# Patient Record
Sex: MALE | Race: WHITE | Employment: FULL TIME | URBAN - NONMETROPOLITAN AREA
[De-identification: names, ages, dates, MRNs, and addresses within clinical notes are randomized per-mention and may not be internally consistent; named-entity substitution may affect disease eponyms.]

---

## 2017-01-26 ENCOUNTER — OFFICE VISIT (OUTPATIENT)
Dept: FAMILY MEDICINE CLINIC | Age: 62
End: 2017-01-26

## 2017-01-26 VITALS
HEIGHT: 72 IN | HEART RATE: 99 BPM | DIASTOLIC BLOOD PRESSURE: 82 MMHG | SYSTOLIC BLOOD PRESSURE: 130 MMHG | BODY MASS INDEX: 38.03 KG/M2 | OXYGEN SATURATION: 96 % | WEIGHT: 280.8 LBS

## 2017-01-26 DIAGNOSIS — N52.9 ERECTILE DYSFUNCTION, UNSPECIFIED ERECTILE DYSFUNCTION TYPE: ICD-10-CM

## 2017-01-26 DIAGNOSIS — Z87.898 HISTORY OF PREDIABETES: ICD-10-CM

## 2017-01-26 DIAGNOSIS — K44.9 GASTROESOPHAGEAL REFLUX DISEASE WITH HIATAL HERNIA: Primary | ICD-10-CM

## 2017-01-26 DIAGNOSIS — R73.9 HYPERGLYCEMIA: ICD-10-CM

## 2017-01-26 DIAGNOSIS — E66.9 NON MORBID OBESITY, UNSPECIFIED OBESITY TYPE: ICD-10-CM

## 2017-01-26 DIAGNOSIS — E29.1 HYPOGONADISM IN MALE: ICD-10-CM

## 2017-01-26 DIAGNOSIS — R35.0 URINARY FREQUENCY: ICD-10-CM

## 2017-01-26 DIAGNOSIS — L30.1 DYSHIDROTIC ECZEMA: ICD-10-CM

## 2017-01-26 DIAGNOSIS — Z11.59 NEED FOR HEPATITIS C SCREENING TEST: ICD-10-CM

## 2017-01-26 DIAGNOSIS — K21.9 GASTROESOPHAGEAL REFLUX DISEASE WITH HIATAL HERNIA: Primary | ICD-10-CM

## 2017-01-26 LAB
BILIRUBIN URINE: NEGATIVE
BLOOD, URINE: NEGATIVE
CLARITY: CLEAR
COLOR: YELLOW
GLUCOSE URINE: NEGATIVE MG/DL
HBA1C MFR BLD: 5.5 % (ref 4.8–5.9)
HEPATITIS C ANTIBODY INTERPRETATION: NORMAL
KETONES, URINE: NEGATIVE MG/DL
LEUKOCYTE ESTERASE, URINE: NEGATIVE
NITRITE, URINE: NEGATIVE
PH UA: 7.5 (ref 5–9)
PROTEIN UA: NEGATIVE MG/DL
SPECIFIC GRAVITY UA: 1.01 (ref 1–1.03)
UROBILINOGEN, URINE: 0.2 E.U./DL

## 2017-01-26 PROCEDURE — 99214 OFFICE O/P EST MOD 30 MIN: CPT | Performed by: FAMILY MEDICINE

## 2017-01-26 ASSESSMENT — ENCOUNTER SYMPTOMS
SORE THROAT: 0
NAUSEA: 0
HEARTBURN: 0
COUGH: 0
ABDOMINAL PAIN: 0
SHORTNESS OF BREATH: 0

## 2017-01-28 LAB — TESTOSTERONE TOTAL-MALE: 325 NG/DL (ref 300–720)

## 2017-01-31 ENCOUNTER — OFFICE VISIT (OUTPATIENT)
Dept: FAMILY MEDICINE CLINIC | Age: 62
End: 2017-01-31

## 2017-01-31 VITALS
HEIGHT: 72 IN | SYSTOLIC BLOOD PRESSURE: 142 MMHG | HEART RATE: 99 BPM | WEIGHT: 284 LBS | OXYGEN SATURATION: 96 % | BODY MASS INDEX: 38.47 KG/M2 | DIASTOLIC BLOOD PRESSURE: 88 MMHG

## 2017-01-31 DIAGNOSIS — B99.9 LOCALIZED INFECTION: Primary | ICD-10-CM

## 2017-01-31 DIAGNOSIS — L30.1 DYSHIDROTIC ECZEMA: ICD-10-CM

## 2017-01-31 PROCEDURE — 99213 OFFICE O/P EST LOW 20 MIN: CPT | Performed by: FAMILY MEDICINE

## 2017-01-31 RX ORDER — UREA 40 %
CREAM (GRAM) TOPICAL
Qty: 198.4 G | Refills: 5 | Status: SHIPPED | OUTPATIENT
Start: 2017-01-31 | End: 2017-09-07

## 2017-01-31 ASSESSMENT — ENCOUNTER SYMPTOMS
SHORTNESS OF BREATH: 0
ABDOMINAL PAIN: 0

## 2017-02-09 ENCOUNTER — OFFICE VISIT (OUTPATIENT)
Dept: FAMILY MEDICINE CLINIC | Age: 62
End: 2017-02-09

## 2017-02-09 VITALS
HEIGHT: 72 IN | OXYGEN SATURATION: 96 % | WEIGHT: 280 LBS | SYSTOLIC BLOOD PRESSURE: 104 MMHG | BODY MASS INDEX: 37.93 KG/M2 | HEART RATE: 96 BPM | DIASTOLIC BLOOD PRESSURE: 80 MMHG

## 2017-02-09 DIAGNOSIS — L30.1 DYSHIDROTIC ECZEMA: Primary | ICD-10-CM

## 2017-02-09 DIAGNOSIS — N52.9 ERECTILE DYSFUNCTION, UNSPECIFIED ERECTILE DYSFUNCTION TYPE: ICD-10-CM

## 2017-02-09 PROCEDURE — 99213 OFFICE O/P EST LOW 20 MIN: CPT | Performed by: FAMILY MEDICINE

## 2017-02-09 RX ORDER — PREDNISONE 10 MG/1
TABLET ORAL
Qty: 40 TABLET | Refills: 0 | Status: SHIPPED | OUTPATIENT
Start: 2017-02-09 | End: 2017-03-02

## 2017-02-09 RX ORDER — MUPIROCIN CALCIUM 20 MG/G
CREAM TOPICAL
Qty: 30 G | Refills: 1 | Status: SHIPPED | OUTPATIENT
Start: 2017-02-09 | End: 2017-05-04 | Stop reason: SDUPTHER

## 2017-02-09 ASSESSMENT — ENCOUNTER SYMPTOMS
VOMITING: 0
SHORTNESS OF BREATH: 0
SORE THROAT: 0
COUGH: 0
RHINORRHEA: 0
DIARRHEA: 0

## 2017-02-14 ENCOUNTER — TELEPHONE (OUTPATIENT)
Dept: FAMILY MEDICINE CLINIC | Age: 62
End: 2017-02-14

## 2017-03-02 ENCOUNTER — OFFICE VISIT (OUTPATIENT)
Dept: FAMILY MEDICINE CLINIC | Age: 62
End: 2017-03-02

## 2017-03-02 VITALS
WEIGHT: 273.4 LBS | BODY MASS INDEX: 37.03 KG/M2 | DIASTOLIC BLOOD PRESSURE: 78 MMHG | OXYGEN SATURATION: 95 % | SYSTOLIC BLOOD PRESSURE: 118 MMHG | HEIGHT: 72 IN | HEART RATE: 52 BPM

## 2017-03-02 DIAGNOSIS — E66.9 NON MORBID OBESITY, UNSPECIFIED OBESITY TYPE: ICD-10-CM

## 2017-03-02 DIAGNOSIS — E55.9 VITAMIN D DEFICIENCY: ICD-10-CM

## 2017-03-02 DIAGNOSIS — E29.1 HYPOGONADISM IN MALE: ICD-10-CM

## 2017-03-02 DIAGNOSIS — N52.9 ERECTILE DYSFUNCTION, UNSPECIFIED ERECTILE DYSFUNCTION TYPE: ICD-10-CM

## 2017-03-02 DIAGNOSIS — L30.1 DYSHIDROTIC ECZEMA: Primary | ICD-10-CM

## 2017-03-02 LAB — VITAMIN D 25-HYDROXY: 29.6 NG/ML (ref 30–100)

## 2017-03-02 PROCEDURE — 99214 OFFICE O/P EST MOD 30 MIN: CPT | Performed by: FAMILY MEDICINE

## 2017-03-02 RX ORDER — MULTIVITAMIN WITH IRON
250 TABLET ORAL DAILY
COMMUNITY

## 2017-03-02 RX ORDER — SILDENAFIL 100 MG/1
100 TABLET, FILM COATED ORAL PRN
Qty: 30 TABLET | Refills: 3 | Status: SHIPPED | OUTPATIENT
Start: 2017-03-02 | End: 2017-09-07 | Stop reason: DRUGHIGH

## 2017-03-02 RX ORDER — MULTIVIT-MIN/FOLIC/VIT K/LYCOP 400-300MCG
TABLET ORAL
COMMUNITY

## 2017-03-02 ASSESSMENT — ENCOUNTER SYMPTOMS
RHINORRHEA: 0
SHORTNESS OF BREATH: 0
COUGH: 0
VOMITING: 0
DIARRHEA: 0
SORE THROAT: 0

## 2017-05-04 ENCOUNTER — OFFICE VISIT (OUTPATIENT)
Dept: FAMILY MEDICINE CLINIC | Age: 62
End: 2017-05-04

## 2017-05-04 VITALS
BODY MASS INDEX: 37.25 KG/M2 | HEART RATE: 74 BPM | SYSTOLIC BLOOD PRESSURE: 144 MMHG | WEIGHT: 275 LBS | HEIGHT: 72 IN | OXYGEN SATURATION: 97 % | DIASTOLIC BLOOD PRESSURE: 92 MMHG

## 2017-05-04 DIAGNOSIS — R73.9 HYPERGLYCEMIA: ICD-10-CM

## 2017-05-04 DIAGNOSIS — N52.9 ERECTILE DYSFUNCTION, UNSPECIFIED ERECTILE DYSFUNCTION TYPE: ICD-10-CM

## 2017-05-04 DIAGNOSIS — K44.9 GASTROESOPHAGEAL REFLUX DISEASE WITH HIATAL HERNIA: ICD-10-CM

## 2017-05-04 DIAGNOSIS — Z12.5 PROSTATE CANCER SCREENING: ICD-10-CM

## 2017-05-04 DIAGNOSIS — Z00.00 WELLNESS EXAMINATION: ICD-10-CM

## 2017-05-04 DIAGNOSIS — E66.9 NON MORBID OBESITY, UNSPECIFIED OBESITY TYPE: ICD-10-CM

## 2017-05-04 DIAGNOSIS — L30.1 DYSHIDROTIC ECZEMA: Primary | ICD-10-CM

## 2017-05-04 DIAGNOSIS — Z79.899 HIGH RISK MEDICATION USE: ICD-10-CM

## 2017-05-04 DIAGNOSIS — E55.9 VITAMIN D DEFICIENCY: ICD-10-CM

## 2017-05-04 DIAGNOSIS — K21.9 GASTROESOPHAGEAL REFLUX DISEASE WITH HIATAL HERNIA: ICD-10-CM

## 2017-05-04 DIAGNOSIS — R03.0 ELEVATED BLOOD PRESSURE READING WITHOUT DIAGNOSIS OF HYPERTENSION: ICD-10-CM

## 2017-05-04 PROCEDURE — 99214 OFFICE O/P EST MOD 30 MIN: CPT | Performed by: FAMILY MEDICINE

## 2017-05-04 PROCEDURE — 96372 THER/PROPH/DIAG INJ SC/IM: CPT | Performed by: FAMILY MEDICINE

## 2017-05-04 RX ORDER — PANTOPRAZOLE SODIUM 40 MG/1
40 TABLET, DELAYED RELEASE ORAL DAILY
Qty: 90 TABLET | Refills: 3 | Status: SHIPPED | OUTPATIENT
Start: 2017-05-04 | End: 2018-09-18 | Stop reason: ALTCHOICE

## 2017-05-04 RX ORDER — PREDNISONE 10 MG/1
TABLET ORAL
Qty: 40 TABLET | Refills: 0 | Status: SHIPPED | OUTPATIENT
Start: 2017-05-04 | End: 2017-07-14 | Stop reason: ALTCHOICE

## 2017-05-04 ASSESSMENT — ENCOUNTER SYMPTOMS
RHINORRHEA: 0
SORE THROAT: 0
VOMITING: 0
DIARRHEA: 0
SHORTNESS OF BREATH: 0
COUGH: 0

## 2017-05-05 RX ORDER — MUPIROCIN CALCIUM 20 MG/G
CREAM TOPICAL
Qty: 30 G | Refills: 1 | Status: SHIPPED | OUTPATIENT
Start: 2017-05-05 | End: 2017-06-03

## 2017-05-05 RX ORDER — TRIAMCINOLONE ACETONIDE 1 MG/G
CREAM TOPICAL
Qty: 454 G | Refills: 1 | Status: SHIPPED | OUTPATIENT
Start: 2017-05-05 | End: 2017-09-07 | Stop reason: SDUPTHER

## 2017-05-05 RX ORDER — TRIAMCINOLONE ACETONIDE 40 MG/ML
80 INJECTION, SUSPENSION INTRA-ARTICULAR; INTRAMUSCULAR ONCE
Status: COMPLETED | OUTPATIENT
Start: 2017-05-05 | End: 2017-05-05

## 2017-05-05 RX ADMIN — TRIAMCINOLONE ACETONIDE 80 MG: 40 INJECTION, SUSPENSION INTRA-ARTICULAR; INTRAMUSCULAR at 08:06

## 2017-06-02 DIAGNOSIS — E55.9 VITAMIN D DEFICIENCY: ICD-10-CM

## 2017-06-02 DIAGNOSIS — E29.1 HYPOGONADISM IN MALE: ICD-10-CM

## 2017-06-02 DIAGNOSIS — N52.9 ERECTILE DYSFUNCTION, UNSPECIFIED ERECTILE DYSFUNCTION TYPE: ICD-10-CM

## 2017-06-02 LAB — VITAMIN D 25-HYDROXY: 32.5 NG/ML (ref 30–100)

## 2017-06-05 LAB — TESTOSTERONE TOTAL-MALE: 297 NG/DL (ref 300–720)

## 2017-07-06 DIAGNOSIS — E55.9 VITAMIN D DEFICIENCY: Primary | ICD-10-CM

## 2017-07-06 DIAGNOSIS — N52.9 ERECTILE DYSFUNCTION, UNSPECIFIED ERECTILE DYSFUNCTION TYPE: ICD-10-CM

## 2017-07-14 ENCOUNTER — OFFICE VISIT (OUTPATIENT)
Dept: FAMILY MEDICINE CLINIC | Age: 62
End: 2017-07-14

## 2017-07-14 VITALS
OXYGEN SATURATION: 97 % | TEMPERATURE: 97.1 F | BODY MASS INDEX: 36.38 KG/M2 | HEIGHT: 72 IN | WEIGHT: 268.6 LBS | DIASTOLIC BLOOD PRESSURE: 90 MMHG | HEART RATE: 89 BPM | SYSTOLIC BLOOD PRESSURE: 140 MMHG

## 2017-07-14 DIAGNOSIS — M54.42 ACUTE LEFT-SIDED LOW BACK PAIN WITH LEFT-SIDED SCIATICA: Primary | ICD-10-CM

## 2017-07-14 PROCEDURE — 99213 OFFICE O/P EST LOW 20 MIN: CPT | Performed by: NURSE PRACTITIONER

## 2017-07-14 RX ORDER — CYCLOBENZAPRINE HCL 10 MG
10 TABLET ORAL 3 TIMES DAILY PRN
Qty: 15 TABLET | Refills: 0 | Status: SHIPPED | OUTPATIENT
Start: 2017-07-14 | End: 2017-07-24

## 2017-07-14 RX ORDER — METHYLPREDNISOLONE 4 MG/1
TABLET ORAL
Qty: 21 TABLET | Refills: 0 | Status: SHIPPED | OUTPATIENT
Start: 2017-07-14 | End: 2017-07-20

## 2017-07-14 ASSESSMENT — ENCOUNTER SYMPTOMS
SHORTNESS OF BREATH: 0
BACK PAIN: 1
COUGH: 0

## 2017-07-14 ASSESSMENT — PATIENT HEALTH QUESTIONNAIRE - PHQ9
SUM OF ALL RESPONSES TO PHQ QUESTIONS 1-9: 0
1. LITTLE INTEREST OR PLEASURE IN DOING THINGS: 0
2. FEELING DOWN, DEPRESSED OR HOPELESS: 0
SUM OF ALL RESPONSES TO PHQ9 QUESTIONS 1 & 2: 0

## 2017-08-31 DIAGNOSIS — Z12.5 PROSTATE CANCER SCREENING: ICD-10-CM

## 2017-08-31 DIAGNOSIS — Z00.00 WELLNESS EXAMINATION: ICD-10-CM

## 2017-08-31 DIAGNOSIS — Z79.899 HIGH RISK MEDICATION USE: ICD-10-CM

## 2017-08-31 DIAGNOSIS — R03.0 ELEVATED BLOOD PRESSURE READING WITHOUT DIAGNOSIS OF HYPERTENSION: ICD-10-CM

## 2017-08-31 DIAGNOSIS — E55.9 VITAMIN D DEFICIENCY: ICD-10-CM

## 2017-08-31 DIAGNOSIS — N52.9 ERECTILE DYSFUNCTION, UNSPECIFIED ERECTILE DYSFUNCTION TYPE: ICD-10-CM

## 2017-08-31 DIAGNOSIS — E66.9 NON MORBID OBESITY, UNSPECIFIED OBESITY TYPE: ICD-10-CM

## 2017-08-31 LAB
ALT SERPL-CCNC: 23 U/L (ref 0–41)
ANION GAP SERPL CALCULATED.3IONS-SCNC: 15 MEQ/L (ref 7–13)
AST SERPL-CCNC: 16 U/L (ref 0–40)
BASOPHILS ABSOLUTE: 0.1 K/UL (ref 0–0.2)
BASOPHILS RELATIVE PERCENT: 1.2 %
BUN BLDV-MCNC: 17 MG/DL (ref 8–23)
CALCIUM SERPL-MCNC: 9.3 MG/DL (ref 8.6–10.2)
CHLORIDE BLD-SCNC: 101 MEQ/L (ref 98–107)
CHOLESTEROL, TOTAL: 181 MG/DL (ref 0–199)
CO2: 25 MEQ/L (ref 22–29)
CREAT SERPL-MCNC: 0.67 MG/DL (ref 0.7–1.2)
EOSINOPHILS ABSOLUTE: 0.2 K/UL (ref 0–0.7)
EOSINOPHILS RELATIVE PERCENT: 3.2 %
GFR AFRICAN AMERICAN: >60
GFR NON-AFRICAN AMERICAN: >60
GLUCOSE BLD-MCNC: 99 MG/DL (ref 74–109)
HBA1C MFR BLD: 5.2 % (ref 4.8–5.9)
HCT VFR BLD CALC: 46.1 % (ref 42–52)
HDLC SERPL-MCNC: 39 MG/DL (ref 40–59)
HEMOGLOBIN: 15.5 G/DL (ref 14–18)
LDL CHOLESTEROL CALCULATED: 119 MG/DL (ref 0–129)
LYMPHOCYTES ABSOLUTE: 1.2 K/UL (ref 1–4.8)
LYMPHOCYTES RELATIVE PERCENT: 18.1 %
MAGNESIUM: 2.2 MG/DL (ref 1.7–2.3)
MCH RBC QN AUTO: 29.6 PG (ref 27–31.3)
MCHC RBC AUTO-ENTMCNC: 33.6 % (ref 33–37)
MCV RBC AUTO: 88.1 FL (ref 80–100)
MONOCYTES ABSOLUTE: 0.6 K/UL (ref 0.2–0.8)
MONOCYTES RELATIVE PERCENT: 8.7 %
NEUTROPHILS ABSOLUTE: 4.4 K/UL (ref 1.4–6.5)
NEUTROPHILS RELATIVE PERCENT: 68.8 %
PDW BLD-RTO: 13.5 % (ref 11.5–14.5)
PLATELET # BLD: 194 K/UL (ref 130–400)
POTASSIUM SERPL-SCNC: 4.4 MEQ/L (ref 3.5–5.1)
PROSTATE SPECIFIC ANTIGEN: 0.73 NG/ML (ref 0–5.4)
RBC # BLD: 5.23 M/UL (ref 4.7–6.1)
SODIUM BLD-SCNC: 141 MEQ/L (ref 132–144)
TRIGL SERPL-MCNC: 117 MG/DL (ref 0–200)
TSH SERPL DL<=0.05 MIU/L-ACNC: 2.26 UIU/ML (ref 0.27–4.2)
VITAMIN D 25-HYDROXY: 31.8 NG/ML (ref 30–100)
WBC # BLD: 6.4 K/UL (ref 4.8–10.8)

## 2017-09-02 LAB — TESTOSTERONE TOTAL-MALE: 318 NG/DL (ref 300–720)

## 2017-09-07 ENCOUNTER — OFFICE VISIT (OUTPATIENT)
Dept: FAMILY MEDICINE CLINIC | Age: 62
End: 2017-09-07

## 2017-09-07 VITALS
HEIGHT: 72 IN | HEART RATE: 81 BPM | WEIGHT: 277 LBS | OXYGEN SATURATION: 97 % | DIASTOLIC BLOOD PRESSURE: 90 MMHG | BODY MASS INDEX: 37.52 KG/M2 | SYSTOLIC BLOOD PRESSURE: 142 MMHG

## 2017-09-07 DIAGNOSIS — Z12.11 COLON CANCER SCREENING: ICD-10-CM

## 2017-09-07 DIAGNOSIS — L30.1 DYSHIDROTIC ECZEMA: Primary | ICD-10-CM

## 2017-09-07 DIAGNOSIS — N52.9 ERECTILE DYSFUNCTION, UNSPECIFIED ERECTILE DYSFUNCTION TYPE: ICD-10-CM

## 2017-09-07 DIAGNOSIS — E29.1 HYPOGONADISM IN MALE: ICD-10-CM

## 2017-09-07 DIAGNOSIS — E66.9 OBESITY (BMI 30-39.9): ICD-10-CM

## 2017-09-07 PROCEDURE — 99214 OFFICE O/P EST MOD 30 MIN: CPT | Performed by: FAMILY MEDICINE

## 2017-09-07 RX ORDER — TRIAMCINOLONE ACETONIDE 1 MG/G
CREAM TOPICAL
Qty: 454 G | Refills: 1 | Status: SHIPPED | OUTPATIENT
Start: 2017-09-07 | End: 2018-09-18

## 2017-09-07 RX ORDER — SILDENAFIL 100 MG/1
100 TABLET, FILM COATED ORAL PRN
Qty: 30 TABLET | Refills: 3 | Status: SHIPPED | OUTPATIENT
Start: 2017-09-07 | End: 2018-05-01

## 2017-09-07 ASSESSMENT — ENCOUNTER SYMPTOMS
SORE THROAT: 0
COUGH: 0
RHINORRHEA: 0
VOMITING: 0
SHORTNESS OF BREATH: 0
DIARRHEA: 0

## 2017-09-15 ENCOUNTER — HOSPITAL ENCOUNTER (OUTPATIENT)
Age: 62
Setting detail: OUTPATIENT SURGERY
Discharge: HOME | End: 2017-09-15
Attending: INTERNAL MEDICINE | Admitting: INTERNAL MEDICINE
Payer: COMMERCIAL

## 2017-09-15 ENCOUNTER — OFFICE VISIT (OUTPATIENT)
Dept: GASTROENTEROLOGY | Age: 62
End: 2017-09-15

## 2017-09-15 ENCOUNTER — ANESTHESIA EVENT (OUTPATIENT)
Dept: ENDOSCOPY | Age: 62
End: 2017-09-15
Payer: COMMERCIAL

## 2017-09-15 ENCOUNTER — ANESTHESIA (OUTPATIENT)
Dept: ENDOSCOPY | Age: 62
End: 2017-09-15
Payer: COMMERCIAL

## 2017-09-15 VITALS
SYSTOLIC BLOOD PRESSURE: 144 MMHG | BODY MASS INDEX: 35.89 KG/M2 | HEART RATE: 86 BPM | HEIGHT: 72 IN | RESPIRATION RATE: 16 BRPM | DIASTOLIC BLOOD PRESSURE: 69 MMHG | OXYGEN SATURATION: 97 % | WEIGHT: 265 LBS | TEMPERATURE: 97.5 F

## 2017-09-15 VITALS
DIASTOLIC BLOOD PRESSURE: 98 MMHG | SYSTOLIC BLOOD PRESSURE: 154 MMHG | WEIGHT: 265 LBS | BODY MASS INDEX: 35.94 KG/M2 | HEART RATE: 90 BPM

## 2017-09-15 VITALS
RESPIRATION RATE: 13 BRPM | SYSTOLIC BLOOD PRESSURE: 125 MMHG | OXYGEN SATURATION: 94 % | DIASTOLIC BLOOD PRESSURE: 72 MMHG

## 2017-09-15 DIAGNOSIS — Z79.899 ENCOUNTER FOR LONG-TERM (CURRENT) USE OF MEDICATIONS: ICD-10-CM

## 2017-09-15 DIAGNOSIS — R13.19 OTHER DYSPHAGIA: ICD-10-CM

## 2017-09-15 DIAGNOSIS — R12 HEARTBURN: Primary | ICD-10-CM

## 2017-09-15 DIAGNOSIS — Z12.11 SPECIAL SCREENING FOR MALIGNANT NEOPLASMS, COLON: ICD-10-CM

## 2017-09-15 PROCEDURE — 3700000001 HC ADD 15 MINUTES (ANESTHESIA): Performed by: INTERNAL MEDICINE

## 2017-09-15 PROCEDURE — 3609027000 HC COLONOSCOPY: Performed by: INTERNAL MEDICINE

## 2017-09-15 PROCEDURE — 2500000003 HC RX 250 WO HCPCS: Performed by: NURSE ANESTHETIST, CERTIFIED REGISTERED

## 2017-09-15 PROCEDURE — 7100000010 HC PHASE II RECOVERY - FIRST 15 MIN: Performed by: INTERNAL MEDICINE

## 2017-09-15 PROCEDURE — 6360000002 HC RX W HCPCS: Performed by: NURSE ANESTHETIST, CERTIFIED REGISTERED

## 2017-09-15 PROCEDURE — 2580000003 HC RX 258: Performed by: INTERNAL MEDICINE

## 2017-09-15 PROCEDURE — 88305 TISSUE EXAM BY PATHOLOGIST: CPT

## 2017-09-15 PROCEDURE — 88313 SPECIAL STAINS GROUP 2: CPT

## 2017-09-15 PROCEDURE — 3700000000 HC ANESTHESIA ATTENDED CARE: Performed by: INTERNAL MEDICINE

## 2017-09-15 PROCEDURE — 3609012700 HC EGD DILATION SAVORY: Performed by: INTERNAL MEDICINE

## 2017-09-15 RX ORDER — LIDOCAINE HYDROCHLORIDE 10 MG/ML
1 INJECTION, SOLUTION EPIDURAL; INFILTRATION; INTRACAUDAL; PERINEURAL
Status: DISCONTINUED | OUTPATIENT
Start: 2017-09-15 | End: 2017-09-15 | Stop reason: HOSPADM

## 2017-09-15 RX ORDER — SODIUM CHLORIDE 0.9 % (FLUSH) 0.9 %
10 SYRINGE (ML) INJECTION PRN
Status: DISCONTINUED | OUTPATIENT
Start: 2017-09-15 | End: 2017-09-15 | Stop reason: HOSPADM

## 2017-09-15 RX ORDER — PROPOFOL 10 MG/ML
INJECTION, EMULSION INTRAVENOUS PRN
Status: DISCONTINUED | OUTPATIENT
Start: 2017-09-15 | End: 2017-09-15 | Stop reason: SDUPTHER

## 2017-09-15 RX ORDER — SODIUM CHLORIDE 0.9 % (FLUSH) 0.9 %
10 SYRINGE (ML) INJECTION EVERY 12 HOURS SCHEDULED
Status: DISCONTINUED | OUTPATIENT
Start: 2017-09-15 | End: 2017-09-15 | Stop reason: HOSPADM

## 2017-09-15 RX ORDER — ONDANSETRON 2 MG/ML
4 INJECTION INTRAMUSCULAR; INTRAVENOUS
Status: DISCONTINUED | OUTPATIENT
Start: 2017-09-15 | End: 2017-09-15 | Stop reason: HOSPADM

## 2017-09-15 RX ORDER — SODIUM CHLORIDE 9 MG/ML
INJECTION, SOLUTION INTRAVENOUS CONTINUOUS
Status: DISCONTINUED | OUTPATIENT
Start: 2017-09-15 | End: 2017-09-15 | Stop reason: SDUPTHER

## 2017-09-15 RX ORDER — SODIUM CHLORIDE 9 MG/ML
INJECTION, SOLUTION INTRAVENOUS CONTINUOUS
Status: DISCONTINUED | OUTPATIENT
Start: 2017-09-15 | End: 2017-09-15 | Stop reason: HOSPADM

## 2017-09-15 RX ORDER — GLYCOPYRROLATE 0.2 MG/ML
INJECTION INTRAMUSCULAR; INTRAVENOUS PRN
Status: DISCONTINUED | OUTPATIENT
Start: 2017-09-15 | End: 2017-09-15 | Stop reason: SDUPTHER

## 2017-09-15 RX ORDER — LIDOCAINE HYDROCHLORIDE 20 MG/ML
INJECTION, SOLUTION INFILTRATION; PERINEURAL PRN
Status: DISCONTINUED | OUTPATIENT
Start: 2017-09-15 | End: 2017-09-15 | Stop reason: SDUPTHER

## 2017-09-15 RX ADMIN — SODIUM CHLORIDE: 900 INJECTION, SOLUTION INTRAVENOUS at 12:11

## 2017-09-15 RX ADMIN — PROPOFOL 40 MG: 10 INJECTION, EMULSION INTRAVENOUS at 11:53

## 2017-09-15 RX ADMIN — PROPOFOL 10 MG: 10 INJECTION, EMULSION INTRAVENOUS at 11:54

## 2017-09-15 RX ADMIN — PROPOFOL 20 MG: 10 INJECTION, EMULSION INTRAVENOUS at 12:04

## 2017-09-15 RX ADMIN — PROPOFOL 20 MG: 10 INJECTION, EMULSION INTRAVENOUS at 12:07

## 2017-09-15 RX ADMIN — GLYCOPYRROLATE 0.2 MG: 0.2 INJECTION INTRAMUSCULAR; INTRAVENOUS at 12:11

## 2017-09-15 RX ADMIN — PROPOFOL 20 MG: 10 INJECTION, EMULSION INTRAVENOUS at 12:08

## 2017-09-15 RX ADMIN — PROPOFOL 20 MG: 10 INJECTION, EMULSION INTRAVENOUS at 12:09

## 2017-09-15 RX ADMIN — PROPOFOL 20 MG: 10 INJECTION, EMULSION INTRAVENOUS at 11:55

## 2017-09-15 RX ADMIN — PROPOFOL 20 MG: 10 INJECTION, EMULSION INTRAVENOUS at 12:11

## 2017-09-15 RX ADMIN — PROPOFOL 20 MG: 10 INJECTION, EMULSION INTRAVENOUS at 11:59

## 2017-09-15 RX ADMIN — PROPOFOL 20 MG: 10 INJECTION, EMULSION INTRAVENOUS at 11:56

## 2017-09-15 RX ADMIN — LIDOCAINE HYDROCHLORIDE 40 MG: 20 INJECTION, SOLUTION INFILTRATION; PERINEURAL at 11:53

## 2017-09-15 RX ADMIN — PROPOFOL 20 MG: 10 INJECTION, EMULSION INTRAVENOUS at 12:06

## 2017-09-15 RX ADMIN — PROPOFOL 10 MG: 10 INJECTION, EMULSION INTRAVENOUS at 12:01

## 2017-09-15 RX ADMIN — PROPOFOL 20 MG: 10 INJECTION, EMULSION INTRAVENOUS at 12:00

## 2017-09-15 RX ADMIN — PROPOFOL 20 MG: 10 INJECTION, EMULSION INTRAVENOUS at 11:57

## 2017-09-15 RX ADMIN — PROPOFOL 10 MG: 10 INJECTION, EMULSION INTRAVENOUS at 12:02

## 2017-09-15 RX ADMIN — PROPOFOL 20 MG: 10 INJECTION, EMULSION INTRAVENOUS at 12:05

## 2017-09-15 RX ADMIN — PROPOFOL 20 MG: 10 INJECTION, EMULSION INTRAVENOUS at 12:15

## 2017-09-15 RX ADMIN — PROPOFOL 20 MG: 10 INJECTION, EMULSION INTRAVENOUS at 12:03

## 2017-09-15 RX ADMIN — PROPOFOL 20 MG: 10 INJECTION, EMULSION INTRAVENOUS at 12:13

## 2017-09-15 RX ADMIN — SODIUM CHLORIDE: 900 INJECTION, SOLUTION INTRAVENOUS at 10:52

## 2017-09-15 RX ADMIN — PROPOFOL 20 MG: 10 INJECTION, EMULSION INTRAVENOUS at 11:58

## 2017-09-15 ASSESSMENT — PAIN - FUNCTIONAL ASSESSMENT: PAIN_FUNCTIONAL_ASSESSMENT: 0-10

## 2017-09-28 ENCOUNTER — OFFICE VISIT (OUTPATIENT)
Dept: GASTROENTEROLOGY | Age: 62
End: 2017-09-28

## 2017-09-28 VITALS
BODY MASS INDEX: 37.57 KG/M2 | SYSTOLIC BLOOD PRESSURE: 164 MMHG | DIASTOLIC BLOOD PRESSURE: 91 MMHG | WEIGHT: 277 LBS | HEART RATE: 95 BPM

## 2017-09-28 DIAGNOSIS — R12 HEARTBURN: Primary | ICD-10-CM

## 2017-09-28 DIAGNOSIS — K25.3 ACUTE GASTRIC ULCER WITHOUT HEMORRHAGE OR PERFORATION: ICD-10-CM

## 2017-09-28 DIAGNOSIS — R13.19 OTHER DYSPHAGIA: ICD-10-CM

## 2017-09-28 PROCEDURE — 99212 OFFICE O/P EST SF 10 MIN: CPT | Performed by: INTERNAL MEDICINE

## 2018-03-09 ENCOUNTER — OFFICE VISIT (OUTPATIENT)
Dept: FAMILY MEDICINE CLINIC | Age: 63
End: 2018-03-09
Payer: COMMERCIAL

## 2018-03-09 VITALS
HEART RATE: 79 BPM | BODY MASS INDEX: 37.57 KG/M2 | HEIGHT: 72 IN | DIASTOLIC BLOOD PRESSURE: 90 MMHG | OXYGEN SATURATION: 97 % | WEIGHT: 277.4 LBS | SYSTOLIC BLOOD PRESSURE: 150 MMHG

## 2018-03-09 DIAGNOSIS — N52.9 ERECTILE DYSFUNCTION, UNSPECIFIED ERECTILE DYSFUNCTION TYPE: ICD-10-CM

## 2018-03-09 DIAGNOSIS — E55.9 VITAMIN D DEFICIENCY: ICD-10-CM

## 2018-03-09 DIAGNOSIS — K44.9 GASTROESOPHAGEAL REFLUX DISEASE WITH HIATAL HERNIA: Primary | ICD-10-CM

## 2018-03-09 DIAGNOSIS — E66.9 OBESITY (BMI 30-39.9): ICD-10-CM

## 2018-03-09 DIAGNOSIS — K21.9 GASTROESOPHAGEAL REFLUX DISEASE WITH HIATAL HERNIA: Primary | ICD-10-CM

## 2018-03-09 DIAGNOSIS — L30.1 DYSHIDROTIC ECZEMA: ICD-10-CM

## 2018-03-09 PROCEDURE — 99213 OFFICE O/P EST LOW 20 MIN: CPT | Performed by: FAMILY MEDICINE

## 2018-03-09 RX ORDER — SILDENAFIL CITRATE 20 MG/1
20 TABLET ORAL DAILY
Qty: 30 TABLET | Refills: 0 | Status: SHIPPED | OUTPATIENT
Start: 2018-03-09 | End: 2018-05-01

## 2018-03-09 ASSESSMENT — ENCOUNTER SYMPTOMS
SHORTNESS OF BREATH: 0
ABDOMINAL PAIN: 0

## 2018-03-09 NOTE — PROGRESS NOTES
facility-administered medications for this visit. Objective    Vitals:    03/09/18 1121 03/09/18 1125   BP: (!) 142/90 (!) 150/90   Pulse: 79    SpO2: 97%    Weight: 277 lb 6.4 oz (125.8 kg)    Height: 6' (1.829 m)      Physical Exam   Constitutional: He appears well-developed and well-nourished. HENT:   Head: Normocephalic and atraumatic. Right Ear: Tympanic membrane, external ear and ear canal normal.   Left Ear: Tympanic membrane, external ear and ear canal normal.   Nose: Nose normal.   Mouth/Throat: Uvula is midline, oropharynx is clear and moist and mucous membranes are normal.   Neck: Normal range of motion. Neck supple. Cardiovascular: Normal rate, regular rhythm and normal heart sounds. No murmur heard. Pulmonary/Chest: Effort normal and breath sounds normal. He has no wheezes. Lymphadenopathy:     He has no cervical adenopathy. Skin: Skin is warm and dry. No rash noted. Erythematous scaly patches on hands and feet. Assessment & Plan   1. Gastroesophageal reflux disease with hiatal hernia     2. Dyshidrotic eczema  Crisaborole (EUCRISA) 2 % OINT   3. Vitamin D deficiency     4. Obesity (BMI 30-39.9)     5. Erectile dysfunction, unspecified erectile dysfunction type  sildenafil (REVATIO) 20 MG tablet     Will have him try Eucrisa/samples given and rx sent. He wants to focus on diet, exercise and weight reduction to bring down his BP without medication. He will stop in for a BP and weight check in one month. He declines medication at this time. No orders of the defined types were placed in this encounter. Orders Placed This Encounter   Medications    Crisaborole (EUCRISA) 2 % OINT     Sig: Apply bid to affected areas including hands and feet     Dispense:  60 g     Refill:  3     Dx eczematous dermatitis including hands and feet failing on topical and injection steriods.     sildenafil (REVATIO) 20 MG tablet     Sig: Take 1 tablet by mouth daily     Dispense:  30 tablet

## 2018-04-06 ENCOUNTER — NURSE ONLY (OUTPATIENT)
Dept: FAMILY MEDICINE CLINIC | Age: 63
End: 2018-04-06

## 2018-04-06 ENCOUNTER — TELEPHONE (OUTPATIENT)
Dept: FAMILY MEDICINE CLINIC | Age: 63
End: 2018-04-06

## 2018-04-06 VITALS — DIASTOLIC BLOOD PRESSURE: 72 MMHG | WEIGHT: 268 LBS | BODY MASS INDEX: 36.35 KG/M2 | SYSTOLIC BLOOD PRESSURE: 124 MMHG

## 2018-04-06 VITALS — SYSTOLIC BLOOD PRESSURE: 124 MMHG | DIASTOLIC BLOOD PRESSURE: 72 MMHG | WEIGHT: 268 LBS | BODY MASS INDEX: 36.35 KG/M2

## 2018-05-01 ENCOUNTER — OFFICE VISIT (OUTPATIENT)
Dept: FAMILY MEDICINE CLINIC | Age: 63
End: 2018-05-01
Payer: COMMERCIAL

## 2018-05-01 VITALS
WEIGHT: 266 LBS | HEART RATE: 95 BPM | BODY MASS INDEX: 36.03 KG/M2 | TEMPERATURE: 98.2 F | OXYGEN SATURATION: 97 % | SYSTOLIC BLOOD PRESSURE: 136 MMHG | DIASTOLIC BLOOD PRESSURE: 72 MMHG | HEIGHT: 72 IN

## 2018-05-01 DIAGNOSIS — L30.9 ECZEMA, UNSPECIFIED TYPE: ICD-10-CM

## 2018-05-01 DIAGNOSIS — L03.116 CELLULITIS OF LEFT LOWER EXTREMITY: Primary | ICD-10-CM

## 2018-05-01 PROCEDURE — 99213 OFFICE O/P EST LOW 20 MIN: CPT | Performed by: NURSE PRACTITIONER

## 2018-05-01 RX ORDER — SULFAMETHOXAZOLE AND TRIMETHOPRIM 800; 160 MG/1; MG/1
1 TABLET ORAL 2 TIMES DAILY
Qty: 20 TABLET | Refills: 0 | Status: SHIPPED | OUTPATIENT
Start: 2018-05-01 | End: 2018-05-11

## 2018-05-01 ASSESSMENT — ENCOUNTER SYMPTOMS
SHORTNESS OF BREATH: 0
COUGH: 0
RHINORRHEA: 0
COLOR CHANGE: 1
SORE THROAT: 0

## 2018-09-18 ENCOUNTER — OFFICE VISIT (OUTPATIENT)
Dept: FAMILY MEDICINE CLINIC | Age: 63
End: 2018-09-18
Payer: COMMERCIAL

## 2018-09-18 VITALS
BODY MASS INDEX: 33.72 KG/M2 | HEART RATE: 86 BPM | SYSTOLIC BLOOD PRESSURE: 134 MMHG | WEIGHT: 249 LBS | OXYGEN SATURATION: 98 % | HEIGHT: 72 IN | DIASTOLIC BLOOD PRESSURE: 82 MMHG

## 2018-09-18 DIAGNOSIS — L30.9 ECZEMA, UNSPECIFIED TYPE: Primary | ICD-10-CM

## 2018-09-18 DIAGNOSIS — Z79.899 HIGH RISK MEDICATION USE: ICD-10-CM

## 2018-09-18 DIAGNOSIS — Z12.5 PROSTATE CANCER SCREENING: ICD-10-CM

## 2018-09-18 DIAGNOSIS — Z00.00 WELLNESS EXAMINATION: ICD-10-CM

## 2018-09-18 DIAGNOSIS — Z23 NEED FOR SHINGLES VACCINE: ICD-10-CM

## 2018-09-18 PROCEDURE — 99214 OFFICE O/P EST MOD 30 MIN: CPT | Performed by: FAMILY MEDICINE

## 2018-09-18 RX ORDER — TRIAMCINOLONE ACETONIDE 1 MG/G
CREAM TOPICAL
Qty: 454 G | Refills: 0 | Status: SHIPPED | OUTPATIENT
Start: 2018-09-18 | End: 2019-01-10 | Stop reason: SDUPTHER

## 2018-09-18 ASSESSMENT — PATIENT HEALTH QUESTIONNAIRE - PHQ9
SUM OF ALL RESPONSES TO PHQ QUESTIONS 1-9: 0
SUM OF ALL RESPONSES TO PHQ QUESTIONS 1-9: 0
SUM OF ALL RESPONSES TO PHQ9 QUESTIONS 1 & 2: 0
2. FEELING DOWN, DEPRESSED OR HOPELESS: 0
1. LITTLE INTEREST OR PLEASURE IN DOING THINGS: 0

## 2018-09-18 ASSESSMENT — ENCOUNTER SYMPTOMS
COUGH: 0
SHORTNESS OF BREATH: 0
ABDOMINAL PAIN: 0

## 2018-09-19 DIAGNOSIS — Z00.00 WELLNESS EXAMINATION: ICD-10-CM

## 2018-09-19 DIAGNOSIS — Z12.5 PROSTATE CANCER SCREENING: ICD-10-CM

## 2018-09-19 DIAGNOSIS — Z79.899 HIGH RISK MEDICATION USE: ICD-10-CM

## 2018-09-19 LAB
ALT SERPL-CCNC: 22 U/L (ref 0–41)
ANION GAP SERPL CALCULATED.3IONS-SCNC: 14 MEQ/L (ref 7–13)
AST SERPL-CCNC: 14 U/L (ref 0–40)
BASOPHILS ABSOLUTE: 0.1 K/UL (ref 0–0.2)
BASOPHILS RELATIVE PERCENT: 2.1 %
BUN BLDV-MCNC: 19 MG/DL (ref 8–23)
CALCIUM SERPL-MCNC: 9.5 MG/DL (ref 8.6–10.2)
CHLORIDE BLD-SCNC: 100 MEQ/L (ref 98–107)
CHOLESTEROL, TOTAL: 162 MG/DL (ref 0–199)
CO2: 26 MEQ/L (ref 22–29)
CREAT SERPL-MCNC: 0.74 MG/DL (ref 0.7–1.2)
EOSINOPHILS ABSOLUTE: 0.4 K/UL (ref 0–0.7)
EOSINOPHILS RELATIVE PERCENT: 7.9 %
GFR AFRICAN AMERICAN: >60
GFR NON-AFRICAN AMERICAN: >60
GLUCOSE BLD-MCNC: 71 MG/DL (ref 74–109)
HCT VFR BLD CALC: 45.2 % (ref 42–52)
HDLC SERPL-MCNC: 38 MG/DL (ref 40–59)
HEMOGLOBIN: 15.3 G/DL (ref 14–18)
LDL CHOLESTEROL CALCULATED: 111 MG/DL (ref 0–129)
LYMPHOCYTES ABSOLUTE: 1.1 K/UL (ref 1–4.8)
LYMPHOCYTES RELATIVE PERCENT: 18.7 %
MCH RBC QN AUTO: 29.2 PG (ref 27–31.3)
MCHC RBC AUTO-ENTMCNC: 33.8 % (ref 33–37)
MCV RBC AUTO: 86.6 FL (ref 80–100)
MONOCYTES ABSOLUTE: 0.5 K/UL (ref 0.2–0.8)
MONOCYTES RELATIVE PERCENT: 8.4 %
NEUTROPHILS ABSOLUTE: 3.6 K/UL (ref 1.4–6.5)
NEUTROPHILS RELATIVE PERCENT: 62.9 %
PDW BLD-RTO: 13.9 % (ref 11.5–14.5)
PLATELET # BLD: 201 K/UL (ref 130–400)
POTASSIUM SERPL-SCNC: 4.4 MEQ/L (ref 3.5–5.1)
PROSTATE SPECIFIC ANTIGEN: 0.77 NG/ML (ref 0–5.4)
RBC # BLD: 5.22 M/UL (ref 4.7–6.1)
SODIUM BLD-SCNC: 140 MEQ/L (ref 132–144)
TRIGL SERPL-MCNC: 64 MG/DL (ref 0–200)
WBC # BLD: 5.7 K/UL (ref 4.8–10.8)

## 2019-01-11 RX ORDER — TRIAMCINOLONE ACETONIDE 1 MG/G
CREAM TOPICAL
Qty: 454 G | Refills: 0 | Status: SHIPPED | OUTPATIENT
Start: 2019-01-11 | End: 2020-10-09 | Stop reason: SDUPTHER

## 2019-03-19 ENCOUNTER — OFFICE VISIT (OUTPATIENT)
Dept: FAMILY MEDICINE CLINIC | Age: 64
End: 2019-03-19
Payer: MEDICARE

## 2019-03-19 VITALS
DIASTOLIC BLOOD PRESSURE: 74 MMHG | HEIGHT: 71 IN | TEMPERATURE: 96.5 F | SYSTOLIC BLOOD PRESSURE: 122 MMHG | BODY MASS INDEX: 35.42 KG/M2 | OXYGEN SATURATION: 98 % | WEIGHT: 253 LBS | HEART RATE: 72 BPM

## 2019-03-19 DIAGNOSIS — E55.9 VITAMIN D DEFICIENCY: Primary | ICD-10-CM

## 2019-03-19 DIAGNOSIS — R79.89 LOW TESTOSTERONE: ICD-10-CM

## 2019-03-19 DIAGNOSIS — Z11.4 SCREENING FOR HIV WITHOUT PRESENCE OF RISK FACTORS: ICD-10-CM

## 2019-03-19 DIAGNOSIS — D12.3 ADENOMATOUS POLYP OF TRANSVERSE COLON: ICD-10-CM

## 2019-03-19 DIAGNOSIS — Z13.6 SCREENING FOR AAA (ABDOMINAL AORTIC ANEURYSM): ICD-10-CM

## 2019-03-19 PROCEDURE — 99214 OFFICE O/P EST MOD 30 MIN: CPT | Performed by: FAMILY MEDICINE

## 2019-03-19 RX ORDER — MULTIVIT WITH MINERALS/LUTEIN
1000 TABLET ORAL DAILY
COMMUNITY

## 2019-03-19 ASSESSMENT — ENCOUNTER SYMPTOMS
EYE DISCHARGE: 0
COLOR CHANGE: 0
CONSTIPATION: 0
TROUBLE SWALLOWING: 0
DIARRHEA: 0
VOICE CHANGE: 0
ABDOMINAL DISTENTION: 0
ABDOMINAL PAIN: 0
SHORTNESS OF BREATH: 0
COUGH: 0
NAUSEA: 0

## 2019-03-19 ASSESSMENT — PATIENT HEALTH QUESTIONNAIRE - PHQ9
SUM OF ALL RESPONSES TO PHQ QUESTIONS 1-9: 0
1. LITTLE INTEREST OR PLEASURE IN DOING THINGS: 0
SUM OF ALL RESPONSES TO PHQ9 QUESTIONS 1 & 2: 0
2. FEELING DOWN, DEPRESSED OR HOPELESS: 0
SUM OF ALL RESPONSES TO PHQ QUESTIONS 1-9: 0

## 2019-03-22 DIAGNOSIS — E55.9 VITAMIN D DEFICIENCY: ICD-10-CM

## 2019-03-22 DIAGNOSIS — Z11.4 SCREENING FOR HIV WITHOUT PRESENCE OF RISK FACTORS: ICD-10-CM

## 2019-03-22 DIAGNOSIS — R79.89 LOW TESTOSTERONE: ICD-10-CM

## 2019-03-22 LAB — VITAMIN D 25-HYDROXY: 33.9 NG/ML (ref 30–100)

## 2019-03-25 LAB
SEX HORMONE BINDING GLOBULIN: 30 NMOL/L (ref 11–80)
TESTOSTERONE FREE PERCENT: 1.9 % (ref 1.6–2.9)
TESTOSTERONE FREE, CALC: 55 PG/ML (ref 47–244)
TESTOSTERONE TOTAL-MALE: 292 NG/DL (ref 300–720)

## 2019-03-26 LAB — HIV 1,2 COMBO ANTIGEN/ANTIBODY: NEGATIVE

## 2019-03-29 ENCOUNTER — HOSPITAL ENCOUNTER (OUTPATIENT)
Dept: ULTRASOUND IMAGING | Age: 64
Discharge: HOME | End: 2019-03-31
Payer: MEDICARE

## 2019-03-29 DIAGNOSIS — Z13.6 SCREENING FOR AAA (ABDOMINAL AORTIC ANEURYSM): ICD-10-CM

## 2019-03-29 PROCEDURE — 76706 US ABDL AORTA SCREEN AAA: CPT

## 2019-09-13 ENCOUNTER — TELEPHONE (OUTPATIENT)
Dept: FAMILY MEDICINE CLINIC | Age: 64
End: 2019-09-13

## 2019-09-20 ENCOUNTER — OFFICE VISIT (OUTPATIENT)
Dept: FAMILY MEDICINE CLINIC | Age: 64
End: 2019-09-20
Payer: MEDICARE

## 2019-09-20 VITALS
TEMPERATURE: 97 F | HEIGHT: 71 IN | WEIGHT: 245 LBS | HEART RATE: 78 BPM | DIASTOLIC BLOOD PRESSURE: 82 MMHG | SYSTOLIC BLOOD PRESSURE: 138 MMHG | BODY MASS INDEX: 34.3 KG/M2 | OXYGEN SATURATION: 98 %

## 2019-09-20 DIAGNOSIS — Z12.5 SCREENING FOR MALIGNANT NEOPLASM OF PROSTATE: ICD-10-CM

## 2019-09-20 DIAGNOSIS — E55.9 VITAMIN D DEFICIENCY: ICD-10-CM

## 2019-09-20 DIAGNOSIS — Z00.00 MEDICARE ANNUAL WELLNESS VISIT, SUBSEQUENT: Primary | ICD-10-CM

## 2019-09-20 DIAGNOSIS — L30.9 DERMATITIS: ICD-10-CM

## 2019-09-20 LAB
PROSTATE SPECIFIC ANTIGEN: 0.76 NG/ML (ref 0–5.4)
VITAMIN D 25-HYDROXY: 42.3 NG/ML (ref 30–100)

## 2019-09-20 PROCEDURE — G0402 INITIAL PREVENTIVE EXAM: HCPCS | Performed by: FAMILY MEDICINE

## 2019-09-20 PROCEDURE — 99213 OFFICE O/P EST LOW 20 MIN: CPT | Performed by: FAMILY MEDICINE

## 2019-09-20 ASSESSMENT — ENCOUNTER SYMPTOMS
VOICE CHANGE: 0
COUGH: 0
NAUSEA: 0
TROUBLE SWALLOWING: 0
EYE DISCHARGE: 0
ABDOMINAL PAIN: 0
COLOR CHANGE: 0
CONSTIPATION: 0
SHORTNESS OF BREATH: 0
DIARRHEA: 0
ABDOMINAL DISTENTION: 0

## 2019-09-20 ASSESSMENT — PATIENT HEALTH QUESTIONNAIRE - PHQ9
SUM OF ALL RESPONSES TO PHQ QUESTIONS 1-9: 0
SUM OF ALL RESPONSES TO PHQ QUESTIONS 1-9: 0

## 2019-09-20 ASSESSMENT — LIFESTYLE VARIABLES: HOW OFTEN DO YOU HAVE A DRINK CONTAINING ALCOHOL: 0

## 2019-09-20 NOTE — PROGRESS NOTES
Medicare Annual Wellness Visit  Name: Lucrecia Jim Date: 2019   MRN: [id number] Sex: Male   Age: 70 y.o. Ethnicity: Non-/Non    : 1955 Race: Abby Garcia is here for Medicare AWV    Screenings for behavioral, psychosocial and functional/safety risks, and cognitive dysfunction are all negative except as indicated below. These results, as well as other patient data from the [address] form, are documented in Flowsheets linked to this Encounter. No Known Allergies    Prior to Visit Medications    Medication Sig Taking? Authorizing Provider   vitamin E 1000 units capsule Take 1,000 Units by mouth daily Yes Historical Provider, MD   vitamin D (CHOLECALCIFEROL) 1000 UNIT TABS tablet Take 6,000 Units by mouth daily Yes Historical Provider, MD   triamcinolone (KENALOG) 0.1 % cream Apply topically qam daily Monday through Friday only. Take weekend off. Do not use on face, groin, armpits, breasts tissue. Yes Chelle Woodson MD   Multiple Vitamins-Minerals (ONE-A-DAY MENS HEALTH FORMULA) TABS Take by mouth Yes Historical Provider, MD   magnesium (MAGNESIUM-OXIDE) 250 MG TABS tablet Take 250 mg by mouth daily Take 3 pills daily Yes Historical Provider, MD   calcium carbonate (OYSTER SHELL CALCIUM 500 MG) 1250 MG tablet Take 1 tablet by mouth daily Yes Historical Provider, MD   Glucosamine-Chondroitin 5023-0261 MG/30ML LIQD Take by mouth Take 3 pills daily Yes Historical Provider, MD   Omega-3 Fatty Acids (FISH OIL) 1200 MG CAPS Take  by mouth daily.    Yes Historical Provider, MD       Past Medical History:   Diagnosis Date    BPH (benign prostatic hyperplasia) 2/3/2012    no treatment at this time    Chronic back pain     Colon polyp 2017    Dr. Leandra Tee; f/u 5 years    Eczematous dermatitis     hands, feet and legs    ED (erectile dysfunction) 2/3/2012    Erectile dysfunction     Family history of early CAD 2/3/2012    Gastroesophageal reflux

## 2020-10-09 ENCOUNTER — OFFICE VISIT (OUTPATIENT)
Dept: FAMILY MEDICINE CLINIC | Age: 65
End: 2020-10-09
Payer: MEDICARE

## 2020-10-09 VITALS
WEIGHT: 266.6 LBS | HEIGHT: 71 IN | BODY MASS INDEX: 37.32 KG/M2 | HEART RATE: 87 BPM | DIASTOLIC BLOOD PRESSURE: 80 MMHG | SYSTOLIC BLOOD PRESSURE: 136 MMHG | OXYGEN SATURATION: 98 % | TEMPERATURE: 98 F

## 2020-10-09 PROCEDURE — G0008 ADMIN INFLUENZA VIRUS VAC: HCPCS | Performed by: FAMILY MEDICINE

## 2020-10-09 PROCEDURE — G0009 ADMIN PNEUMOCOCCAL VACCINE: HCPCS | Performed by: FAMILY MEDICINE

## 2020-10-09 PROCEDURE — G0438 PPPS, INITIAL VISIT: HCPCS | Performed by: FAMILY MEDICINE

## 2020-10-09 PROCEDURE — 90732 PPSV23 VACC 2 YRS+ SUBQ/IM: CPT | Performed by: FAMILY MEDICINE

## 2020-10-09 PROCEDURE — 90694 VACC AIIV4 NO PRSRV 0.5ML IM: CPT | Performed by: FAMILY MEDICINE

## 2020-10-09 PROCEDURE — 99213 OFFICE O/P EST LOW 20 MIN: CPT | Performed by: FAMILY MEDICINE

## 2020-10-09 RX ORDER — LANOLIN ALCOHOL/MO/W.PET/CERES
3 CREAM (GRAM) TOPICAL DAILY
Refills: 0 | COMMUNITY
Start: 2020-10-09 | End: 2020-10-12

## 2020-10-09 RX ORDER — TRIAMCINOLONE ACETONIDE 1 MG/G
CREAM TOPICAL
Qty: 454 G | Refills: 0 | Status: SHIPPED | OUTPATIENT
Start: 2020-10-09 | End: 2021-07-14 | Stop reason: SDUPTHER

## 2020-10-09 SDOH — ECONOMIC STABILITY: FOOD INSECURITY: WITHIN THE PAST 12 MONTHS, YOU WORRIED THAT YOUR FOOD WOULD RUN OUT BEFORE YOU GOT MONEY TO BUY MORE.: NEVER TRUE

## 2020-10-09 SDOH — ECONOMIC STABILITY: TRANSPORTATION INSECURITY
IN THE PAST 12 MONTHS, HAS LACK OF TRANSPORTATION KEPT YOU FROM MEETINGS, WORK, OR FROM GETTING THINGS NEEDED FOR DAILY LIVING?: NO

## 2020-10-09 SDOH — ECONOMIC STABILITY: INCOME INSECURITY: HOW HARD IS IT FOR YOU TO PAY FOR THE VERY BASICS LIKE FOOD, HOUSING, MEDICAL CARE, AND HEATING?: NOT HARD AT ALL

## 2020-10-09 SDOH — ECONOMIC STABILITY: TRANSPORTATION INSECURITY
IN THE PAST 12 MONTHS, HAS THE LACK OF TRANSPORTATION KEPT YOU FROM MEDICAL APPOINTMENTS OR FROM GETTING MEDICATIONS?: NO

## 2020-10-09 SDOH — ECONOMIC STABILITY: FOOD INSECURITY: WITHIN THE PAST 12 MONTHS, THE FOOD YOU BOUGHT JUST DIDN'T LAST AND YOU DIDN'T HAVE MONEY TO GET MORE.: NEVER TRUE

## 2020-10-09 ASSESSMENT — ENCOUNTER SYMPTOMS
ABDOMINAL PAIN: 0
COUGH: 0
ABDOMINAL DISTENTION: 0
CONSTIPATION: 0
EYE DISCHARGE: 0
VOICE CHANGE: 0
DIARRHEA: 0
SHORTNESS OF BREATH: 0
TROUBLE SWALLOWING: 0
NAUSEA: 0
COLOR CHANGE: 0

## 2020-10-09 ASSESSMENT — LIFESTYLE VARIABLES: HOW OFTEN DO YOU HAVE A DRINK CONTAINING ALCOHOL: 0

## 2020-10-09 ASSESSMENT — PATIENT HEALTH QUESTIONNAIRE - PHQ9
2. FEELING DOWN, DEPRESSED OR HOPELESS: 0
SUM OF ALL RESPONSES TO PHQ QUESTIONS 1-9: 0
SUM OF ALL RESPONSES TO PHQ QUESTIONS 1-9: 0
SUM OF ALL RESPONSES TO PHQ9 QUESTIONS 1 & 2: 0
1. LITTLE INTEREST OR PLEASURE IN DOING THINGS: 0

## 2020-10-09 NOTE — PATIENT INSTRUCTIONS
Personalized Preventive Plan for Lorraine Tirado - 10/9/2020  Medicare offers a range of preventive health benefits. Some of the tests and screenings are paid in full while other may be subject to a deductible, co-insurance, and/or copay. Some of these benefits include a comprehensive review of your medical history including lifestyle, illnesses that may run in your family, and various assessments and screenings as appropriate. After reviewing your medical record and screening and assessments performed today your provider may have ordered immunizations, labs, imaging, and/or referrals for you. A list of these orders (if applicable) as well as your Preventive Care list are included within your After Visit Summary for your review. Other Preventive Recommendations:    · A preventive eye exam performed by an eye specialist is recommended every 1-2 years to screen for glaucoma; cataracts, macular degeneration, and other eye disorders. · A preventive dental visit is recommended every 6 months. · Try to get at least 150 minutes of exercise per week or 10,000 steps per day on a pedometer . · Order or download the FREE \"Exercise & Physical Activity: Your Everyday Guide\" from The [employer]. Call 7-602.385.2049 or search [employer] online. · You need 2789-1178 mg of calcium and 9698-1640 IU of vitamin D per day. It is possible to meet your calcium requirement with diet alone, but a vitamin D supplement is usually necessary to meet this goal.  · When exposed to the sun, use a sunscreen that protects against both UVA and UVB radiation with an SPF of 30 or greater. Reapply every 2 to 3 hours or after sweating, drying off with a towel, or swimming. · Always wear a seat belt when traveling in a car. Always wear a helmet when riding a bicycle or motorcycle.

## 2020-10-09 NOTE — PROGRESS NOTES
Diagnosis Orders   1. Medicare annual wellness visit, subsequent     2. Eczema, unspecified type  triamcinolone (KENALOG) 0.1 % cream   3. Obesity (BMI 30-39.9)     4. Prostate cancer screening  Psa screening   5. Insomnia, unspecified type  melatonin 3 MG TABS tablet   6. Need for pneumococcal vaccine  PNEUMOVAX 23 subcutaneous/IM (Pneumococcal polysaccharide vaccine 23-valent >= 3yo)   7. Flu vaccine need  INFLUENZA, QUADV, ADJUVANTED, 65 YRS =, IM, PF, PREFILL SYR, 0.5ML (FLUAD)     Return in 6 months (on 4/9/2021) for Medicare Annual Wellness Visit in 1 year, 6 month eczema and weight recheck. Subjective:      Patient ID: Danette Cisneros is a 77 y.o. male who presents for:  Chief Complaint   Patient presents with    Medicare [address] Maintenance     Pt declines flu & Pneu        Patient has not done routine labs. Review of prior labs reveals at least prostate needs to be repeated. Using triamcinolone as needed for rash. He also uses some over-the-counter antifungal cream he cannot find the name of at times as well. Keeps his rash is controlled. No further symptoms at this time. Patient had been having some insomnia more recently. His wife has broken her neck and is an extended care facility while she is rehabilitating. This is changed some things for him at home so he has been taking child dosing melatonin and that has been helping with his insomnia. Patient is aware he is overweight. He has been working on working out.   He does weights and has an elliptical that he is doing small amounts of exercise on at this time and working on increasing      Current Outpatient Medications on File Prior to Visit   Medication Sig Dispense Refill    vitamin E 1000 units capsule Take 1,000 Units by mouth daily      vitamin D (CHOLECALCIFEROL) 1000 UNIT TABS tablet Take 6,000 Units by mouth daily      Multiple Vitamins-Minerals (ONE-A-DAY MENS HEALTH FORMULA) TABS Take by mouth      magnesium (MAGNESIUM-OXIDE) 250 MG TABS tablet Take 250 mg by mouth daily Take 3 pills daily      calcium carbonate (OYSTER SHELL CALCIUM 500 MG) 1250 MG tablet Take 1 tablet by mouth daily      Glucosamine-Chondroitin 2970-7740 MG/30ML LIQD Take by mouth Take 3 pills daily      Omega-3 Fatty Acids (FISH OIL) 1200 MG CAPS Take  by mouth daily. No current facility-administered medications on file prior to visit. Past Medical History:   Diagnosis Date    BPH (benign prostatic hyperplasia) 2/3/2012    no treatment at this time    Chronic back pain     Colon polyp 09/2017    Dr. Delon Reed; f/u 5 years    Eczematous dermatitis     hands, feet and legs    ED (erectile dysfunction) 2/3/2012    Erectile dysfunction     Family history of early CAD 2/3/2012    Gastroesophageal reflux disease with hiatal hernia     History of hypertension     History of prediabetes     History of renal insufficiency syndrome     Obesity (BMI 30-39. 9)     Osteoarthritis     Vitamin D deficiency     Weight gain      Past Surgical History:   Procedure Laterality Date    COLONOSCOPY  9/25/2006    [place] COLON [place] SCRN NOT  W [address] [place] [address] IND N/A 9/15/2017    COLONOSCOPY performed by Dony Kirkpatrick MD at [address] [place] [address]  5/9/14    bx, dilation marjorie    UPPER GASTROINTESTINAL ENDOSCOPY N/A 9/15/2017    EGD DILATION [facility] and biopsy performed by Dony Kirkpatrick MD at [address] [place] [address] Marital status:      Spouse name: Not on file    Number of children: 1    Years of education: Not on file    Highest education level: Not on file   Occupational History    Not on file   Social Needs    Financial resource strain: Not hard at all   Fernando-Timo insecurity     Worry: Never true     Inability: Never true   [employer] needs     Medical: No     Non-medical: No   Tobacco Use    Smoking status: Former Smoker     Packs/day: 2.00 Years: 5.00     Pack years: 10.00     Last attempt to quit: 1990     Years since quittin.7    Smokeless tobacco: Never Used   Substance and Sexual Activity    Alcohol use: No    Drug use: No    Sexual activity: Yes     Partners: Female   Lifestyle    Physical activity     Days per week: Not on file     Minutes per session: Not on file    Stress: Not on file   Relationships    Social connections     Talks on phone: Not on file     Gets together: Not on file     Attends Rastafari service: Not on file     Active member of club or organization: Not on file     Attends meetings of clubs or organizations: Not on file     Relationship status: Not on file    Intimate partner violence     Fear of current or ex partner: Not on file     Emotionally abused: Not on file     Physically abused: Not on file     Forced sexual activity: Not on file   Other Topics Concern    Not on file   Social History Narrative    Not on file     Family History   Problem Relation Age of Onset    Depression Mother     Heart Disease Paternal Grandmother     High Blood Pressure Paternal Grandmother     High Cholesterol Paternal Grandmother     Anemia Paternal Grandmother         B 12 deficiency    Heart Disease Brother     Heart Surgery Brother     Anemia Brother         B 12 deficiency     Allergies:  Patient has no known allergies. Review of Systems   Constitutional: Negative for activity change, appetite change, fatigue and unexpected weight change. HENT: Negative for congestion, dental problem, trouble swallowing and voice change. Eyes: Negative for discharge and visual disturbance. Respiratory: Negative for cough and shortness of breath. Cardiovascular: Negative for chest pain. Gastrointestinal: Negative for abdominal distention, abdominal pain, constipation, diarrhea and nausea. Endocrine: Negative for polydipsia and polyuria. Genitourinary: Negative for dysuria and urgency.    Musculoskeletal: Negative for gait problem and joint swelling. Skin: Negative for color change and rash. Allergic/Immunologic: Negative for environmental allergies and food allergies. Neurological: Negative for speech difficulty and weakness. Hematological: Does not bruise/bleed easily. Psychiatric/Behavioral: Positive for sleep disturbance. Negative for agitation and behavioral problems. Objective:   /80   Pulse 87   Temp 98 °F (36.7 °C)   Ht 5' 10.75\" (1.797 m)   Wt 266 lb 9.6 oz (120.9 kg)   SpO2 98%   BMI 37.45 kg/m²     Physical Exam  Vitals signs reviewed. Constitutional:       General: He is not in acute distress. Appearance: He is well-developed. HENT:      Head: Normocephalic and atraumatic. Right Ear: External ear normal.      Left Ear: External ear normal.      Nose: Nose normal.   Eyes:      General:         Right eye: No discharge. Left eye: No discharge. Conjunctiva/sclera: Conjunctivae normal.      Pupils: Pupils are equal, round, and reactive to light. Neck:      Musculoskeletal: Neck supple. Thyroid: No thyromegaly. Cardiovascular:      Rate and Rhythm: Normal rate and regular rhythm. Pulmonary:      Effort: Pulmonary effort is normal. No respiratory distress. Abdominal:      General: There is no distension. Skin:     General: Skin is warm and dry. Neurological:      Mental Status: He is alert and oriented to person, place, and time. Coordination: Coordination normal.   Psychiatric:         Thought Content: Thought content normal.         Judgment: Judgment normal.         No results found for this visit on 10/09/20. No results found for this or any previous visit (from the past 2016 hour(s)). Assessment:       Diagnosis Orders   1. Medicare annual wellness visit, subsequent     2. Eczema, unspecified type  triamcinolone (KENALOG) 0.1 % cream   3. Obesity (BMI 30-39.9)     4. Prostate cancer screening  Psa screening   5.  Insomnia, unspecified type  melatonin 3 MG TABS tablet   6. Need for pneumococcal vaccine  PNEUMOVAX 23 subcutaneous/IM (Pneumococcal polysaccharide vaccine 23-valent >= 1yo)   7. Flu vaccine need  INFLUENZA, QUADV, ADJUVANTED, 65 YRS =, IM, PF, PREFILL SYR, 0.5ML (FLUAD)         Orders Placed This Encounter   Procedures    INFLUENZA, QUADV, ADJUVANTED, 65 YRS =, IM, PF, PREFILL SYR, 0.5ML (FLUAD)    PNEUMOVAX 23 subcutaneous/IM (Pneumococcal polysaccharide vaccine 23-valent >= 1yo)    Psa screening     Standing Status:   Future     Standing Expiration Date:   10/9/2021         Plan:   Return in 6 months (on 4/9/2021) for Medicare Annual Wellness Visit in 1 year, 6 month eczema and weight recheck. Patient Instructions     Personalized Preventive Plan for Aldona Drivers - 10/9/2020  Medicare offers a range of preventive health benefits. Some of the tests and screenings are paid in full while other may be subject to a deductible, co-insurance, and/or copay. Some of these benefits include a comprehensive review of your medical history including lifestyle, illnesses that may run in your family, and various assessments and screenings as appropriate. After reviewing your medical record and screening and assessments performed today your provider may have ordered immunizations, labs, imaging, and/or referrals for you. A list of these orders (if applicable) as well as your Preventive Care list are included within your After Visit Summary for your review. Other Preventive Recommendations:    · A preventive eye exam performed by an eye specialist is recommended every 1-2 years to screen for glaucoma; cataracts, macular degeneration, and other eye disorders. · A preventive dental visit is recommended every 6 months. · Try to get at least 150 minutes of exercise per week or 10,000 steps per day on a pedometer .   · Order or download the FREE \"Exercise & Physical Activity: Your Everyday Guide\" from The [employer] mouth daily Take 3 pills daily Yes Historical Provider, MD   calcium carbonate (OYSTER SHELL CALCIUM 500 MG) 1250 MG tablet Take 1 tablet by mouth daily Yes Historical Provider, MD   Glucosamine-Chondroitin 9417-0405 MG/30ML LIQD Take by mouth Take 3 pills daily Yes Historical Provider, MD   Omega-3 Fatty Acids (FISH OIL) 1200 MG CAPS Take  by mouth daily. Yes Historical Provider, MD         Past Medical History:   Diagnosis Date    BPH (benign prostatic hyperplasia) 2/3/2012    no treatment at this time    Chronic back pain     Colon polyp 09/2017    Dr. Irl Gitelman; f/u 5 years    Eczematous dermatitis     hands, feet and legs    ED (erectile dysfunction) 2/3/2012    Erectile dysfunction     Family history of early CAD 2/3/2012    Gastroesophageal reflux disease with hiatal hernia     History of hypertension     History of prediabetes     History of renal insufficiency syndrome     Obesity (BMI 30-39. 9)     Osteoarthritis     Vitamin D deficiency     Weight gain        Past Surgical History:   Procedure Laterality Date    COLONOSCOPY  9/25/2006    [place] COLON [place] SCRN NOT  W [address] [place] [address] IND N/A 9/15/2017    COLONOSCOPY performed by Sebastien Morel MD at [address] [place] [address]  5/9/14    bx, dilation jarmoszuk    UPPER GASTROINTESTINAL ENDOSCOPY N/A 9/15/2017    [facility]GD [facility] and [facility]         Family History   Problem Relation Age of Onset    Depression Mother     Heart Disease Paternal Grandmother     High Blood Pressure Paternal Grandmother     High Cholesterol Paternal Grandmother     Anemia Paternal Grandmother         B 12 deficiency    Heart Disease Brother     Heart Surgery Brother     Anemia Brother         B 12 deficiency       CareTeam (Including outside providers/suppliers regularly involved in providing care):   Patient Care Team:  Sean Pack MD as PCP - General (Family Medicine)  Дмитрий Rosado MD as PCP - [facility] Provider    Wt Readings from Last 3 Encounters:   10/09/20 266 lb 9.6 oz (120.9 kg)   09/20/19 245 lb (111.1 kg)   03/19/19 253 lb (114.8 kg)     Vitals:    10/09/20 1059   BP: 136/80   Pulse: 87   Temp: 98 °F (36.7 °C)   SpO2: 98%   Weight: 266 lb 9.6 oz (120.9 kg)   Height: 5' 10.75\" (1.797 m)     Body mass index is 37.45 kg/m². Based upon direct observation of the patient, evaluation of cognition reveals recent and remote memory intact. Patient's complete Health Risk Assessment and screening values have been reviewed and are found in Flowsheets. The following problems were reviewed today and where indicated follow up appointments were made and/or referrals ordered. Positive Risk Factor Screenings with Interventions:       General Health and ACP:  General  In general, how would you say your health is?: Excellent  In the past 7 days, have you experienced any of the following?  New or Increased Pain, New or Increased Fatigue, Loneliness, Social Isolation, Stress or Anger?: (!) Stress  Do you get the social and emotional support that you need?: Yes  Do you have a Living Will?: Yes  Advance Directives     Power of  Living Will ACP-Advance Directive ACP-Power of     Not on File [place] on 09/15/17 Filed [address] [place] Risk Interventions:  · stress related to wife's injury and healthcare for her    Health Habits/Nutrition:  Health Habits/Nutrition  Do you exercise for at least 20 minutes 2-3 times per week?: Yes  Have you lost any weight without trying in the past 3 months?: No  Do you eat fewer than 2 meals per day?: No  Have you seen a dentist within the past year?: Yes  Body mass index: (!) 37.44  Health Habits/Nutrition Interventions:  · pt is now doing elliptical and weights and hopes to have improvement    Hearing/Vision:  No exam data present  Hearing/Vision  Do you or your family notice any trouble with your hearing?: (!) Yes(family has notices he turns T/V up loud)  Do you have difficulty driving, watching TV, or doing any of your daily activities because of your eyesight?: No  Have you had an eye exam within the past year?: Yes  Hearing/Vision Interventions:  · no problems with conversation through mask. situational issue    Personalized Preventive Plan   Current Health Maintenance Status  Immunization History   Administered Date(s) Administered    Influenza Virus Vaccine 10/08/2015    Pneumococcal Polysaccharide ([id number]) 02/03/2012        Health Maintenance   Topic Date Due    Pneumococcal 65+ years Vaccine (1 of 1 - PPSV23) 10/08/2018    Annual Wellness Visit (AWV)  05/29/2019    Flu vaccine (1) 09/01/2020    DTaP/Tdap/Td vaccine (1 - Tdap) 10/09/2021 (Originally 10/8/1974)    Shingles Vaccine (1 of 2) 10/09/2021 (Originally 10/8/2003)    Lipid screen  09/19/2023    Colon cancer screen colonoscopy  09/15/2027    AAA screen  Completed    Hepatitis C screen  Completed    Hepatitis A vaccine  Aged Out    Hepatitis B vaccine  Aged Out    Hib vaccine  Aged Out    Meningococcal (ACWY) vaccine  Aged Out     Recommendations for [employer] Due: see orders and patient instructions/AVS.  . Recommended screening schedule for the next 5-10 years is provided to the patient in written form: see Patient Instructions/AVS.    Vesta Mann was seen today for medicare awv and health maintenance. Diagnoses and all orders for this visit:    Medicare annual wellness visit, subsequent    Eczema, unspecified type  -     triamcinolone (KENALOG) 0.1 % cream; Apply topically qam daily Monday through Friday only. Take weekend off. Do not use on face, groin, armpits, breasts tissue. Obesity (BMI 30-39. 9)    Prostate cancer screening  -     Psa screening; Future    Insomnia, unspecified type  -     melatonin 3 MG TABS tablet;  Take 1 tablet by mouth daily    Need for pneumococcal vaccine  -     PNEUMOVAX 23 subcutaneous/IM (Pneumococcal polysaccharide vaccine 23-valent >= 1yo)    Flu vaccine need  -     INFLUENZA, QUADV, ADJUVANTED, 65 YRS =, IM, PF, PREFILL SYR, 0.5ML (FLUAD)

## 2020-10-09 NOTE — PROGRESS NOTES
After obtaining consent, and per orders of Dr. Vignesh Perera, injection of multiple vaccines  given in both by Pinky Pimentel. Patient instructed to remain in clinic for 20 minutes afterwards, and to report any adverse reaction to me immediately. Vaccine Information Sheet, \"Influenza - Inactivated\"  given to Jerod Parisi, or parent/legal guardian of  Jerod Parisi and verbalized understanding. Patient responses:    Have you ever had a reaction to a flu vaccine? No  Are you able to eat eggs without adverse effects? Yes  Do you have any current illness? No  Have you ever had Guillian [place] Syndrome? No    Flu vaccine given per order. Please see immunization tab.

## 2020-10-12 DIAGNOSIS — Z12.5 PROSTATE CANCER SCREENING: ICD-10-CM

## 2020-10-12 LAB — PROSTATE SPECIFIC ANTIGEN: 0.81 NG/ML (ref 0–5.4)

## 2020-10-12 RX ORDER — UREA 10 %
1 LOTION (ML) TOPICAL NIGHTLY PRN
Qty: 30 TABLET | COMMUNITY
Start: 2020-10-12 | End: 2022-03-27

## 2020-10-12 RX ORDER — LANOLIN ALCOHOL/MO/W.PET/CERES
1 CREAM (GRAM) TOPICAL DAILY
Refills: 0 | COMMUNITY
Start: 2020-10-12 | End: 2020-10-12 | Stop reason: DRUGHIGH

## 2021-04-12 ENCOUNTER — OFFICE VISIT (OUTPATIENT)
Dept: FAMILY MEDICINE CLINIC | Age: 66
End: 2021-04-12
Payer: MEDICARE

## 2021-04-12 VITALS
SYSTOLIC BLOOD PRESSURE: 128 MMHG | HEIGHT: 71 IN | WEIGHT: 273.4 LBS | DIASTOLIC BLOOD PRESSURE: 76 MMHG | BODY MASS INDEX: 38.27 KG/M2 | OXYGEN SATURATION: 98 % | TEMPERATURE: 97.8 F | HEART RATE: 90 BPM

## 2021-04-12 DIAGNOSIS — R19.5 LOOSE STOOLS: Primary | ICD-10-CM

## 2021-04-12 DIAGNOSIS — L57.0 ACTINIC KERATOSES: ICD-10-CM

## 2021-04-12 DIAGNOSIS — L82.1 SEBORRHEIC KERATOSES: ICD-10-CM

## 2021-04-12 DIAGNOSIS — E66.9 OBESITY (BMI 30-39.9): ICD-10-CM

## 2021-04-12 DIAGNOSIS — L30.1 DYSHIDROTIC ECZEMA: ICD-10-CM

## 2021-04-12 PROCEDURE — 17110 DESTRUCTION B9 LES UP TO 14: CPT | Performed by: FAMILY MEDICINE

## 2021-04-12 PROCEDURE — 3288F FALL RISK ASSESSMENT DOCD: CPT | Performed by: FAMILY MEDICINE

## 2021-04-12 PROCEDURE — 17000 DESTRUCT PREMALG LESION: CPT | Performed by: FAMILY MEDICINE

## 2021-04-12 PROCEDURE — 99214 OFFICE O/P EST MOD 30 MIN: CPT | Performed by: FAMILY MEDICINE

## 2021-04-12 ASSESSMENT — PATIENT HEALTH QUESTIONNAIRE - PHQ9
2. FEELING DOWN, DEPRESSED OR HOPELESS: 0
SUM OF ALL RESPONSES TO PHQ QUESTIONS 1-9: 0
SUM OF ALL RESPONSES TO PHQ9 QUESTIONS 1 & 2: 0
SUM OF ALL RESPONSES TO PHQ QUESTIONS 1-9: 0
SUM OF ALL RESPONSES TO PHQ QUESTIONS 1-9: 0
1. LITTLE INTEREST OR PLEASURE IN DOING THINGS: 0

## 2021-04-12 ASSESSMENT — ENCOUNTER SYMPTOMS
PHOTOPHOBIA: 0
CHEST TIGHTNESS: 0
COLOR CHANGE: 1
SHORTNESS OF BREATH: 0
CONSTIPATION: 0
ABDOMINAL PAIN: 0
NAUSEA: 0
VOMITING: 0
DIARRHEA: 1
ABDOMINAL DISTENTION: 0

## 2021-04-12 NOTE — PATIENT INSTRUCTIONS
Pt will stop magnesium for 3-4 days and monitor stool response. Discuss restart of magnesium after that, may consider labs    Continue current meds for eczema     Increase walking now weather is better for weight loss. Cryotherapy instructions    Post op instructions given. A printed copy provided. It is best to leave blisters alone if they form for the first 1-3 days to allow the desired damaged tissue (precancer lesion, wart, or whatever lesion is being removed) to separate from healthy tissue. They should be covered with a bandage to prevent blister breakage and dirt exposure. The wounds should remain dry while there is a blister, therefore if this is a sweaty location like the foot you may need to change socks multiple times per day. When the blister(s) pop or patient removes the top as instructed between day 3-5, apply antibiotic (NOT triple antibiotic, one brand is Neosporin) ointment and a bandage to affected area(s). The ointment should be applied to the open area as long as it is not covered with skin. Exposed tissue is meant to be moist.  Once a scab is formed the patient may stop applying ointment. The scab may appear yellow while moist, don't confuse this with infection. If the wound is infection pus will drain from the site. If this treatment was for a large wart you may note that a plug of skin may fall out of the area that was treated. That is the center of the wart and it is appropriate for it to come out. If exposed skin remains, treat that area as you would a ruptured blister as mentioned above.     Bacitracin sample supplied

## 2021-04-12 NOTE — PROGRESS NOTES
supplied              Subjective:      Patient ID: Antwan Mayo is a 77 y.o. male who presents for:  Chief Complaint   Patient presents with    Skin Exam     x 6 months  Eczema     Weight Management     recheck weight        Patient has been noticing over the last couple weeks that he had very watery stool that sometimes gets pasty but has not gotten back to normal consistency. They have decreased in frequency from the original problem from 3 or 4 times a day down to once or twice a day now but still not normal consistency. No abnormal color. No blood. No obvious trigger. No nausea. No vomiting. No fever. No preceding illness. Patient uses triamcinolone cream twice a day on his feet for about a week or so when he gets a flare and it resolves quickly. He notes his hand symptoms to resolve even more quickly. Patient noted a new lesion on the back of his left hand and a growing brown lesion on his right side. Both of them get caught on things bleed and are irritated. Patient has been doing some weightlifting but no cardio. Now that the weather is better he plans to get out walking more and hopes to lose some weight. Current Outpatient Medications on File Prior to Visit   Medication Sig Dispense Refill    melatonin 1 MG tablet Take 1 tablet by mouth nightly as needed for Sleep 30 tablet     triamcinolone (KENALOG) 0.1 % cream Apply topically qam daily Monday through Friday only. Take weekend off. Do not use on face, groin, armpits, breasts tissue.  454 g 0    vitamin E 1000 units capsule Take 1,000 Units by mouth daily      vitamin D (CHOLECALCIFEROL) 1000 UNIT TABS tablet Take 6,000 Units by mouth daily      Multiple Vitamins-Minerals (ONE-A-DAY MENS HEALTH FORMULA) TABS Take by mouth      magnesium (MAGNESIUM-OXIDE) 250 MG TABS tablet Take 250 mg by mouth daily Take 3 pills daily      calcium carbonate (OYSTER SHELL CALCIUM 500 MG) 1250 MG tablet Take 1 tablet by mouth daily  Glucosamine-Chondroitin 5577-2769 MG/30ML LIQD Take by mouth Take 3 pills daily      Omega-3 Fatty Acids (FISH OIL) 1200 MG CAPS Take  by mouth daily. No current facility-administered medications on file prior to visit. Past Medical History:   Diagnosis Date    BPH (benign prostatic hyperplasia) 2/3/2012    no treatment at this time    Chronic back pain     Colon polyp 2017    Dr. Wendi Cain; f/u 5 years    Eczematous dermatitis     hands, feet and legs    ED (erectile dysfunction) 2/3/2012    Erectile dysfunction     Family history of early CAD 2/3/2012    Gastroesophageal reflux disease with hiatal hernia     History of hypertension     History of prediabetes     History of renal insufficiency syndrome     Obesity (BMI 30-39. 9)     Osteoarthritis     Vitamin D deficiency     Weight gain      Past Surgical History:   Procedure Laterality Date    COLONOSCOPY  2006    IA COLON CA SCRN NOT  [address] IND N/A 9/15/2017    COLONOSCOPY performed by Juan Young MD at [address] [place] [address]  14    bx, dilation jarmoszuk    UPPER GASTROINTESTINAL ENDOSCOPY N/A 9/15/2017    EGD DILATION [facility] and biopsy performed by Juan Young MD at [address] [place] [address] Marital status:      Spouse name: Not on file    Number of children: 1    Years of education: Not on file    Highest education level: Not on file   Occupational History    Not on file   Social Needs    Financial resource strain: Not hard at all   [employer] system insecurity     Worry: Never true     Inability: Never true   [employer] needs     Medical: No     Non-medical: No   Tobacco Use    Smoking status: Former Smoker     Packs/day: 2.00     Years: 5.00     Pack years: 10.00     Quit date: 1990     Years since quittin.2    Smokeless tobacco: Never Used   Substance and Sexual Activity    Alcohol use: No    Drug Objective:   /76 (Cuff Size: Large Adult)   Pulse 90   Temp 97.8 °F (36.6 °C)   Ht 5' 10.75\" (1.797 m)   Wt 273 lb 6.4 oz (124 kg)   SpO2 98%   BMI 38.40 kg/m²     Physical Exam  Constitutional:       General: He is not in acute distress. Appearance: Normal appearance. He is well-developed. He is not toxic-appearing. HENT:      Head: Normocephalic and atraumatic. Right Ear: Hearing and tympanic membrane normal.      Left Ear: Hearing and tympanic membrane normal.      Nose: Nose normal. No nasal deformity. Eyes:      General: Lids are normal.         Right eye: No discharge. Left eye: No discharge. Conjunctiva/sclera: Conjunctivae normal.      Pupils: Pupils are equal, round, and reactive to light. Neck:      Musculoskeletal: Full passive range of motion without pain. Thyroid: No thyroid mass or thyromegaly. Vascular: No JVD. Trachea: Trachea and phonation normal.   Cardiovascular:      Rate and Rhythm: Normal rate and regular rhythm. Pulmonary:      Effort: No accessory muscle usage or respiratory distress. Musculoskeletal:      Comments: FROM all large muscle groups and joints as witnessed when walking to exam table, getting on, and getting off the exam table. Skin:     General: Skin is warm and dry. Findings: No rash. Comments: Left hand dorsal aspect 3 mm erythematous base raised rough white flake    . oval stuck on  Appearing brown lesion 4 x 8 mm   Neurological:      Mental Status: He is alert. Motor: No tremor or atrophy. Gait: Gait normal.   Psychiatric:         Speech: Speech normal.         Behavior: Behavior normal.         Thought Content: Thought content normal.         PROCEDURE:  The procedure was discussed with the patient. All questions were answered and alternative options discussed. The patient is aware of the risks of bleeding, infection, unsatisfactory scar result.   Informed consent paperwork was signed by the patient. Liquid nitrogen was applied to the affected areas until freezing was noted then a thaw was allowed between dosing for a total of 2 applications. Applied to 2 lesion(s). The patient tolerated the procedure well. Post op instructions given. A printed copy provided. No results found for this visit on 04/12/21. No results found for this or any previous visit (from the past 2016 hour(s)). Assessment:       Diagnosis Orders   1. Loose stools     2. Actinic keratoses  IN [place] PREMALIGNANT, FIRST LESION   3. Dyshidrotic eczema     4. Obesity (BMI 30-39.9)     5. Seborrheic keratoses  IN DESTRUCTION BENIGN LESIONS UP TO 14         Orders Placed This Encounter   Procedures    IN DESTRUCTION BENIGN LESIONS UP TO 14    IN [place] PREMALIGNANT, FIRST LESION         Plan:   Return in about 3 months (around 7/12/2021) for for review of outcome of today's recommendation. Patient Instructions   Pt will stop magnesium for 3-4 days and monitor stool response. Discuss restart of magnesium after that, may consider labs    Continue current meds for eczema     Increase walking now weather is better for weight loss. Cryotherapy instructions    Post op instructions given. A printed copy provided. It is best to leave blisters alone if they form for the first 1-3 days to allow the desired damaged tissue (precancer lesion, wart, or whatever lesion is being removed) to separate from healthy tissue. They should be covered with a bandage to prevent blister breakage and dirt exposure. The wounds should remain dry while there is a blister, therefore if this is a sweaty location like the foot you may need to change socks multiple times per day. When the blister(s) pop or patient removes the top as instructed between day 3-5, apply antibiotic (NOT triple antibiotic, one brand is Neosporin) ointment and a bandage to affected area(s).   The ointment should be applied to the open area as long as it is not covered with skin. Exposed tissue is meant to be moist.  Once a scab is formed the patient may stop applying ointment. The scab may appear yellow while moist, don't confuse this with infection. If the wound is infection pus will drain from the site. If this treatment was for a large wart you may note that a plug of skin may fall out of the area that was treated. That is the center of the wart and it is appropriate for it to come out. If exposed skin remains, treat that area as you would a ruptured blister as mentioned above. Bacitracin sample supplied              This note was partially created with the assistance of dictation. This may lead to grammatical or spelling errors. Thang Bronson M.D.

## 2021-07-14 ENCOUNTER — OFFICE VISIT (OUTPATIENT)
Dept: FAMILY MEDICINE CLINIC | Age: 66
End: 2021-07-14
Payer: MEDICARE

## 2021-07-14 VITALS
TEMPERATURE: 97.7 F | SYSTOLIC BLOOD PRESSURE: 132 MMHG | HEART RATE: 86 BPM | OXYGEN SATURATION: 98 % | HEIGHT: 71 IN | BODY MASS INDEX: 37.63 KG/M2 | DIASTOLIC BLOOD PRESSURE: 82 MMHG | WEIGHT: 268.8 LBS

## 2021-07-14 DIAGNOSIS — Z13.1 SCREENING FOR DIABETES MELLITUS: ICD-10-CM

## 2021-07-14 DIAGNOSIS — R79.89 LOW TESTOSTERONE: ICD-10-CM

## 2021-07-14 DIAGNOSIS — Z13.0 SCREENING FOR DEFICIENCY ANEMIA: ICD-10-CM

## 2021-07-14 DIAGNOSIS — L30.9 ECZEMA, UNSPECIFIED TYPE: ICD-10-CM

## 2021-07-14 DIAGNOSIS — L30.1 DYSHIDROTIC ECZEMA: Primary | ICD-10-CM

## 2021-07-14 DIAGNOSIS — Z13.29 SCREENING FOR THYROID DISORDER: ICD-10-CM

## 2021-07-14 DIAGNOSIS — D12.3 ADENOMATOUS POLYP OF TRANSVERSE COLON: ICD-10-CM

## 2021-07-14 DIAGNOSIS — E66.9 OBESITY (BMI 30-39.9): ICD-10-CM

## 2021-07-14 DIAGNOSIS — R19.5 LOOSE STOOLS: ICD-10-CM

## 2021-07-14 DIAGNOSIS — Z12.5 PROSTATE CANCER SCREENING: ICD-10-CM

## 2021-07-14 PROCEDURE — 99214 OFFICE O/P EST MOD 30 MIN: CPT | Performed by: FAMILY MEDICINE

## 2021-07-14 RX ORDER — TRIAMCINOLONE ACETONIDE 1 MG/G
CREAM TOPICAL
Qty: 454 G | Refills: 0 | Status: SHIPPED | OUTPATIENT
Start: 2021-07-14 | End: 2021-10-20 | Stop reason: SDUPTHER

## 2021-07-14 ASSESSMENT — ENCOUNTER SYMPTOMS
PHOTOPHOBIA: 0
ABDOMINAL DISTENTION: 0
DIARRHEA: 0
CHEST TIGHTNESS: 0
SHORTNESS OF BREATH: 0
COLOR CHANGE: 0
ABDOMINAL PAIN: 0

## 2021-07-14 NOTE — PATIENT INSTRUCTIONS
Follow-up in October after labs to review and then to discuss any change in mind about testosterone therapy. Monitor weight as well. Colonoscopy is due in 2022. Patient states he thinks he can wait on his esophageal issue until that scope is due. Patient Education        Counting Carbohydrates for Diabetes: Care Instructions  Your Care Instructions     You don't have to eat special foods when you have diabetes. You just have to be careful to eat healthy foods. Carbohydrates (carbs) raise blood sugar higher and quicker than any other nutrient. Carbs are found in desserts, breads and cereals, and fruit. They're also in starchy vegetables. These include potatoes, corn, and grains such as rice and pasta. Carbs are also in milk and yogurt. The more carbs you eat at one time, the higher your blood sugar will rise. Spreading carbs all through the day helps keep your blood sugar levels within your target range. Counting carbs is one of the best ways to keep your blood sugar under control. If you use insulin, counting carbs helps you match the right amount of insulin to the number of grams of carbs in a meal. Then you can change your diet and insulin dose as needed. Testing your blood sugar several times a day can help you learn how carbs affect your blood sugar. A registered dietitian or certified diabetes educator can help you plan meals and snacks. Follow-up care is a key part of your treatment and safety. Be sure to make and go to all appointments, and call your doctor if you are having problems. It's also a good idea to know your test results and keep a list of the medicines you take. How can you care for yourself at home? Know your daily amount of carbohydrates  Your daily amount depends on several things, such as your weight, how active you are, which diabetes medicines you take, and what your goals are for your blood sugar levels.  A registered dietitian or certified diabetes educator can help you carbohydrate (carbs) you eat is an important part of healthy meals when you have diabetes. Carbohydrate is found in many foods. · Learn which foods have carbs. And learn the amounts of carbs in different foods. ? Bread, cereal, pasta, and rice have about 15 grams of carbs in a serving. A serving is 1 slice of bread (1 ounce), ½ cup of cooked cereal, or 1/3 cup of cooked pasta or rice. ? Fruits have 15 grams of carbs in a serving. A serving is 1 small fresh fruit, such as an apple or orange; ½ of a banana; ½ cup of cooked or canned fruit; ½ cup of fruit juice; 1 cup of melon or raspberries; or 2 tablespoons of dried fruit. ? Milk and no-sugar-added yogurt have 15 grams of carbs in a serving. A serving is 1 cup of milk or 2/3 cup of no-sugar-added yogurt. ? Starchy vegetables have 15 grams of carbs in a serving. A serving is ½ cup of mashed potatoes or sweet potato; 1 cup winter squash; ½ of a small baked potato; ½ cup of cooked beans; or ½ cup cooked corn or green peas. · Learn how much carbs to eat each day and at each meal. A dietitian or CDE can teach you how to keep track of the amount of carbs you eat. This is called carbohydrate counting. · If you are not sure how to count carbohydrate grams, use the Plate Method to plan meals. It is a good, quick way to make sure that you have a balanced meal. It also helps you spread carbs throughout the day. ? Divide your plate by types of foods. Put non-starchy vegetables on half the plate, meat or other protein food on one-quarter of the plate, and a grain or starchy vegetable in the final quarter of the plate. To this you can add a small piece of fruit and 1 cup of milk or yogurt, depending on how many carbs you are supposed to eat at a meal.  · Try to eat about the same amount of carbs at each meal. Do not \"save up\" your daily allowance of carbs to eat at one meal.  · Proteins have very little or no carbs per serving.  Examples of proteins are beef, chicken, turkey, fish, eggs, tofu, cheese, cottage cheese, and peanut butter. A serving size of meat is 3 ounces, which is about the size of a deck of cards. Examples of meat substitute serving sizes (equal to 1 ounce of meat) are 1/4 cup of cottage cheese, 1 egg, 1 tablespoon of peanut butter, and ½ cup of tofu. How can you eat out and still eat healthy? · Learn to estimate the serving sizes of foods that have carbohydrate. If you measure food at home, it will be easier to estimate the amount in a serving of restaurant food. · If the meal you order has too much carbohydrate (such as potatoes, corn, or baked beans), ask to have a low-carbohydrate food instead. Ask for a salad or green vegetables. · If you use insulin, check your blood sugar before and after eating out to help you plan how much to eat in the future. · If you eat more carbohydrate at a meal than you had planned, take a walk or do other exercise. This will help lower your blood sugar. What are some tips for eating healthy? · Limit saturated fat, such as the fat from meat and dairy products. This is a healthy choice because people who have diabetes are at higher risk of heart disease. So choose lean cuts of meat and nonfat or low-fat dairy products. Use olive or canola oil instead of butter or shortening when cooking. · Don't skip meals. Your blood sugar may drop too low if you skip meals and take insulin or certain medicines for diabetes. · Check with your doctor before you drink alcohol. Alcohol can cause your blood sugar to drop too low. Alcohol can also cause a bad reaction if you take certain diabetes medicines. Follow-up care is a key part of your treatment and safety. Be sure to make and go to all appointments, and call your doctor if you are having problems. It's also a good idea to know your test results and keep a list of the medicines you take. Where can you learn more? Go to https://franky[facility]. org and sign in to your [employer] account. Enter U872 in the [facility] box to learn more about \"Learning About Carbohydrate (Carb) Counting and Eating Out When You Have Diabetes. \"     If you do not have an account, please click on the \"Sign Up Now\" link. Current as of: August 31, 2020               Content Version: 12.9  © 2006-2021 Healthwise, [employer]. Care instructions adapted under license by [facility] ([facility]). If you have questions about a medical condition or this instruction, always ask your healthcare professional. [address] any warranty or liability for your use of this information.

## 2021-07-14 NOTE — PROGRESS NOTES
Return in about 3 months (around 7/12/2021) for for review of outcome of today's recommendation. Patient Instructions   Pt will stop magnesium for 3-4 days and monitor stool response. Discuss restart of magnesium after that, may consider labs     Continue current meds for eczema      Increase walking now weather is better for weight loss.

## 2021-07-14 NOTE — PROGRESS NOTES
Diagnosis Orders   1. Dyshidrotic eczema  triamcinolone (KENALOG) 0.1 % cream   2. Eczema, unspecified type  triamcinolone (KENALOG) 0.1 % cream   3. Low testosterone  Testosterone Free and Total Male   4. Obesity (BMI 30-39.9)     5. Adenomatous polyp of transverse colon- f/u due 9/2022     6. Prostate cancer screening  PSA screening   7. Screening for thyroid disorder  TSH with Reflex   8. Screening for diabetes mellitus  Basic Metabolic Panel   9. Screening for deficiency anemia  CBC Auto Differential   10. Loose stools       Return in about 3 months (around 10/12/2021) for for review of outcome of today's recommendation. Patient Instructions   Follow-up in October after labs to review and then to discuss any change in mind about testosterone therapy. Monitor weight as well. Colonoscopy is due in 2022. Patient states he thinks he can wait on his esophageal issue until that scope is due. Patient Education        Counting Carbohydrates for Diabetes: Care Instructions  Your Care Instructions     You don't have to eat special foods when you have diabetes. You just have to be careful to eat healthy foods. Carbohydrates (carbs) raise blood sugar higher and quicker than any other nutrient. Carbs are found in desserts, breads and cereals, and fruit. They're also in starchy vegetables. These include potatoes, corn, and grains such as rice and pasta. Carbs are also in milk and yogurt. The more carbs you eat at one time, the higher your blood sugar will rise. Spreading carbs all through the day helps keep your blood sugar levels within your target range. Counting carbs is one of the best ways to keep your blood sugar under control. If you use insulin, counting carbs helps you match the right amount of insulin to the number of grams of carbs in a meal. Then you can change your diet and insulin dose as needed. Testing your blood sugar several times a day can help you learn how carbs affect your blood sugar.   BEV registered dietitian or certified diabetes educator can help you plan meals and snacks. Follow-up care is a key part of your treatment and safety. Be sure to make and go to all appointments, and call your doctor if you are having problems. It's also a good idea to know your test results and keep a list of the medicines you take. How can you care for yourself at home? Know your daily amount of carbohydrates  Your daily amount depends on several things, such as your weight, how active you are, which diabetes medicines you take, and what your goals are for your blood sugar levels. A registered dietitian or certified diabetes educator can help you plan how many carbs to include in each meal and snack. For most adults, a guideline for the daily amount of carbs is:  · 45 to 60 grams at each meal. That's about the same as 3 to 4 carbohydrate servings. · 15 to 20 grams at each snack. That's about the same as 1 carbohydrate serving. Count carbs  Counting carbs lets you know how much rapid-acting insulin to take before you eat. If you use an insulin pump, you get a constant rate of insulin during the day. So the pump must be programmed at meals. This gives you extra insulin to cover the rise in blood sugar after meals. If you take insulin:  · Learn your own insulin-to-carb ratio. You and your diabetes health professional will figure out the ratio. You can do this by testing your blood sugar after meals. For example, you may need a certain amount of insulin for every 15 grams of carbs. · Add up the carb grams in a meal. Then you can figure out how many units of insulin to take based on your insulin-to-carb ratio. · Exercise lowers blood sugar. You can use less insulin than you would if you were not doing exercise. Keep in mind that timing matters.  If you exercise within 1 hour after a meal, your body may need less insulin for that meal than it would if you exercised 3 hours after the meal. Test your blood sugar to find out how exercise affects your need for insulin. If you do or don't take insulin:  · Look at labels on packaged foods. This can tell you how many carbs are in a serving. You can also use guides from the American Diabetes Association. · Be aware of portions, or serving sizes. If a package has two servings and you eat the whole package, you need to double the number of grams of carbohydrate listed for one serving. · Protein, fat, and fiber do not raise blood sugar as much as carbs do. If you eat a lot of these nutrients in a meal, your blood sugar will rise more slowly than it would otherwise. Eat from all food groups  · Eat at least three meals a day. · Plan meals to include food from all the food groups. The food groups include grains, fruits, dairy, proteins, and vegetables. · Talk to your dietitian or diabetes educator about ways to add limited amounts of sweets into your meal plan. · If you drink alcohol, talk to your doctor. It may not be recommended when you are taking certain diabetes medicines. Where can you learn more? Go to https://[employer].Rentobo. org and sign in to your [employer] account. Enter N451 in the [facility] box to learn more about \"Counting Carbohydrates for Diabetes: Care Instructions. \"     If you do not have an account, please click on the \"Sign Up Now\" link. Current as of: August 31, 2020               Content Version: 12.9  © 2006-2021 [employer]. Care instructions adapted under license by [facility] ([facility]). If you have questions about a medical condition or this instruction, always ask your healthcare professional. [place] [address] any warranty or liability for your use of this information. Patient Education        Learning About Carbohydrate (Carb) Counting and Eating Out When You Have Diabetes  Why plan your meals? Meal planning can be a key part of managing diabetes.  Planning meals and snacks with the right balance of carbohydrate, protein, and fat can help you keep your blood sugar at the target level you set with your doctor. You don't have to eat special foods. You can eat what your family eats, including sweets once in a while. But you do have to pay attention to how often you eat and how much you eat of certain foods. You may want to work with a dietitian or a certified diabetes educator. He or she can give you tips and meal ideas and can answer your questions about meal planning. This health professional can also help you reach a healthy weight if that is one of your goals. What should you know about eating carbs? Managing the amount of carbohydrate (carbs) you eat is an important part of healthy meals when you have diabetes. Carbohydrate is found in many foods. · Learn which foods have carbs. And learn the amounts of carbs in different foods. ? Bread, cereal, pasta, and rice have about 15 grams of carbs in a serving. A serving is 1 slice of bread (1 ounce), ½ cup of cooked cereal, or 1/3 cup of cooked pasta or rice. ? Fruits have 15 grams of carbs in a serving. A serving is 1 small fresh fruit, such as an apple or orange; ½ of a banana; ½ cup of cooked or canned fruit; ½ cup of fruit juice; 1 cup of melon or raspberries; or 2 tablespoons of dried fruit. ? Milk and no-sugar-added yogurt have 15 grams of carbs in a serving. A serving is 1 cup of milk or 2/3 cup of no-sugar-added yogurt. ? Starchy vegetables have 15 grams of carbs in a serving. A serving is ½ cup of mashed potatoes or sweet potato; 1 cup winter squash; ½ of a small baked potato; ½ cup of cooked beans; or ½ cup cooked corn or green peas. · Learn how much carbs to eat each day and at each meal. A dietitian or CDE can teach you how to keep track of the amount of carbs you eat. This is called carbohydrate counting. · If you are not sure how to count carbohydrate grams, use the Plate Method to plan meals.  It is a good, quick way to make sure that you have a balanced meal. It also helps you spread carbs throughout the day. ? Divide your plate by types of foods. Put non-starchy vegetables on half the plate, meat or other protein food on one-quarter of the plate, and a grain or starchy vegetable in the final quarter of the plate. To this you can add a small piece of fruit and 1 cup of milk or yogurt, depending on how many carbs you are supposed to eat at a meal.  · Try to eat about the same amount of carbs at each meal. Do not \"save up\" your daily allowance of carbs to eat at one meal.  · Proteins have very little or no carbs per serving. Examples of proteins are beef, chicken, turkey, fish, eggs, tofu, cheese, cottage cheese, and peanut butter. A serving size of meat is 3 ounces, which is about the size of a deck of cards. Examples of meat substitute serving sizes (equal to 1 ounce of meat) are 1/4 cup of cottage cheese, 1 egg, 1 tablespoon of peanut butter, and ½ cup of tofu. How can you eat out and still eat healthy? · Learn to estimate the serving sizes of foods that have carbohydrate. If you measure food at home, it will be easier to estimate the amount in a serving of restaurant food. · If the meal you order has too much carbohydrate (such as potatoes, corn, or baked beans), ask to have a low-carbohydrate food instead. Ask for a salad or green vegetables. · If you use insulin, check your blood sugar before and after eating out to help you plan how much to eat in the future. · If you eat more carbohydrate at a meal than you had planned, take a walk or do other exercise. This will help lower your blood sugar. What are some tips for eating healthy? · Limit saturated fat, such as the fat from meat and dairy products. This is a healthy choice because people who have diabetes are at higher risk of heart disease. So choose lean cuts of meat and nonfat or low-fat dairy products.  Use olive or canola oil instead of butter or shortening when cooking. · Don't skip meals. Your blood sugar may drop too low if you skip meals and take insulin or certain medicines for diabetes. · Check with your doctor before you drink alcohol. Alcohol can cause your blood sugar to drop too low. Alcohol can also cause a bad reaction if you take certain diabetes medicines. Follow-up care is a key part of your treatment and safety. Be sure to make and go to all appointments, and call your doctor if you are having problems. It's also a good idea to know your test results and keep a list of the medicines you take. Where can you learn more? Go to https://chpepiceweb.Shopow. org and sign in to your Plair account. Enter S965 in the [employer] box to learn more about \"Learning About Carbohydrate (Carb) Counting and Eating Out When You Have Diabetes. \"     If you do not have an account, please click on the \"Sign Up Now\" link. Current as of: August 31, 2020               Content Version: 12.9  © 2006-2021 Healthwise, [employer]. Care instructions adapted under license by [facility] ([facility]). If you have questions about a medical condition or this instruction, always ask your healthcare professional. [place] [address] any warranty or liability for your use of this information. Subjective:      Patient ID: Zaheer West is a 77 y.o. male who presents for:  Chief Complaint   Patient presents with    Discuss Medications     x 3 month follow up     Eczema    Weight Management     has been on & off with his walking        Diarrhea improved with decreased Magnesium. Normal bm now    Triamcinolone cream works well for eczema over 2-3 days. Can go a month or so without     Weight is down 5 # and he is walking routinely. monitoring on watch. 10, 000 per day achieved    Needing more water to help pass food down. Will not move if no water.   Has had esophageal dilation in the past      Patient is aware of prior history of low testosterone at this point looking to confirm continued low level and then to supplement with activity and supplements      Current Outpatient Medications on File Prior to Visit   Medication Sig Dispense Refill    melatonin 1 MG tablet Take 1 tablet by mouth nightly as needed for Sleep 30 tablet     vitamin E 1000 units capsule Take 1,000 Units by mouth daily      vitamin D (CHOLECALCIFEROL) 1000 UNIT TABS tablet Take 6,000 Units by mouth daily      Multiple Vitamins-Minerals (ONE-A-DAY MENS HEALTH FORMULA) TABS Take by mouth      magnesium (MAGNESIUM-OXIDE) 250 MG TABS tablet Take 250 mg by mouth daily Take 3 pills daily      calcium carbonate (OYSTER SHELL CALCIUM 500 MG) 1250 MG tablet Take 1 tablet by mouth daily      Glucosamine-Chondroitin 5323-2696 MG/30ML LIQD Take by mouth Take 3 pills daily      Omega-3 Fatty Acids (FISH OIL) 1200 MG CAPS Take  by mouth daily. No current facility-administered medications on file prior to visit. Past Medical History:   Diagnosis Date    BPH (benign prostatic hyperplasia) 2/3/2012    no treatment at this time    Chronic back pain     Colon polyp 09/2017    Dr. India Gardner; f/u 5 years    Eczematous dermatitis     hands, feet and legs    ED (erectile dysfunction) 2/3/2012    Erectile dysfunction     Family history of early CAD 2/3/2012    Gastroesophageal reflux disease with hiatal hernia     History of hypertension     History of prediabetes     History of renal insufficiency syndrome     Obesity (BMI 30-39. 9)     Osteoarthritis     Vitamin D deficiency     Weight gain      Past Surgical History:   Procedure Laterality Date    COLONOSCOPY  9/25/2006    [place] COLON CA SCRN NOT  [address] IND N/A 9/15/2017    COLONOSCOPY performed by Lorna Nicolas MD at [place] [address]  5/9/14    bx, dilation marjorie    UPPER GASTROINTESTINAL ENDOSCOPY N/A 9/15/2017    EGD DILATION DARIUS and biopsy performed by Annalise Acharya MD at [facility] [address] Marital status:      Spouse name: Not on file    Number of children: 1    Years of education: Not on file    Highest education level: Not on file   Occupational History    Not on file   Tobacco Use    Smoking status: Former Smoker     Packs/day: 2.00     Years: 5.00     Pack years: 10.00     Quit date: 1990     Years since quittin.5    Smokeless tobacco: Never Used   Substance and Sexual Activity    Alcohol use: No    Drug use: No    Sexual activity: Yes     Partners: Female   Other Topics Concern    Not on file   Social History Narrative    Not on file     Social Determinants of Health     Financial Resource Strain: Low Risk     Difficulty of Paying Living Expenses: Not hard at all   Food Insecurity: No Food Insecurity    Worried About [address] in the Last Year: Never true    [address] in the Last Year: Never true   Transportation Needs: No Transportation Needs    Lack of Transportation (Medical): No    Lack of Transportation (Non-Medical):  No   Physical Activity:     Days of Exercise per Week:     Minutes of Exercise per Session:    Stress:     Feeling of Stress :    Social Connections:     Frequency of Communication with Friends and Family:     Frequency of Social Gatherings with Friends and Family:     Attends [employer] Services:     Active Member of Clubs or Organizations:     Attends Club or Organization Meetings:     Marital Status:    Intimate Partner Violence:     Fear of Current or Ex-Partner:     Emotionally Abused:     Physically Abused:     Sexually Abused:      Family History   Problem Relation Age of Onset    Depression Mother     Heart Disease Paternal Grandmother     High Blood Pressure Paternal Grandmother     High Cholesterol Paternal Grandmother     Anemia Paternal Grandmother         B 12 deficiency    Heart Disease Brother     Heart Surgery Brother     Anemia Brother         B 12 deficiency     Allergies:  Patient has no known allergies. Review of Systems   Constitutional: Negative for activity change, appetite change, diaphoresis and unexpected weight change. Eyes: Negative for photophobia and visual disturbance. Respiratory: Negative for chest tightness and shortness of breath. No orthopnea   Cardiovascular: Negative for chest pain, palpitations and leg swelling. Gastrointestinal: Negative for abdominal distention, abdominal pain and diarrhea. Genitourinary: Negative for flank pain and frequency. Musculoskeletal: Negative for gait problem and joint swelling. Skin: Positive for rash. Negative for color change and wound. Neurological: Negative for dizziness, weakness, light-headedness and headaches. Psychiatric/Behavioral: Negative for confusion. Objective:   /82   Pulse 86   Temp 97.7 °F (36.5 °C)   Ht 5' 10.75\" (1.797 m)   Wt 268 lb 12.8 oz (121.9 kg)   SpO2 98%   BMI 37.76 kg/m²     Physical Exam  Constitutional:       Appearance: Normal appearance. He is well-developed. He is not ill-appearing or diaphoretic. Comments: Vitals signs reviewed   HENT:      Head: Normocephalic and atraumatic. Right Ear: Hearing and external ear normal.      Left Ear: Hearing and external ear normal.      Nose: No nasal deformity or rhinorrhea. Eyes:      General: Lids are normal.         Right eye: No discharge. Left eye: No discharge. Extraocular Movements:      Right eye: No nystagmus. Left eye: No nystagmus. Conjunctiva/sclera: Conjunctivae normal.      Right eye: No hemorrhage. Left eye: No hemorrhage. Pupils: Pupils are equal, round, and reactive to light. Neck:      Thyroid: No thyroid mass or thyromegaly. Vascular: Normal carotid pulses. No carotid bruit or JVD. Trachea: No tracheal tenderness or tracheal deviation.    Cardiovascular:      Rate and Rhythm: Normal rate and regular rhythm. Chest Wall: PMI displaced: normal location PMI. Pulses:           Carotid pulses are 2+ on the right side and 2+ on the left side. Radial pulses are 2+ on the right side and 2+ on the left side. Heart sounds: S1 normal and S2 normal. No murmur heard. No friction rub. No gallop. No S3 sounds. Pulmonary:      Effort: Pulmonary effort is normal. No accessory muscle usage or respiratory distress. Breath sounds: Normal breath sounds. Chest:      Chest wall: No deformity. Abdominal:      General: There is no distension. Musculoskeletal:         General: No deformity. Cervical back: Full passive range of motion without pain and neck supple. No deformity or tenderness. Normal range of motion. Lymphadenopathy:      Cervical:      Right cervical: No superficial cervical adenopathy. Left cervical: No superficial cervical adenopathy. Upper Body:      Right upper body: No supraclavicular adenopathy. Left upper body: No supraclavicular adenopathy. Skin:     General: Skin is warm and dry. Findings: No bruising or rash. Nails: There is no clubbing. Neurological:      Mental Status: He is alert. Cranial Nerves: Cranial nerve deficit: CN II-XII GI without obvious deficit. Sensory: Sensory deficit: grossly intact for hands. Motor: No tremor. Atrophy: B UE and LE without atrophy. Abnormal muscle tone: B UE and LE have normal tone. Coordination: Coordination normal.      Gait: Gait normal.   Psychiatric:         Attention and Perception: He is attentive. Mood and Affect: Mood is not anxious. Affect is not inappropriate. Speech: Speech normal.         Behavior: Behavior is not agitated. Behavior is cooperative. Judgment: Judgment normal.         No results found for this visit on 07/14/21. No results found for this or any previous visit (from the past 2016 hour(s)).         Assessment: Diagnosis Orders   1. Dyshidrotic eczema  triamcinolone (KENALOG) 0.1 % cream   2. Eczema, unspecified type  triamcinolone (KENALOG) 0.1 % cream   3. Low testosterone  Testosterone Free and Total Male   4. Obesity (BMI 30-39.9)     5. Adenomatous polyp of transverse colon- f/u due 9/2022     6. Prostate cancer screening  PSA screening   7. Screening for thyroid disorder  TSH with Reflex   8. Screening for diabetes mellitus  Basic Metabolic Panel   9. Screening for deficiency anemia  CBC Auto Differential   10. Loose stools           Orders Placed This Encounter   Procedures    Testosterone Free and Total Male     Standing Status:   Future     Standing Expiration Date:   7/14/2022    PSA screening     Standing Status:   Future     Standing Expiration Date:   7/14/2022    TSH with Reflex     Standing Status:   Future     Standing Expiration Date:   7/14/2022    Basic Metabolic Panel     Standing Status:   Future     Standing Expiration Date:   7/14/2022    CBC Auto Differential     Standing Status:   Future     Standing Expiration Date:   7/14/2022         Plan:   Return in about 3 months (around 10/12/2021) for for review of outcome of today's recommendation. Patient Instructions   Follow-up in October after labs to review and then to discuss any change in mind about testosterone therapy. Monitor weight as well. Colonoscopy is due in 2022. Patient states he thinks he can wait on his esophageal issue until that scope is due. Patient Education        Counting Carbohydrates for Diabetes: Care Instructions  Your Care Instructions     You don't have to eat special foods when you have diabetes. You just have to be careful to eat healthy foods. Carbohydrates (carbs) raise blood sugar higher and quicker than any other nutrient. Carbs are found in desserts, breads and cereals, and fruit. They're also in starchy vegetables. These include potatoes, corn, and grains such as rice and pasta.  Carbs are also in milk and yogurt. The more carbs you eat at one time, the higher your blood sugar will rise. Spreading carbs all through the day helps keep your blood sugar levels within your target range. Counting carbs is one of the best ways to keep your blood sugar under control. If you use insulin, counting carbs helps you match the right amount of insulin to the number of grams of carbs in a meal. Then you can change your diet and insulin dose as needed. Testing your blood sugar several times a day can help you learn how carbs affect your blood sugar. A registered dietitian or certified diabetes educator can help you plan meals and snacks. Follow-up care is a key part of your treatment and safety. Be sure to make and go to all appointments, and call your doctor if you are having problems. It's also a good idea to know your test results and keep a list of the medicines you take. How can you care for yourself at home? Know your daily amount of carbohydrates  Your daily amount depends on several things, such as your weight, how active you are, which diabetes medicines you take, and what your goals are for your blood sugar levels. A registered dietitian or certified diabetes educator can help you plan how many carbs to include in each meal and snack. For most adults, a guideline for the daily amount of carbs is:  · 45 to 60 grams at each meal. That's about the same as 3 to 4 carbohydrate servings. · 15 to 20 grams at each snack. That's about the same as 1 carbohydrate serving. Count carbs  Counting carbs lets you know how much rapid-acting insulin to take before you eat. If you use an insulin pump, you get a constant rate of insulin during the day. So the pump must be programmed at meals. This gives you extra insulin to cover the rise in blood sugar after meals. If you take insulin:  · Learn your own insulin-to-carb ratio. You and your diabetes health professional will figure out the ratio.  You can do this by testing your blood sugar after meals. For example, you may need a certain amount of insulin for every 15 grams of carbs. · Add up the carb grams in a meal. Then you can figure out how many units of insulin to take based on your insulin-to-carb ratio. · Exercise lowers blood sugar. You can use less insulin than you would if you were not doing exercise. Keep in mind that timing matters. If you exercise within 1 hour after a meal, your body may need less insulin for that meal than it would if you exercised 3 hours after the meal. Test your blood sugar to find out how exercise affects your need for insulin. If you do or don't take insulin:  · Look at labels on packaged foods. This can tell you how many carbs are in a serving. You can also use guides from the American Diabetes Association. · Be aware of portions, or serving sizes. If a package has two servings and you eat the whole package, you need to double the number of grams of carbohydrate listed for one serving. · Protein, fat, and fiber do not raise blood sugar as much as carbs do. If you eat a lot of these nutrients in a meal, your blood sugar will rise more slowly than it would otherwise. Eat from all food groups  · Eat at least three meals a day. · Plan meals to include food from all the food groups. The food groups include grains, fruits, dairy, proteins, and vegetables. · Talk to your dietitian or diabetes educator about ways to add limited amounts of sweets into your meal plan. · If you drink alcohol, talk to your doctor. It may not be recommended when you are taking certain diabetes medicines. Where can you learn more? Go to https://Systems Integrationjosefa.Joyhound. org and sign in to your [employer] account. Enter X257 in the [facility] box to learn more about \"Counting Carbohydrates for Diabetes: Care Instructions. \"     If you do not have an account, please click on the \"Sign Up Now\" link.   Current as of: August 31, 2020               Content Version: 12.9  © 9429-8030 Healthwise, [employer]. Care instructions adapted under license by [facility] ([facility]). If you have questions about a medical condition or this instruction, always ask your healthcare professional. [address] any warranty or liability for your use of this information. Patient Education        Learning About Carbohydrate (Carb) Counting and Eating Out When You Have Diabetes  Why plan your meals? Meal planning can be a key part of managing diabetes. Planning meals and snacks with the right balance of carbohydrate, protein, and fat can help you keep your blood sugar at the target level you set with your doctor. You don't have to eat special foods. You can eat what your family eats, including sweets once in a while. But you do have to pay attention to how often you eat and how much you eat of certain foods. You may want to work with a dietitian or a certified diabetes educator. He or she can give you tips and meal ideas and can answer your questions about meal planning. This health professional can also help you reach a healthy weight if that is one of your goals. What should you know about eating carbs? Managing the amount of carbohydrate (carbs) you eat is an important part of healthy meals when you have diabetes. Carbohydrate is found in many foods. · Learn which foods have carbs. And learn the amounts of carbs in different foods. ? Bread, cereal, pasta, and rice have about 15 grams of carbs in a serving. A serving is 1 slice of bread (1 ounce), ½ cup of cooked cereal, or 1/3 cup of cooked pasta or rice. ? Fruits have 15 grams of carbs in a serving. A serving is 1 small fresh fruit, such as an apple or orange; ½ of a banana; ½ cup of cooked or canned fruit; ½ cup of fruit juice; 1 cup of melon or raspberries; or 2 tablespoons of dried fruit. ? Milk and no-sugar-added yogurt have 15 grams of carbs in a serving.  A serving is 1 cup of milk or 2/3 cup of no-sugar-added yogurt. ? Starchy vegetables have 15 grams of carbs in a serving. A serving is ½ cup of mashed potatoes or sweet potato; 1 cup winter squash; ½ of a small baked potato; ½ cup of cooked beans; or ½ cup cooked corn or green peas. · Learn how much carbs to eat each day and at each meal. A dietitian or CDE can teach you how to keep track of the amount of carbs you eat. This is called carbohydrate counting. · If you are not sure how to count carbohydrate grams, use the Plate Method to plan meals. It is a good, quick way to make sure that you have a balanced meal. It also helps you spread carbs throughout the day. ? Divide your plate by types of foods. Put non-starchy vegetables on half the plate, meat or other protein food on one-quarter of the plate, and a grain or starchy vegetable in the final quarter of the plate. To this you can add a small piece of fruit and 1 cup of milk or yogurt, depending on how many carbs you are supposed to eat at a meal.  · Try to eat about the same amount of carbs at each meal. Do not \"save up\" your daily allowance of carbs to eat at one meal.  · Proteins have very little or no carbs per serving. Examples of proteins are beef, chicken, turkey, fish, eggs, tofu, cheese, cottage cheese, and peanut butter. A serving size of meat is 3 ounces, which is about the size of a deck of cards. Examples of meat substitute serving sizes (equal to 1 ounce of meat) are 1/4 cup of cottage cheese, 1 egg, 1 tablespoon of peanut butter, and ½ cup of tofu. How can you eat out and still eat healthy? · Learn to estimate the serving sizes of foods that have carbohydrate. If you measure food at home, it will be easier to estimate the amount in a serving of restaurant food. · If the meal you order has too much carbohydrate (such as potatoes, corn, or baked beans), ask to have a low-carbohydrate food instead. Ask for a salad or green vegetables.   · If you use insulin, check your blood sugar before and after eating out to help you plan how much to eat in the future. · If you eat more carbohydrate at a meal than you had planned, take a walk or do other exercise. This will help lower your blood sugar. What are some tips for eating healthy? · Limit saturated fat, such as the fat from meat and dairy products. This is a healthy choice because people who have diabetes are at higher risk of heart disease. So choose lean cuts of meat and nonfat or low-fat dairy products. Use olive or canola oil instead of butter or shortening when cooking. · Don't skip meals. Your blood sugar may drop too low if you skip meals and take insulin or certain medicines for diabetes. · Check with your doctor before you drink alcohol. Alcohol can cause your blood sugar to drop too low. Alcohol can also cause a bad reaction if you take certain diabetes medicines. Follow-up care is a key part of your treatment and safety. Be sure to make and go to all appointments, and call your doctor if you are having problems. It's also a good idea to know your test results and keep a list of the medicines you take. Where can you learn more? Go to https://Avaamopepiceweb.Camera Service & Integration. org and sign in to your [employer] account. Enter M928 in the [employer] box to learn more about \"Learning About Carbohydrate (Carb) Counting and Eating Out When You Have Diabetes. \"     If you do not have an account, please click on the \"Sign Up Now\" link. Current as of: August 31, 2020               Content Version: 12.9  © 2006-2021 Healthwise, Incorporated. Care instructions adapted under license by [facility] [address] If you have questions about a medical condition or this instruction, always ask your healthcare professional. [place] [address] any warranty or liability for your use of this information. This note was partially created with the assistance of dictation.   This may lead to grammatical or spelling errors. Thang Sutton M.D.

## 2021-10-12 DIAGNOSIS — R79.89 LOW TESTOSTERONE: Primary | ICD-10-CM

## 2021-10-19 DIAGNOSIS — R79.89 LOW TESTOSTERONE: ICD-10-CM

## 2021-10-20 ENCOUNTER — OFFICE VISIT (OUTPATIENT)
Dept: FAMILY MEDICINE CLINIC | Age: 66
End: 2021-10-20
Payer: MEDICARE

## 2021-10-20 VITALS
DIASTOLIC BLOOD PRESSURE: 78 MMHG | SYSTOLIC BLOOD PRESSURE: 124 MMHG | HEART RATE: 96 BPM | BODY MASS INDEX: 37.1 KG/M2 | WEIGHT: 265 LBS | OXYGEN SATURATION: 98 % | HEIGHT: 71 IN | TEMPERATURE: 98.2 F

## 2021-10-20 DIAGNOSIS — R79.89 LOW TESTOSTERONE: ICD-10-CM

## 2021-10-20 DIAGNOSIS — E66.9 OBESITY (BMI 30-39.9): ICD-10-CM

## 2021-10-20 DIAGNOSIS — L30.9 ECZEMA, UNSPECIFIED TYPE: Primary | ICD-10-CM

## 2021-10-20 DIAGNOSIS — L30.1 DYSHIDROTIC ECZEMA: ICD-10-CM

## 2021-10-20 DIAGNOSIS — R79.89 ABNORMAL TSH: ICD-10-CM

## 2021-10-20 PROCEDURE — 3288F FALL RISK ASSESSMENT DOCD: CPT | Performed by: FAMILY MEDICINE

## 2021-10-20 PROCEDURE — 99214 OFFICE O/P EST MOD 30 MIN: CPT | Performed by: FAMILY MEDICINE

## 2021-10-20 RX ORDER — TRIAMCINOLONE ACETONIDE 1 MG/G
CREAM TOPICAL
Qty: 454 G | Refills: 0 | Status: SHIPPED | OUTPATIENT
Start: 2021-10-20 | End: 2022-01-31 | Stop reason: SDUPTHER

## 2021-10-20 SDOH — ECONOMIC STABILITY: FOOD INSECURITY: WITHIN THE PAST 12 MONTHS, YOU WORRIED THAT YOUR FOOD WOULD RUN OUT BEFORE YOU GOT MONEY TO BUY MORE.: NEVER TRUE

## 2021-10-20 SDOH — ECONOMIC STABILITY: FOOD INSECURITY: WITHIN THE PAST 12 MONTHS, THE FOOD YOU BOUGHT JUST DIDN'T LAST AND YOU DIDN'T HAVE MONEY TO GET MORE.: NEVER TRUE

## 2021-10-20 ASSESSMENT — PATIENT HEALTH QUESTIONNAIRE - PHQ9
SUM OF ALL RESPONSES TO PHQ QUESTIONS 1-9: 0
2. FEELING DOWN, DEPRESSED OR HOPELESS: 0
SUM OF ALL RESPONSES TO PHQ9 QUESTIONS 1 & 2: 0
SUM OF ALL RESPONSES TO PHQ QUESTIONS 1-9: 0
1. LITTLE INTEREST OR PLEASURE IN DOING THINGS: 0
SUM OF ALL RESPONSES TO PHQ QUESTIONS 1-9: 0

## 2021-10-20 ASSESSMENT — ENCOUNTER SYMPTOMS
ABDOMINAL DISTENTION: 0
SHORTNESS OF BREATH: 0
CHEST TIGHTNESS: 0
ABDOMINAL PAIN: 0
PHOTOPHOBIA: 0

## 2021-10-20 ASSESSMENT — SOCIAL DETERMINANTS OF HEALTH (SDOH): HOW HARD IS IT FOR YOU TO PAY FOR THE VERY BASICS LIKE FOOD, HOUSING, MEDICAL CARE, AND HEATING?: NOT HARD AT ALL

## 2021-10-20 NOTE — PATIENT INSTRUCTIONS
Pt is interested in testosterone injections and would like to see if the automatic injector is available    Sinew triamcinolone episodically. May need to use twice a day for 2 weeks at a time but then needs to take break for 2 weeks or so at a time. Continue with exercise and weight loss. Continue current diet regimen. Goal of 5 pounds per amount of weight loss is appropriate. Slightly abnormal TSH. We will recheck that in the future when we recheck testosterone level as we expect to do with likely initiation of testosterone therapy.

## 2021-10-20 NOTE — PROGRESS NOTES
Diagnosis Orders   1. Eczema, unspecified type  triamcinolone (KENALOG) 0.1 % cream   2. Dyshidrotic eczema  triamcinolone (KENALOG) 0.1 % cream   3. Low testosterone     4. Abnormal TSH     5. Obesity (BMI 30-39. 9)       Return in about 3 months (around 1/20/2022) for for review of outcome of today's recommendation. Patient Instructions   Pt is interested in testosterone injections and would like to see if the automatic injector is available    Sinew triamcinolone episodically. May need to use twice a day for 2 weeks at a time but then needs to take break for 2 weeks or so at a time. Continue with exercise and weight loss. Continue current diet regimen. Goal of 5 pounds per amount of weight loss is appropriate. Slightly abnormal TSH. We will recheck that in the future when we recheck testosterone level as we expect to do with likely initiation of testosterone therapy. Subjective:      Patient ID: Glady Scheuermann is a 74 y.o. male who presents for:  Chief Complaint   Patient presents with    Discuss Labs     x 3 month check up address [facility]'s        Pt has been working on losing weight, he is at 17# lost.  Using nutrasystem. Increasing protein intake. Did repeat testosterone lab repeat today    Some exercise at home in the form of weights and averaging 11,000 steps per day at work. Rash well controlled with occassional flares, controlled with triamcinolone easily.        Current Outpatient Medications on File Prior to Visit   Medication Sig Dispense Refill    melatonin 1 MG tablet Take 1 tablet by mouth nightly as needed for Sleep 30 tablet     vitamin E 1000 units capsule Take 1,000 Units by mouth daily      vitamin D (CHOLECALCIFEROL) 1000 UNIT TABS tablet Take 6,000 Units by mouth daily      Multiple Vitamins-Minerals (ONE-A-DAY MENS HEALTH FORMULA) TABS Take by mouth      magnesium (MAGNESIUM-OXIDE) 250 MG TABS tablet Take 250 mg by mouth daily Take 3 pills daily      calcium carbonate (OYSTER SHELL CALCIUM 500 MG) 1250 MG tablet Take 1 tablet by mouth daily      Glucosamine-Chondroitin 7261-6830 MG/30ML LIQD Take by mouth Take 3 pills daily      Omega-3 Fatty Acids (FISH OIL) 1200 MG CAPS Take  by mouth daily. No current facility-administered medications on file prior to visit. Past Medical History:   Diagnosis Date    BPH (benign prostatic hyperplasia) 2/3/2012    no treatment at this time    Chronic back pain     Colon polyp 2017    Dr. Hui Trevizo; f/u 5 years    Eczematous dermatitis     hands, feet and legs    ED (erectile dysfunction) 2/3/2012    Erectile dysfunction     Family history of early CAD 2/3/2012    Gastroesophageal reflux disease with hiatal hernia     History of hypertension     History of prediabetes     History of renal insufficiency syndrome     Obesity (BMI 30-39. 9)     Osteoarthritis     Vitamin D deficiency     Weight gain      Past Surgical History:   Procedure Laterality Date    COLONOSCOPY  2006    IA COLON CA SCRN NOT  W [address] [place] [address] IND N/A 9/15/2017    COLONOSCOPY performed by Jacobo Interiano MD at [address] [place] [address]  14    bx, dilation jarmoszuk    UPPER GASTROINTESTINAL ENDOSCOPY N/A 9/15/2017    EGD DILATION [facility] and biopsy performed by Jacobo Interiano MD at [address] [place] [address] Marital status:      Spouse name: Not on file    Number of children: 1    Years of education: Not on file    Highest education level: Not on file   Occupational History    Not on file   Tobacco Use    Smoking status: Former Smoker     Packs/day: 2.00     Years: 5.00     Pack years: 10.00     Quit date: 1990     Years since quittin.8    Smokeless tobacco: Never Used   Substance and Sexual Activity    Alcohol use: No    Drug use: No    Sexual activity: Yes     Partners: Female   Other Topics Concern    Not on file   Social History Narrative    Not on file     Social Determinants of Health     Financial Resource Strain: Low Risk     Difficulty of Paying Living Expenses: Not hard at all   Food Insecurity: No Food Insecurity    Worried About Running Out of Food in the Last Year: Never true    [address] in the Last Year: Never true   Transportation Needs:     Lack of Transportation (Medical):  Lack of Transportation (Non-Medical):    Physical Activity:     Days of Exercise per Week:     Minutes of Exercise per Session:    Stress:     Feeling of Stress :    Social Connections:     Frequency of Communication with Friends and Family:     Frequency of Social Gatherings with Friends and Family:     Attends [employer] Services:     Active Member of Clubs or Organizations:     Attends Club or Organization Meetings:     Marital Status:    Intimate Partner Violence:     Fear of Current or Ex-Partner:     Emotionally Abused:     Physically Abused:     Sexually Abused:      Family History   Problem Relation Age of Onset    Depression Mother     Heart Disease Paternal Grandmother     High Blood Pressure Paternal Grandmother     High Cholesterol Paternal Grandmother     Anemia Paternal Grandmother         B 12 deficiency    Heart Disease Brother     Heart Surgery Brother     Anemia Brother         B 12 deficiency     Allergies:  Patient has no known allergies. Review of Systems   Constitutional: Positive for fatigue. Negative for activity change, appetite change, diaphoresis and unexpected weight change. Eyes: Negative for photophobia and visual disturbance. Respiratory: Negative for chest tightness and shortness of breath. No orthopnea   Cardiovascular: Negative for chest pain, palpitations and leg swelling. Gastrointestinal: Negative for abdominal distention and abdominal pain. Genitourinary: Negative for flank pain and frequency. Musculoskeletal: Positive for myalgias.  Negative for gait problem and joint swelling. Skin: Positive for rash. Neurological: Negative for dizziness, weakness, light-headedness and headaches. Psychiatric/Behavioral: Positive for sleep disturbance. Negative for confusion. Objective:   /78   Pulse 96   Temp 98.2 °F (36.8 °C)   Ht 5' 10.75\" (1.797 m)   Wt 265 lb (120.2 kg)   SpO2 98%   BMI 37.22 kg/m²     Physical Exam  Vitals reviewed. Constitutional:       General: He is not in acute distress. Appearance: He is well-developed. HENT:      Head: Normocephalic and atraumatic. Right Ear: External ear normal.      Left Ear: External ear normal.      Nose: Nose normal.   Eyes:      General:         Right eye: No discharge. Left eye: No discharge. Conjunctiva/sclera: Conjunctivae normal.      Pupils: Pupils are equal, round, and reactive to light. Neck:      Thyroid: No thyromegaly. Cardiovascular:      Rate and Rhythm: Normal rate and regular rhythm. Pulmonary:      Effort: Pulmonary effort is normal. No respiratory distress. Abdominal:      General: There is no distension. Musculoskeletal:      Cervical back: Neck supple. Skin:     General: Skin is warm and dry. Neurological:      Mental Status: He is alert and oriented to person, place, and time. Coordination: Coordination normal.   Psychiatric:         Thought Content: Thought content normal.         Judgment: Judgment normal.         No results found for this visit on 10/20/21.     Recent Results (from the past 2016 hour(s))   CBC Auto Differential    Collection Time: 10/08/21  8:44 AM   Result Value Ref Range    WBC 5.6 4.8 - 10.8 K/uL    RBC 5.36 4.70 - 6.10 M/uL    Hemoglobin 15.5 14.0 - 18.0 g/dL    Hematocrit 46.0 42.0 - 52.0 %    MCV 85.8 80.0 - 100.0 fL    MCH 29.0 27.0 - 31.3 pg    MCHC 33.8 33.0 - 37.0 %    RDW 14.1 11.5 - 14.5 %    Platelets 609 898 - 587 K/uL    Neutrophils % 69.0 %    Lymphocytes % 17.2 %    Monocytes % 8.1 %    Eosinophils % 3.6 % Basophils % 2.1 %    Neutrophils Absolute 3.9 1.4 - 6.5 K/uL    Lymphocytes Absolute 1.0 1.0 - 4.8 K/uL    Monocytes Absolute 0.5 0.2 - 0.8 K/uL    Eosinophils Absolute 0.2 0.0 - 0.7 K/uL    Basophils Absolute 0.1 0.0 - 0.2 K/uL   Basic Metabolic Panel    Collection Time: 10/08/21  8:44 AM   Result Value Ref Range    Sodium 141 135 - 144 mEq/L    Potassium 4.6 3.4 - 4.9 mEq/L    Chloride 105 95 - 107 mEq/L    CO2 25 20 - 31 mEq/L    Anion Gap 11 9 - 15 mEq/L    Glucose 109 (H) 70 - 99 mg/dL    BUN 16 8 - 23 mg/dL    CREATININE 0.73 0.70 - 1.20 mg/dL    GFR Non-African American >60.0 >60    GFR  >60.0 >60    Calcium 9.1 8.5 - 9.9 mg/dL   TSH with Reflex    Collection Time: 10/08/21  8:44 AM   Result Value Ref Range    TSH 3.930 (H) 0.440 - 3.860 uIU/mL   PSA screening    Collection Time: 10/08/21  8:44 AM   Result Value Ref Range    PSA 0.89 0.00 - 4.00 ng/mL   Testosterone Free and Total Male    Collection Time: 10/08/21  8:44 AM   Result Value Ref Range    Testosterone Free, Calc 59 47 - 244 pg/mL    Testosterone % Free 2.0 1.6 - 2.9 %    Total Testosterone 292 (L) 300 - 720 ng/dL    Sex Hormone Binding 26 11 - 80 nmol/L       [] Pt was seen by provider for      Minutes  Counseling and coordination of care was done for all assessment diagnosis listed for today with patient and any family/friend present. More than 50% of this visit was spent coordinating cuurent care, obtaining information for prior records, and counseling for current plan of action. Assessment:       Diagnosis Orders   1. Eczema, unspecified type  triamcinolone (KENALOG) 0.1 % cream   2. Dyshidrotic eczema  triamcinolone (KENALOG) 0.1 % cream   3. Low testosterone     4. Abnormal TSH     5. Obesity (BMI 30-39. 9)           No orders of the defined types were placed in this encounter.       Orders Placed This Encounter   Medications    triamcinolone (KENALOG) 0.1 % cream     Sig: Apply topically qam daily Monday through Friday only. Take weekend off. Do not use on face, groin, armpits, breasts tissue. Dispense:  454 g     Refill:  0          Medication List          Accurate as of October 20, 2021  6:44 PM. If you have any questions, ask your nurse or doctor. CONTINUE taking these medications    calcium carbonate 1250 (500 Ca) MG tablet  Commonly known as: OYSTER SHELL CALCIUM 500 mg     Fish Oil 1200 MG Caps     Glucosamine-Chondroitin 1119-2232 MG/30ML Liqd     magnesium 250 MG Tabs tablet  Commonly known as: MAGNESIUM-OXIDE     melatonin 1 MG tablet  Take 1 tablet by mouth nightly as needed for Sleep     One-A-Day Mens Health Formula Tabs     triamcinolone 0.1 % cream  Commonly known as: KENALOG  Apply topically qam daily Monday through Friday only. Take weekend off. Do not use on face, groin, armpits, breasts tissue. vitamin D 1000 UNIT Tabs tablet  Commonly known as: CHOLECALCIFEROL     vitamin E 1000 units capsule           Where to Get Your Medications      These medications were sent to [facility], [id number] [place] [address] [address] [place] [address]  [address] [place] [address]    Phone: [phone]   · triamcinolone 0.1 % cream           Plan:   Return in about 3 months (around 1/20/2022) for for review of outcome of today's recommendation. Patient Instructions   Pt is interested in testosterone injections and would like to see if the automatic injector is available    Sinew triamcinolone episodically. May need to use twice a day for 2 weeks at a time but then needs to take break for 2 weeks or so at a time. Continue with exercise and weight loss. Continue current diet regimen. Goal of 5 pounds per amount of weight loss is appropriate. Slightly abnormal TSH. We will recheck that in the future when we recheck testosterone level as we expect to do with likely initiation of testosterone therapy.       This note was partially created with the assistance of dictation. This may lead to grammatical or spelling errors. Thang Watson M.D.

## 2021-10-20 NOTE — PROGRESS NOTES
Return in about 3 months (around 10/12/2021) for for review of outcome of today's recommendation. Patient Instructions   Follow-up in October after labs to review and then to discuss any change in mind about testosterone therapy. Monitor weight as well. Colonoscopy is due in 2022. Patient states he thinks he can wait on his esophageal issue until that scope is due.         Patient Education

## 2021-10-22 LAB
SEX HORMONE BINDING GLOBULIN: 36 NMOL/L (ref 11–80)
TESTOSTERONE FREE PERCENT: 1.7 % (ref 1.6–2.9)
TESTOSTERONE FREE, CALC: 55 PG/ML (ref 47–244)
TESTOSTERONE TOTAL-MALE: 320 NG/DL (ref 300–720)

## 2021-10-28 ENCOUNTER — TELEPHONE (OUTPATIENT)
Dept: FAMILY MEDICINE CLINIC | Age: 66
End: 2021-10-28

## 2021-10-28 DIAGNOSIS — E34.9 TESTOSTERONE DEFICIENCY: Primary | ICD-10-CM

## 2021-10-28 RX ORDER — SYRINGE W-NEEDLE,DISPOSAB,3 ML 25GX5/8"
1 SYRINGE, EMPTY DISPOSABLE MISCELLANEOUS
Qty: 2 EACH | Refills: 5 | Status: SHIPPED | OUTPATIENT
Start: 2021-10-28

## 2021-10-28 RX ORDER — TESTOSTERONE ENANTHATE 200 MG/ML
1 VIAL (ML) INTRAMUSCULAR
Qty: 6 ML | Refills: 0 | Status: SHIPPED | OUTPATIENT
Start: 2021-10-28 | End: 2021-11-22

## 2021-10-28 NOTE — TELEPHONE ENCOUNTER
Pt is calling to let you know that he does want the Testerone shot. Does pt need a prescription for it?      Pt # [phone]

## 2021-10-29 NOTE — TELEPHONE ENCOUNTER
Pt aware of Dr Krystian Espinal message. Rosie Solano that he wants to start it after his 11/17 visit.

## 2021-11-17 ENCOUNTER — OFFICE VISIT (OUTPATIENT)
Dept: FAMILY MEDICINE CLINIC | Age: 66
End: 2021-11-17
Payer: MEDICARE

## 2021-11-17 VITALS
HEART RATE: 68 BPM | TEMPERATURE: 98.2 F | OXYGEN SATURATION: 98 % | HEIGHT: 71 IN | BODY MASS INDEX: 36.37 KG/M2 | DIASTOLIC BLOOD PRESSURE: 80 MMHG | SYSTOLIC BLOOD PRESSURE: 142 MMHG | WEIGHT: 259.8 LBS

## 2021-11-17 DIAGNOSIS — Z00.00 MEDICARE ANNUAL WELLNESS VISIT, SUBSEQUENT: Primary | ICD-10-CM

## 2021-11-17 DIAGNOSIS — Z23 FLU VACCINE NEED: ICD-10-CM

## 2021-11-17 PROCEDURE — G0008 ADMIN INFLUENZA VIRUS VAC: HCPCS | Performed by: FAMILY MEDICINE

## 2021-11-17 PROCEDURE — G0439 PPPS, SUBSEQ VISIT: HCPCS | Performed by: FAMILY MEDICINE

## 2021-11-17 PROCEDURE — 90694 VACC AIIV4 NO PRSRV 0.5ML IM: CPT | Performed by: FAMILY MEDICINE

## 2021-11-17 PROCEDURE — 99497 ADVNCD CARE PLAN 30 MIN: CPT | Performed by: FAMILY MEDICINE

## 2021-11-17 ASSESSMENT — PATIENT HEALTH QUESTIONNAIRE - PHQ9
SUM OF ALL RESPONSES TO PHQ QUESTIONS 1-9: 0
SUM OF ALL RESPONSES TO PHQ QUESTIONS 1-9: 0
1. LITTLE INTEREST OR PLEASURE IN DOING THINGS: 0
SUM OF ALL RESPONSES TO PHQ QUESTIONS 1-9: 0
SUM OF ALL RESPONSES TO PHQ9 QUESTIONS 1 & 2: 0
2. FEELING DOWN, DEPRESSED OR HOPELESS: 0

## 2021-11-17 ASSESSMENT — LIFESTYLE VARIABLES: HOW OFTEN DO YOU HAVE A DRINK CONTAINING ALCOHOL: 0

## 2021-11-17 NOTE — PROGRESS NOTES
Medicare Annual Wellness Visit  Name: Preeti Hayes Date: 2021   MRN: [id number] Sex: Male   Age: 74 y.o. Ethnicity: Non- / Non    : 1955 Race: White (non-)      Wilson Damon is here for Medicare AWV and Advance Care Planning    Screenings for behavioral, psychosocial and functional/safety risks, and cognitive dysfunction are all negative except as indicated below. These results, as well as other patient data from the [address] form, are documented in Flowsheets linked to this Encounter. No Known Allergies      Prior to Visit Medications    Medication Sig Taking? Authorizing Provider   Syringe/Needle, Disp, (SYRINGE 3CC/50NT5-9/2\") 22G X 1-1/2\" 3 ML MISC 1 actuation by Does not apply route every 14 days Yes Keyla Mederos MD   Testosterone Enanthate 200 MG/ML SOLN Inject 1 mL as directed every 14 days Yes Keyla Mederos MD   triamcinolone (KENALOG) 0.1 % cream Apply topically qam daily Monday through Friday only. Take weekend off. Do not use on face, groin, armpits, breasts tissue. Yes Keyla Mederos MD   melatonin 1 MG tablet Take 1 tablet by mouth nightly as needed for Sleep Yes Keyla Mederos MD   vitamin E 1000 units capsule Take 1,000 Units by mouth daily Yes Historical Provider, MD   vitamin D (CHOLECALCIFEROL) 1000 UNIT TABS tablet Take 6,000 Units by mouth daily Yes Historical Provider, MD   Multiple Vitamins-Minerals (ONE-A-DAY MENS HEALTH FORMULA) TABS Take by mouth Yes Historical Provider, MD   magnesium (MAGNESIUM-OXIDE) 250 MG TABS tablet Take 250 mg by mouth daily Take 3 pills daily Yes Historical Provider, MD   calcium carbonate (OYSTER SHELL CALCIUM 500 MG) 1250 MG tablet Take 1 tablet by mouth daily Yes Historical Provider, MD   Glucosamine-Chondroitin 8219-3451 MG/30ML LIQD Take by mouth Take 3 pills daily Yes Historical Provider, MD   Omega-3 Fatty Acids (FISH OIL) 1200 MG CAPS Take  by mouth daily.    Yes Historical MD Mark Anthony         Past Medical History:   Diagnosis Date    BPH (benign prostatic hyperplasia) 2/3/2012    no treatment at this time    Chronic back pain     Colon polyp 09/2017    Dr. Chente Avila; f/u 5 years    Eczematous dermatitis     hands, feet and legs    ED (erectile dysfunction) 2/3/2012    Erectile dysfunction     Family history of early CAD 2/3/2012    Gastroesophageal reflux disease with hiatal hernia     History of hypertension     History of prediabetes     History of renal insufficiency syndrome     Obesity (BMI 30-39. 9)     Osteoarthritis     Vitamin D deficiency     Weight gain        Past Surgical History:   Procedure Laterality Date    COLONOSCOPY  9/25/2006    [place] COLON CA SCRN NOT HI [facility] IND N/A 9/15/2017    COLONOSCOPY performed by Na Silva MD at [address] [place] [address]  5/9/14    bx, dilation jarmoszuk    UPPER GASTROINTESTINAL ENDOSCOPY N/A 9/15/2017    EGD DILATION [facility] and biopsy performed by [facility] at [facility]         Family History   Problem Relation Age of Onset    Depression Mother     Heart Disease Paternal Grandmother     High Blood Pressure Paternal Grandmother     High Cholesterol Paternal Grandmother     Anemia Paternal Grandmother         B 12 deficiency    Heart Disease Brother     Heart Surgery Brother     Anemia Brother         B 12 deficiency       CareTeam (Including outside providers/suppliers regularly involved in providing care):   Patient Care Team:  Abilio Martinez MD as PCP - General (Family Medicine)  Abilio Martinez MD as PCP - [facility] Empaneled Provider    Wt Readings from Last 3 Encounters:   11/17/21 259 lb 12.8 oz (117.8 kg)   10/20/21 265 lb (120.2 kg)   07/14/21 268 lb 12.8 oz (121.9 kg)     Vitals:    11/17/21 1654 11/17/21 1704   BP: (!) 144/82 (!) 142/80   Pulse: 68    Temp: 98.2 °F (36.8 °C)    SpO2: 98%    Weight: 259 lb 12.8 oz (117.8 kg)    Height: 5' 10.5\" (1.791 m)      Body mass index is 36.75 kg/m². Based upon direct observation of the patient, evaluation of cognition reveals recent and remote memory intact. Patient's complete Health Risk Assessment and screening values have been reviewed and are found in Flowsheets. The following problems were reviewed today and where indicated follow up appointments were made and/or referrals ordered. Positive Risk Factor Screenings with Interventions:           Health Habits/Nutrition:  Health Habits/Nutrition  Do you exercise for at least 20 minutes 2-3 times per week?: Yes  Have you lost any weight without trying in the past 3 months?: No  Do you eat only one meal per day?: No  Have you seen the dentist within the past year?: (!) No  Body mass index: (!) 36.75  Health Habits/Nutrition Interventions:  · will make appt     Advance Care Planning     Advance Care Planning (ACP) Physician/NP/PA Conversation    Date of Conversation: 11/17/2021  Conducted with: Patient with Decision Making Capacity    Healthcare Decision Maker:    Primary Decision Maker: Piyush Fam - Child - [phone]  Click here to complete [address] including selection of the Healthcare Decision Maker Relationship (ie \"Primary\"). Today we documented Decision Maker(s) consistent with Legal Next of Kin hierarchy. Care Preferences:    Hospitalization: \"If your health worsens and it becomes clear that your chance of recovery is unlikely, what would be your preference regarding hospitalization? \"  The patient would prefer hospitalization. Ventilation: \"If you were unable to breath on your own and your chance of recovery was unlikely, what would be your preference about the use of a ventilator (breathing machine) if it was available to you? \"  The patient would desire the use of a ventilator. Resuscitation:   \"In the event your heart stopped as a result of an underlying serious health condition, would you want attempts made to restart your heart, or would you prefer a natural death? \"  Yes, attempt to resuscitate. benefit/burden of treatment options    Conversation Outcomes / Follow-Up Plan:  ACP incomplete - refer to ACP Clinical Specialist  Reviewed DNR/DNI and patient elects Full Code (Attempt Resuscitation)    Length of Voluntary ACP Conversation in minutes:  12 minutes    Lyubov Pleitez MD                      Personalized Preventive Plan   Current Health Maintenance Status  Immunization History   Administered Date(s) Administered    Influenza Virus Vaccine 10/08/2015    Influenza, Quadv, adjuvanted, 65 yrs +, IM, PF (Fluad) 10/09/2020, 11/17/2021    Pneumococcal Polysaccharide ([id number]) 02/03/2012, 10/09/2020        Health Maintenance   Topic Date Due    COVID-19 Vaccine (1) Never done    Shingles Vaccine (1 of 2) Never done    DTaP/Tdap/Td vaccine (2 - Tdap) 06/14/2009    Diabetes screen  08/31/2020    Annual Wellness Visit (AWV)  10/10/2021    Lipid screen  09/19/2023    Colon cancer screen colonoscopy  09/15/2027    Flu vaccine  Completed    Pneumococcal 65+ years Vaccine  Completed    AAA screen  Completed    Hepatitis C screen  Completed    Hepatitis A vaccine  Aged Out    Hepatitis B vaccine  Aged Out    Hib vaccine  Aged Out    Meningococcal (ACWY) vaccine  Aged Out     Recommendations for cookdinner Due: see orders and patient instructions/AVS.  . Recommended screening schedule for the next 5-10 years is provided to the patient in written form: see Patient Instructions/AVS.    Kamar Deion was seen today for medicare awv and advance care planning.     Diagnoses and all orders for this visit:    Medicare annual wellness visit, subsequent    Flu vaccine need  -     INFLUENZA, QUADV, ADJUVANTED, 65 YRS =, IM, PF, PREFILL SYR, 0.5ML (FLUAD)

## 2021-11-17 NOTE — PROGRESS NOTES
After obtaining consent, and per orders of Dr. Talha Reed, injection of high dose flu given in Left deltoid by Agusto rKuger MA. Patient instructed to remain in clinic for 20 minutes afterwards, and to report any adverse reaction to me immediately. Vaccine Information Sheet, \"Influenza - Inactivated\"  given to Diane Martinez, or parent/legal guardian of  Diane Martinez and verbalized understanding. Patient responses:    Have you ever had a reaction to a flu vaccine? No  Are you able to eat eggs without adverse effects? Yes  Do you have any current illness? No  Have you ever had [place] Syndrome? No    Flu vaccine given per order. Please see immunization tab.

## 2021-11-19 ENCOUNTER — TELEPHONE (OUTPATIENT)
Dept: FAMILY MEDICINE CLINIC | Age: 66
End: 2021-11-19

## 2021-11-19 NOTE — TELEPHONE ENCOUNTER
Pt ph. [phone]    Patient states he was told by the pharmacy that the ins company needs a PA on the testosterone script. Please complete.

## 2021-11-22 ENCOUNTER — NURSE ONLY (OUTPATIENT)
Dept: FAMILY MEDICINE CLINIC | Age: 66
End: 2021-11-22
Payer: MEDICARE

## 2021-11-22 DIAGNOSIS — R79.89 LOW TESTOSTERONE: Primary | ICD-10-CM

## 2021-11-22 PROCEDURE — 96372 THER/PROPH/DIAG INJ SC/IM: CPT | Performed by: FAMILY MEDICINE

## 2021-11-22 RX ORDER — TESTOSTERONE CYPIONATE 200 MG/ML
200 INJECTION INTRAMUSCULAR ONCE
Status: COMPLETED | OUTPATIENT
Start: 2021-11-22 | End: 2021-11-22

## 2021-11-22 RX ORDER — TESTOSTERONE CYPIONATE 200 MG/ML
200 INJECTION INTRAMUSCULAR
Qty: 8 ML | Refills: 0 | Status: SHIPPED | OUTPATIENT
Start: 2021-11-22 | End: 2025-04-22

## 2021-11-22 RX ADMIN — TESTOSTERONE CYPIONATE 200 MG: 200 INJECTION INTRAMUSCULAR at 17:12

## 2021-11-22 NOTE — TELEPHONE ENCOUNTER
Please see med ordered ? Is this suppose to be the testosterone cypionate or is this correct? If not correct can it be resent to the pharmacy.

## 2021-11-22 NOTE — PROGRESS NOTES
Patient given Testosterone 200mg IM right gluteal..  Will return in 2 weeks for next injection    Patient tolerated injection well.     Administrations This Visit     testosterone cypionate (DEPOTESTOTERONE CYPIONATE) injection 200 mg     Admin Date  11/22/2021 Action  Given Dose  200 mg Route  IntraMUSCular Administered By  Maria Dolores Hebert LPN

## 2021-12-06 ENCOUNTER — NURSE ONLY (OUTPATIENT)
Dept: FAMILY MEDICINE CLINIC | Age: 66
End: 2021-12-06
Payer: MEDICARE

## 2021-12-06 DIAGNOSIS — R79.89 LOW TESTOSTERONE: Primary | ICD-10-CM

## 2021-12-06 PROCEDURE — 96372 THER/PROPH/DIAG INJ SC/IM: CPT | Performed by: FAMILY MEDICINE

## 2021-12-06 RX ORDER — TESTOSTERONE CYPIONATE 200 MG/ML
200 INJECTION INTRAMUSCULAR ONCE
Status: COMPLETED | OUTPATIENT
Start: 2021-12-06 | End: 2021-12-06

## 2021-12-06 RX ADMIN — TESTOSTERONE CYPIONATE 200 MG: 200 INJECTION INTRAMUSCULAR at 17:00

## 2021-12-06 NOTE — PROGRESS NOTES
Patient given Testosterone 200mg IM left gluteal..  Will return in 2 weeks for next injection    Patient tolerated injection well.     Administrations This Visit     testosterone cypionate (DEPOTESTOTERONE CYPIONATE) injection 200 mg     Admin Date  12/06/2021 Action  Given Dose  200 mg Route  IntraMUSCular Administered By  Darren Plata LPN

## 2021-12-20 ENCOUNTER — NURSE ONLY (OUTPATIENT)
Dept: FAMILY MEDICINE CLINIC | Age: 66
End: 2021-12-20
Payer: MEDICARE

## 2021-12-20 DIAGNOSIS — R79.89 LOW TESTOSTERONE: Primary | ICD-10-CM

## 2021-12-20 PROCEDURE — 96372 THER/PROPH/DIAG INJ SC/IM: CPT | Performed by: FAMILY MEDICINE

## 2021-12-20 RX ORDER — TESTOSTERONE CYPIONATE 200 MG/ML
200 INJECTION INTRAMUSCULAR ONCE
Status: COMPLETED | OUTPATIENT
Start: 2021-12-20 | End: 2021-12-20

## 2021-12-20 RX ADMIN — TESTOSTERONE CYPIONATE 200 MG: 200 INJECTION INTRAMUSCULAR at 16:51

## 2021-12-20 NOTE — PROGRESS NOTES
Patient given Testosterone 200mg IM right gluteal..  Will return in 2 weeks for next injection    Patient tolerated injection well.     Administrations This Visit     testosterone cypionate (DEPOTESTOTERONE CYPIONATE) injection 200 mg     Admin Date  12/20/2021 Action  Given Dose  200 mg Route  IntraMUSCular Administered By  Pearl Bledsoe LPN

## 2022-01-03 ENCOUNTER — NURSE ONLY (OUTPATIENT)
Dept: FAMILY MEDICINE CLINIC | Age: 67
End: 2022-01-03
Payer: MEDICARE

## 2022-01-03 DIAGNOSIS — R79.89 LOW TESTOSTERONE: Primary | ICD-10-CM

## 2022-01-03 PROCEDURE — 96372 THER/PROPH/DIAG INJ SC/IM: CPT | Performed by: FAMILY MEDICINE

## 2022-01-03 RX ORDER — TESTOSTERONE CYPIONATE 200 MG/ML
200 INJECTION INTRAMUSCULAR ONCE
Status: COMPLETED | OUTPATIENT
Start: 2022-01-03 | End: 2022-01-03

## 2022-01-03 RX ADMIN — TESTOSTERONE CYPIONATE 200 MG: 200 INJECTION INTRAMUSCULAR at 16:50

## 2022-01-03 NOTE — PROGRESS NOTES
Patient given Testosterone 200mg IM left gluteal..  Will return in 2 weeks for next injection    Patient tolerated injection well.     Administrations This Visit     testosterone cypionate (DEPOTESTOTERONE CYPIONATE) injection 200 mg     Admin Date  01/03/2022 Action  Given Dose  200 mg Route  IntraMUSCular Administered By  Manuel Cerna LPN

## 2022-01-05 ENCOUNTER — OFFICE VISIT (OUTPATIENT)
Dept: FAMILY MEDICINE CLINIC | Age: 67
End: 2022-01-05
Payer: MEDICARE

## 2022-01-05 VITALS
BODY MASS INDEX: 37.1 KG/M2 | SYSTOLIC BLOOD PRESSURE: 136 MMHG | HEIGHT: 71 IN | TEMPERATURE: 97.3 F | DIASTOLIC BLOOD PRESSURE: 82 MMHG | OXYGEN SATURATION: 97 % | WEIGHT: 265 LBS | HEART RATE: 94 BPM

## 2022-01-05 DIAGNOSIS — L03.311 CELLULITIS OF ABDOMINAL WALL: Primary | ICD-10-CM

## 2022-01-05 DIAGNOSIS — D49.2 ABNORMAL SKIN GROWTH: ICD-10-CM

## 2022-01-05 PROCEDURE — 11104 PUNCH BX SKIN SINGLE LESION: CPT | Performed by: FAMILY MEDICINE

## 2022-01-05 PROCEDURE — 99214 OFFICE O/P EST MOD 30 MIN: CPT | Performed by: FAMILY MEDICINE

## 2022-01-05 RX ORDER — CEPHALEXIN 500 MG/1
500 CAPSULE ORAL 3 TIMES DAILY
Qty: 21 CAPSULE | Refills: 0 | Status: SHIPPED | OUTPATIENT
Start: 2022-01-05 | End: 2022-01-12

## 2022-01-05 ASSESSMENT — ENCOUNTER SYMPTOMS: COLOR CHANGE: 1

## 2022-01-05 NOTE — PROGRESS NOTES
Diagnosis Orders   1. Cellulitis of abdominal wall  cephALEXin (KEFLEX) 500 MG capsule   2. Abnormal skin growth  [place] PUNCH BIOPSY SKIN SINGLE LESION    Specimen to Pathology Outpatient     Return for Based on test results. Patient Instructions   Patient will initiate antibiotics to help heal the cellulitis of the prior folliculitis the rest the way. Biopsy of left outer leg is being done and follow-up will be based on test results    Incision/Laceration repair    -Clean surgical area with antibacterial soap and water once daily. --You may be instructed to soak the wound with Hydrogen Peroxide to loosen scabbing around sutures, this is not to be done more often that every 3 days, should be for 30 seconds-1 min and then rinsed off with water.     -Keep surgical site moist with vaseline or antibiotic ointment (single, not tripleantibiotic or Neosporin) and apply a fresh bandage daily until a solid scab forms or if the wound is at risk for trauma or dirt.     -Follow up immediately if any growing redness (minimal redness or pale pink is normal along wound edges) surrounds the surgical site or if dripping drainage occurs at surgical site. Once a solid scab forms no more bandage needed. A wet scab can look yellow. This is not infection, just moisture.  -Once the lesion is healed be sure to apply sunscreen to the area to prevent burn of the newer and more delicate skin during the first 6 months of healing.    -If the scar begins to be raised you may massage the area firmly twice a day to help break down scar tissue and help the area become a flat scar. There is some evidence that Mederma applied to a scar daily for the first few months can help shrink and fade it more quickly then without intervention.            Subjective:      Patient ID: Kezia Álvarez is a 74 y.o. male who presents for:  Chief Complaint   Patient presents with    Skin Exam    Skin Problem     stomach , left upper thigh        Patient came in because he had a lesion on his abdomen that that got very large and swollen and red. He read on the Internet how to address this and he soaked it with warm compresses and it popped but it remains red and he has a scab and that he feels fullness he is not sure it is resolved already. In addition he has 2 spots on his left upper leg it is been there for over a year and slowly growing. They itch at times it will seem to bleed. No known history of skin cancer. Current Outpatient Medications on File Prior to Visit   Medication Sig Dispense Refill    testosterone cypionate (DEPOTESTOTERONE CYPIONATE) 200 MG/ML injection Inject 1 mL into the muscle every 14 days for 90 doses. 8 mL 0    Syringe/Needle, Disp, (SYRINGE 3CC/71MO6-9/2\") 22G X 1-1/2\" 3 ML MISC 1 actuation by Does not apply route every 14 days 2 each 5    triamcinolone (KENALOG) 0.1 % cream Apply topically qam daily Monday through Friday only. Take weekend off. Do not use on face, groin, armpits, breasts tissue. 454 g 0    melatonin 1 MG tablet Take 1 tablet by mouth nightly as needed for Sleep 30 tablet     vitamin E 1000 units capsule Take 1,000 Units by mouth daily      vitamin D (CHOLECALCIFEROL) 1000 UNIT TABS tablet Take 6,000 Units by mouth daily      Multiple Vitamins-Minerals (ONE-A-DAY MENS HEALTH FORMULA) TABS Take by mouth      magnesium (MAGNESIUM-OXIDE) 250 MG TABS tablet Take 250 mg by mouth daily Take 3 pills daily      calcium carbonate (OYSTER SHELL CALCIUM 500 MG) 1250 MG tablet Take 1 tablet by mouth daily      Glucosamine-Chondroitin 8381-0499 MG/30ML LIQD Take by mouth Take 3 pills daily      Omega-3 Fatty Acids (FISH OIL) 1200 MG CAPS Take  by mouth daily. No current facility-administered medications on file prior to visit.      Past Medical History:   Diagnosis Date    BPH (benign prostatic hyperplasia) 2/3/2012    no treatment at this time    Chronic back pain     Colon polyp 09/2017    Dr. Vesta Back; f/u 5 years    Eczematous dermatitis     hands, feet and legs    ED (erectile dysfunction) 2/3/2012    Erectile dysfunction     Family history of early CAD 2/3/2012    Gastroesophageal reflux disease with hiatal hernia     History of hypertension     History of prediabetes     History of renal insufficiency syndrome     Obesity (BMI 30-39. 9)     Osteoarthritis     Vitamin D deficiency     Weight gain      Past Surgical History:   Procedure Laterality Date    COLONOSCOPY  2006    [place] COLON CA SCRN NOT  [address] IND N/A 9/15/2017    COLONOSCOPY performed by Johnnie Hassan MD at [address] [place] [address]  14    bx, dilation jarmoszuk    UPPER GASTROINTESTINAL ENDOSCOPY N/A 9/15/2017    EGD DILATION [facility] and biopsy performed by Johnnie Hassan MD at [address] [place] [address] Marital status:      Spouse name: Not on file    Number of children: 1    Years of education: Not on file    Highest education level: Not on file   Occupational History    Not on file   Tobacco Use    Smoking status: Former Smoker     Packs/day: 2.00     Years: 5.00     Pack years: 10.00     Quit date: 1990     Years since quittin.0    Smokeless tobacco: Never Used   Substance and Sexual Activity    Alcohol use: No    Drug use: No    Sexual activity: Yes     Partners: Female   Other Topics Concern    Not on file   Social History Narrative    Not on file     Social Determinants of Health     Financial Resource Strain: Low Risk     Difficulty of Paying Living Expenses: Not hard at all   Food Insecurity: No Food Insecurity    Worried About [facility] in the Last Year: Never true    [facility] in the Last Year: Never true   Transportation Needs:     Lack of Transportation (Medical): Not on file    Lack of Transportation (Non-Medical):  Not on file   Physical Activity:     Days of Exercise per Week: Not on file   [employer] of Exercise per Session: Not on file   Stress:     Feeling of Stress : Not on file   Social Connections:     Frequency of Communication with Friends and Family: Not on file    Frequency of Social Gatherings with Friends and Family: Not on file    Attends [employer] Services: Not on file    Active Member of [address] [place] [address] [employer] or Organizations: Not on file    Attends Club or Organization Meetings: Not on file    Marital Status: Not on file   Intimate Partner Violence:     Fear of Current or Ex-Partner: Not on file    Emotionally Abused: Not on file    Physically Abused: Not on file    Sexually Abused: Not on file   Housing Stability:     Unable to Pay for Housing in the Last Year: Not on file    Number of [place] in the Last Year: Not on file    Unstable Housing in the Last Year: Not on file     Family History   Problem Relation Age of Onset    Depression Mother     Heart Disease Paternal Grandmother     High Blood Pressure Paternal Grandmother     High Cholesterol Paternal Grandmother     Anemia Paternal Grandmother         B 12 deficiency    Heart Disease Brother     Heart Surgery Brother     Anemia Brother         B 12 deficiency     Allergies:  Patient has no known allergies. Review of Systems   Constitutional: Negative for chills and fever. Skin: Positive for color change and wound. Allergic/Immunologic: Negative for environmental allergies, food allergies and immunocompromised state. Hematological: Negative for adenopathy. Does not bruise/bleed easily. Psychiatric/Behavioral: Negative for behavioral problems and sleep disturbance. Objective:   /82   Pulse 94   Temp 97.3 °F (36.3 °C)   Ht 5' 10.5\" (1.791 m)   Wt 265 lb (120.2 kg)   SpO2 97%   BMI 37.49 kg/m²     Physical Exam  Constitutional:       General: He is not in acute distress. Appearance: Normal appearance. He is well-developed. He is not toxic-appearing.    HENT:      Head: Normocephalic and atraumatic. Right Ear: Hearing and tympanic membrane normal.      Left Ear: Hearing and tympanic membrane normal.      Nose: Nose normal. No nasal deformity. Eyes:      General: Lids are normal.         Right eye: No discharge. Left eye: No discharge. Conjunctiva/sclera: Conjunctivae normal.      Pupils: Pupils are equal, round, and reactive to light. Neck:      Thyroid: No thyroid mass or thyromegaly. Vascular: No JVD. Trachea: Trachea and phonation normal.   Cardiovascular:      Rate and Rhythm: Normal rate and regular rhythm. Pulmonary:      Effort: No accessory muscle usage or respiratory distress. Musculoskeletal:      Cervical back: Full passive range of motion without pain. Comments: FROM all large muscle groups and joints as witnessed when walking to exam table, getting on, and getting off the exam table. Skin:     General: Skin is warm and dry. Findings: No rash. Comments: Left abdominal wound indurated minimally erythematous no fluctuance eschar present. Left thigh 2 lesions similar character flaking irregular erythematous base   Neurological:      Mental Status: He is alert. Motor: No tremor or atrophy. Gait: Gait normal.   Psychiatric:         Speech: Speech normal.         Behavior: Behavior normal.         Thought Content: Thought content normal.         No results found for this visit on 01/05/22. PROCEDURE: Left thigh biopsy  Biopsy done using 0.3 cc of 1% lidocaine with epinephrine for anesthesia. A 3 mm punch biopsy was used to obtain the biopsy. Drysol was used to control blood loss at the base of site. EBL < 1cc. [] Pt was seen by provider for      Minutes  Counseling and coordination of care was done for all assessment diagnosis listed for today with patient and any family/friend present.    More than 50% of this visit was spent coordinating cuurent care, obtaining information for prior records, and counseling for current plan of action. Assessment:       Diagnosis Orders   1. Cellulitis of abdominal wall  cephALEXin (KEFLEX) 500 MG capsule   2. Abnormal skin growth  [place] PUNCH BIOPSY SKIN SINGLE LESION    Specimen to Pathology Outpatient         Orders Placed This Encounter   Procedures    Specimen to Pathology Outpatient     L leg dermatitis x 1 year     Standing Status:   Future     Number of Occurrences:   1     Standing Expiration Date:   1/5/2023     Order Specific Question:   PREVIOUS BIOPSY     Answer:   No     Order Specific Question:   PREOP DIAGNOSIS     Answer:   dermatitis     Order Specific Question:   FROZEN SECTION - NO OR YES/SPECIMEN     Answer:   No    Surgical Pathology    [place] PUNCH BIOPSY SKIN SINGLE LESION     l LEG       Orders Placed This Encounter   Medications    cephALEXin (KEFLEX) 500 MG capsule     Sig: Take 1 capsule by mouth 3 times daily for 7 days     Dispense:  21 capsule     Refill:  0          Medication List          Accurate as of January 5, 2022  4:59 PM. If you have any questions, ask your nurse or doctor. START taking these medications    cephALEXin 500 MG capsule  Commonly known as: KEFLEX  Take 1 capsule by mouth 3 times daily for 7 days  Started by: Carrie Engle MD        CONTINUE taking these medications    calcium carbonate 1250 (500 Ca) MG tablet  Commonly known as: OYSTER SHELL CALCIUM 500 mg     Fish Oil 1200 MG Caps     Glucosamine-Chondroitin 2929-5166 MG/30ML Liqd     magnesium 250 MG Tabs tablet  Commonly known as: MAGNESIUM-OXIDE     melatonin 1 MG tablet  Take 1 tablet by mouth nightly as needed for Sleep     One-A-Day Mens Health Formula Tabs     SYRINGE 3CC/40GI1-5/2\" 22G X 1-1/2\" 3 ML Misc  1 actuation by Does not apply route every 14 days     testosterone cypionate 200 MG/ML injection  Commonly known as: DEPOTESTOTERONE CYPIONATE  Inject 1 mL into the muscle every 14 days for 90 doses.      triamcinolone 0.1 % cream  Commonly known as: KENALOG  Apply topically qam daily Monday through Friday only. Take weekend off. Do not use on face, groin, armpits, breasts tissue. vitamin D 1000 UNIT Tabs tablet  Commonly known as: CHOLECALCIFEROL     vitamin E 1000 units capsule           Where to Get Your Medications      These medications were sent to [facility], [id number] [place] [address] [address]  [address] [place] [address]    Phone: [phone]   · cephALEXin 500 MG capsule           Plan:   Return for Based on test results. Patient Instructions   Patient will initiate antibiotics to help heal the cellulitis of the prior folliculitis the rest the way. Biopsy of left outer leg is being done and follow-up will be based on test results    Incision/Laceration repair    -Clean surgical area with antibacterial soap and water once daily. --You may be instructed to soak the wound with Hydrogen Peroxide to loosen scabbing around sutures, this is not to be done more often that every 3 days, should be for 30 seconds-1 min and then rinsed off with water.     -Keep surgical site moist with vaseline or antibiotic ointment (single, not tripleantibiotic or Neosporin) and apply a fresh bandage daily until a solid scab forms or if the wound is at risk for trauma or dirt.     -Follow up immediately if any growing redness (minimal redness or pale pink is normal along wound edges) surrounds the surgical site or if dripping drainage occurs at surgical site. Once a solid scab forms no more bandage needed. A wet scab can look yellow. This is not infection, just moisture.  -Once the lesion is healed be sure to apply sunscreen to the area to prevent burn of the newer and more delicate skin during the first 6 months of healing.    -If the scar begins to be raised you may massage the area firmly twice a day to help break down scar tissue and help the area become a flat scar.  There is some evidence that Mederma applied to a scar daily for the first few months can help shrink and fade it more quickly then without intervention. This note was partially created with the assistance of dictation. This may lead to grammatical or spelling errors. Thang Kendrick M.D.

## 2022-01-05 NOTE — PATIENT INSTRUCTIONS
Patient will initiate antibiotics to help heal the cellulitis of the prior folliculitis the rest the way. Biopsy of left outer leg is being done and follow-up will be based on test results    Incision/Laceration repair    -Clean surgical area with antibacterial soap and water once daily. --You may be instructed to soak the wound with Hydrogen Peroxide to loosen scabbing around sutures, this is not to be done more often that every 3 days, should be for 30 seconds-1 min and then rinsed off with water.     -Keep surgical site moist with vaseline or antibiotic ointment (single, not tripleantibiotic or Neosporin) and apply a fresh bandage daily until a solid scab forms or if the wound is at risk for trauma or dirt.     -Follow up immediately if any growing redness (minimal redness or pale pink is normal along wound edges) surrounds the surgical site or if dripping drainage occurs at surgical site. Once a solid scab forms no more bandage needed. A wet scab can look yellow. This is not infection, just moisture.  -Once the lesion is healed be sure to apply sunscreen to the area to prevent burn of the newer and more delicate skin during the first 6 months of healing.    -If the scar begins to be raised you may massage the area firmly twice a day to help break down scar tissue and help the area become a flat scar. There is some evidence that Mederma applied to a scar daily for the first few months can help shrink and fade it more quickly then without intervention.

## 2022-01-19 ENCOUNTER — NURSE ONLY (OUTPATIENT)
Dept: FAMILY MEDICINE CLINIC | Age: 67
End: 2022-01-19
Payer: MEDICARE

## 2022-01-19 DIAGNOSIS — R79.89 LOW TESTOSTERONE: Primary | ICD-10-CM

## 2022-01-19 PROCEDURE — 96372 THER/PROPH/DIAG INJ SC/IM: CPT | Performed by: FAMILY MEDICINE

## 2022-01-19 RX ORDER — TESTOSTERONE CYPIONATE 200 MG/ML
200 INJECTION INTRAMUSCULAR ONCE
Status: COMPLETED | OUTPATIENT
Start: 2022-01-19 | End: 2022-01-19

## 2022-01-19 RX ADMIN — TESTOSTERONE CYPIONATE 200 MG: 200 INJECTION INTRAMUSCULAR at 10:42

## 2022-01-19 NOTE — PROGRESS NOTES
Patient given Testosterone 200mg IM right gluteal..  Will return in 2 weeks for next injection    Patient tolerated injection well.     Administrations This Visit     testosterone cypionate (DEPOTESTOTERONE CYPIONATE) injection 200 mg     Admin Date  01/19/2022 Action  Given Dose  200 mg Route  IntraMUSCular Administered By  Vinicius Martinez LPN

## 2022-01-31 ENCOUNTER — NURSE ONLY (OUTPATIENT)
Dept: FAMILY MEDICINE CLINIC | Age: 67
End: 2022-01-31
Payer: MEDICARE

## 2022-01-31 ENCOUNTER — OFFICE VISIT (OUTPATIENT)
Dept: FAMILY MEDICINE CLINIC | Age: 67
End: 2022-01-31
Payer: MEDICARE

## 2022-01-31 VITALS
BODY MASS INDEX: 36.16 KG/M2 | HEIGHT: 72 IN | OXYGEN SATURATION: 98 % | WEIGHT: 267 LBS | TEMPERATURE: 98.4 F | HEART RATE: 87 BPM

## 2022-01-31 DIAGNOSIS — L30.9 ECZEMA, UNSPECIFIED TYPE: ICD-10-CM

## 2022-01-31 DIAGNOSIS — E34.9 TESTOSTERONE DEFICIENCY: Primary | ICD-10-CM

## 2022-01-31 DIAGNOSIS — R79.89 ABNORMAL THYROID BLOOD TEST: Primary | ICD-10-CM

## 2022-01-31 DIAGNOSIS — L30.1 DYSHIDROTIC ECZEMA: ICD-10-CM

## 2022-01-31 PROCEDURE — 99214 OFFICE O/P EST MOD 30 MIN: CPT | Performed by: FAMILY MEDICINE

## 2022-01-31 PROCEDURE — 96372 THER/PROPH/DIAG INJ SC/IM: CPT | Performed by: FAMILY MEDICINE

## 2022-01-31 RX ORDER — TESTOSTERONE CYPIONATE 200 MG/ML
200 INJECTION INTRAMUSCULAR ONCE
Status: COMPLETED | OUTPATIENT
Start: 2022-01-31 | End: 2022-01-31

## 2022-01-31 RX ORDER — TRIAMCINOLONE ACETONIDE 1 MG/G
CREAM TOPICAL
Qty: 454 G | Refills: 0 | Status: SHIPPED | OUTPATIENT
Start: 2022-01-31 | End: 2022-05-12 | Stop reason: SDUPTHER

## 2022-01-31 RX ADMIN — TESTOSTERONE CYPIONATE 200 MG: 200 INJECTION INTRAMUSCULAR at 16:46

## 2022-01-31 NOTE — PATIENT INSTRUCTIONS
Patient is due for thyroid and testosterone labs. Orders have been created. Continue triamcinolone as needed. Be sure to change socks frequently and consider using a boot dryer so that shoes do not remain moist triggering dyshidrotic eczema.     Eliminate triggers for eczema on the leg

## 2022-01-31 NOTE — PROGRESS NOTES
Patient given Testosterone 200mg IM left gluteal..  Will return in 2 weeks for next injection    Patient tolerated injection well.     Administrations This Visit     testosterone cypionate (DEPOTESTOTERONE CYPIONATE) injection 200 mg     Admin Date  01/31/2022 Action  Given Dose  200 mg Route  IntraMUSCular Administered By  Nupur Rodas LPN

## 2022-01-31 NOTE — PROGRESS NOTES
Diagnosis Orders   1. Abnormal thyroid blood test  TSH with Reflex   2. Eczema, unspecified type  triamcinolone (KENALOG) 0.1 % cream   3. Dyshidrotic eczema  triamcinolone (KENALOG) 0.1 % cream     Return in about 1 year (around 1/31/2023) for 15 min skin check. Patient Instructions   Patient is due for thyroid and testosterone labs. Orders have been created. Continue triamcinolone as needed. Be sure to change socks frequently and consider using a boot dryer so that shoes do not remain moist triggering dyshidrotic eczema. Eliminate triggers for eczema on the leg      Subjective:      Patient ID: Prudencio Claude is a 74 y.o. male who presents for:  Chief Complaint   Patient presents with    Lesion(s)     states that he is here to discuss tx options for lesion on his LT upper leg       Patient states his dyshidrotic eczema on his feet is responding well. He forgot that he should be changing socks frequently he thinks that will help even more. Here to review the results of his biopsy. He states he did finally realize a potential trigger though for the area that was irritated and that is that he puts his iPhone in that pocket frequently. He is started moving location and noted that the area is healing up better. Also reviewed patient's prior labs and current lab needs. Current Outpatient Medications on File Prior to Visit   Medication Sig Dispense Refill    testosterone cypionate (DEPOTESTOTERONE CYPIONATE) 200 MG/ML injection Inject 1 mL into the muscle every 14 days for 90 doses.  8 mL 0    melatonin 1 MG tablet Take 1 tablet by mouth nightly as needed for Sleep 30 tablet     vitamin E 1000 units capsule Take 1,000 Units by mouth daily      vitamin D (CHOLECALCIFEROL) 1000 UNIT TABS tablet Take 6,000 Units by mouth daily      Multiple Vitamins-Minerals (ONE-A-DAY MENS HEALTH FORMULA) TABS Take by mouth      magnesium (MAGNESIUM-OXIDE) 250 MG TABS tablet Take 250 mg by mouth daily Take 3 pills daily      calcium carbonate (OYSTER SHELL CALCIUM 500 MG) 1250 MG tablet Take 1 tablet by mouth daily      Glucosamine-Chondroitin 5931-7323 MG/30ML LIQD Take by mouth Take 3 pills daily      Omega-3 Fatty Acids (FISH OIL) 1200 MG CAPS Take  by mouth daily.  Syringe/Needle, Disp, (SYRINGE 3CC/93SP7-4/2\") 22G X 1-2\" 3 ML MISC 1 actuation by Does not apply route every 14 days (Patient not taking: Reported on 2022) 2 each 5     No current facility-administered medications on file prior to visit. Past Medical History:   Diagnosis Date    BPH (benign prostatic hyperplasia) 2/3/2012    no treatment at this time    Chronic back pain     Colon polyp 2017    Dr. Marcel Serna; f/u 5 years    Eczematous dermatitis     hands, feet and legs    ED (erectile dysfunction) 2/3/2012    Erectile dysfunction     Family history of early CAD 2/3/2012    Gastroesophageal reflux disease with hiatal hernia     History of hypertension     History of prediabetes     History of renal insufficiency syndrome     Obesity (BMI 30-39. 9)     Osteoarthritis     Vitamin D deficiency     Weight gain      Past Surgical History:   Procedure Laterality Date    COLONOSCOPY  2006    IA COLON CA SCRN NOT  [address] IND N/A 9/15/2017    COLONOSCOPY performed by Sam Hector MD at [place] [address]  14    bx, dilation jarmoscarol    UPPER GASTROINTESTINAL ENDOSCOPY N/A 9/15/2017    EGD DILATION [facility] and biopsy performed by Sam Hector MD at [address] [place] [address] Marital status:      Spouse name: Not on file    Number of children: 1    Years of education: Not on file    Highest education level: Not on file   Occupational History    Not on file   Tobacco Use    Smoking status: Former Smoker     Packs/day: 2.00     Years: 5.00     Pack years: 10.00     Quit date: 1990     Years since quittin.1  Smokeless tobacco: Never Used   Substance and Sexual Activity    Alcohol use: No    Drug use: No    Sexual activity: Yes     Partners: Female   Other Topics Concern    Not on file   Social History Narrative    Not on file     Social Determinants of Health     Financial Resource Strain: Low Risk     Difficulty of Paying Living Expenses: Not hard at all   Food Insecurity: No Food Insecurity    Worried About Running Out of Food in the Last Year: Never true    [address] in the Last Year: Never true   Transportation Needs:     Lack of Transportation (Medical): Not on file    Lack of Transportation (Non-Medical): Not on file   Physical Activity:     Days of Exercise per Week: Not on file    Minutes of Exercise per Session: Not on file   Stress:     Feeling of Stress : Not on file   Social Connections:     Frequency of Communication with Friends and Family: Not on file    Frequency of Social Gatherings with Friends and Family: Not on file    Attends Yazidi Services: Not on file    Active Member of [address] [place] [address] or Organizations: Not on file    Attends Club or Organization Meetings: Not on file    Marital Status: Not on file   Intimate Partner Violence:     Fear of Current or Ex-Partner: Not on file    Emotionally Abused: Not on file    Physically Abused: Not on file    Sexually Abused: Not on file   Housing Stability:     Unable to Pay for Housing in the Last Year: Not on file    Number of [place] in the Last Year: Not on file    Unstable Housing in the Last Year: Not on file     Family History   Problem Relation Age of Onset    Depression Mother     Heart Disease Paternal Grandmother     High Blood Pressure Paternal Grandmother     High Cholesterol Paternal Grandmother     Anemia Paternal Grandmother         B 12 deficiency    Heart Disease Brother     Heart Surgery Brother     Anemia Brother         B 12 deficiency     Allergies:  Patient has no known allergies.     Review of Systems   Constitutional: Negative for chills and fever. Skin: Positive for rash. Allergic/Immunologic: Negative for environmental allergies, food allergies and immunocompromised state. Hematological: Negative for adenopathy. Does not bruise/bleed easily. Psychiatric/Behavioral: Negative for behavioral problems and sleep disturbance. Objective:   Pulse 87   Temp 98.4 °F (36.9 °C) (Infrared)   Ht 6' (1.829 m)   Wt 267 lb (121.1 kg)   SpO2 98%   BMI 36.21 kg/m²     Physical Exam  Constitutional:       General: He is not in acute distress. Appearance: Normal appearance. He is well-developed. He is not toxic-appearing. HENT:      Head: Normocephalic and atraumatic. Right Ear: Hearing and tympanic membrane normal.      Left Ear: Hearing and tympanic membrane normal.      Nose: Nose normal. No nasal deformity. Eyes:      General: Lids are normal.         Right eye: No discharge. Left eye: No discharge. Conjunctiva/sclera: Conjunctivae normal.      Pupils: Pupils are equal, round, and reactive to light. Neck:      Thyroid: No thyroid mass or thyromegaly. Vascular: No JVD. Trachea: Trachea and phonation normal.   Cardiovascular:      Rate and Rhythm: Normal rate and regular rhythm. Pulmonary:      Effort: No accessory muscle usage or respiratory distress. Musculoskeletal:      Cervical back: Full passive range of motion without pain. Comments: FROM all large muscle groups and joints as witnessed when walking to exam table, getting on, and getting off the exam table. Skin:     General: Skin is warm and dry. Findings: No rash. Comments: Left lateral thigh minimal discoloration noted without flaking or excoriation. There is some central granulation tissue noted at the biopsy site   Neurological:      Mental Status: He is alert. Motor: No tremor or atrophy.       Gait: Gait normal.   Psychiatric:         Speech: Speech normal.         Behavior: Behavior normal.         Thought Content: Thought content normal.         No results found for this visit on 01/31/22. No results found for this or any previous visit (from the past 2016 hour(s)). [] Pt was seen by provider for      Minutes  Counseling and coordination of care was done for all assessment diagnosis listed for today with patient and any family/friend present. More than 50% of this visit was spent coordinating cuurent care, obtaining information for prior records, and counseling for current plan of action. Assessment:       Diagnosis Orders   1. Abnormal thyroid blood test  TSH with Reflex   2. Eczema, unspecified type  triamcinolone (KENALOG) 0.1 % cream   3. Dyshidrotic eczema  triamcinolone (KENALOG) 0.1 % cream         Orders Placed This Encounter   Procedures    TSH with Reflex     Standing Status:   Future     Standing Expiration Date:   1/31/2023       Orders Placed This Encounter   Medications    triamcinolone (KENALOG) 0.1 % cream     Sig: Apply topically qam daily Monday through Friday only. Take weekend off. Do not use on face, groin, armpits, breasts tissue. Dispense:  454 g     Refill:  0          Medication List          Accurate as of January 31, 2022  5:51 PM. If you have any questions, ask your nurse or doctor. CONTINUE taking these medications    calcium carbonate 1250 (500 Ca) MG tablet  Commonly known as: OYSTER SHELL CALCIUM 500 mg     Fish Oil 1200 MG Caps     Glucosamine-Chondroitin 0519-3562 MG/30ML Liqd     magnesium 250 MG Tabs tablet  Commonly known as: MAGNESIUM-OXIDE     melatonin 1 MG tablet  Take 1 tablet by mouth nightly as needed for Sleep     One-A-Day Mens Health Formula Tabs     SYRINGE 3CC/04OQ3-6/2\" 22G X 1-1/2\" 3 ML Misc  1 actuation by Does not apply route every 14 days     testosterone cypionate 200 MG/ML injection  Commonly known as: DEPOTESTOTERONE CYPIONATE  Inject 1 mL into the muscle every 14 days for 90 doses. triamcinolone 0.1 % cream  Commonly known as: KENALOG  Apply topically qam daily Monday through Friday only. Take weekend off. Do not use on face, groin, armpits, breasts tissue. vitamin D 1000 UNIT Tabs tablet  Commonly known as: CHOLECALCIFEROL     vitamin E 1000 units capsule           Where to Get Your Medications      These medications were sent to [facility], [id number] [place] [address] [facility]  [address], [place] [address]    Phone: [phone]   · triamcinolone 0.1 % cream           Plan:   Return in about 1 year (around 1/31/2023) for 15 min skin check. Patient Instructions   Patient is due for thyroid and testosterone labs. Orders have been created. Continue triamcinolone as needed. Be sure to change socks frequently and consider using a boot dryer so that shoes do not remain moist triggering dyshidrotic eczema. Eliminate triggers for eczema on the leg      This note was partially created with the assistance of dictation. This may lead to grammatical or spelling errors. Thang Michaud M.D.

## 2022-02-02 DIAGNOSIS — E34.9 TESTOSTERONE DEFICIENCY: ICD-10-CM

## 2022-02-02 DIAGNOSIS — R79.89 ABNORMAL THYROID BLOOD TEST: ICD-10-CM

## 2022-02-02 LAB — TSH REFLEX: 3.16 UIU/ML (ref 0.44–3.86)

## 2022-02-04 LAB
SEX HORMONE BINDING GLOBULIN: 30 NMOL/L (ref 11–80)
TESTOSTERONE FREE PERCENT: 2.1 % (ref 1.6–2.9)
TESTOSTERONE FREE, CALC: 162 PG/ML (ref 47–244)
TESTOSTERONE TOTAL-MALE: 764 NG/DL (ref 300–720)

## 2022-02-14 ENCOUNTER — NURSE ONLY (OUTPATIENT)
Dept: FAMILY MEDICINE CLINIC | Age: 67
End: 2022-02-14
Payer: MEDICARE

## 2022-02-14 ENCOUNTER — HOSPITAL ENCOUNTER (OUTPATIENT)
Dept: ULTRASOUND IMAGING | Age: 67
Discharge: HOME | End: 2022-02-16
Payer: MEDICARE

## 2022-02-14 ENCOUNTER — TELEPHONE (OUTPATIENT)
Dept: FAMILY MEDICINE CLINIC | Age: 67
End: 2022-02-14

## 2022-02-14 ENCOUNTER — OFFICE VISIT (OUTPATIENT)
Dept: FAMILY MEDICINE CLINIC | Age: 67
End: 2022-02-14
Payer: MEDICARE

## 2022-02-14 VITALS
HEIGHT: 71 IN | OXYGEN SATURATION: 98 % | TEMPERATURE: 98.1 F | WEIGHT: 268 LBS | DIASTOLIC BLOOD PRESSURE: 80 MMHG | HEART RATE: 102 BPM | BODY MASS INDEX: 37.52 KG/M2 | SYSTOLIC BLOOD PRESSURE: 154 MMHG

## 2022-02-14 DIAGNOSIS — R22.41 LOCALIZED SWELLING OF RIGHT LOWER EXTREMITY: ICD-10-CM

## 2022-02-14 DIAGNOSIS — I10 UNCONTROLLED HYPERTENSION: ICD-10-CM

## 2022-02-14 DIAGNOSIS — M79.89 SWOLLEN LEG: ICD-10-CM

## 2022-02-14 DIAGNOSIS — M79.671 RIGHT FOOT PAIN: ICD-10-CM

## 2022-02-14 DIAGNOSIS — R22.41 LOCALIZED SWELLING OF RIGHT FOOT: ICD-10-CM

## 2022-02-14 DIAGNOSIS — Z13.1 DIABETES MELLITUS SCREENING: ICD-10-CM

## 2022-02-14 DIAGNOSIS — M79.89 SWOLLEN LEG: Primary | ICD-10-CM

## 2022-02-14 DIAGNOSIS — E34.9 TESTOSTERONE DEFICIENCY: Primary | ICD-10-CM

## 2022-02-14 DIAGNOSIS — R73.9 HYPERGLYCEMIA: ICD-10-CM

## 2022-02-14 LAB — HBA1C MFR BLD: 5.2 %

## 2022-02-14 PROCEDURE — 83036 HEMOGLOBIN GLYCOSYLATED A1C: CPT | Performed by: FAMILY MEDICINE

## 2022-02-14 PROCEDURE — 96372 THER/PROPH/DIAG INJ SC/IM: CPT | Performed by: FAMILY MEDICINE

## 2022-02-14 PROCEDURE — 93926 LOWER EXTREMITY STUDY: CPT

## 2022-02-14 PROCEDURE — 93971 EXTREMITY STUDY: CPT

## 2022-02-14 PROCEDURE — 99215 OFFICE O/P EST HI 40 MIN: CPT | Performed by: FAMILY MEDICINE

## 2022-02-14 RX ORDER — TESTOSTERONE CYPIONATE 200 MG/ML
200 INJECTION INTRAMUSCULAR ONCE
Status: COMPLETED | OUTPATIENT
Start: 2022-02-14 | End: 2022-02-14

## 2022-02-14 RX ADMIN — TESTOSTERONE CYPIONATE 200 MG: 200 INJECTION INTRAMUSCULAR at 16:04

## 2022-02-14 ASSESSMENT — PATIENT HEALTH QUESTIONNAIRE - PHQ9
2. FEELING DOWN, DEPRESSED OR HOPELESS: 0
SUM OF ALL RESPONSES TO PHQ QUESTIONS 1-9: 0
SUM OF ALL RESPONSES TO PHQ QUESTIONS 1-9: 0
SUM OF ALL RESPONSES TO PHQ9 QUESTIONS 1 & 2: 0
SUM OF ALL RESPONSES TO PHQ QUESTIONS 1-9: 0
SUM OF ALL RESPONSES TO PHQ QUESTIONS 1-9: 0
1. LITTLE INTEREST OR PLEASURE IN DOING THINGS: 0

## 2022-02-14 ASSESSMENT — ENCOUNTER SYMPTOMS
ABDOMINAL PAIN: 0
ABDOMINAL DISTENTION: 0
PHOTOPHOBIA: 0
CHEST TIGHTNESS: 0
SHORTNESS OF BREATH: 0
COLOR CHANGE: 1

## 2022-02-14 NOTE — PROGRESS NOTES
Diagnosis Orders   1. Swollen leg  US DUP LOWER EXTREMITY LEFT ARTERIES    US DUP LOWER EXTREMITY LEFT CONCEPCION   2. Right foot pain  US DUP LOWER EXTREMITY LEFT ARTERIES    US DUP LOWER EXTREMITY LEFT CONCEPCION   3. Diabetes mellitus screening     4. Hyperglycemia  POCT glycosylated hemoglobin (Hb A1C)   5. Uncontrolled hypertension       Return for Based on test results. Patient Instructions   Vascular studies to be done stat. Suspect some vascular involvement in this change in his foot. Hemoglobin A1c is 5.2. Insignificant hyperglycemia    We will address blood pressure when through this acute issue. The acute issue may even be contributing      Subjective:      Patient ID: Yoly Banda is a 74 y.o. male who presents for:  Chief Complaint   Patient presents with    Foot Swelling     right sided - acute visit - wore a to tight shoe for too long thinks he bruised his foot        Patient states he noted pain in his foot about a week ago. Took his shoe off and noted a bit of a red spot. Thought it was friction from his shoe. It continued to progress. The foot and ankle have increased in swelling. He tried heat and that made it worse. He tried cold and it seems to soothe things a little bit but it does not get rid of the problem. Denies open sores. Denies deep pain in calf or leg. Does get some pain across the top of the right foot at times. Denies black changes in the skin. Denies burning sensation. Current Outpatient Medications on File Prior to Visit   Medication Sig Dispense Refill    triamcinolone (KENALOG) 0.1 % cream Apply topically qam daily Monday through Friday only. Take weekend off. Do not use on face, groin, armpits, breasts tissue. 454 g 0    testosterone cypionate (DEPOTESTOTERONE CYPIONATE) 200 MG/ML injection Inject 1 mL into the muscle every 14 days for 90 doses.  8 mL 0    Syringe/Needle, Disp, (SYRINGE 3CC/78OO5-9/2\") 22G X 1-1/2\" 3 ML MISC 1 actuation by Does not apply route every 14 days 2 each 5    melatonin 1 MG tablet Take 1 tablet by mouth nightly as needed for Sleep 30 tablet     vitamin E 1000 units capsule Take 1,000 Units by mouth daily      vitamin D (CHOLECALCIFEROL) 1000 UNIT TABS tablet Take 6,000 Units by mouth daily      Multiple Vitamins-Minerals (ONE-A-DAY MENS HEALTH FORMULA) TABS Take by mouth      magnesium (MAGNESIUM-OXIDE) 250 MG TABS tablet Take 250 mg by mouth daily Take 3 pills daily      calcium carbonate (OYSTER SHELL CALCIUM 500 MG) 1250 MG tablet Take 1 tablet by mouth daily      Glucosamine-Chondroitin 4786-7625 MG/30ML LIQD Take by mouth Take 3 pills daily      Omega-3 Fatty Acids (FISH OIL) 1200 MG CAPS Take  by mouth daily. No current facility-administered medications on file prior to visit. Past Medical History:   Diagnosis Date    BPH (benign prostatic hyperplasia) 2/3/2012    no treatment at this time    Chronic back pain     Colon polyp 09/2017    Dr. Lora Mays; f/u 5 years    Eczematous dermatitis     hands, feet and legs    ED (erectile dysfunction) 2/3/2012    Erectile dysfunction     Family history of early CAD 2/3/2012    Gastroesophageal reflux disease with hiatal hernia     History of hypertension     History of prediabetes     History of renal insufficiency syndrome     Obesity (BMI 30-39. 9)     Osteoarthritis     Vitamin D deficiency     Weight gain      Past Surgical History:   Procedure Laterality Date    COLONOSCOPY  9/25/2006    LA COLON CA SCRN NOT  W [address] [place] [address] N/A 9/15/2017    COLONOSCOPY performed by Missy Ramos MD at [place] [address]  5/9/14    bx, dilation jarmoscarol    UPPER GASTROINTESTINAL ENDOSCOPY N/A 9/15/2017    EGD DILATION [facility] and biopsy performed by Missy Ramos MD at [address] [place] [address] Marital status:      Spouse name: Not on file    Number of children: 1    Years of education: Not on file    Highest education level: Not on file   Occupational History    Not on file   Tobacco Use    Smoking status: Former Smoker     Packs/day: 2.00     Years: 5.00     Pack years: 10.00     Quit date: 1990     Years since quittin.1    Smokeless tobacco: Never Used   Substance and Sexual Activity    Alcohol use: No    Drug use: No    Sexual activity: Yes     Partners: Female   Other Topics Concern    Not on file   Social History Narrative    Not on file     Social Determinants of Health     Financial Resource Strain: Low Risk     Difficulty of Paying Living Expenses: Not hard at all   Food Insecurity: No Food Insecurity    Worried About [employer] [employer] in the Last Year: Never true    [address] in the Last Year: Never true   Transportation Needs:     Lack of Transportation (Medical): Not on file    Lack of Transportation (Non-Medical):  Not on file   Physical Activity:     Days of Exercise per Week: Not on file    Minutes of Exercise per Session: Not on file   Stress:     Feeling of Stress : Not on file   Social Connections:     Frequency of Communication with Friends and Family: Not on file    Frequency of Social Gatherings with Friends and Family: Not on file    Attends Jew Services: Not on file    Active Member of [address] [place] [address] [employer] or Organizations: Not on file    Attends Club or Organization Meetings: Not on file    Marital Status: Not on file   Intimate Partner Violence:     Fear of Current or Ex-Partner: Not on file    Emotionally Abused: Not on file    Physically Abused: Not on file    Sexually Abused: Not on file   Housing Stability:     Unable to Pay for Housing in the Last Year: Not on file    Number of [place] in the Last Year: Not on file    Unstable Housing in the Last Year: Not on file     Family History   Problem Relation Age of Onset    Depression Mother     Heart Disease Paternal Grandmother     High Blood Pressure Paternal Grandmother     High Cholesterol Paternal Grandmother     Anemia Paternal Grandmother         B 12 deficiency    Heart Disease Brother     Heart Surgery Brother     Anemia Brother         B 12 deficiency     Allergies:  Patient has no known allergies. Review of Systems   Constitutional: Negative for activity change, appetite change, diaphoresis and unexpected weight change. Eyes: Negative for photophobia and visual disturbance. Respiratory: Negative for chest tightness and shortness of breath. No orthopnea   Cardiovascular: Positive for leg swelling. Negative for chest pain and palpitations. Gastrointestinal: Negative for abdominal distention and abdominal pain. Genitourinary: Negative for flank pain and frequency. Musculoskeletal: Negative for gait problem and joint swelling. Skin: Positive for color change. Neurological: Negative for dizziness, weakness, light-headedness and headaches. Psychiatric/Behavioral: Negative for confusion. Objective:   BP (!) 154/80   Pulse 102   Temp 98.1 °F (36.7 °C)   Ht 5' 10.5\" (1.791 m)   Wt 268 lb (121.6 kg)   SpO2 98%   BMI 37.91 kg/m²     Physical Exam  Vitals reviewed. Constitutional:       General: He is not in acute distress. Appearance: He is well-developed. HENT:      Head: Normocephalic and atraumatic. Right Ear: External ear normal.      Left Ear: External ear normal.      Nose: Nose normal.   Eyes:      General:         Right eye: No discharge. Left eye: No discharge. Conjunctiva/sclera: Conjunctivae normal.      Pupils: Pupils are equal, round, and reactive to light. Neck:      Thyroid: No thyromegaly. Cardiovascular:      Rate and Rhythm: Normal rate and regular rhythm. Comments: Bilateral lower extremities are warm to touch capillary refill less than 3 seconds but no palpable pulse in the right foot  Pulmonary:      Effort: Pulmonary effort is normal. No respiratory distress.    Abdominal: General: There is no distension. Musculoskeletal:         General: Swelling and tenderness present. Cervical back: Neck supple. Right lower leg: Edema present. Comments: Sausage toes erythematous continued through the foot including the swelling to the ankle approximately at the ankle it turns to edema without erythema   Skin:     General: Skin is warm and dry. Neurological:      Mental Status: He is alert and oriented to person, place, and time. Coordination: Coordination normal.   Psychiatric:         Thought Content: Thought content normal.         Judgment: Judgment normal.         Results for POC orders placed in visit on 02/14/22   POCT glycosylated hemoglobin (Hb A1C)   Result Value Ref Range    Hemoglobin A1C 5.2 %       Recent Results (from the past 2016 hour(s))   TSH with Reflex    Collection Time: 02/02/22  9:54 AM   Result Value Ref Range    TSH 3.160 0.440 - 3.860 uIU/mL   Testosterone Free and Total Male    Collection Time: 02/02/22  9:54 AM   Result Value Ref Range    Testosterone Free, Calc 162 47 - 244 pg/mL    Testosterone % Free 2.1 1.6 - 2.9 %    Total Testosterone 764 (H) 300 - 720 ng/dL    Sex Hormone Binding 30 11 - 80 nmol/L   POCT glycosylated hemoglobin (Hb A1C)    Collection Time: 02/14/22  3:43 PM   Result Value Ref Range    Hemoglobin A1C 5.2 %       [] Pt was seen by provider for      Minutes  Counseling and coordination of care was done for all assessment diagnosis listed for today with patient and any family/friend present. More than 50% of this visit was spent coordinating cuurent care, obtaining information for prior records, and counseling for current plan of action. Educated patient on the rationale for ordering tests data and the fact that this could be an emergency. Talked about emergency reasons to go to the hospital         Assessment:       Diagnosis Orders   1.  Swollen leg  US DUP LOWER EXTREMITY LEFT ARTERIES    US DUP LOWER EXTREMITY LEFT CONCEPCION   2. Right foot pain  US DUP LOWER EXTREMITY LEFT ARTERIES    US DUP LOWER EXTREMITY LEFT CONCEPCION   3. Diabetes mellitus screening     4. Hyperglycemia  POCT glycosylated hemoglobin (Hb A1C)   5. Uncontrolled hypertension           Orders Placed This Encounter   Procedures    US DUP LOWER EXTREMITY LEFT ARTERIES     Standing Status:   Future     Standing Expiration Date:   2/14/2023    US DUP LOWER EXTREMITY LEFT CONCEPCION     Standing Status:   Future     Standing Expiration Date:   2/14/2023    POCT glycosylated hemoglobin (Hb A1C)       No orders of the defined types were placed in this encounter. Medication List          Accurate as of February 14, 2022  3:57 PM. If you have any questions, ask your nurse or doctor. CONTINUE taking these medications    calcium carbonate 1250 (500 Ca) MG tablet  Commonly known as: OYSTER SHELL CALCIUM 500 mg     Fish Oil 1200 MG Caps     Glucosamine-Chondroitin 1749-3471 MG/30ML Liqd     magnesium 250 MG Tabs tablet  Commonly known as: MAGNESIUM-OXIDE     melatonin 1 MG tablet  Take 1 tablet by mouth nightly as needed for Sleep     One-A-Day Mens Health Formula Tabs     SYRINGE 3CC/00PE4-5/2\" 22G X 1-1/2\" 3 ML Misc  1 actuation by Does not apply route every 14 days     testosterone cypionate 200 MG/ML injection  Commonly known as: DEPOTESTOTERONE CYPIONATE  Inject 1 mL into the muscle every 14 days for 90 doses. triamcinolone 0.1 % cream  Commonly known as: KENALOG  Apply topically qam daily Monday through Friday only. Take weekend off. Do not use on face, groin, armpits, breasts tissue. vitamin D 1000 UNIT Tabs tablet  Commonly known as: CHOLECALCIFEROL     vitamin E 1000 units capsule              Plan:   Return for Based on test results. Patient Instructions   Vascular studies to be done stat. Suspect some vascular involvement in this change in his foot. Hemoglobin A1c is 5.2.   Insignificant hyperglycemia    We will address blood pressure when through this acute issue. The acute issue may even be contributing      This note was partially created with the assistance of dictation. This may lead to grammatical or spelling errors. Thang Vu M.D.

## 2022-02-14 NOTE — PATIENT INSTRUCTIONS
Vascular studies to be done stat. Suspect some vascular involvement in this change in his foot. Hemoglobin A1c is 5.2. Insignificant hyperglycemia    We will address blood pressure when through this acute issue.   The acute issue may even be contributing

## 2022-02-14 NOTE — TELEPHONE ENCOUNTER
Scheduling states the dx foot pain needs to be changed, and the left needs to be changed to the right. Pt is on his way to [place] to have the test ran.

## 2022-02-14 NOTE — PROGRESS NOTES
Patient given Testosterone 200mg IM right gluteal..  Will return in 2 weeks for next injection    Patient tolerated injection well.     Administrations This Visit     testosterone cypionate (DEPOTESTOTERONE CYPIONATE) injection 200 mg     Admin Date  02/14/2022 Action  Given Dose  200 mg Route  [facility] Administered By  Charlesetta Baumgarten, LPN

## 2022-02-16 ENCOUNTER — OFFICE VISIT (OUTPATIENT)
Dept: FAMILY MEDICINE CLINIC | Age: 67
End: 2022-02-16
Payer: MEDICARE

## 2022-02-16 VITALS — BODY MASS INDEX: 37.52 KG/M2 | WEIGHT: 268 LBS | TEMPERATURE: 98.6 F | HEIGHT: 71 IN

## 2022-02-16 DIAGNOSIS — E66.01 SEVERE OBESITY (BMI 35.0-39.9) WITH COMORBIDITY (HCC): ICD-10-CM

## 2022-02-16 DIAGNOSIS — I70.209 ATHEROSCLEROSIS OF ARTERY OF LOWER EXTREMITY (HCC): Primary | ICD-10-CM

## 2022-02-16 DIAGNOSIS — R22.41 LOCALIZED SWELLING OF RIGHT FOOT: ICD-10-CM

## 2022-02-16 PROCEDURE — 99214 OFFICE O/P EST MOD 30 MIN: CPT | Performed by: FAMILY MEDICINE

## 2022-02-16 RX ORDER — PREDNISONE 10 MG/1
TABLET ORAL
Qty: 20 TABLET | Refills: 0 | Status: ON HOLD | OUTPATIENT
Start: 2022-02-16 | End: 2022-02-28 | Stop reason: HOSPADM

## 2022-02-16 RX ORDER — ASPIRIN 325 MG
325 TABLET, DELAYED RELEASE (ENTERIC COATED) ORAL DAILY
Qty: 1 TABLET | Refills: 0 | Status: SHIPPED | OUTPATIENT
Start: 2022-02-16 | End: 2023-02-16

## 2022-02-16 ASSESSMENT — ENCOUNTER SYMPTOMS
SHORTNESS OF BREATH: 0
PHOTOPHOBIA: 0
ABDOMINAL DISTENTION: 0
ABDOMINAL PAIN: 0
CHEST TIGHTNESS: 0

## 2022-02-16 NOTE — PROGRESS NOTES
Diagnosis Orders   1. Atherosclerosis of artery of lower extremity (HCC)  aspirin 325 MG EC tablet   2. Localized swelling of right foot  predniSONE (DELTASONE) 10 MG tablet   3. Severe obesity (BMI 35.0-39. 9) with comorbidity ([address]       Return for for routine major medical condition management. Patient Instructions   Patient will take prednisone taper for foot swelling. If repeat episode occurs we will order uric acid, lipid, and x-ray. Due to atherosclerosis noted on arterial testing at 30% in the right lower leg patient will start daily 325 mg aspirin for 1 week and then convert to baby aspirin 81 mg daily thereafter for peripheral vascular disease noted on arterial testing. Subjective:      Patient ID: Gracie Preciado is a 74 y.o. male who presents for:  Chief Complaint   Patient presents with    Results       Patient states right foot remains red. Skin is intact. Definitely warm. Some swelling into the ankle now. Reviewed ultrasound and arterial testing results. No history of gout. No family history of gout. No history of injury. Patient is active at work with walking otherwise not doing committed exercise      Current Outpatient Medications on File Prior to Visit   Medication Sig Dispense Refill    triamcinolone (KENALOG) 0.1 % cream Apply topically qam daily Monday through Friday only. Take weekend off. Do not use on face, groin, armpits, breasts tissue. 454 g 0    testosterone cypionate (DEPOTESTOTERONE CYPIONATE) 200 MG/ML injection Inject 1 mL into the muscle every 14 days for 90 doses.  8 mL 0    Syringe/Needle, Disp, (SYRINGE 3CC/62LN1-1/2\") 22G X 1-1/2\" 3 ML MISC 1 actuation by Does not apply route every 14 days 2 each 5    melatonin 1 MG tablet Take 1 tablet by mouth nightly as needed for Sleep 30 tablet     vitamin E 1000 units capsule Take 1,000 Units by mouth daily      vitamin D (CHOLECALCIFEROL) 1000 UNIT TABS tablet Take 6,000 Units by mouth daily      Multiple Vitamins-Minerals (ONE-A-DAY MENS HEALTH FORMULA) TABS Take by mouth      magnesium (MAGNESIUM-OXIDE) 250 MG TABS tablet Take 250 mg by mouth daily Take 3 pills daily      calcium carbonate (OYSTER SHELL CALCIUM 500 MG) 1250 MG tablet Take 1 tablet by mouth daily      Glucosamine-Chondroitin 6845-5824 MG/30ML LIQD Take by mouth Take 3 pills daily      Omega-3 Fatty Acids (FISH OIL) 1200 MG CAPS Take  by mouth daily. No current facility-administered medications on file prior to visit. Past Medical History:   Diagnosis Date    BPH (benign prostatic hyperplasia) 2/3/2012    no treatment at this time    Chronic back pain     Colon polyp 2017    Dr. Geovanna Bean; f/u 5 years    Eczematous dermatitis     hands, feet and legs    ED (erectile dysfunction) 2/3/2012    Erectile dysfunction     Family history of early CAD 2/3/2012    Gastroesophageal reflux disease with hiatal hernia     History of hypertension     History of prediabetes     History of renal insufficiency syndrome     Obesity (BMI 30-39. 9)     Osteoarthritis     Vitamin D deficiency     Weight gain      Past Surgical History:   Procedure Laterality Date    COLONOSCOPY  2006    CA COLON CA SCRN NOT  W [address] [place] [address] IND N/A 9/15/2017    COLONOSCOPY performed by Justin Rivera MD at [address] [place] [address]  14    bx, dilation jarmoszuk    UPPER GASTROINTESTINAL ENDOSCOPY N/A 9/15/2017    EGD DILATION [facility] and biopsy performed by Justin Rivera MD at [address] [place] [address] Marital status:      Spouse name: Not on file    Number of children: 1    Years of education: Not on file    Highest education level: Not on file   Occupational History    Not on file   Tobacco Use    Smoking status: Former Smoker     Packs/day: 2.00     Years: 5.00     Pack years: 10.00     Quit date: 1990     Years since quittin.1    Smokeless tobacco: Never Used   Substance and Sexual Activity    Alcohol use: No    Drug use: No    Sexual activity: Yes     Partners: Female   Other Topics Concern    Not on file   Social History Narrative    Not on file     Social Determinants of Health     Financial Resource Strain: Low Risk     Difficulty of Paying Living Expenses: Not hard at all   Food Insecurity: No Food Insecurity    Worried About Running Out of Food in the Last Year: Never true    [address] in the Last Year: Never true   Transportation Needs:     Lack of Transportation (Medical): Not on file    Lack of Transportation (Non-Medical): Not on file   Physical Activity:     Days of Exercise per Week: Not on file    Minutes of Exercise per Session: Not on file   Stress:     Feeling of Stress : Not on file   Social Connections:     Frequency of Communication with Friends and Family: Not on file    Frequency of Social Gatherings with Friends and Family: Not on file    Attends [employer] Services: Not on file    Active Member of [address] [place] [address] or Organizations: Not on file    Attends Club or Organization Meetings: Not on file    Marital Status: Not on file   Intimate Partner Violence:     Fear of Current or Ex-Partner: Not on file    Emotionally Abused: Not on file    Physically Abused: Not on file    Sexually Abused: Not on file   Housing Stability:     Unable to Pay for Housing in the Last Year: Not on file    Number of [place] in the Last Year: Not on file    Unstable Housing in the Last Year: Not on file     Family History   Problem Relation Age of Onset    Depression Mother     Heart Disease Paternal Grandmother     High Blood Pressure Paternal Grandmother     High Cholesterol Paternal Grandmother     Anemia Paternal Grandmother         B 12 deficiency    Heart Disease Brother     Heart Surgery Brother     Anemia Brother         B 12 deficiency     Allergies:  Patient has no known allergies.     Review of Systems   Constitutional: Negative for activity change, appetite change, diaphoresis and unexpected weight change. Eyes: Negative for photophobia and visual disturbance. Respiratory: Negative for chest tightness and shortness of breath. No orthopnea   Cardiovascular: Positive for leg swelling. Negative for chest pain and palpitations. Gastrointestinal: Negative for abdominal distention and abdominal pain. Genitourinary: Negative for flank pain and frequency. Musculoskeletal: Negative for gait problem and joint swelling. Neurological: Negative for dizziness, weakness, light-headedness and headaches. Psychiatric/Behavioral: Negative for confusion. Objective:   Temp 98.6 °F (37 °C)   Ht 5' 10.5\" (1.791 m)   Wt 268 lb (121.6 kg)   BMI 37.91 kg/m²     Physical Exam  Vitals reviewed. Constitutional:       General: He is not in acute distress. Appearance: He is well-developed. HENT:      Head: Normocephalic and atraumatic. Right Ear: External ear normal.      Left Ear: External ear normal.      Nose: Nose normal.   Eyes:      General:         Right eye: No discharge. Left eye: No discharge. Conjunctiva/sclera: Conjunctivae normal.      Pupils: Pupils are equal, round, and reactive to light. Neck:      Thyroid: No thyromegaly. Cardiovascular:      Rate and Rhythm: Normal rate and regular rhythm. Pulmonary:      Effort: Pulmonary effort is normal. No respiratory distress. Abdominal:      General: There is no distension. Musculoskeletal:         General: Swelling and tenderness present. Cervical back: Neck supple. Comments: Erythema of the right foot on the dorsal aspect with swelling into the toes   Skin:     General: Skin is warm and dry. Neurological:      Mental Status: He is alert and oriented to person, place, and time. Coordination: Coordination normal.   Psychiatric:         Thought Content:  Thought content normal.         Judgment: Judgment normal.         No results found for this visit on 02/16/22. Recent Results (from the past 2016 hour(s))   TSH with Reflex    Collection Time: 02/02/22  9:54 AM   Result Value Ref Range    TSH 3.160 0.440 - 3.860 uIU/mL   Testosterone Free and Total Male    Collection Time: 02/02/22  9:54 AM   Result Value Ref Range    Testosterone Free, Calc 162 47 - 244 pg/mL    Testosterone % Free 2.1 1.6 - 2.9 %    Total Testosterone 764 (H) 300 - 720 ng/dL    Sex Hormone Binding 30 11 - 80 nmol/L   POCT glycosylated hemoglobin (Hb A1C)    Collection Time: 02/14/22  3:43 PM   Result Value Ref Range    Hemoglobin A1C 5.2 %       [] Pt was seen by provider for      Minutes  Counseling and coordination of care was done for all assessment diagnosis listed for today with patient and any family/friend present. More than 50% of this visit was spent coordinating cuurent care, obtaining information for prior records, and counseling for current plan of action. Assessment:       Diagnosis Orders   1. Atherosclerosis of artery of lower extremity (HCC)  aspirin 325 MG EC tablet   2. Localized swelling of right foot  predniSONE (DELTASONE) 10 MG tablet   3. Severe obesity (BMI 35.0-39. 9) with comorbidity ([facility] [address]           No orders of the defined types were placed in this encounter. Orders Placed This Encounter   Medications    predniSONE (DELTASONE) 10 MG tablet     Sig: Take 3 tabs for 3 days, then 2 tabs for 3 days, then 1 tab for 3 days, then 1/2 tab for 3 days, then stop. Dispense:  20 tablet     Refill:  0    aspirin 325 MG EC tablet     Sig: Take 1 tablet by mouth daily     Dispense:  1 tablet     Refill:  0          Medication List          Accurate as of February 16, 2022  6:04 PM. If you have any questions, ask your nurse or doctor.             START taking these medications    aspirin 325 MG EC tablet  Take 1 tablet by mouth daily  Started by: Kurt Lan MD     predniSONE 10 MG tablet  Commonly known as: Rajan Public  Take 3 tabs for 3 days, then 2 tabs for 3 days, then 1 tab for 3 days, then 1/2 tab for 3 days, then stop. Started by: Sabiha Sosa MD        CONTINUE taking these medications    calcium carbonate 1250 (500 Ca) MG tablet  Commonly known as: OYSTER SHELL CALCIUM 500 mg     Fish Oil 1200 MG Caps     Glucosamine-Chondroitin 1200-9518 MG/30ML Liqd     magnesium 250 MG Tabs tablet  Commonly known as: MAGNESIUM-OXIDE     melatonin 1 MG tablet  Take 1 tablet by mouth nightly as needed for Sleep     One-A-Day Mens Health Formula Tabs     SYRINGE 3CC/83XB5-8/2\" 22G X 1-1/2\" 3 ML Misc  1 actuation by Does not apply route every 14 days     testosterone cypionate 200 MG/ML injection  Commonly known as: DEPOTESTOTERONE CYPIONATE  Inject 1 mL into the muscle every 14 days for 90 doses. triamcinolone 0.1 % cream  Commonly known as: KENALOG  Apply topically qam daily Monday through Friday only. Take weekend off. Do not use on face, groin, armpits, breasts tissue. vitamin D 1000 UNIT Tabs tablet  Commonly known as: CHOLECALCIFEROL     vitamin E 1000 units capsule           Where to Get Your Medications      These medications were sent to [address] [place] [address] [address] [place] [id number]    Phone: [phone]   · predniSONE 10 MG tablet     You can get these medications from any pharmacy    Bring a paper prescription for each of these medications  · aspirin 325 MG EC tablet           Plan:   Return for for routine major medical condition management. Patient Instructions   Patient will take prednisone taper for foot swelling. If repeat episode occurs we will order uric acid, lipid, and x-ray.     Due to atherosclerosis noted on arterial testing at 30% in the right lower leg patient will start daily 325 mg aspirin for 1 week and then convert to baby aspirin 81 mg daily thereafter for peripheral vascular disease noted on arterial testing. This note was partially created with the assistance of dictation. This may lead to grammatical or spelling errors. Thang Smith M.D.

## 2022-02-16 NOTE — PATIENT INSTRUCTIONS
Patient will take prednisone taper for foot swelling. If repeat episode occurs we will order uric acid, lipid, and x-ray. Due to atherosclerosis noted on arterial testing at 30% in the right lower leg patient will start daily 325 mg aspirin for 1 week and then convert to baby aspirin 81 mg daily thereafter for peripheral vascular disease noted on arterial testing.

## 2022-02-23 ENCOUNTER — HOSPITAL ENCOUNTER (EMERGENCY)
Age: 67
Discharge: HOME | DRG: 854 | End: 2022-02-23
Payer: MEDICARE

## 2022-02-23 ENCOUNTER — APPOINTMENT (OUTPATIENT)
Dept: GENERAL RADIOLOGY | Age: 67
DRG: 854 | End: 2022-02-23
Payer: MEDICARE

## 2022-02-23 VITALS
WEIGHT: 260 LBS | BODY MASS INDEX: 35.21 KG/M2 | TEMPERATURE: 99.1 F | OXYGEN SATURATION: 97 % | DIASTOLIC BLOOD PRESSURE: 87 MMHG | HEIGHT: 72 IN | HEART RATE: 90 BPM | SYSTOLIC BLOOD PRESSURE: 140 MMHG | RESPIRATION RATE: 15 BRPM

## 2022-02-23 DIAGNOSIS — L03.115 CELLULITIS OF RIGHT FOOT: Primary | ICD-10-CM

## 2022-02-23 LAB
ALBUMIN SERPL-MCNC: 3.9 G/DL (ref 3.5–4.6)
ALP BLD-CCNC: 110 U/L (ref 35–104)
ALT SERPL-CCNC: 19 U/L (ref 0–41)
ANION GAP SERPL CALCULATED.3IONS-SCNC: 11 MEQ/L (ref 9–15)
AST SERPL-CCNC: 11 U/L (ref 0–40)
BASOPHILS ABSOLUTE: 0.1 K/UL (ref 0–0.2)
BASOPHILS RELATIVE PERCENT: 0.7 %
BILIRUB SERPL-MCNC: 0.5 MG/DL (ref 0.2–0.7)
BUN BLDV-MCNC: 12 MG/DL (ref 8–23)
C-REACTIVE PROTEIN: 29.1 MG/L (ref 0–5)
CALCIUM SERPL-MCNC: 8.7 MG/DL (ref 8.5–9.9)
CHLORIDE BLD-SCNC: 99 MEQ/L (ref 95–107)
CO2: 24 MEQ/L (ref 20–31)
CREAT SERPL-MCNC: 0.73 MG/DL (ref 0.7–1.2)
EOSINOPHILS ABSOLUTE: 0.1 K/UL (ref 0–0.7)
EOSINOPHILS RELATIVE PERCENT: 0.9 %
GFR AFRICAN AMERICAN: >60
GFR NON-AFRICAN AMERICAN: >60
GLOBULIN: 3.1 G/DL (ref 2.3–3.5)
GLUCOSE BLD-MCNC: 109 MG/DL (ref 70–99)
HCT VFR BLD CALC: 47.8 % (ref 42–52)
HEMOGLOBIN: 15.8 G/DL (ref 14–18)
LYMPHOCYTES ABSOLUTE: 1 K/UL (ref 1–4.8)
LYMPHOCYTES RELATIVE PERCENT: 6.7 %
MCH RBC QN AUTO: 28.1 PG (ref 27–31.3)
MCHC RBC AUTO-ENTMCNC: 33 % (ref 33–37)
MCV RBC AUTO: 85.3 FL (ref 80–100)
MONOCYTES ABSOLUTE: 1 K/UL (ref 0.2–0.8)
MONOCYTES RELATIVE PERCENT: 7.1 %
NEUTROPHILS ABSOLUTE: 12.3 K/UL (ref 1.4–6.5)
NEUTROPHILS RELATIVE PERCENT: 84.6 %
PDW BLD-RTO: 13.9 % (ref 11.5–14.5)
PLATELET # BLD: 262 K/UL (ref 130–400)
POTASSIUM SERPL-SCNC: 4.2 MEQ/L (ref 3.4–4.9)
RBC # BLD: 5.6 M/UL (ref 4.7–6.1)
SEDIMENTATION RATE, ERYTHROCYTE: 8 MM (ref 0–20)
SODIUM BLD-SCNC: 134 MEQ/L (ref 135–144)
TOTAL PROTEIN: 7 G/DL (ref 6.3–8)
WBC # BLD: 14.6 K/UL (ref 4.8–10.8)

## 2022-02-23 PROCEDURE — 73630 X-RAY EXAM OF FOOT: CPT

## 2022-02-23 PROCEDURE — 80053 COMPREHEN METABOLIC PANEL: CPT

## 2022-02-23 PROCEDURE — 2500000003 HC RX 250 WO HCPCS: Performed by: PHYSICIAN ASSISTANT

## 2022-02-23 PROCEDURE — 96375 TX/PRO/DX INJ NEW DRUG ADDON: CPT

## 2022-02-23 PROCEDURE — 96365 THER/PROPH/DIAG IV INF INIT: CPT

## 2022-02-23 PROCEDURE — 84550 ASSAY OF BLOOD/URIC ACID: CPT

## 2022-02-23 PROCEDURE — 85652 RBC SED RATE AUTOMATED: CPT

## 2022-02-23 PROCEDURE — 6360000002 HC RX W HCPCS: Performed by: PHYSICIAN ASSISTANT

## 2022-02-23 PROCEDURE — 85025 COMPLETE CBC W/AUTO DIFF WBC: CPT

## 2022-02-23 PROCEDURE — 99284 EMERGENCY DEPT VISIT MOD MDM: CPT

## 2022-02-23 PROCEDURE — 86140 C-REACTIVE PROTEIN: CPT

## 2022-02-23 PROCEDURE — 87040 BLOOD CULTURE FOR BACTERIA: CPT

## 2022-02-23 PROCEDURE — 36415 COLL VENOUS BLD VENIPUNCTURE: CPT

## 2022-02-23 RX ORDER — CLINDAMYCIN HYDROCHLORIDE 300 MG/1
300 CAPSULE ORAL 4 TIMES DAILY
Qty: 40 CAPSULE | Refills: 0 | Status: ON HOLD | OUTPATIENT
Start: 2022-02-23 | End: 2022-02-28 | Stop reason: HOSPADM

## 2022-02-23 RX ORDER — HYDROCODONE BITARTRATE AND ACETAMINOPHEN 5; 325 MG/1; MG/1
1 TABLET ORAL EVERY 6 HOURS PRN
Qty: 12 TABLET | Refills: 0 | Status: ON HOLD | OUTPATIENT
Start: 2022-02-23 | End: 2022-02-28 | Stop reason: HOSPADM

## 2022-02-23 RX ORDER — KETOROLAC TROMETHAMINE 15 MG/ML
15 INJECTION, SOLUTION INTRAMUSCULAR; INTRAVENOUS ONCE
Status: COMPLETED | OUTPATIENT
Start: 2022-02-23 | End: 2022-02-23

## 2022-02-23 RX ORDER — CLINDAMYCIN PHOSPHATE 600 MG/50ML
600 INJECTION INTRAVENOUS ONCE
Status: COMPLETED | OUTPATIENT
Start: 2022-02-23 | End: 2022-02-23

## 2022-02-23 RX ADMIN — CLINDAMYCIN PHOSPHATE 600 MG: 600 INJECTION, SOLUTION INTRAVENOUS at 09:40

## 2022-02-23 RX ADMIN — KETOROLAC TROMETHAMINE 15 MG: 15 INJECTION, SOLUTION INTRAMUSCULAR; INTRAVENOUS at 09:05

## 2022-02-23 ASSESSMENT — PAIN DESCRIPTION - DESCRIPTORS: DESCRIPTORS: OTHER (COMMENT)

## 2022-02-23 ASSESSMENT — PAIN DESCRIPTION - PAIN TYPE
TYPE: ACUTE PAIN
TYPE: ACUTE PAIN

## 2022-02-23 ASSESSMENT — PAIN SCALES - GENERAL
PAINLEVEL_OUTOF10: 0
PAINLEVEL_OUTOF10: 2
PAINLEVEL_OUTOF10: 4

## 2022-02-23 ASSESSMENT — PAIN DESCRIPTION - ORIENTATION: ORIENTATION: RIGHT

## 2022-02-23 ASSESSMENT — PAIN DESCRIPTION - LOCATION: LOCATION: FOOT

## 2022-02-23 NOTE — ED TRIAGE NOTES
Pt to ED; reports he had \"a tight shoe 2 weeks ago that caused swelling in his right foot\". Pt states he saw his pcp and was given prednisone but has not started yet. Pt reports he noticed redness and swelling in his foot and reports \"a black spot on his foot as well. \" Pt not diabetic, not taking any medication at home.

## 2022-02-23 NOTE — Clinical Note
Royston Kehr was seen and treated in our emergency department on 2/23/2022. He may return to work on 02/28/2022. If you have any questions or concerns, please don't hesitate to call.       Perez Lee PA-C

## 2022-02-23 NOTE — ED PROVIDER NOTES
[address] [facility] ED  eMERGENCY dEPARTMENT eNCOUnter      Pt Name: Linda Lou  MRN: [id number]  [place] 1955  Date of evaluation: 2/23/2022  Provider: Justina Singh PA-C    CHIEF COMPLAINT     No chief complaint on file. HISTORY OF PRESENT ILLNESS   (Location/Symptom, Timing/Onset,Context/Setting, Quality, Duration, Modifying Factors, Severity)  Note limiting factors. Linda Lou is a 74 y.o. male who presents to the emergency department with complaint of right foot swelling, and pain, which she states been ongoing for approximately 2 weeks. Patient states approximately 2 weeks ago he had purchased a new pair shoes, he states they were tight, causing irritation to his foot, he states increasing swelling, he was seen by his regular family provider I had an ultrasound completed at that time which did not show any blood clots, but concerns for atherosclerosis, he was placed on aspirin, given a prescription for prednisone. He has not fill the prescription for prednisone to this point. He states the foot is progressively more swollen and red, and now notices a dark spot over the dorsal surface of the right foot in the area of the third metatarsal.  No drainage or discharge noted, no lacerations or bleeding. Past medical history significant for osteoarthritis, chronic back pain, gastric reflux, coronary artery disease, hypertension prediabetes    HPI    NursingNotes were reviewed. REVIEW OF SYSTEMS    (2-9 systems for level 4, 10 or more for level 5)     Review of Systems   Constitutional: Negative for activity change and appetite change. HENT: Negative for congestion, ear discharge, ear pain, nosebleeds, rhinorrhea and sore throat. Eyes: Negative for discharge. Respiratory: Negative for shortness of breath. Cardiovascular: Negative for chest pain, palpitations and leg swelling. Gastrointestinal: Negative for abdominal distention, abdominal pain and constipation. Genitourinary: Negative for difficulty urinating and dysuria. Musculoskeletal: Negative for arthralgias. Skin: Negative for color change. Entire right foot is erythemic and mildly edematous 1+ edema. Neurological: Negative for dizziness, syncope, numbness and headaches. Psychiatric/Behavioral: Negative for agitation and confusion. Except as noted above the remainder of the review of systems was reviewed and negative. PAST MEDICAL HISTORY     Past Medical History:   Diagnosis Date    BPH (benign prostatic hyperplasia) 2/3/2012    no treatment at this time    Chronic back pain     Colon polyp 09/2017    Dr. Farhana Vasquez; f/u 5 years    Eczematous dermatitis     hands, feet and legs    ED (erectile dysfunction) 2/3/2012    Erectile dysfunction     Family history of early CAD 2/3/2012    Gastroesophageal reflux disease with hiatal hernia     History of hypertension     History of prediabetes     History of renal insufficiency syndrome     Obesity (BMI 30-39. 9)     Osteoarthritis     Vitamin D deficiency     Weight gain          SURGICALHISTORY       Past Surgical History:   Procedure Laterality Date    COLONOSCOPY  9/25/2006    [facility]    COLONOSCOPY performed by Xavier Morgan MD at [address] [place] [address]  5/9/14    bx, dilation jarmoszuk    UPPER GASTROINTESTINAL ENDOSCOPY N/A 9/15/2017    EGD DILATION [facility] and biopsy performed by Xavier Morgan MD at [address] [place] [address]       Discharge Medication List as of 2/23/2022 10:01 AM      CONTINUE these medications which have NOT CHANGED    Details   predniSONE (DELTASONE) 10 MG tablet Take 3 tabs for 3 days, then 2 tabs for 3 days, then 1 tab for 3 days, then 1/2 tab for 3 days, then stop., Disp-20 tablet, R-0Normal      aspirin 325 MG EC tablet Take 1 tablet by mouth daily, Disp-1 tablet, R-0Normal      triamcinolone (KENALOG) 0.1 % cream Apply topically qam daily Monday through Friday only. Take weekend off. Do not use on face, groin, armpits, breasts tissue., Disp-454 g, R-0, Normal      testosterone cypionate (DEPOTESTOTERONE CYPIONATE) 200 MG/ML injection Inject 1 mL into the muscle every 14 days for 90 doses. , Disp-8 mL, R-0Normal      Syringe/Needle, Disp, (SYRINGE 3CC/46WH4-3/2\") 22G X 1-1/2\" 3 ML MISC EVERY 14 DAYS Starting Thu 10/28/2021, Disp-2 each, R-5, Normal      melatonin 1 MG tablet Take 1 tablet by mouth nightly as needed for Sleep, Disp-30 tabletOTC      vitamin E 1000 units capsule Take 1,000 Units by mouth dailyHistorical Med      vitamin D (CHOLECALCIFEROL) 1000 UNIT TABS tablet Take 6,000 Units by mouth dailyHistorical Med      Multiple Vitamins-Minerals (ONE-A-DAY MENS HEALTH FORMULA) TABS Take by mouthHistorical Med      magnesium (MAGNESIUM-OXIDE) 250 MG TABS tablet Take 250 mg by mouth daily Take 3 pills dailyHistorical Med      calcium carbonate (OYSTER SHELL CALCIUM 500 MG) 1250 MG tablet Take 1 tablet by mouth daily      Glucosamine-Chondroitin 4618-5775 MG/30ML LIQD Take by mouth Take 3 pills dailyHistorical Med      Omega-3 Fatty Acids (FISH OIL) 1200 MG CAPS Take  by mouth daily. ALLERGIES     Patient has no known allergies.     FAMILY HISTORY       Family History   Problem Relation Age of Onset    Depression Mother     Heart Disease Paternal Grandmother     High Blood Pressure Paternal Grandmother     High Cholesterol Paternal Grandmother     Anemia Paternal Grandmother         B 12 deficiency    Heart Disease Brother     Heart Surgery Brother     Anemia Brother         B 12 deficiency          SOCIAL HISTORY       Social History     Socioeconomic History    Marital status:      Spouse name: None    Number of children: 1    Years of education: None    Highest education level: None   Occupational History    None   Tobacco Use    Smoking status: Former Smoker Packs/day: 2.00     Years: 5.00     Pack years: 10.00     Quit date: 1990     Years since quittin.1    Smokeless tobacco: Never Used   Vaping Use    Vaping Use: Never used   Substance and Sexual Activity    Alcohol use: No    Drug use: No    Sexual activity: Yes     Partners: Female   Other Topics Concern    None   Social History Narrative    None     Social Determinants of Health     Financial Resource Strain: Low Risk     Difficulty of Paying Living Expenses: Not hard at all   Food Insecurity: No Food Insecurity    Worried About Running Out of Food in the Last Year: Never true    Ricardo of Food in the Last Year: Never true   Transportation Needs:     Lack of Transportation (Medical): Not on file    Lack of Transportation (Non-Medical):  Not on file   Physical Activity:     Days of Exercise per Week: Not on file    Minutes of Exercise per Session: Not on file   Stress:     Feeling of Stress : Not on file   Social Connections:     Frequency of Communication with Friends and Family: Not on file    Frequency of Social Gatherings with Friends and Family: Not on file    Attends Jew Services: Not on file    Active Member of [address] [place] [address] [employer] or Organizations: Not on file    Attends Club or Organization Meetings: Not on file    Marital Status: Not on file   Intimate Partner Violence:     Fear of Current or Ex-Partner: Not on file    Emotionally Abused: Not on file    Physically Abused: Not on file    Sexually Abused: Not on file   Housing Stability:     Unable to Pay for Housing in the Last Year: Not on file    Number of [place] in the Last Year: Not on file    Unstable Housing in the Last Year: Not on file       SCREENINGS    Dago Coma Scale  Eye Opening: Spontaneous  Best Verbal Response: Oriented  Best Motor Response: Obeys commands  Dago Coma Scale Score: 15 @FLOW([id number])@      PHYSICAL EXAM    (up to 7 for level 4, 8 or more for level 5)     ED Triage Vitals [22 0842] BP Temp Temp Source Pulse Resp SpO2 Height Weight   (!) 147/99 99.1 °F (37.3 °C) Oral 97 18 97 % 6' (1.829 m) 260 lb (117.9 kg)       Physical Exam  Vitals and nursing note reviewed. Constitutional:       General: He is not in acute distress. Appearance: He is well-developed. He is not ill-appearing, toxic-appearing or diaphoretic. HENT:      Head: Normocephalic. Nose: No congestion. Mouth/Throat:      Mouth: Mucous membranes are moist.      Pharynx: No oropharyngeal exudate or posterior oropharyngeal erythema. Eyes:      Extraocular Movements: Extraocular movements intact. Conjunctiva/sclera: Conjunctivae normal.      Pupils: Pupils are equal, round, and reactive to light. Neck:      Vascular: No JVD. Trachea: No tracheal deviation. Cardiovascular:      Rate and Rhythm: Normal rate. Pulses: Normal pulses. Heart sounds: Normal heart sounds. No murmur heard. No friction rub. No gallop. Pulmonary:      Effort: Pulmonary effort is normal. No tachypnea, accessory muscle usage, respiratory distress or retractions. Breath sounds: No stridor. No wheezing, rhonchi or rales. Chest:      Chest wall: No tenderness. Abdominal:      General: Abdomen is flat. Bowel sounds are normal. There is no distension or abdominal bruit. Palpations: There is no shifting dullness, fluid wave, hepatomegaly, splenomegaly, mass or pulsatile mass. Tenderness: There is no abdominal tenderness. There is no right CVA tenderness, left CVA tenderness, guarding or rebound. Negative signs include Umana's sign, Rovsing's sign and McBurney's sign. Musculoskeletal:         General: No deformity. Cervical back: Normal range of motion and neck supple. No rigidity. Feet:    Skin:     General: Skin is warm and dry. Capillary Refill: Capillary refill takes less than 2 seconds. Coloration: Skin is not jaundiced. Findings: Rash present.       Comments: Right foot over entire area is erythema, 1+ edema is noted. There is no open wounds, no drainage or discharge, skin is erythemic, but blanches on touch. Positive dorsalis and posterior tibialis pulses. Good sensation. Moves foot ankle and toes well without increased difficulty or pain. Neurological:      General: No focal deficit present. Mental Status: He is alert and oriented to person, place, and time. Mental status is at baseline. Cranial Nerves: No cranial nerve deficit. Sensory: No sensory deficit. Motor: No weakness.       Coordination: Coordination normal.   Psychiatric:         Mood and Affect: Mood normal.         DIAGNOSTIC RESULTS     EKG: All EKG's are interpreted by the Emergency Department Physician who either signs or Co-signsthis chart in the absence of a cardiologist.        RADIOLOGY:   [place] such as CT, Ultrasound and MRI are read by the radiologist. Plain radiographic images are visualized and preliminarily interpreted by the emergency physician with the below findings:        Interpretation per the Radiologist below, if available at the time ofthis note:    XR FOOT RIGHT (MIN 3 VIEWS)   Final Result   NO ACUTE FRACTURES            ED BEDSIDE ULTRASOUND:   Performed by ED Physician - none    LABS:  Labs Reviewed   COMPREHENSIVE METABOLIC PANEL - Abnormal; Notable for the following components:       Result Value    Sodium 134 (*)     Glucose 109 (*)     Alkaline Phosphatase 110 (*)     All other components within normal limits   CBC WITH AUTO DIFFERENTIAL - Abnormal; Notable for the following components:    WBC 14.6 (*)     Neutrophils Absolute 12.3 (*)     Monocytes Absolute 1.0 (*)     All other components within normal limits   C-REACTIVE PROTEIN - Abnormal; Notable for the following components:    CRP 29.1 (*)     All other components within normal limits   CULTURE, BLOOD 1    Narrative:     ORDER#: [id number]                          ORDERED BY: Reynaldo Álvarez TERRIE  SOURCE: Blood                              COLLECTED:  02/23/22 09:17  [facility] [place].:                      RECEIVED :  02/23/22 09:17   CULTURE, BLOOD 2    Narrative:     ORDER#: [id number]                          ORDERED BY: Valentine Balbuena  SOURCE: Blood                              COLLECTED:  02/23/22 09:16  [facility] [place].:                      RECEIVED :  02/23/22 09:16   SEDIMENTATION RATE   URIC ACID       All other labs were within normal range or not returned as of this dictation. EMERGENCY DEPARTMENT COURSE and DIFFERENTIAL DIAGNOSIS/MDM:   Vitals:    Vitals:    02/23/22 0842 02/23/22 0847 02/23/22 0940 02/23/22 1003   BP: (!) 147/99  (!) 151/88 (!) 140/87   Pulse: 97 97 88 90   Resp: 18   15   Temp: 99.1 °F (37.3 °C)      TempSrc: Oral      SpO2: 97%  95% 97%   Weight: 260 lb (117.9 kg)      Height: 6' (1.829 m)             MDM  Number of Diagnoses or Management Options  Cellulitis of right foot  Diagnosis management comments: Patient presents to ED with complaint of redness, swelling, to right foot which she states been approximately present for 2 weeks. He states this initially started by wearing tight shoes, he was seen by primary doctor, he was given a prescription for prednisone, which he states he did not fill up to this point. He is prediabetic, his blood glucose today is 109 mg/dL. The foot is red, swollen, shows most small area of discoloration in the midfoot. An x-ray of the foot shows no signs for osteomyelitis, but some midfoot soft tissue swelling. Findings are most consistent with that of cellulitis. He was given a dose of clindamycin while in the ED, and sent home with clindamycin, and Norco for pain control. He was given a referral to his regular family provider, as well as Dr. Karthik Diaz of the wound care center.   He was advised if he has any worsening or changes symptoms, he should return to the emergency department for reevaluation, and potential

## 2022-02-24 ENCOUNTER — HOSPITAL ENCOUNTER (INPATIENT)
Age: 67
LOS: 5 days | Discharge: HOME HEALTH | DRG: 854 | End: 2022-03-01
Attending: INTERNAL MEDICINE | Admitting: INTERNAL MEDICINE
Payer: MEDICARE

## 2022-02-24 ENCOUNTER — HOSPITAL ENCOUNTER (OUTPATIENT)
Dept: WOUND CARE | Age: 67
Discharge: HOME | DRG: 854 | End: 2022-02-24
Payer: MEDICARE

## 2022-02-24 ENCOUNTER — APPOINTMENT (OUTPATIENT)
Dept: MRI IMAGING | Age: 67
DRG: 854 | End: 2022-02-24
Payer: MEDICARE

## 2022-02-24 VITALS
DIASTOLIC BLOOD PRESSURE: 71 MMHG | TEMPERATURE: 97.8 F | SYSTOLIC BLOOD PRESSURE: 148 MMHG | RESPIRATION RATE: 16 BRPM | HEART RATE: 107 BPM

## 2022-02-24 DIAGNOSIS — L03.115 CELLULITIS OF FOOT, RIGHT: Chronic | ICD-10-CM

## 2022-02-24 DIAGNOSIS — L03.115 CELLULITIS OF RIGHT FOOT: Primary | ICD-10-CM

## 2022-02-24 DIAGNOSIS — L02.611 ABSCESS OF FOOT INCLUDING TOES, RIGHT: Chronic | ICD-10-CM

## 2022-02-24 DIAGNOSIS — M79.89 SWOLLEN LEG: Primary | ICD-10-CM

## 2022-02-24 LAB
ALBUMIN SERPL-MCNC: 4.2 G/DL (ref 3.5–4.6)
ALP BLD-CCNC: 101 U/L (ref 35–104)
ALT SERPL-CCNC: 18 U/L (ref 0–41)
ANION GAP SERPL CALCULATED.3IONS-SCNC: 8 MEQ/L (ref 9–15)
AST SERPL-CCNC: 10 U/L (ref 0–40)
BASOPHILS ABSOLUTE: 0.1 K/UL (ref 0–0.2)
BASOPHILS RELATIVE PERCENT: 0.6 %
BILIRUB SERPL-MCNC: 0.5 MG/DL (ref 0.2–0.7)
BUN BLDV-MCNC: 16 MG/DL (ref 8–23)
C-REACTIVE PROTEIN: 66.4 MG/L (ref 0–5)
CALCIUM SERPL-MCNC: 9 MG/DL (ref 8.5–9.9)
CHLORIDE BLD-SCNC: 95 MEQ/L (ref 95–107)
CO2: 30 MEQ/L (ref 20–31)
CREAT SERPL-MCNC: 0.85 MG/DL (ref 0.7–1.2)
EOSINOPHILS ABSOLUTE: 0.1 K/UL (ref 0–0.7)
EOSINOPHILS RELATIVE PERCENT: 0.8 %
GFR AFRICAN AMERICAN: >60
GFR NON-AFRICAN AMERICAN: >60
GLOBULIN: 3 G/DL (ref 2.3–3.5)
GLUCOSE BLD-MCNC: 113 MG/DL (ref 70–99)
HBA1C MFR BLD: 5.7 % (ref 4.8–5.9)
HCT VFR BLD CALC: 47.9 % (ref 42–52)
HEMOGLOBIN: 15.7 G/DL (ref 14–18)
LACTIC ACID: 1 MMOL/L (ref 0.5–2.2)
LYMPHOCYTES ABSOLUTE: 0.9 K/UL (ref 1–4.8)
LYMPHOCYTES RELATIVE PERCENT: 6.1 %
MCH RBC QN AUTO: 27.9 PG (ref 27–31.3)
MCHC RBC AUTO-ENTMCNC: 32.7 % (ref 33–37)
MCV RBC AUTO: 85.4 FL (ref 80–100)
MONOCYTES ABSOLUTE: 1.1 K/UL (ref 0.2–0.8)
MONOCYTES RELATIVE PERCENT: 7.8 %
NEUTROPHILS ABSOLUTE: 12.4 K/UL (ref 1.4–6.5)
NEUTROPHILS RELATIVE PERCENT: 84.7 %
PDW BLD-RTO: 13.9 % (ref 11.5–14.5)
PLATELET # BLD: 268 K/UL (ref 130–400)
POTASSIUM SERPL-SCNC: 4.3 MEQ/L (ref 3.4–4.9)
RBC # BLD: 5.62 M/UL (ref 4.7–6.1)
SEDIMENTATION RATE, ERYTHROCYTE: 14 MM (ref 0–20)
SODIUM BLD-SCNC: 133 MEQ/L (ref 135–144)
TOTAL PROTEIN: 7.2 G/DL (ref 6.3–8)
URIC ACID, SERUM: 5.8 MG/DL (ref 3.4–7)
WBC # BLD: 14.7 K/UL (ref 4.8–10.8)

## 2022-02-24 PROCEDURE — 96367 TX/PROPH/DG ADDL SEQ IV INF: CPT

## 2022-02-24 PROCEDURE — 85025 COMPLETE CBC W/AUTO DIFF WBC: CPT

## 2022-02-24 PROCEDURE — 96365 THER/PROPH/DIAG IV INF INIT: CPT

## 2022-02-24 PROCEDURE — 1210000000 HC MED SURG R&B

## 2022-02-24 PROCEDURE — 6360000002 HC RX W HCPCS: Performed by: NURSE PRACTITIONER

## 2022-02-24 PROCEDURE — 6360000002 HC RX W HCPCS: Performed by: PHYSICIAN ASSISTANT

## 2022-02-24 PROCEDURE — 86140 C-REACTIVE PROTEIN: CPT

## 2022-02-24 PROCEDURE — 2580000003 HC RX 258: Performed by: PHYSICIAN ASSISTANT

## 2022-02-24 PROCEDURE — 86900 BLOOD TYPING SEROLOGIC ABO: CPT

## 2022-02-24 PROCEDURE — 6360000004 HC RX CONTRAST MEDICATION: Performed by: RADIOLOGY

## 2022-02-24 PROCEDURE — A9579 GAD-BASE MR CONTRAST NOS,1ML: HCPCS | Performed by: RADIOLOGY

## 2022-02-24 PROCEDURE — 99285 EMERGENCY DEPT VISIT HI MDM: CPT

## 2022-02-24 PROCEDURE — 73720 MRI LWR EXTREMITY W/O&W/DYE: CPT

## 2022-02-24 PROCEDURE — 6370000000 HC RX 637 (ALT 250 FOR IP): Performed by: NURSE PRACTITIONER

## 2022-02-24 PROCEDURE — 87040 BLOOD CULTURE FOR BACTERIA: CPT

## 2022-02-24 PROCEDURE — 85652 RBC SED RATE AUTOMATED: CPT

## 2022-02-24 PROCEDURE — 86901 BLOOD TYPING SEROLOGIC RH(D): CPT

## 2022-02-24 PROCEDURE — 86850 RBC ANTIBODY SCREEN: CPT

## 2022-02-24 PROCEDURE — 6370000000 HC RX 637 (ALT 250 FOR IP): Performed by: STUDENT IN AN ORGANIZED HEALTH CARE EDUCATION/TRAINING PROGRAM

## 2022-02-24 PROCEDURE — 2580000003 HC RX 258: Performed by: NURSE PRACTITIONER

## 2022-02-24 PROCEDURE — 99212 OFFICE O/P EST SF 10 MIN: CPT

## 2022-02-24 PROCEDURE — 36415 COLL VENOUS BLD VENIPUNCTURE: CPT

## 2022-02-24 PROCEDURE — 83605 ASSAY OF LACTIC ACID: CPT

## 2022-02-24 PROCEDURE — 83036 HEMOGLOBIN GLYCOSYLATED A1C: CPT

## 2022-02-24 PROCEDURE — 80053 COMPREHEN METABOLIC PANEL: CPT

## 2022-02-24 RX ORDER — HYDROCODONE BITARTRATE AND ACETAMINOPHEN 5; 325 MG/1; MG/1
2 TABLET ORAL EVERY 6 HOURS PRN
Status: DISCONTINUED | OUTPATIENT
Start: 2022-02-24 | End: 2022-03-01 | Stop reason: HOSPADM

## 2022-02-24 RX ORDER — NICOTINE POLACRILEX 4 MG
15 LOZENGE BUCCAL PRN
Status: DISCONTINUED | OUTPATIENT
Start: 2022-02-24 | End: 2022-02-24

## 2022-02-24 RX ORDER — DEXTROSE MONOHYDRATE 25 G/50ML
12.5 INJECTION, SOLUTION INTRAVENOUS PRN
Status: DISCONTINUED | OUTPATIENT
Start: 2022-02-24 | End: 2022-02-24

## 2022-02-24 RX ORDER — ACETAMINOPHEN 650 MG/1
650 SUPPOSITORY RECTAL EVERY 6 HOURS PRN
Status: DISCONTINUED | OUTPATIENT
Start: 2022-02-24 | End: 2022-03-01 | Stop reason: HOSPADM

## 2022-02-24 RX ORDER — DEXTROSE MONOHYDRATE 50 MG/ML
100 INJECTION, SOLUTION INTRAVENOUS PRN
Status: DISCONTINUED | OUTPATIENT
Start: 2022-02-24 | End: 2022-02-24

## 2022-02-24 RX ORDER — ONDANSETRON 2 MG/ML
4 INJECTION INTRAMUSCULAR; INTRAVENOUS EVERY 6 HOURS PRN
Status: DISCONTINUED | OUTPATIENT
Start: 2022-02-24 | End: 2022-02-25

## 2022-02-24 RX ORDER — SODIUM CHLORIDE 0.9 % (FLUSH) 0.9 %
5-40 SYRINGE (ML) INJECTION EVERY 12 HOURS SCHEDULED
Status: DISCONTINUED | OUTPATIENT
Start: 2022-02-24 | End: 2022-02-27 | Stop reason: SDUPTHER

## 2022-02-24 RX ORDER — SODIUM CHLORIDE 9 MG/ML
INJECTION, SOLUTION INTRAVENOUS CONTINUOUS
Status: DISCONTINUED | OUTPATIENT
Start: 2022-02-24 | End: 2022-02-25

## 2022-02-24 RX ORDER — ACETAMINOPHEN 325 MG/1
650 TABLET ORAL EVERY 6 HOURS PRN
Status: DISCONTINUED | OUTPATIENT
Start: 2022-02-24 | End: 2022-03-01 | Stop reason: HOSPADM

## 2022-02-24 RX ORDER — ONDANSETRON 4 MG/1
4 TABLET, ORALLY DISINTEGRATING ORAL EVERY 8 HOURS PRN
Status: DISCONTINUED | OUTPATIENT
Start: 2022-02-24 | End: 2022-02-25

## 2022-02-24 RX ORDER — SODIUM CHLORIDE 9 MG/ML
25 INJECTION, SOLUTION INTRAVENOUS PRN
Status: DISCONTINUED | OUTPATIENT
Start: 2022-02-24 | End: 2022-02-27 | Stop reason: SDUPTHER

## 2022-02-24 RX ORDER — ACETAMINOPHEN 325 MG/1
650 TABLET ORAL ONCE
Status: COMPLETED | OUTPATIENT
Start: 2022-02-24 | End: 2022-02-24

## 2022-02-24 RX ORDER — POLYETHYLENE GLYCOL 3350 17 G/17G
17 POWDER, FOR SOLUTION ORAL DAILY PRN
Status: DISCONTINUED | OUTPATIENT
Start: 2022-02-24 | End: 2022-03-01 | Stop reason: HOSPADM

## 2022-02-24 RX ORDER — SODIUM CHLORIDE 0.9 % (FLUSH) 0.9 %
5-40 SYRINGE (ML) INJECTION PRN
Status: DISCONTINUED | OUTPATIENT
Start: 2022-02-24 | End: 2022-02-27 | Stop reason: SDUPTHER

## 2022-02-24 RX ADMIN — ACETAMINOPHEN 650 MG: 325 TABLET ORAL at 18:32

## 2022-02-24 RX ADMIN — VANCOMYCIN HYDROCHLORIDE 1500 MG: 5 INJECTION, POWDER, LYOPHILIZED, FOR SOLUTION INTRAVENOUS at 18:25

## 2022-02-24 RX ADMIN — SODIUM CHLORIDE: 9 INJECTION, SOLUTION INTRAVENOUS at 17:42

## 2022-02-24 RX ADMIN — SODIUM CHLORIDE, PRESERVATIVE FREE 10 ML: 5 INJECTION INTRAVENOUS at 21:12

## 2022-02-24 RX ADMIN — ENOXAPARIN SODIUM 40 MG: 100 INJECTION SUBCUTANEOUS at 21:12

## 2022-02-24 RX ADMIN — PIPERACILLIN AND TAZOBACTAM 3375 MG: 3; .375 INJECTION, POWDER, LYOPHILIZED, FOR SOLUTION INTRAVENOUS at 17:41

## 2022-02-24 RX ADMIN — ASPIRIN 325 MG: 325 TABLET, COATED ORAL at 21:11

## 2022-02-24 RX ADMIN — GADOTERIDOL 20 ML: 279.3 INJECTION, SOLUTION INTRAVENOUS at 19:57

## 2022-02-24 ASSESSMENT — ENCOUNTER SYMPTOMS
EYE DISCHARGE: 0
ABDOMINAL PAIN: 0
RESPIRATORY NEGATIVE: 1
EYES NEGATIVE: 1
COLOR CHANGE: 1
SORE THROAT: 0
GASTROINTESTINAL NEGATIVE: 1
CONSTIPATION: 0
SHORTNESS OF BREATH: 0
ABDOMINAL DISTENTION: 0
RHINORRHEA: 0
COLOR CHANGE: 0

## 2022-02-24 ASSESSMENT — PAIN DESCRIPTION - PAIN TYPE
TYPE: ACUTE PAIN
TYPE: ACUTE PAIN

## 2022-02-24 ASSESSMENT — PAIN - FUNCTIONAL ASSESSMENT: PAIN_FUNCTIONAL_ASSESSMENT: 0-10

## 2022-02-24 ASSESSMENT — PAIN DESCRIPTION - ORIENTATION
ORIENTATION: RIGHT
ORIENTATION: RIGHT

## 2022-02-24 ASSESSMENT — PAIN DESCRIPTION - LOCATION
LOCATION: FOOT
LOCATION: FOOT

## 2022-02-24 ASSESSMENT — PAIN DESCRIPTION - FREQUENCY
FREQUENCY: INTERMITTENT
FREQUENCY: CONTINUOUS

## 2022-02-24 ASSESSMENT — PAIN DESCRIPTION - DESCRIPTORS
DESCRIPTORS: SHARP
DESCRIPTORS: SHOOTING

## 2022-02-24 ASSESSMENT — PAIN SCALES - GENERAL
PAINLEVEL_OUTOF10: 4
PAINLEVEL_OUTOF10: 2
PAINLEVEL_OUTOF10: 0

## 2022-02-24 ASSESSMENT — PAIN DESCRIPTION - ONSET: ONSET: ON-GOING

## 2022-02-24 NOTE — ED NOTES
Bed: 04  Expected date:   Expected time:   Means of arrival:   Comments:     April 322 Naeem Hamilton RN  02/24/22 5085

## 2022-02-24 NOTE — PROGRESS NOTES
[facility]   Progress Note and Procedure Note      [address] RECORD NUMBER:  [id number]  AGE: 74 y.o. GENDER: male  : 1955  EPISODE DATE:  2022    Subjective:     Chief Complaint   Patient presents with    Wound Check     right foot         HISTORY of PRESENT ILLNESS HPI     Jennifer Johnson is a 74 y.o. male who presents today for wound/ulcer evaluation. History of Wound Context: Jennifer Johnson is a 74 y.o. male who presents to the wound center with complaint of right foot swelling, and pain, which she states been ongoing for approximately 2 weeks. Patient states approximately 2 weeks ago he had purchased a new pair shoes, he states they were tight, causing irritation to his foot, he states increasing swelling, he was seen by his regular family provider I had an ultrasound completed at that time which did not show any blood clots, but concerns for atherosclerosis, he was placed on aspirin, given a prescription for prednisone. He has not fill the prescription for prednisone to this point. He states the foot is progressively more swollen and red, and now notices a dark spot over the dorsal surface of the right foot in the area of the third metatarsal.  No drainage or discharge noted, no lacerations or bleeding.   Past medical history significant for osteoarthritis, chronic back pain, gastric reflux, coronary artery disease, hypertension prediabetes    Wound/Ulcer Pain Timing/Severity: severe  Quality of pain: sharp  Severity:  5 / 10   Modifying Factors: Pain worsens with walking  Associated Signs/Symptoms: edema and pain    Ulcer Identification:  Ulcer Type: undetermined  Contributing Factors: edema, shear force, obesity, arterial insufficiency and cellulitis    Wound: N/A        PAST MEDICAL HISTORY        Diagnosis Date    BPH (benign prostatic hyperplasia) 2/3/2012    no treatment at this time    Chronic back pain     Colon polyp 2017    Dr. Gilmer Salgado; f/u 5 years  Eczematous dermatitis     hands, feet and legs    ED (erectile dysfunction) 2/3/2012    Erectile dysfunction     Family history of early CAD 2/3/2012    Gastroesophageal reflux disease with hiatal hernia     History of hypertension     History of prediabetes     History of renal insufficiency syndrome     Obesity (BMI 30-39. 9)     Osteoarthritis     Vitamin D deficiency     Weight gain        PAST SURGICAL HISTORY    Past Surgical History:   Procedure Laterality Date    COLONOSCOPY  2006    [place] COLON CA SCRN NOT  [address] IND N/A 9/15/2017    COLONOSCOPY performed by Nolvia Vera MD at [address] [place] [address]  14    bx, dilation jarmoszuk    UPPER GASTROINTESTINAL ENDOSCOPY N/A 9/15/2017    [facility]GD [facility] and biopsy [facility] by [facility] at [facility]    Family History   Problem Relation Age of Onset    Depression Mother     Heart Disease Paternal Grandmother     High Blood Pressure Paternal Grandmother     High Cholesterol Paternal Grandmother     Anemia Paternal Grandmother         B 12 deficiency    Heart Disease Brother     Heart Surgery Brother     Anemia Brother         B 12 deficiency       SOCIAL HISTORY    Social History     Tobacco Use    Smoking status: Former Smoker     Packs/day: 2.00     Years: 5.00     Pack years: 10.00     Quit date: 1990     Years since quittin.1    Smokeless tobacco: Never Used   Vaping Use    Vaping Use: Never used   Substance Use Topics    Alcohol use: No    Drug use: No       ALLERGIES    No Known Allergies    MEDICATIONS    Current Outpatient Medications on File Prior to Encounter   Medication Sig Dispense Refill    HYDROcodone-acetaminophen (NORCO) 5-325 MG per tablet Take 1 tablet by mouth every 6 hours as needed for Pain for up to 3 days. Intended supply: 3 days.  Take lowest dose possible to manage pain 12 tablet 0    clindamycin (CLEOCIN) 300 MG capsule Take 1 capsule by mouth 4 times daily for 10 days 40 capsule 0    aspirin 325 MG EC tablet Take 1 tablet by mouth daily 1 tablet 0    triamcinolone (KENALOG) 0.1 % cream Apply topically qam daily Monday through Friday only. Take weekend off. Do not use on face, groin, armpits, breasts tissue. 454 g 0    testosterone cypionate (DEPOTESTOTERONE CYPIONATE) 200 MG/ML injection Inject 1 mL into the muscle every 14 days for 90 doses. 8 mL 0    melatonin 1 MG tablet Take 1 tablet by mouth nightly as needed for Sleep 30 tablet     vitamin E 1000 units capsule Take 1,000 Units by mouth daily      vitamin D (CHOLECALCIFEROL) 1000 UNIT TABS tablet Take 6,000 Units by mouth daily      Multiple Vitamins-Minerals (ONE-A-DAY MENS HEALTH FORMULA) TABS Take by mouth      calcium carbonate (OYSTER SHELL CALCIUM 500 MG) 1250 MG tablet Take 1 tablet by mouth daily      Glucosamine-Chondroitin 2490-7886 MG/30ML LIQD Take by mouth Take 3 pills daily      Omega-3 Fatty Acids (FISH OIL) 1200 MG CAPS Take  by mouth daily.  predniSONE (DELTASONE) 10 MG tablet Take 3 tabs for 3 days, then 2 tabs for 3 days, then 1 tab for 3 days, then 1/2 tab for 3 days, then stop. 20 tablet 0    Syringe/Needle, Disp, (SYRINGE 3CC/93PS1-7/2\") 22G X 1-1/2\" 3 ML MISC 1 actuation by Does not apply route every 14 days 2 each 5    magnesium (MAGNESIUM-OXIDE) 250 MG TABS tablet Take 250 mg by mouth daily Take 3 pills daily       No current facility-administered medications on file prior to encounter. REVIEW OF SYSTEMS    Pertinent items are noted in HPI. Objective:      BP (!) 148/71   Pulse 107   Temp 97.8 °F (36.6 °C) (Temporal)   Resp 16     Wt Readings from Last 3 Encounters:   02/23/22 260 lb (117.9 kg)   02/16/22 268 lb (121.6 kg)   02/14/22 268 lb (121.6 kg)       PHYSICAL EXAM    Constitutional:   Well nourished and well developed. Appears neat and clean.   Patient is alert, oriented x3, and in no apparent distress. Respiratory:  Respiratory effort is easy and symmetric bilaterally. Rate is normal at rest and on room air. Vascular:  Pedal Pulses is palpable and audible signal noted with doppler. Capillary refill is <5 sec to digits bilateral.  Extremities negative for pitting edema. Neurological:  Gross and Light touch intact. Protective sensation intact    Dermatological:  Wound description noted in wound assessment. No open wound. Darkened eschar is noted over dorsal foot with significant edema, erythema, pain  and warmth noted. Mild fluctuance noted to dorsal foot. X rays reviewed. No fracture noted. Possible 30% stenosis of RIght posterior tibial artery. Psychiatric:  Judgement and insight intact. Short and long term memory intact. No evidence of depression, anxiety, or agitation. Patient is calm, cooperative, and communicative. Appropriate interactions and affect. Assessment:      Active Hospital Problems    Diagnosis Date Noted    Abscess of foot including toes, right [L02.611] 02/24/2022    Cellulitis of foot, right [L03.115] 02/24/2022        Procedure Note  Indications:  Based on my examination of this patient's wound(s)/ulcer(s) today, debridement is not required to promote healing and evaluate the wound base.     Wound 02/24/22 Foot Right;Dorsal #1 (Active)   Wound Image   02/24/22 1558   Wound Cleansed Cleansed with saline 02/24/22 1558   Wound Length (cm) 2.4 cm 02/24/22 1558   Wound Width (cm) 0.5 cm 02/24/22 1558   Wound Depth (cm) 0 cm 02/24/22 1558   Wound Surface Area (cm^2) 1.2 cm^2 02/24/22 1558   Wound Volume (cm^3) 0 cm^3 02/24/22 1558   Wound Assessment Dry 02/24/22 1558   Drainage Amount None 02/24/22 1558   Diann-wound Assessment Blanchable erythema 02/24/22 1558   Margins Attached edges 02/24/22 1558   Number of days: 0                  Plan:   Patient examined  I explained the situation after reviewing his previous visits and labs from PCP and ER  I am highly suspicious for a subcutaneous abscess. With a 14.9 Wbc I feel that he should be admitted for IV abx and stat MRI with possible I &D of right foot. Dr. Jacky Mosley has OR time and will add on if MRI comes back positive. Spoke to Kristin DAWSON in ER for the admission and Stat MRI. Patient understands the seriousness of this and has agreed to be admitted. Treatment Note please see attached Discharge Instructions    Written patient dismissal instructions given to patient and signed by patient or POA. Discharge [address] and Hyperbaric Medicine   Physician Orders and Discharge [address]  Telephone: [phone]      NAME:  Yaa Massey          YOB: 1955  MEDICAL RECORD NUMBER:  [id number]    Your  is:  [facility]/Facility:    Wound Location: Right Dorsal Foot    Dressing orders:    Compression:    Offloading Device: Post op shoe right foot    Other Instructions:     Dressing Supplies:       Keep all dressings clean, dry and intact. Keep pressure off the wound(s) at all times. Follow up visit : Go to the ER today for further evaluation of Right Foot. No need to make a return at this time. Please give 24 hour notice if unable to keep appointment. [phone]    If you experience any of the following, please call the Wound Care Service at  [phone] or go to the nearest emergency room. *Increase in pain *Temperature over 101 *Increase in drainage from your wound or a foul odor  *Uncontrolled swelling *Need for compression bandage changes due to slippage, breakthrough drainage       PLEASE NOTE: IF YOU ARE UNABLE TO OBTAIN WOUND SUPPLIES, CONTINUE TO USE THE SUPPLIES YOU HAVE AVAILABLE UNTIL YOU ARE ABLE TO REACH US.  IT IS MOST IMPORTANT TO KEEP THE WOUND COVERED AT ALL TIMES                    Electronically signed by Kervin Moss DPM on 2/24/2022 at 4:32 PM

## 2022-02-24 NOTE — ED TRIAGE NOTES
Pt presents to ED stating need for wound check. Pt reports that he has a wound to the dorsal surface of his right foot that he noted yesterday. Pt was sent by Dr. Tosha Hairston for admission. Currently taking Clindamycin and Norco.   Upon assessment, pt is A/Ox4, skin p/w/d, resp even and unlabored, msp's intact. Pt denies cp, sob, n/v/d, fever, and chills. Pt denies d/c from wound. Denies PMHx of diabetes.

## 2022-02-24 NOTE — ED PROVIDER NOTES
1 [facility] [address]  eMERGENCY dEPARTMENT eNCOUnter      Pt Name: Sharlene Hall  MRN: [id number]  [place] 1955  Date of evaluation: 2/24/2022  Provider: Nadeem Thapa PA-C    [address] [place] [address]       Chief Complaint   Patient presents with    Wound Check     pt sent by Podiatry to be admitted for wound to right foot - states that he is currently taking Clindamycin          HISTORY OF PRESENT ILLNESS   (Location/Symptom, Timing/Onset,Context/Setting, Quality, Duration, Modifying Factors, Severity)  Note limiting factors. Sharlene Hall is a 74 y.o. male who presents to the emergency department with complaint of cellulitis to right foot. Patient was seen in the emerge from yesterday for the same thing, he was diagnosed with cellulitis, started on clindamycin, he did follow-up in the outpatient wound care center today, was evaluated, and returned to the emergency department for concerns of abscess to right foot. Patient was seen by Dr. Victor M Pineda in wound care center with the intent of admission and possible debridement of abscess. Past medical history is significant for osteoarthritis, chronic back pain, gastric reflux, renal insufficiency, hypertension, prediabetes. HPI    NursingNotes were reviewed. REVIEW OF SYSTEMS    (2-9 systems for level 4, 10 or more for level 5)     Review of Systems   Constitutional: Negative for activity change and appetite change. HENT: Negative for congestion, ear discharge, ear pain, nosebleeds, rhinorrhea and sore throat. Eyes: Negative for discharge. Respiratory: Negative for shortness of breath. Cardiovascular: Negative for chest pain, palpitations and leg swelling. Gastrointestinal: Negative for abdominal distention, abdominal pain and constipation. Genitourinary: Negative for difficulty urinating and dysuria. Musculoskeletal: Negative for arthralgias. Skin: Positive for rash. Negative for color change. Redness swelling erythema to right foot. Neurological: Negative for dizziness, syncope, numbness and headaches. Psychiatric/Behavioral: Negative for agitation and confusion. Except as noted above the remainder of the review of systems was reviewed and negative. PAST MEDICAL HISTORY     Past Medical History:   Diagnosis Date    BPH (benign prostatic hyperplasia) 2/3/2012    no treatment at this time    Chronic back pain     Colon polyp 09/2017    Dr. Namita Barrios; f/u 5 years    Eczematous dermatitis     hands, feet and legs    ED (erectile dysfunction) 2/3/2012    Erectile dysfunction     Family history of early CAD 2/3/2012    Gastroesophageal reflux disease with hiatal hernia     History of hypertension     History of prediabetes     History of renal insufficiency syndrome     Obesity (BMI 30-39. 9)     Osteoarthritis     Vitamin D deficiency     Weight gain          SURGICALHISTORY       Past Surgical History:   Procedure Laterality Date    COLONOSCOPY  9/25/2006    [place] COLON CA SCRN NOT  [address] IND N/A 9/15/2017    COLONOSCOPY performed by Janelle Hassan MD at [address] [place] [address]  5/9/14    bx, dilation jarmoszuk    UPPER GASTROINTESTINAL ENDOSCOPY N/A 9/15/2017    EGD DILATION SAVORY and biopsy performed by Janelle Hassan MD at [address] [facility]       Current Discharge Medication List      CONTINUE these medications which have NOT CHANGED    Details   HYDROcodone-acetaminophen (NORCO) 5-325 MG per tablet Take 1 tablet by mouth every 6 hours as needed for Pain for up to 3 days. Intended supply: 3 days.  Take lowest dose possible to manage pain  Qty: 12 tablet, Refills: 0    Associated Diagnoses: Cellulitis of right foot      clindamycin (CLEOCIN) 300 MG capsule Take 1 capsule by mouth 4 times daily for 10 days  Qty: 40 capsule, Refills: 0      predniSONE (DELTASONE) 10 MG tablet Take 3 tabs for 3 days, then 2 tabs for 3 days, then 1 tab for 3 days, then 1/2 tab for 3 days, then stop. Qty: 20 tablet, Refills: 0    Associated Diagnoses: Localized swelling of right foot      aspirin 325 MG EC tablet Take 1 tablet by mouth daily  Qty: 1 tablet, Refills: 0    Associated Diagnoses: Atherosclerosis of artery of lower extremity (HCC)      triamcinolone (KENALOG) 0.1 % cream Apply topically qam daily Monday through Friday only. Take weekend off. Do not use on face, groin, armpits, breasts tissue. Qty: 454 g, Refills: 0    Associated Diagnoses: Eczema, unspecified type; Dyshidrotic eczema      testosterone cypionate (DEPOTESTOTERONE CYPIONATE) 200 MG/ML injection Inject 1 mL into the muscle every 14 days for 90 doses. Qty: 8 mL, Refills: 0    Associated Diagnoses: Low testosterone      Syringe/Needle, Disp, (SYRINGE 3CC/41LL7-1/2\") 22G X 1-1/2\" 3 ML MISC 1 actuation by Does not apply route every 14 days  Qty: 2 each, Refills: 5      melatonin 1 MG tablet Take 1 tablet by mouth nightly as needed for Sleep  Qty: 30 tablet    Associated Diagnoses: Insomnia, unspecified type      vitamin E 1000 units capsule Take 1,000 Units by mouth daily      vitamin D (CHOLECALCIFEROL) 1000 UNIT TABS tablet Take 6,000 Units by mouth daily      Multiple Vitamins-Minerals (ONE-A-DAY MENS HEALTH FORMULA) TABS Take by mouth      magnesium (MAGNESIUM-OXIDE) 250 MG TABS tablet Take 250 mg by mouth daily Take 3 pills daily      calcium carbonate (OYSTER SHELL CALCIUM 500 MG) 1250 MG tablet Take 1 tablet by mouth daily      Glucosamine-Chondroitin 0138-2944 MG/30ML LIQD Take by mouth Take 3 pills daily      Omega-3 Fatty Acids (FISH OIL) 1200 MG CAPS Take  by mouth daily. ALLERGIES     Patient has no known allergies.     FAMILY HISTORY       Family History   Problem Relation Age of Onset    Depression Mother     Heart Disease Paternal Grandmother     High Blood Pressure Paternal Grandmother     High Cholesterol Paternal Grandmother     Anemia Paternal Grandmother         B 12 deficiency    Heart Disease Brother     Heart Surgery Brother     Anemia Brother         B 12 deficiency          SOCIAL HISTORY       Social History     Socioeconomic History    Marital status:      Spouse name: None    Number of children: 1    Years of education: None    Highest education level: None   Occupational History    None   Tobacco Use    Smoking status: Former Smoker     Packs/day: 2.00     Years: 5.00     Pack years: 10.00     Quit date: 1990     Years since quittin.1    Smokeless tobacco: Never Used   Vaping Use    Vaping Use: Never used   Substance and Sexual Activity    Alcohol use: No    Drug use: No    Sexual activity: Yes     Partners: Female   Other Topics Concern    None   Social History Narrative    None     Social Determinants of Health     Financial Resource Strain: Low Risk     Difficulty of Paying Living Expenses: Not hard at all   Food Insecurity: No Food Insecurity    Worried About Running Out of Food in the Last Year: Never true    Ricardo of Food in the Last Year: Never true   Transportation Needs:     Lack of Transportation (Medical): Not on file    Lack of Transportation (Non-Medical):  Not on file   Physical Activity:     Days of Exercise per Week: Not on file    Minutes of Exercise per Session: Not on file   Stress:     Feeling of Stress : Not on file   Social Connections:     Frequency of Communication with Friends and Family: Not on file    Frequency of Social Gatherings with Friends and Family: Not on file    Attends Sabianist Services: Not on file    Active Member of Clubs or Organizations: Not on file    Attends Club or Organization Meetings: Not on file    Marital Status: Not on file   Intimate Partner Violence:     Fear of Current or Ex-Partner: Not on file    Emotionally Abused: Not on file    Physically Abused: Not on file    Sexually Abused: Not on file   Housing Stability:     Unable to Pay for Housing in the Last Year: Not on file    Number of Places Lived in the Last Year: Not on file    Unstable Housing in the Last Year: Not on file       SCREENINGS    Dago Coma Scale  Eye Opening: Spontaneous  Best Verbal Response: Oriented  Best Motor Response: Obeys commands  Dago Coma Scale Score: 15 @FLOW([id number])@      PHYSICAL EXAM    (up to 7 for level 4, 8 or more for level 5)     ED Triage Vitals [02/24/22 1650]   BP Temp Temp Source Pulse Resp SpO2 Height Weight   (!) 148/86 98.6 °F (37 °C) Temporal 103 19 96 % 6' (1.829 m) 260 lb (117.9 kg)       Physical Exam  Vitals and nursing note reviewed. Constitutional:       General: He is not in acute distress. Appearance: He is well-developed. He is not ill-appearing, toxic-appearing or diaphoretic. HENT:      Head: Normocephalic. Nose: No congestion. Mouth/Throat:      Mouth: Mucous membranes are moist.      Pharynx: No oropharyngeal exudate or posterior oropharyngeal erythema. Eyes:      Extraocular Movements: Extraocular movements intact. Conjunctiva/sclera: Conjunctivae normal.      Pupils: Pupils are equal, round, and reactive to light. Neck:      Vascular: No JVD. Trachea: No tracheal deviation. Cardiovascular:      Rate and Rhythm: Normal rate. Pulses: Normal pulses. Heart sounds: Normal heart sounds. No murmur heard. No friction rub. No gallop. Pulmonary:      Effort: Pulmonary effort is normal. No tachypnea, accessory muscle usage, respiratory distress or retractions. Breath sounds: No stridor. No wheezing, rhonchi or rales. Chest:      Chest wall: No tenderness. Abdominal:      General: Abdomen is flat. Bowel sounds are normal. There is no distension or abdominal bruit. Palpations: There is no shifting dullness, fluid wave, hepatomegaly, splenomegaly, mass or pulsatile mass. Tenderness: There is no abdominal tenderness. There is no right CVA tenderness, left CVA tenderness, guarding or rebound.  Negative signs include Umana's sign, Rovsing's sign and McBurney's sign. Musculoskeletal:         General: No deformity. Cervical back: Normal range of motion and neck supple. No rigidity. Feet:       Comments: Right foot has significant erythema across the dorsal surface extending up to lower ankle, there is an area of edema, erythema at the base of the third metatarsal with some discoloration, fluctuance, concern for abscess. Patient has strong dorsalis pedis pulses, able to move foot ankle and toes well with minimal increase in pain. Skin:     General: Skin is warm and dry. Capillary Refill: Capillary refill takes less than 2 seconds. Coloration: Skin is not jaundiced. Neurological:      General: No focal deficit present. Mental Status: He is alert and oriented to person, place, and time. Mental status is at baseline. Cranial Nerves: No cranial nerve deficit. Sensory: No sensory deficit. Motor: No weakness.       Coordination: Coordination normal.   Psychiatric:         Mood and Affect: Mood normal.         DIAGNOSTIC RESULTS     EKG: All EKG's are interpreted by the Emergency Department Physician who either signs or Co-signsthis chart in the absence of a cardiologist.      RADIOLOGY:   Tiffanie Morgan such as CT, Ultrasound and MRI are read by the radiologist. Plain radiographic images are visualized and preliminarily interpreted by the emergency physician with the below findings:        Interpretation per the Radiologist below, if available at the time ofthis note:    MRI Lala 7110    (Results Pending)         ED BEDSIDE ULTRASOUND:   Performed by ED Physician - none    LABS:  Labs Reviewed   COMPREHENSIVE METABOLIC PANEL - Abnormal; Notable for the following components:       Result Value    Sodium 133 (*)     Anion Gap 8 (*)     Glucose 113 (*)     All other components within normal limits   CBC WITH AUTO DIFFERENTIAL - Abnormal; Notable for the following components:    WBC 14.7 (*)     MCHC 32.7 (*)     Neutrophils Absolute 12.4 (*)     Lymphocytes Absolute 0.9 (*)     Monocytes Absolute 1.1 (*)     All other components within normal limits   C-REACTIVE PROTEIN - Abnormal; Notable for the following components:    CRP 66.4 (*)     All other components within normal limits   CULTURE, BLOOD 2   CULTURE, BLOOD 1   CULTURE, WOUND   LACTIC ACID   SEDIMENTATION RATE   HEMOGLOBIN U0P   BASIC METABOLIC PANEL W/ REFLEX TO MG FOR LOW K   CBC WITH AUTO DIFFERENTIAL   TYPE AND SCREEN       All other labs were within normal range or not returned as of this dictation. EMERGENCY DEPARTMENT COURSE and DIFFERENTIAL DIAGNOSIS/MDM:   Vitals:    Vitals:    02/24/22 1830 02/24/22 1845 02/24/22 2005 02/24/22 2015   BP: (!) 146/88 137/87 (!) 143/92 (!) 152/95   Pulse:   99    Resp:       Temp:       TempSrc:       SpO2: 96% 95% 96%    Weight:       Height:         MDM     Pt is a 73 yo M who presents to the ED for evaluation of R foot cellulitis. Temp 100, HD stable. Pt given po  Tylenol, IV zosyn and IV vancomycin in the ED. CBC remarkable for white count of 14.7, unchanged from yesterday. CRP 66. Remainder of labs unremarkable. MRI foot pending. Pt admitted to hospital service, Dr. Jill Ambriz accepting. Stable for admission. CRITICAL CARE TIME   Total Critical Care time was 0 minutes, excluding separately reportableprocedures. There was a high probability of clinicallysignificant/life threatening deterioration in the patient's condition which required my urgent intervention. CONSULTS:  IP CONSULT TO PODIATRY  PHARMACY TO DOSE VANCOMYCIN  IP CONSULT TO PODIATRY    PROCEDURES:  Unless otherwise noted below, none     Procedures    FINAL IMPRESSION      1. Cellulitis of right foot          DISPOSITION/PLAN   DISPOSITION Admitted 02/24/2022 07:00:49 PM      PATIENT REFERRED TO:  No follow-up provider specified.     DISCHARGE MEDICATIONS:  Current Discharge Medication List (Please note that portions of this note were completed with a voice recognition program.  Efforts were made to edit the dictations but occasionally words are mis-transcribed.)    David Dawson PA-C (electronically signed)           David Dawson PA-C  02/24/22 3688

## 2022-02-24 NOTE — CODE DOCUMENTATION
[id number] [facility] Physician Billing Sheet. Marnee Krabbe  AGE: 74 y.o.    GENDER: male  : 1955  TODAY'S DATE:  2022    ICD-10 CODES  Active Hospital Problems    Diagnosis Date Noted    Abscess of foot including toes, right [L02.611] 2022    Cellulitis of foot, right [L03.115] 2022       PHYSICIAN PROCEDURES    CPT CODE  51717      Electronically signed by Adilene Delvalle DPM on 2022 at 4:31 PM

## 2022-02-25 ENCOUNTER — ANESTHESIA EVENT (OUTPATIENT)
Dept: OPERATING ROOM | Age: 67
DRG: 854 | End: 2022-02-25
Payer: MEDICARE

## 2022-02-25 ENCOUNTER — ANESTHESIA (OUTPATIENT)
Dept: OPERATING ROOM | Age: 67
DRG: 854 | End: 2022-02-25
Payer: MEDICARE

## 2022-02-25 VITALS — DIASTOLIC BLOOD PRESSURE: 59 MMHG | OXYGEN SATURATION: 97 % | SYSTOLIC BLOOD PRESSURE: 101 MMHG

## 2022-02-25 LAB
ABO/RH: NORMAL
ANION GAP SERPL CALCULATED.3IONS-SCNC: 11 MEQ/L (ref 9–15)
ANTIBODY SCREEN: NORMAL
BASOPHILS ABSOLUTE: 0.1 K/UL (ref 0–0.2)
BASOPHILS RELATIVE PERCENT: 0.6 %
BUN BLDV-MCNC: 13 MG/DL (ref 8–23)
CALCIUM SERPL-MCNC: 8.5 MG/DL (ref 8.5–9.9)
CHLORIDE BLD-SCNC: 100 MEQ/L (ref 95–107)
CO2: 25 MEQ/L (ref 20–31)
CREAT SERPL-MCNC: 0.89 MG/DL (ref 0.7–1.2)
EOSINOPHILS ABSOLUTE: 0.2 K/UL (ref 0–0.7)
EOSINOPHILS RELATIVE PERCENT: 1.4 %
GFR AFRICAN AMERICAN: >60
GFR NON-AFRICAN AMERICAN: >60
GLUCOSE BLD-MCNC: 111 MG/DL (ref 70–99)
HCT VFR BLD CALC: 43.6 % (ref 42–52)
HEMOGLOBIN: 14.6 G/DL (ref 14–18)
LYMPHOCYTES ABSOLUTE: 1 K/UL (ref 1–4.8)
LYMPHOCYTES RELATIVE PERCENT: 8.1 %
MCH RBC QN AUTO: 28.2 PG (ref 27–31.3)
MCHC RBC AUTO-ENTMCNC: 33.4 % (ref 33–37)
MCV RBC AUTO: 84.5 FL (ref 80–100)
MONOCYTES ABSOLUTE: 1.2 K/UL (ref 0.2–0.8)
MONOCYTES RELATIVE PERCENT: 9.6 %
NEUTROPHILS ABSOLUTE: 9.7 K/UL (ref 1.4–6.5)
NEUTROPHILS RELATIVE PERCENT: 80.3 %
PDW BLD-RTO: 13.8 % (ref 11.5–14.5)
PLATELET # BLD: 285 K/UL (ref 130–400)
POTASSIUM REFLEX MAGNESIUM: 4.4 MEQ/L (ref 3.4–4.9)
RBC # BLD: 5.16 M/UL (ref 4.7–6.1)
SARS-COV-2, NAAT: NOT DETECTED
SODIUM BLD-SCNC: 136 MEQ/L (ref 135–144)
WBC # BLD: 12.1 K/UL (ref 4.8–10.8)

## 2022-02-25 PROCEDURE — 2580000003 HC RX 258

## 2022-02-25 PROCEDURE — 6360000002 HC RX W HCPCS: Performed by: NURSE PRACTITIONER

## 2022-02-25 PROCEDURE — 87070 CULTURE OTHR SPECIMN AEROBIC: CPT

## 2022-02-25 PROCEDURE — A4217 STERILE WATER/SALINE, 500 ML: HCPCS | Performed by: PODIATRIST

## 2022-02-25 PROCEDURE — 3700000001 HC ADD 15 MINUTES (ANESTHESIA): Performed by: PODIATRIST

## 2022-02-25 PROCEDURE — 86403 PARTICLE AGGLUT ANTBDY SCRN: CPT

## 2022-02-25 PROCEDURE — 36415 COLL VENOUS BLD VENIPUNCTURE: CPT

## 2022-02-25 PROCEDURE — 85025 COMPLETE CBC W/AUTO DIFF WBC: CPT

## 2022-02-25 PROCEDURE — 6360000002 HC RX W HCPCS: Performed by: ANESTHESIOLOGY

## 2022-02-25 PROCEDURE — 6360000002 HC RX W HCPCS

## 2022-02-25 PROCEDURE — 2709999900 HC NON-CHARGEABLE SUPPLY: Performed by: PODIATRIST

## 2022-02-25 PROCEDURE — 7100000001 HC PACU RECOVERY - ADDTL 15 MIN: Performed by: PODIATRIST

## 2022-02-25 PROCEDURE — 3600000003 HC SURGERY LEVEL 3 BASE: Performed by: PODIATRIST

## 2022-02-25 PROCEDURE — 0J9Q0ZZ DRAINAGE OF RIGHT FOOT SUBCUTANEOUS TISSUE AND FASCIA, OPEN APPROACH: ICD-10-PCS | Performed by: PODIATRIST

## 2022-02-25 PROCEDURE — 2580000003 HC RX 258: Performed by: INTERNAL MEDICINE

## 2022-02-25 PROCEDURE — 7100000000 HC PACU RECOVERY - FIRST 15 MIN: Performed by: PODIATRIST

## 2022-02-25 PROCEDURE — 3700000000 HC ANESTHESIA ATTENDED CARE: Performed by: PODIATRIST

## 2022-02-25 PROCEDURE — 87635 SARS-COV-2 COVID-19 AMP PRB: CPT

## 2022-02-25 PROCEDURE — 6370000000 HC RX 637 (ALT 250 FOR IP)

## 2022-02-25 PROCEDURE — 2720000010 HC SURG SUPPLY STERILE: Performed by: PODIATRIST

## 2022-02-25 PROCEDURE — 87186 SC STD MICRODIL/AGAR DIL: CPT

## 2022-02-25 PROCEDURE — 3600000013 HC SURGERY LEVEL 3 ADDTL 15MIN: Performed by: PODIATRIST

## 2022-02-25 PROCEDURE — 2500000003 HC RX 250 WO HCPCS: Performed by: PODIATRIST

## 2022-02-25 PROCEDURE — 0JDQ0ZZ EXTRACTION OF RIGHT FOOT SUBCUTANEOUS TISSUE AND FASCIA, OPEN APPROACH: ICD-10-PCS | Performed by: PODIATRIST

## 2022-02-25 PROCEDURE — 2580000003 HC RX 258: Performed by: PODIATRIST

## 2022-02-25 PROCEDURE — 87075 CULTR BACTERIA EXCEPT BLOOD: CPT

## 2022-02-25 PROCEDURE — 80048 BASIC METABOLIC PNL TOTAL CA: CPT

## 2022-02-25 PROCEDURE — 2580000003 HC RX 258: Performed by: NURSE PRACTITIONER

## 2022-02-25 PROCEDURE — 1210000000 HC MED SURG R&B

## 2022-02-25 RX ORDER — METOCLOPRAMIDE HYDROCHLORIDE 5 MG/ML
10 INJECTION INTRAMUSCULAR; INTRAVENOUS
Status: DISCONTINUED | OUTPATIENT
Start: 2022-02-25 | End: 2022-02-25 | Stop reason: HOSPADM

## 2022-02-25 RX ORDER — PROPOFOL 10 MG/ML
INJECTION, EMULSION INTRAVENOUS PRN
Status: DISCONTINUED | OUTPATIENT
Start: 2022-02-25 | End: 2022-02-25 | Stop reason: SDUPTHER

## 2022-02-25 RX ORDER — SODIUM CHLORIDE 9 MG/ML
INJECTION, SOLUTION INTRAVENOUS CONTINUOUS
Status: DISPENSED | OUTPATIENT
Start: 2022-02-25 | End: 2022-02-25

## 2022-02-25 RX ORDER — SODIUM CHLORIDE 0.9 % (FLUSH) 0.9 %
5-40 SYRINGE (ML) INJECTION PRN
Status: DISCONTINUED | OUTPATIENT
Start: 2022-02-25 | End: 2022-02-25 | Stop reason: HOSPADM

## 2022-02-25 RX ORDER — FENTANYL CITRATE 50 UG/ML
50 INJECTION, SOLUTION INTRAMUSCULAR; INTRAVENOUS EVERY 10 MIN PRN
Status: DISCONTINUED | OUTPATIENT
Start: 2022-02-25 | End: 2022-02-25 | Stop reason: HOSPADM

## 2022-02-25 RX ORDER — SODIUM CHLORIDE 0.9 % (FLUSH) 0.9 %
5-40 SYRINGE (ML) INJECTION EVERY 12 HOURS SCHEDULED
Status: DISCONTINUED | OUTPATIENT
Start: 2022-02-25 | End: 2022-03-01 | Stop reason: HOSPADM

## 2022-02-25 RX ORDER — SODIUM CHLORIDE 0.9 % (FLUSH) 0.9 %
5-40 SYRINGE (ML) INJECTION EVERY 12 HOURS SCHEDULED
Status: DISCONTINUED | OUTPATIENT
Start: 2022-02-25 | End: 2022-02-25 | Stop reason: HOSPADM

## 2022-02-25 RX ORDER — SODIUM CHLORIDE 9 MG/ML
25 INJECTION, SOLUTION INTRAVENOUS PRN
Status: DISCONTINUED | OUTPATIENT
Start: 2022-02-25 | End: 2022-03-01 | Stop reason: HOSPADM

## 2022-02-25 RX ORDER — SODIUM CHLORIDE 9 MG/ML
25 INJECTION, SOLUTION INTRAVENOUS PRN
Status: DISCONTINUED | OUTPATIENT
Start: 2022-02-25 | End: 2022-02-25 | Stop reason: HOSPADM

## 2022-02-25 RX ORDER — ONDANSETRON 2 MG/ML
4 INJECTION INTRAMUSCULAR; INTRAVENOUS EVERY 6 HOURS PRN
Status: DISCONTINUED | OUTPATIENT
Start: 2022-02-25 | End: 2022-03-01 | Stop reason: HOSPADM

## 2022-02-25 RX ORDER — OXYCODONE HYDROCHLORIDE 5 MG/1
5 TABLET ORAL PRN
Status: DISCONTINUED | OUTPATIENT
Start: 2022-02-25 | End: 2022-02-25 | Stop reason: HOSPADM

## 2022-02-25 RX ORDER — DIPHENHYDRAMINE HYDROCHLORIDE 50 MG/ML
12.5 INJECTION INTRAMUSCULAR; INTRAVENOUS
Status: DISCONTINUED | OUTPATIENT
Start: 2022-02-25 | End: 2022-02-25 | Stop reason: HOSPADM

## 2022-02-25 RX ORDER — ONDANSETRON 4 MG/1
4 TABLET, ORALLY DISINTEGRATING ORAL EVERY 8 HOURS PRN
Status: DISCONTINUED | OUTPATIENT
Start: 2022-02-25 | End: 2022-03-01 | Stop reason: HOSPADM

## 2022-02-25 RX ORDER — MAGNESIUM HYDROXIDE 1200 MG/15ML
LIQUID ORAL CONTINUOUS PRN
Status: COMPLETED | OUTPATIENT
Start: 2022-02-25 | End: 2022-02-25

## 2022-02-25 RX ORDER — FENTANYL CITRATE 50 UG/ML
INJECTION, SOLUTION INTRAMUSCULAR; INTRAVENOUS PRN
Status: DISCONTINUED | OUTPATIENT
Start: 2022-02-25 | End: 2022-02-25 | Stop reason: SDUPTHER

## 2022-02-25 RX ORDER — SODIUM CHLORIDE 0.9 % (FLUSH) 0.9 %
5-40 SYRINGE (ML) INJECTION PRN
Status: DISCONTINUED | OUTPATIENT
Start: 2022-02-25 | End: 2022-03-01 | Stop reason: HOSPADM

## 2022-02-25 RX ORDER — MIDAZOLAM HYDROCHLORIDE 1 MG/ML
INJECTION INTRAMUSCULAR; INTRAVENOUS PRN
Status: DISCONTINUED | OUTPATIENT
Start: 2022-02-25 | End: 2022-02-25 | Stop reason: SDUPTHER

## 2022-02-25 RX ORDER — ONDANSETRON 2 MG/ML
INJECTION INTRAMUSCULAR; INTRAVENOUS PRN
Status: DISCONTINUED | OUTPATIENT
Start: 2022-02-25 | End: 2022-02-25 | Stop reason: SDUPTHER

## 2022-02-25 RX ORDER — MEPERIDINE HYDROCHLORIDE 25 MG/ML
12.5 INJECTION INTRAMUSCULAR; INTRAVENOUS; SUBCUTANEOUS EVERY 5 MIN PRN
Status: DISCONTINUED | OUTPATIENT
Start: 2022-02-25 | End: 2022-02-25 | Stop reason: HOSPADM

## 2022-02-25 RX ORDER — ONDANSETRON 2 MG/ML
4 INJECTION INTRAMUSCULAR; INTRAVENOUS
Status: DISCONTINUED | OUTPATIENT
Start: 2022-02-25 | End: 2022-02-25 | Stop reason: HOSPADM

## 2022-02-25 RX ORDER — OXYCODONE HYDROCHLORIDE 5 MG/1
10 TABLET ORAL PRN
Status: DISCONTINUED | OUTPATIENT
Start: 2022-02-25 | End: 2022-02-25 | Stop reason: HOSPADM

## 2022-02-25 RX ADMIN — HYDROCODONE BITARTRATE AND ACETAMINOPHEN 2 TABLET: 5; 325 TABLET ORAL at 18:16

## 2022-02-25 RX ADMIN — HYDROMORPHONE HYDROCHLORIDE 0.5 MG: 1 INJECTION, SOLUTION INTRAMUSCULAR; INTRAVENOUS; SUBCUTANEOUS at 16:31

## 2022-02-25 RX ADMIN — FENTANYL CITRATE 50 MCG: 50 INJECTION, SOLUTION INTRAMUSCULAR; INTRAVENOUS at 15:15

## 2022-02-25 RX ADMIN — SODIUM CHLORIDE: 9 INJECTION, SOLUTION INTRAVENOUS at 19:44

## 2022-02-25 RX ADMIN — PIPERACILLIN AND TAZOBACTAM 3375 MG: 3; .375 INJECTION, POWDER, LYOPHILIZED, FOR SOLUTION INTRAVENOUS at 18:21

## 2022-02-25 RX ADMIN — Medication 10 ML: at 08:30

## 2022-02-25 RX ADMIN — ONDANSETRON 4 MG: 2 INJECTION INTRAMUSCULAR; INTRAVENOUS at 15:15

## 2022-02-25 RX ADMIN — PIPERACILLIN AND TAZOBACTAM 3375 MG: 3; .375 INJECTION, POWDER, LYOPHILIZED, FOR SOLUTION INTRAVENOUS at 02:15

## 2022-02-25 RX ADMIN — PROPOFOL 200 MG: 10 INJECTION, EMULSION INTRAVENOUS at 15:15

## 2022-02-25 RX ADMIN — SODIUM CHLORIDE: 9 INJECTION, SOLUTION INTRAVENOUS at 17:37

## 2022-02-25 RX ADMIN — VANCOMYCIN HYDROCHLORIDE 1250 MG: 5 INJECTION, POWDER, LYOPHILIZED, FOR SOLUTION INTRAVENOUS at 22:54

## 2022-02-25 RX ADMIN — MIDAZOLAM HYDROCHLORIDE 2 MG: 1 INJECTION, SOLUTION INTRAMUSCULAR; INTRAVENOUS at 15:14

## 2022-02-25 RX ADMIN — VANCOMYCIN HYDROCHLORIDE 1250 MG: 5 INJECTION, POWDER, LYOPHILIZED, FOR SOLUTION INTRAVENOUS at 08:40

## 2022-02-25 RX ADMIN — PIPERACILLIN AND TAZOBACTAM 3375 MG: 3; .375 INJECTION, POWDER, LYOPHILIZED, FOR SOLUTION INTRAVENOUS at 08:28

## 2022-02-25 RX ADMIN — HYDROMORPHONE HYDROCHLORIDE 0.5 MG: 1 INJECTION, SOLUTION INTRAMUSCULAR; INTRAVENOUS; SUBCUTANEOUS at 16:16

## 2022-02-25 ASSESSMENT — PULMONARY FUNCTION TESTS
PIF_VALUE: 11
PIF_VALUE: 11
PIF_VALUE: 4
PIF_VALUE: 1
PIF_VALUE: 3
PIF_VALUE: 2
PIF_VALUE: 1
PIF_VALUE: 3
PIF_VALUE: 4
PIF_VALUE: 1
PIF_VALUE: 0
PIF_VALUE: 3
PIF_VALUE: 25
PIF_VALUE: 5
PIF_VALUE: 3
PIF_VALUE: 4
PIF_VALUE: 3
PIF_VALUE: 6
PIF_VALUE: 3
PIF_VALUE: 3
PIF_VALUE: 0
PIF_VALUE: 0
PIF_VALUE: 3
PIF_VALUE: 0
PIF_VALUE: 0
PIF_VALUE: 3
PIF_VALUE: 0
PIF_VALUE: 0
PIF_VALUE: 3
PIF_VALUE: 4
PIF_VALUE: 1
PIF_VALUE: 3
PIF_VALUE: 3

## 2022-02-25 ASSESSMENT — PAIN SCALES - GENERAL
PAINLEVEL_OUTOF10: 0
PAINLEVEL_OUTOF10: 2
PAINLEVEL_OUTOF10: 7
PAINLEVEL_OUTOF10: 10
PAINLEVEL_OUTOF10: 9

## 2022-02-25 NOTE — BRIEF OP NOTE
Brief Postoperative Note      Patient: Franki Current  YOB: 1955  MRN: [id number]    Date of Procedure: 2/25/2022    Pre-Op Diagnosis: Abscess right foot    Post-Op Diagnosis: Same       Procedure(s):  FOOT DEBRIDEMENT INCISION AND DRAINAGE    Surgeon(s):  Clarence Chilel DPM    Assistant:  Resident: Nataly Mendoza DPM    Anesthesia: Choice    Estimated Blood Loss (mL): less than 50     Complications: None    Specimens:   ID Type Source Tests Collected by Time Destination   1 : Deep Culture Swab Foot CULTURE, ANAEROBIC AND AEROBIC Clarence Chilel DPM 2/25/2022 1537        Implants:  * No implants in log *      Drains: * No LDAs found *    Findings: Large dorsal abscess to right foot, 40cc purulent drainage, viable tissue noted after debridement    Electronically signed by Nataly Mendoza DPM on 2/25/2022 at 3:55 PM    Clarence Chilel DPM

## 2022-02-25 NOTE — PROGRESS NOTES
Wound Ostomy Continence Nursing    This [id number] [facility] Nurse received referral for evaluation of right foot wound. Podiatry is on and managing, and an I&D is scheduled for this afternoon. Patient denies any other wounds or skin concerns. No other needs from this [id number] [facility] Nurse at this time.     Electronically signed by ISAI ColladoN, RN, CWOCN on 2/25/2022 at 10:52 AM

## 2022-02-25 NOTE — ED NOTES
The patient is back from MRI. Pt states his foot pain has subsided. Pt's right dorsal foot is reddened and swollen with the redness spreading outside the skin marker site.         Bethanie Culp RN  02/24/22 2008

## 2022-02-25 NOTE — CONSULTS
PODIATRIC MEDICINE AND SURGERY  CONSULT HISTORY AND PHYSICAL    Consulting Service:  IM  Requesting Provider: Shireen  Opinion/advice regarding: RLE Cellulitis  Staff Doctor:  Lori Allen    ASSESSMENT:  75 y.o. male with PMH significant for Obesity, HTN  for who podiatry was consulted for RLE cellulitis. Right dorsal foot with edema, erythema, fluctuance. WBC 14.7 on admission downtrending to 12.1 with initiation of vanc/zosyn. Neurovascular grossly intact, however ART DUPLEX RLE revealing mid stenosis to distal PT arteries without occlusion. Recommend vascular consult to evaluate vascular status to ensure adequate healing potential. XR negative for acute pathology however MRI confirming 6 x 6.5 x 2cm loculated abscess. OR for I&D of right foot this afternoon. PLAN AND RECOMMENDATIONS[de-identified]  Patient's case to be discussed with staff, Dr. Lori Allen, who will provide final recommendations going forward  NWB to RLE  Elevate B/L LE at or above heart level while resting  Recommend ID and Vascular Consult  X-rays negative for any acute pathology  MRI R Foot revealing right foot dorsal abscess   ART duplex ultrasound revealing 30% stenosis right distal PT, without occlusion  Antibiotic coverage per primary/ID  Pain management per primary team   Podiatry to follow while in house   Patient will need follow up with Dr. Lori Allen within 7-10 days of discharge      HPI: This 74y.o. year old male was seen today for RLE cellulitis. Recently went to ED after seeing PCP, was placed on cleocin and D/C home. Recent Art Duplex revealed mild stenosis of R distal PT, PCP RX him aspirin and prednisone which he did not fill. Patient was seen in the wound care clinic today for right foot edema and erythema. Was admitted from clinic for concern of right foot dorsal abscess. He relates mild pain to the area. Recalls no recent trauma or injury to the site. States he did however purchase new shoes that he felt were too tight.  States this has occurred before and resolved on its own. He relates this time he tried to wait it out but the issue did not resolve on its own this time. Relates he is prediabetic, not medication with recent A1C of 5.7%. Denies numbness or tingling to B/L LE. Patient denies nausea, vomiting, diarrhea, fevers, chills, chest pain, shortness of breath, head ache, or calf pain. No other pedal complaints. Past Medical History:   Diagnosis Date    BPH (benign prostatic hyperplasia) 2/3/2012    no treatment at this time    Chronic back pain     Colon polyp 09/2017    Dr. Benita Florentino; f/u 5 years    Eczematous dermatitis     hands, feet and legs    ED (erectile dysfunction) 2/3/2012    Erectile dysfunction     Family history of early CAD 2/3/2012    Gastroesophageal reflux disease with hiatal hernia     History of hypertension     History of prediabetes     History of renal insufficiency syndrome     Obesity (BMI 30-39. 9)     Osteoarthritis     Vitamin D deficiency     Weight gain        Past Surgical History:   Procedure Laterality Date    COLONOSCOPY  9/25/2006    IN COLON CA SCRN NOT HI RSK IND N/A 9/15/2017    COLONOSCOPY performed by Vandana Lopez MD at [place] [address]  5/9/14    bx, dilation jarmoszuk    UPPER GASTROINTESTINAL ENDOSCOPY N/A 9/15/2017    EGD DILATION [facility] and biopsy performed by Vandana Lopez MD at [facility]       No current facility-administered medications on file prior to encounter. Current Outpatient Medications on File Prior to Encounter   Medication Sig Dispense Refill    HYDROcodone-acetaminophen (NORCO) 5-325 MG per tablet Take 1 tablet by mouth every 6 hours as needed for Pain for up to 3 days. Intended supply: 3 days.  Take lowest dose possible to manage pain 12 tablet 0    clindamycin (CLEOCIN) 300 MG capsule Take 1 capsule by mouth 4 times daily for 10 days 40 capsule 0    predniSONE (DELTASONE) 10 MG tablet Take 3 tabs for 3 days, then 2 tabs for 3 days, then 1 tab for 3 days, then 1/2 tab for 3 days, then stop. 20 tablet 0    aspirin 325 MG EC tablet Take 1 tablet by mouth daily 1 tablet 0    triamcinolone (KENALOG) 0.1 % cream Apply topically qam daily Monday through Friday only. Take weekend off. Do not use on face, groin, armpits, breasts tissue. 454 g 0    testosterone cypionate (DEPOTESTOTERONE CYPIONATE) 200 MG/ML injection Inject 1 mL into the muscle every 14 days for 90 doses. 8 mL 0    Syringe/Needle, Disp, (SYRINGE 3CC/53XC7-6/2\") 22G X 1-1/2\" 3 ML MISC 1 actuation by Does not apply route every 14 days 2 each 5    melatonin 1 MG tablet Take 1 tablet by mouth nightly as needed for Sleep 30 tablet     vitamin E 1000 units capsule Take 1,000 Units by mouth daily      vitamin D (CHOLECALCIFEROL) 1000 UNIT TABS tablet Take 6,000 Units by mouth daily      Multiple Vitamins-Minerals (ONE-A-DAY MENS HEALTH FORMULA) TABS Take by mouth      magnesium (MAGNESIUM-OXIDE) 250 MG TABS tablet Take 250 mg by mouth daily Take 3 pills daily      calcium carbonate (OYSTER SHELL CALCIUM 500 MG) 1250 MG tablet Take 1 tablet by mouth daily      Glucosamine-Chondroitin 2788-7159 MG/30ML LIQD Take by mouth Take 3 pills daily      Omega-3 Fatty Acids (FISH OIL) 1200 MG CAPS Take  by mouth daily.            No Known Allergies    Family History   Problem Relation Age of Onset    Depression Mother     Heart Disease Paternal Grandmother     High Blood Pressure Paternal Grandmother     High Cholesterol Paternal Grandmother     Anemia Paternal Grandmother         B 12 deficiency    Heart Disease Brother     Heart Surgery Brother     Anemia Brother         B 12 deficiency       Social History     Socioeconomic History    Marital status:      Spouse name: Not on file    Number of children: 1    Years of education: Not on file    Highest education level: Not on file   Occupational History    Not on file   Tobacco Use    Smoking status: Former Smoker     Packs/day: 2.00     Years: 5.00     Pack years: 10.00     Quit date: 1990     Years since quittin.1    Smokeless tobacco: Never Used   Vaping Use    Vaping Use: Never used   Substance and Sexual Activity    Alcohol use: No    Drug use: No    Sexual activity: Yes     Partners: Female   Other Topics Concern    Not on file   Social History Narrative    Not on file     Social Determinants of Health     Financial Resource Strain: Low Risk     Difficulty of Paying Living Expenses: Not hard at all   Food Insecurity: No Food Insecurity    Worried About [address] in the Last Year: Never true    [address] in the Last Year: Never true   Transportation Needs:     Lack of Transportation (Medical): Not on file    Lack of Transportation (Non-Medical): Not on file   Physical Activity:     Days of Exercise per Week: Not on file    Minutes of Exercise per Session: Not on file   Stress:     Feeling of Stress : Not on file   Social Connections:     Frequency of Communication with Friends and Family: Not on file    Frequency of Social Gatherings with Friends and Family: Not on file    Attends Gnosticist Services: Not on file    Active Member of Clubs or Organizations: Not on file    Attends Club or Organization Meetings: Not on file    Marital Status: Not on file   Intimate Partner Violence:     Fear of Current or Ex-Partner: Not on file    Emotionally Abused: Not on file    Physically Abused: Not on file    Sexually Abused: Not on file   Housing Stability:     Unable to Pay for Housing in the Last Year: Not on file    Number of [place] in the Last Year: Not on file    Unstable Housing in the Last Year: Not on file       Review of Systems  CONSTITUTIONAL: No fevers, chills, diaphoresis  HEENT: No epistaxis, tinnitus  EYES: No diplopia, blurry vision.   CARDIOVASCULAR:  No chest pain, palpitations  PULM: No dyspnea, tachypnea, wheezing  GI: No nausea, vomiting, constipation, diarrhea  : No dysuria, gross hematuria, or pyuria  NEURO: No new balance problems, peripheral weakness, paresthesias, numbness  MSK: Mild RLE pain  PSY: No concerns regarding depression, anxiety  INTEGUMENTARY: Edema, Erythema with dorsal right foot abscess    OBJECTIVE:  BP (!) 152/95   Pulse 99   Temp 100 °F (37.8 °C) (Oral)   Resp 18   Ht 6' (1.829 m)   Wt 260 lb (117.9 kg)   SpO2 96%   BMI 35.26 kg/m²   Patient is alert and oriented to person, place, and time    Vascular:   Palpable Dorsalis Pedis and Palpable Posterior Tibial Pulses B/L   Capillary Fill time < 3 seconds to B/L digits  Skin temperature warm to warm tibial tuberosity to the digits B/L  Hair growth absent to digits  severe pedal edema to R dorsal foot  No varicosities     Neurological:   Sensation to light touch intact B/L  Protective sensation via monofilament testing intact B/L    Musculoskeletal/Orthopaedic:   Structural Deformities:   5/5 muscle strength Dorsiflexion, Plantarflexion, Inversion, Eversion B/L  Mild tenderness on palpation of right dorsal foot    Dermatological:   Right dorsal foot with significant edema and erythema, central hyperpigmented region overlying 4th metatarsal shaft. Edema to digits 1-5. No open wounds, no active drainage noted. Mild xerosis to dorsal right foot. Fluctuance to dorsal right foot. No malodor. Cellulitic changes to right foot with erythema extending to right dorsal ankle and across lateral aspect of right foot to the calcaneus. Mild pain to palpation.         LABS:   Lab Results   Component Value Date    WBC 12.1 (H) 02/25/2022    HGB 14.6 02/25/2022    HCT 43.6 02/25/2022    MCV 84.5 02/25/2022     02/25/2022     Lab Results   Component Value Date     02/25/2022    K 4.4 02/25/2022     02/25/2022    CO2 25 02/25/2022    BUN 13 02/25/2022    CREATININE 0.89 02/25/2022    GLUCOSE 111 02/25/2022    GLUCOSE 105 02/03/2012    CALCIUM 8.5 02/25/2022      Lab Results   Component Value Date    LABALBU 4.2 02/24/2022     Lab Results   Component Value Date    SEDRATE 14 2022     Lab Results   Component Value Date    CRP 66.4 (H) 2022     Lab Results   Component Value Date    LABA1C 5.7 2022       MICROBIOLOGY:   Type   Date  Results    Blood Culture:  22 NGTD    IMAGIN/23/22 XR R FOOT  FINDINGS:       Three  views of the right foot are submitted. No acute fractures. No dislocations. Degenerative changes first MTP   Calcaneal enthesophyte and calcaneal spur   No radiographic foreign body. There is soft tissue swelling in the dorsum of the midfoot. Impression   NO ACUTE FRACTURES     MRI R FOOT  22  Impression       Loculated rim-enhancing fluid collection at the level of the second through fifth metatarsals measuring approximately 2 cm in AP dimension by 6 cm in transverse dimension by 6.5 cm in craniocaudal dimension is most concerning for abscess. No osteomyelitis. VASCULAR STUDIES:  Impression       Mildly elevated velocity within the right mid and distal posterior tibial arteries suggesting approximately 30% stenosis. Otherwise no evidence for significant stenosis. No evidence of arterial occlusion, or aneurysm. Laura Yanes DPM PGY-1  Podiatric Surgery Resident  Podiatry On Call Pager: [phone]    22  10:23 AM    The resident/student was under my direct supervision during today's patient encounter. reflection of my personal examination, assessment and treatment plan. The patient was seen and examined with the resident/student. The above findings and recommendations were reviewed and I agree with above treatment plan. Any questions or concerns, please Dr. Janelle San or podiatry resident on call.     Kriss Chandra DPM  Podiatry Attending  22  3:13 PM

## 2022-02-25 NOTE — OP NOTE
Operative Note      Patient: Yoly Banda  YOB: 1955  MRN: [id number]    Date of Procedure: 2/25/2022    Pre-Op Diagnosis: Right Foot Cellulitis with Abscess    Post-Op Diagnosis: Same       Procedure:  Right foot incision and drainage    Surgeon(s):  Jagdish Francis DPM    Assistant:   Resident: Zaria Luevano DPM    Anesthesia: Choice    Estimated Blood Loss (mL): less than 50     Complications: None    Specimens:   ID Type Source Tests Collected by Time Destination   1 : Deep Culture Swab Foot CULTURE, ANAEROBIC AND AEROBIC Jagdish Francis DPM 2/25/2022 1537        Implants:  * No implants in log *      Drains: * No LDAs found *    Findings: Abscess to dorsal right foot with 30-40cc purulent drainage, viable tissue noted s/p debridement and irrigation    Detailed Description of Procedure:     OPERATIVE INDICATIONS: This is a pleasant 74 y.o.  male with a history of cellulitis to right dorsal foot. All A/B/R/C/P were discussed in detail with the patient. Answered all of the patient's questions to the patient's satisfaction. No guarantees were given. The patient understands this. Patient elects to proceed with surgery. OPERATIVE FINDINGS:  Consistent with post operative diagnosis. OPERATIVE PROCEDURE: Patient was transferred from the preoperative holding area to the operative suite and placed in a supine position. All monitors were applied. General anesthesia was administered. 10cc  50/50 of 0.5% Marcaine plain and Lidocaine plain were injected to form a right ankle block. Prophylaxis was obtained with Vancomycin/Zosyn IV piggyback pre-operatively. Tourniquet was applied to the right ankle, but not yet inflated. The rightfoot, ankle, and leg were then prepped and draped in the normal sterile fashion. Time out was performed. Attention was then directed to the dorsal aspect of the right foot where a 6 cm linear incision was planned and performed.  Incision was made with a 15 blade extending towards the 3 webspace. Upon incision asha purulence was expressed. 40 cc asha purulence and sanguinous drainage was milked from the right foot. After majority of purulence was milked from the right foot. Next, area was probed and incised through multiple fascial planes to release any deep pockets of infection medially, laterally, distal, and proximal. Superficial tissue was noted to be loosely adhered, all tissue adhesions were freed in the region of the previous abscess space. Deep culture swab was performed and passed off the table for culture. Next Misonix was used to irrigate and debride the skin and soft tissue of the right foot. After debridement, no residual purulence was noted. Viable, granular soft tissue was noted to operative site. Wounds were flushed with copious amounts of normal sterile saline. Hemostasis was achieved. The operative site was left open and packed with iodoform nugauze. Wounds were further dressed with fluff dressings, ABD, Kerlix roll, ACE. Patient tolerated the anesthesia and procedure very well, was transferred to the PACU with all vital signs stable and brisk capillary refill time noted in all toes. Dr. Constance Vo was present and scrubbed for the entire case. Elements of the case which included but were not limited to incision, dissection, preparation of site, implant, and closure were performed under direct supervision of Dr. Nestor Gutierrez. All critical elements of this case were performed by Dr. Constance Vo. CONDITION: Satisfactory.     SIGNATURE: Lyndsey Jordan DPM PATIENT NAME: Connie Logan   DATE: February 25, 2022 MRN: [id number]   TIME: 4:10 PM PAGER/CONTACT #: [phone]             Electronically signed by Lyndsey Jordan DPM on 2/25/2022 at 4:09 PM    Gudelia Madrid DPM

## 2022-02-25 NOTE — H&P
Hospitalist History and Physical  Name: Yaa Massey  Age: 74 y.o. Gender: male  CodeStatus: No Order  Allergies: No Known Allergies    Chief Complaint:Wound Check (pt sent by Podiatry to be admitted for wound to right foot - states that he is currently taking Clindamycin )    Primary Care Provider: Carrie Engle MD  InpatientTreatment Team: Treatment Team: Attending Provider: Akosua Oliver DO; Physician Assistant: Rosamaria Lilly PA-C; Consulting Physician: Armin Hirsch DPM; Physician Assistant: nAny Mackay PA-C  Admission Date: 2/24/2022      Subjective: Patient is a 68-year-old male with past medical history for renal insufficiency, hypertension, GERD, CAD and osteoarthritis. Patient presents to the emergency room yesterday with reports of right foot swelling and pain stated had been going on for approximately 2 weeks. He reports that he purchased new pair shoes which were too tight causing irritation to his foot. He was seen by PCP prior to ER visit, who did order ultrasound which showed concerns for atherosclerosis, he was placed on aspirin and given a prescription for prednisone. Patient has not yet had prednisone prescription filled states that it has progressively become more swollen more red and there is a dark spot over the dorsal surface of the right foot at the third metatarsal noted. X-ray showed no signs of osteomyelitis, midfoot soft tissue swelling, patient was sent home with clindamycin and Norco and a follow-up with Dr. Sol Ann in the wound care center. Today patient was seen by Dr. Sol Ann in wound care center and she recommended admission to the hospital for IV antibiotics and wound debridement. Patient is hemodynamically stable, afebrile and in no distress. Right foot is significantly erythematous across the dorsal surface, there is a concern for abscess.   Pedal pulses are strong patient is able to move foot, ankle and toes    Physical Exam  Constitutional: Appearance: Normal appearance. HENT:      Head: Normocephalic and atraumatic. Right Ear: External ear normal.      Left Ear: External ear normal.      Nose: Nose normal.      Mouth/Throat:      Mouth: Mucous membranes are dry. Pharynx: Oropharynx is clear. Eyes:      Extraocular Movements: Extraocular movements intact. Conjunctiva/sclera: Conjunctivae normal.   Cardiovascular:      Rate and Rhythm: Normal rate and regular rhythm. Pulses: Normal pulses. Pulmonary:      Effort: Pulmonary effort is normal.      Breath sounds: Normal breath sounds. Abdominal:      General: Bowel sounds are normal.      Palpations: Abdomen is soft. Musculoskeletal:         General: Normal range of motion. Cervical back: Normal range of motion and neck supple. Skin:     General: Skin is warm. Capillary Refill: Capillary refill takes less than 2 seconds. Findings: Erythema and lesion present. Neurological:      General: No focal deficit present. Mental Status: He is alert and oriented to person, place, and time. Psychiatric:         Mood and Affect: Mood normal.         Review of Systems   Constitutional: Negative. HENT: Negative. Eyes: Negative. Respiratory: Negative. Cardiovascular: Negative. Gastrointestinal: Negative. Endocrine: Negative. Genitourinary: Negative. Musculoskeletal: Negative. Skin: Positive for color change and wound. Neurological: Negative. Hematological: Negative. Psychiatric/Behavioral: Negative. Medications:  Reviewed     No current facility-administered medications on file prior to encounter. Current Outpatient Medications on File Prior to Encounter   Medication Sig Dispense Refill    HYDROcodone-acetaminophen (NORCO) 5-325 MG per tablet Take 1 tablet by mouth every 6 hours as needed for Pain for up to 3 days. Intended supply: 3 days.  Take lowest dose possible to manage pain 12 tablet 0    clindamycin (CLEOCIN) 300 MG capsule Take 1 capsule by mouth 4 times daily for 10 days 40 capsule 0    predniSONE (DELTASONE) 10 MG tablet Take 3 tabs for 3 days, then 2 tabs for 3 days, then 1 tab for 3 days, then 1/2 tab for 3 days, then stop. 20 tablet 0    aspirin 325 MG EC tablet Take 1 tablet by mouth daily 1 tablet 0    triamcinolone (KENALOG) 0.1 % cream Apply topically qam daily Monday through Friday only. Take weekend off. Do not use on face, groin, armpits, breasts tissue. 454 g 0    testosterone cypionate (DEPOTESTOTERONE CYPIONATE) 200 MG/ML injection Inject 1 mL into the muscle every 14 days for 90 doses. 8 mL 0    Syringe/Needle, Disp, (SYRINGE 3CC/35KU5-7/2\") 22G X 1-1/2\" 3 ML MISC 1 actuation by Does not apply route every 14 days 2 each 5    melatonin 1 MG tablet Take 1 tablet by mouth nightly as needed for Sleep 30 tablet     vitamin E 1000 units capsule Take 1,000 Units by mouth daily      vitamin D (CHOLECALCIFEROL) 1000 UNIT TABS tablet Take 6,000 Units by mouth daily      Multiple Vitamins-Minerals (ONE-A-DAY MENS HEALTH FORMULA) TABS Take by mouth      magnesium (MAGNESIUM-OXIDE) 250 MG TABS tablet Take 250 mg by mouth daily Take 3 pills daily      calcium carbonate (OYSTER SHELL CALCIUM 500 MG) 1250 MG tablet Take 1 tablet by mouth daily      Glucosamine-Chondroitin 2514-1513 MG/30ML LIQD Take by mouth Take 3 pills daily      Omega-3 Fatty Acids (FISH OIL) 1200 MG CAPS Take  by mouth daily.             Past Medical History:   Diagnosis Date    BPH (benign prostatic hyperplasia) 2/3/2012    no treatment at this time    Chronic back pain     Colon polyp 09/2017    Dr. Namita Barrios; f/u 5 years    Eczematous dermatitis     hands, feet and legs    ED (erectile dysfunction) 2/3/2012    Erectile dysfunction     Family history of early CAD 2/3/2012    Gastroesophageal reflux disease with hiatal hernia     History of hypertension     History of prediabetes     History of renal insufficiency syndrome     Obesity (BMI 30-39. 9)     Osteoarthritis     Vitamin D deficiency     Weight gain        Past Surgical History:   Procedure Laterality Date    COLONOSCOPY  2006    TN COLON CA SCRN NOT  [address] IND N/A 9/15/2017    COLONOSCOPY performed by Gamal Machuca MD at [address] [place] [address]  14    bx, dilation jarmoscarol    UPPER GASTROINTESTINAL ENDOSCOPY N/A 9/15/2017    EGD DILATION SAVORY and biopsy performed by [facility]al [facility]uca [facility] at [facility]        Family History   Problem Relation Age of Onset    Depression Mother     Heart Disease Paternal Grandmother     High Blood Pressure Paternal Grandmother     High Cholesterol Paternal Grandmother     Anemia Paternal Grandmother         B 15 deficiency    Heart Disease Brother     Heart Surgery Brother     Anemia Brother         B 12 deficiency        Social History     Socioeconomic History    Marital status:      Spouse name: Not on file    Number of children: 1    Years of education: Not on file    Highest education level: Not on file   Occupational History    Not on file   Tobacco Use    Smoking status: Former Smoker     Packs/day: 2.00     Years: 5.00     Pack years: 10.00     Quit date: 1990     Years since quittin.1    Smokeless tobacco: Never Used   Vaping Use    Vaping Use: Never used   Substance and Sexual Activity    Alcohol use: No    Drug use: No    Sexual activity: Yes     Partners: Female   Other Topics Concern    Not on file   Social History Narrative    Not on file     Social Determinants of Health     Financial Resource Strain: Low Risk     Difficulty of Paying Living Expenses: Not hard at all   Food Insecurity: No Food Insecurity    Worried About [facility] in the Last Year: Never true    [address] [facility] in the Last Year: Never true   Transportation Needs:     Lack of Transportation (Medical):  Not on file    Lack of Transportation (Non-Medical): Not on file   Physical Activity:     Days of Exercise per Week: Not on file    Minutes of Exercise per Session: Not on file   Stress:     Feeling of Stress : Not on file   Social Connections:     Frequency of Communication with Friends and Family: Not on file    Frequency of Social Gatherings with Friends and Family: Not on file    Attends [employer] Services: Not on file    Active Member of [address] [place] [address] or Organizations: Not on file    Attends Club or Organization Meetings: Not on file    Marital Status: Not on file   Intimate Partner Violence:     Fear of Current or Ex-Partner: Not on file    Emotionally Abused: Not on file    Physically Abused: Not on file    Sexually Abused: Not on file   Housing Stability:     Unable to Pay for Housing in the Last Year: Not on file    Number of [place] in the Last Year: Not on file    Unstable Housing in the Last Year: Not on file        Infusion Medications:    sodium chloride 125 mL/hr at 02/24/22 1742     Scheduled Medications:    vancomycin  1,500 mg IntraVENous Once     PRN Meds:     Labs:   Recent Labs     02/23/22  0900 02/24/22  1700   WBC 14.6* 14.7*   HGB 15.8 15.7   HCT 47.8 47.9    268     Recent Labs     02/23/22  0900 02/24/22  1700   * 133*   K 4.2 4.3   CL 99 95   CO2 24 30   BUN 12 16   CREATININE 0.73 0.85   CALCIUM 8.7 9.0     Recent Labs     02/23/22  0900 02/24/22  1700   AST 11 10   ALT 19 18   BILITOT 0.5 0.5   ALKPHOS 110* 101     No results for input(s): INR in the last 72 hours. No results for input(s): Pam Peek in the last 72 hours. Urinalysis:   Lab Results   Component Value Date    NITRU Negative 01/26/2017    BLOODU Negative 01/26/2017    SPECGRAV 1.015 01/26/2017    GLUCOSEU Negative 01/26/2017       Radiology:   Most recent    Chest CT      WITH CONTRAST:No results found for this or any previous visit. WITHOUT CONTRAST: No results found for this or any previous visit.       CXR      2-view: No results found for this or any previous visit. Portable: No results found for this or any previous visit. Echo No results found for this or any previous visit. Assessment/Plan:    Cellulitis of right foot in setting of ill fitting shoe. Denies history  Diabetes Mellitus:   IV antibiotics, IVFs, tylenol PRN fevers, pain control meds ordered with hold parameters. Will repeat CBC in AM. **Obtain TWO SETS OF BLOOD CULTURES STAT IF SPIKES FEVER .4F or GREATER**. Podiatry consulted. Will consider PICC for possible long term antibiotics     hyperglycemia: obtain Hgb A1C      I personally spent estimated 60 minutes with this patient today. Additional work up or/and treatment plan may be added today or then after based on clinical progression. I am managing a portion of pt care. Some medical issues are handled by other specialists. Additional work up and treatment should be done in out pt setting by pt PCP and other out pt providers. In addition to examining and evaluating pt, I spent additional time explaining care, normaland abnormal findings, and treatment plan. All of pt questions were answered. Counseling, diet and education were provided. Case will be discussed with nursing staff when appropriate. Family will be updated if and when appropriate.       Electronically signed by KRISTY Moore CNP on 2/24/2022 at 7:01 PM

## 2022-02-25 NOTE — PLAN OF CARE
[facility] MEDICINE AND SURGERY   PLAN OF CARE    NAME: Win Sahni  MRN: [id number]  DATE: February 25, 2022  TIME: 1:10 PM    PLAN AND RECOMMENDATIONS:      Patient is POD #0 s/p right foot incision and drainage. Operative site was left open and packed with iodoform nugauze packing. DSD in place. Please do not change the right foot dressings. Podiatry will change the first postop dressing. If strike through noted, nursing can reinforce dressing with ABD, Kerlix, and ACE over top of existing dressing. NWB RLE in a post op shoe. Ok to use walker, crutches, or other assistive device(s) to use the restroom or for Physical Therapy. Elevate RLE at or above the level of the heart. Prevalon boots on when in bed. Plan for delayed primary closure pending clean margin culture results.           Mary Ellen Gray DPM PGY-1  February 25, 2022  1:10 PM    Rangel Vu DPM

## 2022-02-25 NOTE — ED NOTES
Report called for [facility] RN on [address] [place] [address]  02/24/22 2017

## 2022-02-25 NOTE — CARE COORDINATION
[facility] AT [place] Case Management Initial Discharge Assessment    Met with Patient to discuss discharge plan. PCP: Dameon Garvin MD                                Date of Last Visit: last week    [facility] Patient: No        VA Notified: no    If no PCP, list provided? N/A    Discharge Planning    Living Arrangements: independently at home    Who do you live with? self    Who helps you with your care:  self    If lives at home:     Do you have any barriers navigating in your home? no    Patient can perform ADL? Yes    Current Services (outpatient and in home) :  None    Dialysis: No    Is transportation available to get to your appointments? Yes    DME Equipment:  no    Respiratory equipment: None    Respiratory provider:  no     [facility]    Consult with Medication Assistance Program?  No      Patient agreeable [place] [address]? Declined    Patient agreeable to SNF/Rehab? Declined    Other discharge needs identified? N/A    Does Patient Have a High-Risk for Readmission Diagnosis (CHF, PN, MI, COPD)? No     Initial Discharge Plan? (Note: please see concurrent daily documentation for any updates after initial note). Pt may need iv antibiotics and/or hhc for wound care after d/c. Cm to assess for further d/c needs and referrals.     Readmission Risk              Risk of Unplanned Readmission:  0         Electronically signed by Elder Mukherjee RN on 2/24/2022 at 8:59 PM

## 2022-02-25 NOTE — CARE COORDINATION
[facility] EMERGENCY [facility] C[facility] AT [place] Case Management Initial Discharge Assessment    Met with Patient to discuss discharge plan. PCP: Shira Arrieta MD                                Date of Last Visit: LAST WEEK    [facility] Patient: No        VA Notified: no    If no PCP, list provided? N/A    Discharge Planning    Living Arrangements: independently at home    Who do you live with? ALONE    Who helps you with your care:  self    If lives at home:     Do you have any barriers navigating in your home? yes - 7 STEPS    Patient can perform ADL? Yes    Current Services (outpatient and in home) :  None    Dialysis: No    Is transportation available to get to your appointments? Yes    DME Equipment:  No--MAY NEED TO ORDER S/P SURGERY    Respiratory equipment: None    Respiratory provider:  no     Ph[facility]  [facility] [place]    Consult with Medication Assistance Program?  No      Patient agreeable to [facility] AT [facility]? Yes, [facility]    Patient agreeable to SNF/Rehab? No and Declined    Other discharge needs identified? N/A    Does Patient Have a High-Risk for Readmission Diagnosis (CHF, PN, MI, COPD)? No    Initial Discharge Plan? (Note: please see concurrent daily documentation for any updates after initial note). PATIENT PLANS TO D/C HOME. PATIENT DOES NOT WANT TO GO ANYWHERE INPATIENT. PATIENT IS AGREEABLE TO TO [facility] IF INDICATED FOR IV THERAPY OR PT/OT. PATIENT HAVING HIS I/D TODAY. WILL NEED TO SEE IF PATIENT NEEDS IV'S AT D/C AND THERAPY NEEDS S/P SURGERY. CM TO FOLLOW.      Readmission Risk              Risk of Unplanned Readmission:  10         Electronically signed by Jose Rafael Ewing RN on 2/25/2022 at 1:48 PM

## 2022-02-25 NOTE — PLAN OF CARE
Patient seen in wound care clinic today, concern for right foot dorsal abscess. MRI pending  WBC 14.7, CRP elevated. Vascular intact. Optimize for OR tomorrow, afternoon add on case. Cardiac Clearance. Plan for I&D right foot with debridement and removal of all nonviable tissue  NPO at midnight  Type and Screen ordered  Please hold anticoagulation at least 6 hours prior to procedure  Will see patient in the morning at bedside    The resident/student was under my direct supervision during today's patient encounter. reflection of my personal examination, assessment and treatment plan. The patient was seen and examined with the resident/student. The above findings and recommendations were reviewed and I agree with above treatment plan. Any questions or concerns, please Dr. Perla Robles or podiatry resident on call.     Brigid Wills DPM  Podiatry Attending  03/08/22  3:09 PM

## 2022-02-25 NOTE — PROGRESS NOTES
Physician Progress Note    2/25/2022   4:14 PM    Name:  Irving Hedrick  MRN:    [id number]      Day: 1     Admit Date: 2/24/2022  4:52 PM  PCP: Sabiha Sosa MD    Code Status:  Full Code    Subjective:     He is doing well. No complaints at this time. Going for surgery this afternoon.     Current Facility-Administered Medications   Medication Dose Route Frequency Provider Last Rate Last Admin    sodium chloride flush 0.9 % injection 5-40 mL  5-40 mL IntraVENous 2 times per day Emi Valenzuela DPM   10 mL at 02/25/22 0830    sodium chloride flush 0.9 % injection 5-40 mL  5-40 mL IntraVENous PRN Emi Valenzuela DPM        0.9 % sodium chloride infusion  25 mL IntraVENous PRN Emi Valenzuela DPM        sodium chloride flush 0.9 % injection 5-40 mL  5-40 mL IntraVENous 2 times per day Cassandra Aparicio MD        sodium chloride flush 0.9 % injection 5-40 mL  5-40 mL IntraVENous PRN Cassandra Aparicio MD        0.9 % sodium chloride infusion  25 mL IntraVENous PRN Cassandra Aparicio MD        meperidine (DEMEROL) injection 12.5 mg  12.5 mg IntraVENous Q5 Min PRN Cassandra Aparicio MD        fentaNYL (SUBLIMAZE) injection 50 mcg  50 mcg IntraVENous Q10 Min PRN Cassandra Aparicio MD        HYDROmorphone (DILAUDID) injection 0.5 mg  0.5 mg IntraVENous Q10 Min PRN Cassandra Aparicio MD        oxyCODONE (ROXICODONE) immediate release tablet 5 mg  5 mg Oral PRN Cassandra Aparicio MD        Or    oxyCODONE (ROXICODONE) immediate release tablet 10 mg  10 mg Oral PRN Cassandra Aparicio MD        ondansetron [facility]) injection 4 mg  4 mg IntraVENous Once PRN Cassandra Aparicio MD        metoclopramide [facility]) injection 10 mg  10 mg IntraVENous Once PRN Cassandra Aparicio MD        diphenhydrAMINE (BENADRYL) injection 12.5 mg  12.5 mg IntraVENous Once PRN Cassandra Aparicio MD        bupivacaine (MARCAINE) 1 mL, lidocaine 2 % 1 mL    PRN Marie Mendoza DPM   10 mL at 22 1522    sodium chloride 0.9 % irrigation    Continuous PRN Harrel Males, DPM   1,000 mL at 22 1529    0.9 % sodium chloride infusion   IntraVENous Continuous Johanna Milder, APRN -  mL/hr at 22 Restarted at 22    sodium chloride flush 0.9 % injection 5-40 mL  5-40 mL IntraVENous 2 times per day Johanna Milder, APRN - CNP   10 mL at 22    sodium chloride flush 0.9 % injection 5-40 mL  5-40 mL IntraVENous PRN Johanna Milder, APRN - CNP        0.9 % sodium chloride infusion  25 mL IntraVENous PRN Johanna Milder, APRN - CNP        enoxaparin (LOVENOX) injection 40 mg  40 mg SubCUTAneous Daily Johanna Milder, APRN - CNP   40 mg at 22    polyethylene glycol (GLYCOLAX) packet 17 g  17 g Oral Daily PRN Johanna Milder, APRN - CNP        acetaminophen (TYLENOL) tablet 650 mg  650 mg Oral Q6H PRN Johanna Milder, APRN - CNP        Or    acetaminophen (TYLENOL) suppository 650 mg  650 mg Rectal Q6H PRN Johanna Milder, APRN - CNP        piperacillin-tazobactam (ZOSYN) 3,375 mg in dextrose 5 % 50 mL IVPB extended infusion (mini-bag)  3,375 mg IntraVENous Q8H Johanna Milder, APRN - CNP 12.5 mL/hr at 22 0828 3,375 mg at 22 0828    aspirin EC tablet 325 mg  325 mg Oral Daily Johanna Milder, APRN - CNP   325 mg at 22    HYDROcodone-acetaminophen (NORCO) 5-325 MG per tablet 2 tablet  2 tablet Oral Q6H PRN Johanna Milder, APRN - CNP        vancomycin (VANCOCIN) 1,250 mg in dextrose 5 % 250 mL IVPB  1,250 mg IntraVENous Q12H Johanna Milder, APRN -  mL/hr at 22 0840 1,250 mg at 22 0840    vancomycin (VANCOCIN) intermittent dosing (placeholder)   Other RX Placeholder Johanna Milder, APRN - CNP           Physical Examination:      Vitals:  /72   Pulse 89   Temp 97.4 °F (36.3 °C) (Temporal)   Resp 18   Ht 6' (1.829 m)   Wt 260 lb (117.9 kg)   SpO2 97%   BMI 35.26 kg/m²   Temp (24hrs), Av.8 °F (37.1 °C), Min:97.4 °F (36.3 °C), Max:100 °F (37.8 °C)      General appearance: alert, cooperative and no distress. Obese  Mental Status: oriented to person, place and time and normal affect  Lungs: clear to auscultation bilaterally, normal effort  Heart: regular rate and rhythm, no murmur  Abdomen: soft, nontender, nondistended, bowel sounds present, no masses  Extremities: Right lower extremity is wrapped. Underneath on dorsum of foot is erythematous with area of fluctuance, warmth which is tender to palpation    Data:     Labs:  Recent Labs     02/24/22  1700 02/25/22  0500   WBC 14.7* 12.1*   HGB 15.7 14.6    285     Recent Labs     02/24/22  1700 02/25/22  0500   * 136   K 4.3 4.4   CL 95 100   CO2 30 25   BUN 16 13   CREATININE 0.85 0.89   GLUCOSE 113* 111*     Recent Labs     02/23/22  0900 02/24/22  1700   AST 11 10   ALT 19 18   BILITOT 0.5 0.5   ALKPHOS 110* 101     MRI R Foot:  Loculated rim-enhancing fluid collection at the level of the second through fifth metatarsals measuring approximately 2 cm in AP dimension by 6 cm in transverse dimension by 6.5 cm in craniocaudal dimension is most concerning for abscess.        No osteomyelitis. Assessment and Plan:        1. Sepsis (leukocytosis, tachycardia) secondary to right foot cellulitis with abscess s/p I&D by podiatry 2/25  -Broad-spectrum IV antibiotics.   Follow culture data including surgical cultures  -IVF support as needed  -postoperative pain control per surgeon    Obesity    Lovenox prophylaxis  Full code    >35 minutes in total care time    Electronically signed by Jed Brush DO on 2/25/2022 at 4:14 PM

## 2022-02-25 NOTE — PROGRESS NOTES
[facility] Pharmacokinetic Monitoring Service - Vancomycin     Vinnie Barrios is a 74 y.o. male starting on vancomycin therapy for skin and soft tissue infection. Pharmacy consulted by ARMANDO Iyer for monitoring and adjustment. Target Concentration:  -500    Additional Antimicrobials: Zosyn    Pertinent Laboratory Values: Wt Readings from Last 1 Encounters:   02/24/22 260 lb (117.9 kg)     Temp Readings from Last 1 Encounters:   02/24/22 100 °F (37.8 °C) (Oral)     Estimated Creatinine Clearance: 110 mL/min (based on SCr of 0.85 mg/dL). Recent Labs     02/23/22  0900 02/24/22  1700   CREATININE 0.73 0.85   WBC 14.6* 14.7*         Plan:  Patient received 1500mg x1 in ER   Continue therapy with vancomycin 1250mg q 12 hrs. Pharmacokinetic analysis based on [employer] [facility] predicts an , TR 16, Pauc 71%, Pconc 30%, and tox 11%. Trough level ordered prior to 4th total dose at 0430 on 2-26-22.   Pharmacy will continue to monitor patient and adjust therapy as indicated    Thank you for the consult,  Fahad Fink [facility]  2/24/2022 9:29 PM

## 2022-02-25 NOTE — ANESTHESIA PRE PROCEDURE
Department of Anesthesiology  Preprocedure Note       Name:  Brenden Telles   Age:  74 y.o.  :  1955                                          MRN:  [id number]         Date:  2022      Surgeon: Raulito Gonzalez):  Anny Hanks DPM    Procedure: Procedure(s):  FOOT DEBRIDEMENT INCISION AND DRAINAGE, RIGHT    Medications prior to admission:   Prior to Admission medications    Medication Sig Start Date End Date Taking? Authorizing Provider   HYDROcodone-acetaminophen (NORCO) 5-325 MG per tablet Take 1 tablet by mouth every 6 hours as needed for Pain for up to 3 days. Intended supply: 3 days. Take lowest dose possible to manage pain 22 Yes Jaden Hunter PA-C   clindamycin (CLEOCIN) 300 MG capsule Take 1 capsule by mouth 4 times daily for 10 days 2/23/22 3/5/22  Ankita Ybarra PA-C   predniSONE (DELTASONE) 10 MG tablet Take 3 tabs for 3 days, then 2 tabs for 3 days, then 1 tab for 3 days, then 1/2 tab for 3 days, then stop. 22   Anna Contreras MD   aspirin 325 MG EC tablet Take 1 tablet by mouth daily 22  Anna Contreras MD   triamcinolone (KENALOG) 0.1 % cream Apply topically qam daily Monday through Friday only. Take weekend off. Do not use on face, groin, armpits, breasts tissue. 22   Anna Contreras MD   testosterone cypionate (DEPOTESTOTERONE CYPIONATE) 200 MG/ML injection Inject 1 mL into the muscle every 14 days for 90 doses.  21  Anna Contreras MD   Syringe/Needle, Disp, (SYRINGE 3CC/06CV5-4/2\") 22G X 1-1/2\" 3 ML MISC 1 actuation by Does not apply route every 14 days 10/28/21   Anna Contreras MD   melatonin 1 MG tablet Take 1 tablet by mouth nightly as needed for Sleep 10/12/20   Anna Contreras MD   vitamin E 1000 units capsule Take 1,000 Units by mouth daily    Historical Provider, MD   vitamin D (CHOLECALCIFEROL) 1000 UNIT TABS tablet Take 6,000 Units by mouth daily    Historical Provider, MD   Multiple Vitamins-Minerals (ONE-A-DAY MENS HEALTH FORMULA) TABS Take by mouth    Historical Provider, MD   magnesium (MAGNESIUM-OXIDE) 250 MG TABS tablet Take 250 mg by mouth daily Take 3 pills daily    Historical Provider, MD   calcium carbonate (OYSTER SHELL CALCIUM 500 MG) 1250 MG tablet Take 1 tablet by mouth daily    Historical Provider, MD   Glucosamine-Chondroitin 6153-0468 MG/30ML LIQD Take by mouth Take 3 pills daily    Historical Provider, MD   Omega-3 Fatty Acids (FISH OIL) 1200 MG CAPS Take  by mouth daily.       Historical Provider, MD       Current medications:    Current Facility-Administered Medications   Medication Dose Route Frequency Provider Last Rate Last Admin    sodium chloride flush 0.9 % injection 5-40 mL  5-40 mL IntraVENous 2 times per day Brothers [place], DPM   10 mL at 02/25/22 0830    sodium chloride flush 0.9 % injection 5-40 mL  5-40 mL IntraVENous PRN Deneen Mesa, DPM        0.9 % sodium chloride infusion  25 mL IntraVENous PRN Deneen [place], DPM        sodium chloride flush 0.9 % injection 5-40 mL  5-40 mL IntraVENous 2 times per day Armando Khan MD        sodium chloride flush 0.9 % injection 5-40 mL  5-40 mL IntraVENous PRN Armando Khan MD        0.9 % sodium chloride infusion  25 mL IntraVENous PRN Armando Khan MD        meperidine (DEMEROL) injection 12.5 mg  12.5 mg IntraVENous Q5 Min PRN Armando Khan MD        fentaNYL (SUBLIMAZE) injection 50 mcg  50 mcg IntraVENous Q10 Min PRN Armando Khan MD        HYDROmorphone (DILAUDID) injection 0.5 mg  0.5 mg IntraVENous Q10 Min PRN Armando Khan MD        oxyCODONE (ROXICODONE) immediate release tablet 5 mg  5 mg Oral PRN Armando Khan MD        Or   Sal oxyCODONE (ROXICODONE) immediate release tablet 10 mg  10 mg Oral PRN Armando Khan MD        ondansetron [facility]) injection 4 mg  4 mg IntraVENous Once PRN Armando Khan MD        metoclopramide [facility]) injection 10 mg  10 mg IntraVENous Once PRN Dar Beavers MD        diphenhydrAMINE (BENADRYL) injection 12.5 mg  12.5 mg IntraVENous Once PRN Dar Beavers MD        0.9 % sodium chloride infusion   IntraVENous Continuous KRISTY Barrow  mL/hr at 02/24/22 2000 Restarted at 02/24/22 2000    sodium chloride flush 0.9 % injection 5-40 mL  5-40 mL IntraVENous 2 times per day KRISTY Barrow CNP   10 mL at 02/24/22 2112    sodium chloride flush 0.9 % injection 5-40 mL  5-40 mL IntraVENous PRN KRISTY Barrow CNP        0.9 % sodium chloride infusion  25 mL IntraVENous PRN KRISTY Barrow CNP        enoxaparin (LOVENOX) injection 40 mg  40 mg SubCUTAneous Daily KRISTY Barrow CNP   40 mg at 02/24/22 2112    polyethylene glycol (GLYCOLAX) packet 17 g  17 g Oral Daily PRN KRISTY Barrow CNP        acetaminophen (TYLENOL) tablet 650 mg  650 mg Oral Q6H PRN KRISTY Barorw CNP        Or    acetaminophen (TYLENOL) suppository 650 mg  650 mg Rectal Q6H PRN KRISTY Barrow - CNP        piperacillin-tazobactam (ZOSYN) 3,375 mg in dextrose 5 % 50 mL IVPB extended infusion (mini-bag)  3,375 mg IntraVENous Q8H KRISTY Barrow CNP 12.5 mL/hr at 02/25/22 0828 3,375 mg at 02/25/22 0828    aspirin EC tablet 325 mg  325 mg Oral Daily KRISTY Barrow CNP   325 mg at 02/24/22 2111    HYDROcodone-acetaminophen (NORCO) 5-325 MG per tablet 2 tablet  2 tablet Oral Q6H PRN KRISTY Barrow CNP        vancomycin (VANCOCIN) 1,250 mg in dextrose 5 % 250 mL IVPB  1,250 mg IntraVENous Q12H KRISTY Barrow  mL/hr at 02/25/22 0840 1,250 mg at 02/25/22 0840    vancomycin (VANCOCIN) intermittent dosing (placeholder)   Other RX Placeholder KRISTY Barrow CNP           Allergies:  No Known Allergies    Problem List:    Patient Active Problem List   Diagnosis Code    Dyshidrotic eczema L30.1    Vitamin D deficiency E55.9    Obesity (BMI 30-39. 9) E66.9    Eczematous dermatitis L30.9    Adenomatous polyp of transverse colon- f/u due 2022 D12.3    Low testosterone R79.89    Cellulitis of foot, right L03.115    Abscess of foot including toes, right L02.611    Cellulitis of right lower extremity L03.115       Past Medical History:        Diagnosis Date    BPH (benign prostatic hyperplasia) 2/3/2012    no treatment at this time    Chronic back pain     Colon polyp 2017    Dr. Lonnie Brennan; f/u 5 years    Eczematous dermatitis     hands, feet and legs    ED (erectile dysfunction) 2/3/2012    Erectile dysfunction     Family history of early CAD 2/3/2012    Gastroesophageal reflux disease with hiatal hernia     History of hypertension     History of prediabetes     History of renal insufficiency syndrome     Obesity (BMI 30-39. 9)     Osteoarthritis     Vitamin D deficiency     Weight gain        Past Surgical History:        Procedure Laterality Date    COLONOSCOPY  2006    VT COLON CA SCRN NOT  [address] IND N/A 9/15/2017    COLONOSCOPY performed by Reyes Sigala MD at [address] [place] [address]  14    bx, dilation jarmoszuk    UPPER GASTROINTESTINAL ENDOSCOPY N/A 9/15/2017    EGD DILATION [facility] and biopsy performed by Reyes Sigala MD at [facility]       Social History:    Social History     Tobacco Use    Smoking status: Former Smoker     Packs/day: 2.00     Years: 5.00     Pack years: 10.00     Quit date: 1990     Years since quittin.1    Smokeless tobacco: Never Used   Substance Use Topics    Alcohol use:  No                                Counseling given: Not Answered      Vital Signs (Current):   Vitals:    22 1845 22 0715   BP: 137/87 (!) 143/92 (!) 152/95 133/72   Pulse:  99  89   Resp:    18   Temp:    99.1 °F (37.3 °C)   TempSrc:    Oral   SpO2: 95% 96%  97%   Weight:       Height: BP Readings from Last 3 Encounters:   02/25/22 133/72   02/24/22 (!) 148/71   02/23/22 (!) 140/87       NPO Status:                                                                                 BMI:   Wt Readings from Last 3 Encounters:   02/24/22 260 lb (117.9 kg)   02/23/22 260 lb (117.9 kg)   02/16/22 268 lb (121.6 kg)     Body mass index is 35.26 kg/m². CBC:   Lab Results   Component Value Date    WBC 12.1 02/25/2022    RBC 5.16 02/25/2022    RBC 5.33 02/03/2012    HGB 14.6 02/25/2022    HCT 43.6 02/25/2022    MCV 84.5 02/25/2022    RDW 13.8 02/25/2022     02/25/2022       CMP:   Lab Results   Component Value Date     02/25/2022    K 4.4 02/25/2022     02/25/2022    CO2 25 02/25/2022    BUN 13 02/25/2022    CREATININE 0.89 02/25/2022    GFRAA >60.0 02/25/2022    LABGLOM >60.0 02/25/2022    GLUCOSE 111 02/25/2022    GLUCOSE 105 02/03/2012    PROT 7.2 02/24/2022    CALCIUM 8.5 02/25/2022    BILITOT 0.5 02/24/2022    ALKPHOS 101 02/24/2022    AST 10 02/24/2022    ALT 18 02/24/2022       POC Tests: No results for input(s): POCGLU, POCNA, POCK, POCCL, POCBUN, POCHEMO, POCHCT in the last 72 hours.     Coags: No results found for: PROTIME, INR, APTT    HCG (If Applicable): No results found for: PREGTESTUR, PREGSERUM, HCG, HCGQUANT     ABGs: No results found for: PHART, PO2ART, PIE2GVY, REX3RMQ, BEART, R5TIDEOZ     Type & Screen (If Applicable):  No results found for: LABABO, LABRH    Drug/Infectious Status (If Applicable):  No results found for: HIV, HEPCAB    COVID-19 Screening (If Applicable): No results found for: COVID19        Anesthesia Evaluation  Patient summary reviewed and Nursing notes reviewed  Airway: Mallampati: II  TM distance: >3 FB   Neck ROM: full  Mouth opening: > = 3 FB Dental: normal exam         Pulmonary:Negative Pulmonary ROS and normal exam                               Cardiovascular:Negative CV ROS          ECG reviewed                        Neuro/Psych: Negative Neuro/Psych ROS  (+) neuromuscular disease:,             GI/Hepatic/Renal:   (+) GERD:,           Endo/Other:                     Abdominal:             Vascular: negative vascular ROS. Other Findings:             Anesthesia Plan      general     ASA 2       Induction: intravenous. MIPS: Prophylactic antiemetics administered. Anesthetic plan and risks discussed with patient.                       Nichole Nash MD   2/25/2022

## 2022-02-25 NOTE — PROGRESS NOTES
Pharmacy Vancomycin Consult     Vancomycin Day: 2  Current Dosin mg q12h    Temp 24hr max:  100 F    Recent Labs     22  1700 22  0500   BUN 16 13   CREATININE 0.85 0.89   WBC 14.7* 12.1*       Intake/Output Summary (Last 24 hours) at 2022 1331  Last data filed at 2022 0715  Gross per 24 hour   Intake 99.93 ml   Output --   Net 99.93 ml     Cultures  Recent Labs     22  0917 22  0916   BC No Growth to date. Any change in status will be called. --    BLOODCULT2  --  No Growth to date. Any change in status will be called. Height: 6' (182.9 cm), Weight: 260 lb (117.9 kg), Body mass index is 35.26 kg/m². Estimated Creatinine Clearance: 105 mL/min (based on SCr of 0.89 mg/dL). .    Trough:  No results for input(s): [address] [place] in the last 72 hours. Assessment/Plan:  New data input in [facility]. Continuing current dosing, Bayesian model predicts:      mg/L*hr   Trough concentration at steady state 16.5 mg/L   Probability AUC is >400 mg/L*hr 74%   Probability trough >20 mg/L 34%   Probability of nephrotoxicity 12%     Therefore, will continue current dosing of 1250 mg q12h. Dosing will be assessed further when random vancomycin level is drawn tomorrow with morning labs. Timing of future trough levels may be adjusted based on culture results, renal function, and clinical response.     Thank you,    Nathalia Mayorga, PharmD  PGY-1 Pharmacy Resident  2022 1:36 PM

## 2022-02-25 NOTE — PROGRESS NOTES
PODIATRIC PRE-OPERATIVE NOTE                                 SERVICE DATE: 2/25/2022    SERVICE TIME:  1:43 PM    DIAGNOSIS: Abscess with cellulitis Right Foot    PROCEDURE(S): Incision and Drainage, Right Foot with debridement and removal of all nonviable tissue    Consent on chart: Yes    LABS:  CBC:  Lab Results   Component Value Date    WBC 12.1 (H) 02/25/2022    HGB 14.6 02/25/2022    HCT 43.6 02/25/2022    MCV 84.5 02/25/2022     02/25/2022       CMP:  Lab Results   Component Value Date     02/25/2022    K 4.4 02/25/2022     02/25/2022    CO2 25 02/25/2022    BUN 13 02/25/2022    CREATININE 0.89 02/25/2022    GLUCOSE 111 02/25/2022    GLUCOSE 105 02/03/2012    CALCIUM 8.5 02/25/2022       COAGS:  Lab Results   Component Value Date    LABALBU 4.2 02/24/2022       URINALYSIS:  No components found for: UKET, NITRITES, SPGR, UPROT, LEUKEST, UWBC, UBACTERIA     Type & screen:  Yes    Medical Clearance:  Yes    CXR/EKG:  Yes    Medications/Preop Antibiotics: Continue From Floor     No Known Allergies    Surgical site identified:  Yes    NPO:  Yes    IV Fluids:  Yes     Risks and benefits, complications, treatment options, expected outcome and rehabilitation explained,  patient understands. All questions were entertained and answered. Patient wishes to proceed with above procedure(s).     SIGNATURE: Amelia Gomez DPM PATIENT NAME: Rosemary Pickering   DATE: February 25, 2022 MRN: [id number]   TIME: 1:43 PM PAGER: [phone]     Duncan Draper DPM

## 2022-02-25 NOTE — ANESTHESIA POSTPROCEDURE EVALUATION
Department of Anesthesiology  Postprocedure Note    Patient: Rochelle Carlisle  MRN: [id number]  YOB: 1955  Date of evaluation: 2/25/2022  Time:  3:53 PM     Procedure Summary     Date: 02/25/22 Room / Location: [address]    Anesthesia Start: 0060 Anesthesia Stop:     Procedure: FOOT DEBRIDEMENT INCISION AND DRAINAGE (Right ) Diagnosis: (Abscess)    Surgeons: Benson Welsh DPM Responsible Provider: Alicia Wetzel MD    Anesthesia Type: general ASA Status: 2          Anesthesia Type: No value filed. Lis Phase I: Lis Score: 10    Lis Phase II:      Last vitals: Reviewed and per EMR flowsheets.        Anesthesia Post Evaluation    Patient location during evaluation: PACU  Patient participation: complete - patient participated  Level of consciousness: awake  Pain score: 0  Airway patency: patent  Nausea & Vomiting: no nausea  Complications: no  Cardiovascular status: hemodynamically stable  Respiratory status: face mask  Hydration status: euvolemic

## 2022-02-26 LAB
ANION GAP SERPL CALCULATED.3IONS-SCNC: 11 MEQ/L (ref 9–15)
BASOPHILS ABSOLUTE: 0.1 K/UL (ref 0–0.2)
BASOPHILS RELATIVE PERCENT: 1.1 %
BUN BLDV-MCNC: 12 MG/DL (ref 8–23)
CALCIUM SERPL-MCNC: 8.3 MG/DL (ref 8.5–9.9)
CHLORIDE BLD-SCNC: 102 MEQ/L (ref 95–107)
CO2: 25 MEQ/L (ref 20–31)
CREAT SERPL-MCNC: 0.82 MG/DL (ref 0.7–1.2)
EOSINOPHILS ABSOLUTE: 0.3 K/UL (ref 0–0.7)
EOSINOPHILS RELATIVE PERCENT: 3.3 %
GFR AFRICAN AMERICAN: >60
GFR NON-AFRICAN AMERICAN: >60
GLUCOSE BLD-MCNC: 101 MG/DL (ref 70–99)
HCT VFR BLD CALC: 40.4 % (ref 42–52)
HEMOGLOBIN: 13.5 G/DL (ref 14–18)
LYMPHOCYTES ABSOLUTE: 1.1 K/UL (ref 1–4.8)
LYMPHOCYTES RELATIVE PERCENT: 12.2 %
MCH RBC QN AUTO: 28.5 PG (ref 27–31.3)
MCHC RBC AUTO-ENTMCNC: 33.5 % (ref 33–37)
MCV RBC AUTO: 85.1 FL (ref 80–100)
MONOCYTES ABSOLUTE: 0.9 K/UL (ref 0.2–0.8)
MONOCYTES RELATIVE PERCENT: 10.1 %
NEUTROPHILS ABSOLUTE: 6.7 K/UL (ref 1.4–6.5)
NEUTROPHILS RELATIVE PERCENT: 73.3 %
PDW BLD-RTO: 13.7 % (ref 11.5–14.5)
PLATELET # BLD: 254 K/UL (ref 130–400)
POTASSIUM REFLEX MAGNESIUM: 4.3 MEQ/L (ref 3.4–4.9)
RBC # BLD: 4.75 M/UL (ref 4.7–6.1)
SODIUM BLD-SCNC: 138 MEQ/L (ref 135–144)
VANCOMYCIN RANDOM: 8.6 UG/ML (ref 10–40)
WBC # BLD: 9.2 K/UL (ref 4.8–10.8)

## 2022-02-26 PROCEDURE — 80048 BASIC METABOLIC PNL TOTAL CA: CPT

## 2022-02-26 PROCEDURE — 2580000003 HC RX 258

## 2022-02-26 PROCEDURE — 6370000000 HC RX 637 (ALT 250 FOR IP)

## 2022-02-26 PROCEDURE — 36415 COLL VENOUS BLD VENIPUNCTURE: CPT

## 2022-02-26 PROCEDURE — 6360000002 HC RX W HCPCS

## 2022-02-26 PROCEDURE — 99222 1ST HOSP IP/OBS MODERATE 55: CPT | Performed by: INTERNAL MEDICINE

## 2022-02-26 PROCEDURE — 80202 ASSAY OF VANCOMYCIN: CPT

## 2022-02-26 PROCEDURE — 6370000000 HC RX 637 (ALT 250 FOR IP): Performed by: INTERNAL MEDICINE

## 2022-02-26 PROCEDURE — 85025 COMPLETE CBC W/AUTO DIFF WBC: CPT

## 2022-02-26 PROCEDURE — 1210000000 HC MED SURG R&B

## 2022-02-26 RX ORDER — L. ACIDOPHILUS/L.BULGARICUS 1MM CELL
4 TABLET ORAL 3 TIMES DAILY
Status: DISCONTINUED | OUTPATIENT
Start: 2022-02-26 | End: 2022-03-01 | Stop reason: HOSPADM

## 2022-02-26 RX ADMIN — SODIUM CHLORIDE, PRESERVATIVE FREE 10 ML: 5 INJECTION INTRAVENOUS at 14:50

## 2022-02-26 RX ADMIN — LACTOBACILLUS TAB 4 TABLET: TAB at 22:03

## 2022-02-26 RX ADMIN — PIPERACILLIN AND TAZOBACTAM 3375 MG: 3; .375 INJECTION, POWDER, LYOPHILIZED, FOR SOLUTION INTRAVENOUS at 03:14

## 2022-02-26 RX ADMIN — VANCOMYCIN HYDROCHLORIDE 1250 MG: 5 INJECTION, POWDER, LYOPHILIZED, FOR SOLUTION INTRAVENOUS at 11:47

## 2022-02-26 RX ADMIN — HYDROCODONE BITARTRATE AND ACETAMINOPHEN 2 TABLET: 5; 325 TABLET ORAL at 01:59

## 2022-02-26 RX ADMIN — VANCOMYCIN HYDROCHLORIDE 1250 MG: 5 INJECTION, POWDER, LYOPHILIZED, FOR SOLUTION INTRAVENOUS at 22:07

## 2022-02-26 RX ADMIN — ASPIRIN 325 MG: 325 TABLET, COATED ORAL at 08:37

## 2022-02-26 RX ADMIN — HYDROCODONE BITARTRATE AND ACETAMINOPHEN 2 TABLET: 5; 325 TABLET ORAL at 08:37

## 2022-02-26 RX ADMIN — PIPERACILLIN AND TAZOBACTAM 3375 MG: 3; .375 INJECTION, POWDER, LYOPHILIZED, FOR SOLUTION INTRAVENOUS at 14:54

## 2022-02-26 RX ADMIN — ENOXAPARIN SODIUM 40 MG: 100 INJECTION SUBCUTANEOUS at 08:37

## 2022-02-26 RX ADMIN — Medication 10 ML: at 22:03

## 2022-02-26 ASSESSMENT — PAIN SCALES - GENERAL
PAINLEVEL_OUTOF10: 4
PAINLEVEL_OUTOF10: 1
PAINLEVEL_OUTOF10: 2

## 2022-02-26 NOTE — PROGRESS NOTES
PODIATRIC MEDICINE AND SURGERY  CONSULT PROGRESS NOTE    INTERVAL EVENTS:  Patient resting comfortably at bedside  Vital signs: afebrile and hemodynamically stable   WBC downtrending, 9.2, on vanc/zosyn  Intraoperative abscess cultures PENDING  POD 1 s/p right foot I&D, progressing as expected in post operative period  Awaiting ID consult  Patient will need HHC for daily dressing changes  OK to D/C from podiatry standpoint after PT/ID/HHC finals reccs    ASSESSMENT:  74year old male POD 1 s/p right foot I&D. Operative site left open with iodoform packing. Dressing intact without strike though. No purulence noted on exam today with mild edema and resolving erythema to operative site. WBC WNL, on vanc/zosyn. Intraoperative culture pending, ID on consult for D/C reccs. Mild sanguinous drainage to operative site. Resolved numbness to RLE. Able to move all digits. Pain well controlled. Will continue daily dressing changes with packing in post operative period. Patient will need HHC for dressing changes. OK to D/C from podiatry standpoint once ID/PT/HHC evaluate. PLAN AND RECOMMENDATIONS[de-identified]  Patient's case to be discussed with staff, Dr. Simona Honeycutt, who will provide final recommendations going forward  POD1 s/p R Foot I&D  NWB to RLE with use of post operative shoe and assistive devices, ok to heel WB for transfers. Dressing to stay clean, dry, intact. Podiatry to change dressing. RLE with iodoform packing to operative site, 4x4s, ABD, kerlix, ACE. If strike through noted please reinforce.   Elevate B/L LE at or above heart level while resting  Intraoperative cultures pending  ID Consult- D/C reccs  HHC Consult- daily dressing changes with packing to operative site required  PT Consult- WB/assitive device training  Vasc Consult- ART duplex ultrasound revealing 30% stenosis right distal PT, without occlusion  Antibiotic coverage per primary/ID  Pain management per primary team   Podiatry to follow while in house, ok to D/C from podiatry standpoint  Patient will need follow up with Dr. Nick Mares within 7-10 days     Pertinent HPI:   Patient resting comfortably in bed. Operative dressing clean, dry, intact without strike through. Extremity elevated. Patient understand NWB status to RLE with use of post op shoe and assistive device. Explained patient will need to perform daily dressing changes to operative site which will require packing to operative site. Patient feels he will be unable to perform this task alone and will need home health care. Patient relates numbness and tingling to RLE now resolved. Pain well controlled. No other pedal complaints. No current facility-administered medications on file prior to encounter. Current Outpatient Medications on File Prior to Encounter   Medication Sig Dispense Refill    HYDROcodone-acetaminophen (NORCO) 5-325 MG per tablet Take 1 tablet by mouth every 6 hours as needed for Pain for up to 3 days. Intended supply: 3 days. Take lowest dose possible to manage pain 12 tablet 0    clindamycin (CLEOCIN) 300 MG capsule Take 1 capsule by mouth 4 times daily for 10 days 40 capsule 0    predniSONE (DELTASONE) 10 MG tablet Take 3 tabs for 3 days, then 2 tabs for 3 days, then 1 tab for 3 days, then 1/2 tab for 3 days, then stop. 20 tablet 0    aspirin 325 MG EC tablet Take 1 tablet by mouth daily 1 tablet 0    triamcinolone (KENALOG) 0.1 % cream Apply topically qam daily Monday through Friday only. Take weekend off. Do not use on face, groin, armpits, breasts tissue. 454 g 0    testosterone cypionate (DEPOTESTOTERONE CYPIONATE) 200 MG/ML injection Inject 1 mL into the muscle every 14 days for 90 doses.  8 mL 0    Syringe/Needle, Disp, (SYRINGE 3CC/00YV5-7/2\") 22G X 1-1/2\" 3 ML MISC 1 actuation by Does not apply route every 14 days 2 each 5    melatonin 1 MG tablet Take 1 tablet by mouth nightly as needed for Sleep 30 tablet     vitamin E 1000 units capsule Take 1,000 Units by mouth daily vitamin D (CHOLECALCIFEROL) 1000 UNIT TABS tablet Take 6,000 Units by mouth daily      Multiple Vitamins-Minerals (ONE-A-DAY MENS HEALTH FORMULA) TABS Take by mouth      magnesium (MAGNESIUM-OXIDE) 250 MG TABS tablet Take 250 mg by mouth daily Take 3 pills daily      calcium carbonate (OYSTER SHELL CALCIUM 500 MG) 1250 MG tablet Take 1 tablet by mouth daily      Glucosamine-Chondroitin 8217-6425 MG/30ML LIQD Take by mouth Take 3 pills daily      Omega-3 Fatty Acids (FISH OIL) 1200 MG CAPS Take  by mouth daily.              OBJECTIVE:  /83   Pulse 97   Temp 97.5 °F (36.4 °C) (Oral)   Resp 18   Ht 6' (1.829 m)   Wt 260 lb (117.9 kg)   SpO2 97%   BMI 35.26 kg/m²    Patient AAOx3, NAD    RLE Focused Exam:    Pulses palpable, CFT less than 3 seconds  Mild edema to operative site   Resolving erythema to operative site with moderate residual erythema noted to dorsal right foot and lateral aspect  Operative site remains open with mild sanguinous drainage, no purulence noted, no nonviable tissue noted  Tissue to operative site noted to be granular  No malodor noted  Residual breakdown noted to dorsal aspect of incision site with mild ecchymosis as expected in post operative period  Sensation intact  Able to move all toes, ankle joint  Progressing as expected in post operative period      LABS:   Lab Results   Component Value Date    WBC 9.2 02/26/2022    HGB 13.5 (L) 02/26/2022    HCT 40.4 (L) 02/26/2022    MCV 85.1 02/26/2022     02/26/2022     Lab Results   Component Value Date     02/26/2022    K 4.3 02/26/2022     02/26/2022    CO2 25 02/26/2022    BUN 12 02/26/2022    CREATININE 0.82 02/26/2022    GLUCOSE 101 02/26/2022    GLUCOSE 105 02/03/2012    CALCIUM 8.3 02/26/2022      Lab Results   Component Value Date    LABALBU 4.2 02/24/2022     Lab Results   Component Value Date    SEDRATE 14 02/24/2022     Lab Results   Component Value Date    CRP 66.4 (H) 02/24/2022     Lab Results Component Value Date    LABA1C 5.7 02/24/2022       MICROBIOLOGY:   Wound Culture, Intraoperative, 2/25/22, PENDING  Blood Culture NGTD      Clementina Bradshaw DPM, TAE PGY-1  Podiatric Surgery Resident  Podiatry On Call Pager: [phone]  February 26, 2022  11:00 AM    The resident/student was under my direct supervision during today's patient encounter. reflection of my personal examination, assessment and treatment plan. The patient was seen and examined with the resident/student. The above findings and recommendations were reviewed and I agree with above treatment plan. Any questions or concerns, please Dr. Zulema Callejas or podiatry resident on call.     Kayla De La Torre DPM  Podiatry Attending  03/08/22  3:14 PM

## 2022-02-26 NOTE — PROGRESS NOTES
Pt assessment complete. Pt is alert and oriented. States pain is tolerable at this time. Dressing is C/D/I, foot is elevated and ice applied. Brisk capillary refill noted to right lower extremity. Call light in reach. Bed alarm on.

## 2022-02-26 NOTE — ED NOTES
02/26/22    From: HOME ALONE--INDEPENDENT    Admit: WOUND ON RT FOOT--FAILED OUTPT PO CLINDA    PMH: HTN, PRE-DM    Anticipated Discharge Disposition: HOME, [address]    Patient Mobility or PT/OT ordered: NEEDS ORDERED S/P SURGERY  Consults: PODIATRY, WOUND CARE    Covid result &/or vacc status: NEEDS BOOSTER    Barriers to Discharge:  2/25--I/D RT FOOT  MAY NEED WALKER  IV ATB on D/C?, WOUND CARE  IV VANCO, IV ZOSYN    Assessments: CMI DONE

## 2022-02-26 NOTE — CONSULTS
Infectious Diseases Inpatient Consult Note      Reason for Consult:   Antibiotics management  Requesting Physician:   Dr. Mattie Floyd  Primary Care Physician:  Boston Islas MD  History Obtained From:   Pt, EPIC    Admit Date: 2/24/2022  Hospital Day: 3      HISTORY OF PRESENT ILLNESS:  This is a 74 y.o. male was admitted to [facility]  from home through ER with right foot progressive swelling redness and severe pain of 3 weeks duration. No history of trauma. Patient thinks that this was started with his shoe rubbing. He was on oral antibiotics with lack of clinical improvement. Was admitted to the hospital and was started on IV vancomycin and Zosyn. Right foot MRI was positive for abscess. Patient had I&D done yesterday with pending culture results. He feels better with decreased pain today  No fevers or chills    Past medical surgical and social history were reviewed reviewed and are as detailed below  Past Medical History:   Diagnosis Date    BPH (benign prostatic hyperplasia) 2/3/2012    no treatment at this time    Chronic back pain     Colon polyp 09/2017    Dr. [place] Held; f/u 5 years    Eczematous dermatitis     hands, feet and legs    ED (erectile dysfunction) 2/3/2012    Erectile dysfunction     Family history of early CAD 2/3/2012    Gastroesophageal reflux disease with hiatal hernia     History of hypertension     History of prediabetes     History of renal insufficiency syndrome     Obesity (BMI 30-39. 9)     Osteoarthritis     Vitamin D deficiency     Weight gain        Past Surgical History:   Procedure Laterality Date    COLONOSCOPY  9/25/2006    IN COLON CA SCRN NOT  [address] [place] [address] IND N/A 9/15/2017    COLONOSCOPY performed by Nancy Amato MD at [address] [place] [address]  5/9/14    bx, dilation jarmosrenéek    UPPER GASTROINTESTINAL ENDOSCOPY N/A 9/15/2017    EGD DILATION SAVORY and biopsy performed by Nancy Amato MD at [facility] Current Medications:     sodium chloride flush  5-40 mL IntraVENous 2 times per day    sodium chloride flush  5-40 mL IntraVENous 2 times per day    enoxaparin  40 mg SubCUTAneous Daily    piperacillin-tazobactam  3,375 mg IntraVENous Q8H    aspirin  325 mg Oral Daily    vancomycin  1,250 mg IntraVENous Q12H    vancomycin (VANCOCIN) intermittent dosing (placeholder)   Other RX Placeholder       Allergies:  Patient has no known allergies. Social History     Socioeconomic History    Marital status:      Spouse name: Not on file    Number of children: 1    Years of education: Not on file    Highest education level: Not on file   Occupational History    Not on file   Tobacco Use    Smoking status: Former Smoker     Packs/day: 2.00     Years: 5.00     Pack years: 10.00     Quit date: 1990     Years since quittin.1    Smokeless tobacco: Never Used   Vaping Use    Vaping Use: Never used   Substance and Sexual Activity    Alcohol use: No    Drug use: No    Sexual activity: Yes     Partners: Female   Other Topics Concern    Not on file   Social History Narrative    Not on file     Social Determinants of Health     Financial Resource Strain: Low Risk     Difficulty of Paying Living Expenses: Not hard at all   Food Insecurity: No Food Insecurity    Worried About [facility] in the Last Year: Never true    [facility] in the Last Year: Never true   Transportation Needs:     Lack of Transportation (Medical): Not on file    Lack of Transportation (Non-Medical):  Not on file   Physical Activity:     Days of Exercise per Week: Not on file    Minutes of Exercise per Session: Not on file   Stress:     Feeling of Stress : Not on file   Social Connections:     Frequency of Communication with Friends and Family: Not on file    Frequency of Social Gatherings with Friends and Family: Not on file    Attends Scientologist Services: Not on file   [employer]s or Organizations: Not on file    Attends Club or Organization Meetings: Not on file    Marital Status: Not on file   Intimate Partner Violence:     Fear of Current or Ex-Partner: Not on file    Emotionally Abused: Not on file    Physically Abused: Not on file    Sexually Abused: Not on file   Housing Stability:     Unable to Pay for Housing in the Last Year: Not on file    Number of Places Lived in the Last Year: Not on file    Unstable Housing in the Last Year: Not on file         Family History:   Family History   Problem Relation Age of Onset    Depression Mother     Heart Disease Paternal Grandmother     High Blood Pressure Paternal Grandmother     High Cholesterol Paternal Grandmother     Anemia Paternal Grandmother         B 12 deficiency    Heart Disease Brother     Heart Surgery Brother     Anemia Brother         B 12 deficiency       Review of Systems  14 system review is negative other than HPI    Physical Exam  Vitals:    02/26/22 0028 02/26/22 0730 02/26/22 0732 02/26/22 1045   BP: 132/61 136/80 136/80 134/83   Pulse: 85 91 93 97   Resp: 19 18  18   Temp: 98.2 °F (36.8 °C) 97.5 °F (36.4 °C)  97.5 °F (36.4 °C)   TempSrc: Oral Oral  Oral   SpO2: 99% 100% 99% 97%   Weight:       Height:         General Appearance: alert and oriented to person, place and time, well-developed and well-nourished, in no acute distress  Skin: warm anddry, no rash. Head: normocephalic and atraumatic  Eyes: extraocular eye movements intact, conjunctivae normal, anicteric sclerae  ENT: normal mucous membranes.  No thrush  Lungs: normal respiratory effort, Clear Lungs, no rhonchi, no crackles, no wheezes  Heart:RRR, nl S1/S2, no murmur  Abdomen: soft, no tenderness, no H-S-megaly, + BS  NEUROLOGICAL: alert and oriented x 3, no focal deficits  No leg edema  Right toes with resolved erythema  Right foot with intact dressing      DATA:    Lab Results   Component Value Date    WBC 9.2 02/26/2022    HGB 13.5 (L) 02/26/2022 HCT 40.4 (L) 02/26/2022    MCV 85.1 02/26/2022     02/26/2022     Lab Results   Component Value Date    CREATININE 0.82 02/26/2022    BUN 12 02/26/2022     02/26/2022    K 4.3 02/26/2022     02/26/2022    CO2 25 02/26/2022       Hepatic Function Panel:   Lab Results   Component Value Date    ALKPHOS 101 02/24/2022    ALT 18 02/24/2022    AST 10 02/24/2022    PROT 7.2 02/24/2022    BILITOT 0.5 02/24/2022    LABALBU 4.2 02/24/2022    LABALBU 4.8 02/03/2012       Microbiology:   Recent Labs     02/24/22 2026   BC No Growth to date. Any change in status will be called. Recent Labs     02/24/22 2026   BLOODCULT2 No Growth to date. Any change in status will be called. No results for input(s): LABURIN in the last 72 hours. No results for input(s): WNDABS in the last 72 hours. No results for input(s): CULTRESP in the last 72 hours. Imaging:   MRI right foot      Impression       Loculated rim-enhancing fluid collection at the level of the second through fifth metatarsals measuring approximately 2 cm in AP dimension by 6 cm in transverse dimension by 6.5 cm in craniocaudal dimension is most concerning for abscess.        No osteomyelitis. IMPRESSION:    · Right foot cellulitis with abscess formation  · Failure of outpatient antibiotics    Patient Active Problem List   Diagnosis    Dyshidrotic eczema    Vitamin D deficiency    Obesity (BMI 30-39. 9)    Eczematous dermatitis    Adenomatous polyp of transverse colon- f/u due 9/2022    Low testosterone    Cellulitis of foot, right    Abscess of foot including toes, right    Cellulitis of right lower extremity       PLAN:  · Continue IV vancomycin and Zosyn for now  · Check Intra-Op cultures and accordingly adjust antibiotic therapy  · Follow-up blood cultures  · Local wound care and follow-up with podiatry    Discussed with patient and RN    Jennifer Hussein MD

## 2022-02-26 NOTE — PROGRESS NOTES
Physician Progress Note    2/26/2022   5:17 PM    Name:  Kezia Álvarez  MRN:    [id number]      Day: 2     Admit Date: 2/24/2022  4:52 PM  PCP: Mann Marks MD    Code Status:  Prior    Subjective:     He is doing well. Pain well controlled. No fevers, nausea, chest pain. He did have some diarrhea.     Current Facility-Administered Medications   Medication Dose Route Frequency Provider Last Rate Last Admin    sodium chloride flush 0.9 % injection 5-40 mL  5-40 mL IntraVENous 2 times per day Shoaib Crick, DPM        sodium chloride flush 0.9 % injection 5-40 mL  5-40 mL IntraVENous PRN Shoaibeneida Solano, DPM   10 mL at 02/26/22 1450    0.9 % sodium chloride infusion  25 mL IntraVENous PRN Shoaib Solano, DPM        ondansetron (ZOFRAN-ODT) disintegrating tablet 4 mg  4 mg Oral Q8H PRN Shoaibeneida Solano, DPM        Or    [facility]ron [facility]) injection 4 mg  4 mg IntraVENous Q6H PRN Shoaib Solano, DPM        sodium chloride flush 0.9 % injection 5-40 mL  5-40 mL IntraVENous 2 times per day Shoaib Russ, DPM   10 mL at 02/24/22 2112    sodium chloride flush 0.9 % injection 5-40 mL  5-40 mL IntraVENous PRN Shoaibeneida Solano, DPM        0.9 % sodium chloride infusion  25 mL IntraVENous PRN Shoaib Solano, DPM        enoxaparin (LOVENOX) injection 40 mg  40 mg SubCUTAneous Daily Shoaib Solano, DPM   40 mg at 02/26/22 0837    polyethylene glycol (GLYCOLAX) packet 17 g  17 g Oral Daily PRN Shoaib Crick, DPM        acetaminophen (TYLENOL) tablet 650 mg  650 mg Oral Q6H PRN Shoaib Solano, DPM        Or    acetaminophen (TYLENOL) suppository 650 mg  650 mg Rectal Q6H PRN Shoaib Solano, DPM        piperacillin-tazobactam (ZOSYN) 3,375 mg in dextrose 5 % 50 mL IVPB extended infusion (mini-bag)  3,375 mg IntraVENous Q8H Shoabi Solano, DPM   Stopped at 02/26/22 1854    aspirin EC tablet 325 mg  325 mg Oral Daily Shoaib Solano, DPM   325 mg at 02/26/22 0837    HYDROcodone-acetaminophen (NORCO) 5-325 MG per tablet 2 tablet  2 tablet Oral Q6H PRN Zachery Julien, DPM   2 tablet at 22 0837    vancomycin (VANCOCIN) 1,250 mg in dextrose 5 % 250 mL IVPB  1,250 mg IntraVENous Q12H Zachery Julien, DPM   Stopped at 22 1357    vancomycin (VANCOCIN) intermittent dosing (placeholder)   Other RX Placeholder Zachery Julien, DPM           Physical Examination:      Vitals:  /68   Pulse 88   Temp 98.2 °F (36.8 °C) (Oral)   Resp 18   Ht 6' (1.829 m)   Wt 260 lb (117.9 kg)   SpO2 97%   BMI 35.26 kg/m²   Temp (24hrs), Av.8 °F (36.6 °C), Min:97.3 °F (36.3 °C), Max:98.2 °F (36.8 °C)      General appearance: alert, cooperative and no distress. Obese  Mental Status: oriented to person, place and time and normal affect  Lungs: clear to auscultation bilaterally, normal effort  Heart: regular rate and rhythm, no murmur  Abdomen: soft, nontender, nondistended, bowel sounds present, no masses  Extremities: Right lower extremity is wrapped. Underneath on dorsum of foot is erythematous with area of fluctuance, warmth which is tender to palpation    Data:     Labs:  Recent Labs     22  0500 22  0511   WBC 12.1* 9.2   HGB 14.6 13.5*    254     Recent Labs     22  0500 22  0510    138   K 4.4 4.3    102   CO2 25 25   BUN 13 12   CREATININE 0.89 0.82   GLUCOSE 111* 101*     Recent Labs     22  1700   AST 10   ALT 18   BILITOT 0.5   ALKPHOS 101     MRI R Foot:  Loculated rim-enhancing fluid collection at the level of the second through fifth metatarsals measuring approximately 2 cm in AP dimension by 6 cm in transverse dimension by 6.5 cm in craniocaudal dimension is most concerning for abscess.        No osteomyelitis. Assessment and Plan:        1. Sepsis (leukocytosis, tachycardia) secondary to right foot cellulitis with abscess s/p I&D by podiatry : Improved  -Broad-spectrum IV antibiotics.   Follow culture data including surgical cultures-preliminary growth shows staph aureus.   ID following  -perfusion optimized  -postoperative pain control per surgeon  -Home care ordered for wound care  -Monitor diarrhea    Obesity    Lovenox prophylaxis  Full code    >35 minutes in total care time    Electronically signed by Lindbergh Fleischer, DO on 2/26/2022 at 5:17 PM

## 2022-02-26 NOTE — PROGRESS NOTES
Pharmacy Vancomycin Consult     Vancomycin Day: 3  Current Dosing: vanco 1250mg IV q12h      Recent Labs     02/25/22  0500 02/26/22  0510 02/26/22  0511   BUN 13 12  --    CREATININE 0.89 0.82  --    WBC 12.1*  --  9.2     No intake or output data in the 24 hours ending 02/26/22 0737  Cultures  Recent Labs     02/25/22  1428 02/24/22 2026   BC  --  No Growth to date. Any change in status will be called. BLOODCULT2  --  No Growth to date. Any change in status will be called. COVID19 Not Detected  --      Height: 6' (182.9 cm), Weight: 260 lb (117.9 kg), Body mass index is 35.26 kg/m². Estimated Creatinine Clearance: 114 mL/min (based on SCr of 0.82 mg/dL). .    Trough:  Recent Labs     02/26/22  0510   VANCORANDOM 8.6*      Assessment/Plan:  Data input into [employer] platform. Current dosing continues to predict therapeutic levels. Will continue current dosing and monitor therapy. Timing of future trough levels may be adjusted based on culture results, renal function, and clinical response.     Thank you,  Bianca Roy, [facility] PharmD

## 2022-02-27 LAB
ANION GAP SERPL CALCULATED.3IONS-SCNC: 9 MEQ/L (ref 9–15)
BASOPHILS ABSOLUTE: 0.1 K/UL (ref 0–0.2)
BASOPHILS RELATIVE PERCENT: 1.9 %
BUN BLDV-MCNC: 11 MG/DL (ref 8–23)
CALCIUM SERPL-MCNC: 8.7 MG/DL (ref 8.5–9.9)
CHLORIDE BLD-SCNC: 101 MEQ/L (ref 95–107)
CO2: 26 MEQ/L (ref 20–31)
CREAT SERPL-MCNC: 0.8 MG/DL (ref 0.7–1.2)
EOSINOPHILS ABSOLUTE: 0.4 K/UL (ref 0–0.7)
EOSINOPHILS RELATIVE PERCENT: 5.4 %
GFR AFRICAN AMERICAN: >60
GFR NON-AFRICAN AMERICAN: >60
GLUCOSE BLD-MCNC: 95 MG/DL (ref 70–99)
HCT VFR BLD CALC: 41 % (ref 42–52)
HEMOGLOBIN: 14 G/DL (ref 14–18)
LYMPHOCYTES ABSOLUTE: 1.3 K/UL (ref 1–4.8)
LYMPHOCYTES RELATIVE PERCENT: 18.9 %
MCH RBC QN AUTO: 28.8 PG (ref 27–31.3)
MCHC RBC AUTO-ENTMCNC: 34.2 % (ref 33–37)
MCV RBC AUTO: 84.1 FL (ref 80–100)
MONOCYTES ABSOLUTE: 0.7 K/UL (ref 0.2–0.8)
MONOCYTES RELATIVE PERCENT: 10.5 %
NEUTROPHILS ABSOLUTE: 4.2 K/UL (ref 1.4–6.5)
NEUTROPHILS RELATIVE PERCENT: 63.3 %
PDW BLD-RTO: 13.6 % (ref 11.5–14.5)
PLATELET # BLD: 280 K/UL (ref 130–400)
POTASSIUM REFLEX MAGNESIUM: 4 MEQ/L (ref 3.4–4.9)
RBC # BLD: 4.87 M/UL (ref 4.7–6.1)
SODIUM BLD-SCNC: 136 MEQ/L (ref 135–144)
WBC # BLD: 6.7 K/UL (ref 4.8–10.8)

## 2022-02-27 PROCEDURE — 6370000000 HC RX 637 (ALT 250 FOR IP)

## 2022-02-27 PROCEDURE — 2580000003 HC RX 258: Performed by: INTERNAL MEDICINE

## 2022-02-27 PROCEDURE — 2580000003 HC RX 258

## 2022-02-27 PROCEDURE — 85025 COMPLETE CBC W/AUTO DIFF WBC: CPT

## 2022-02-27 PROCEDURE — 80048 BASIC METABOLIC PNL TOTAL CA: CPT

## 2022-02-27 PROCEDURE — 99232 SBSQ HOSP IP/OBS MODERATE 35: CPT | Performed by: INTERNAL MEDICINE

## 2022-02-27 PROCEDURE — 1210000000 HC MED SURG R&B

## 2022-02-27 PROCEDURE — 6370000000 HC RX 637 (ALT 250 FOR IP): Performed by: INTERNAL MEDICINE

## 2022-02-27 PROCEDURE — 6360000002 HC RX W HCPCS

## 2022-02-27 PROCEDURE — 36415 COLL VENOUS BLD VENIPUNCTURE: CPT

## 2022-02-27 RX ORDER — SODIUM CHLORIDE 0.9 % (FLUSH) 0.9 %
5-40 SYRINGE (ML) INJECTION PRN
Status: DISCONTINUED | OUTPATIENT
Start: 2022-02-27 | End: 2022-03-01 | Stop reason: HOSPADM

## 2022-02-27 RX ORDER — SODIUM CHLORIDE 9 MG/ML
250 INJECTION, SOLUTION INTRAVENOUS ONCE
Status: COMPLETED | OUTPATIENT
Start: 2022-02-27 | End: 2022-02-28

## 2022-02-27 RX ORDER — LIDOCAINE HYDROCHLORIDE 20 MG/ML
5 INJECTION, SOLUTION INFILTRATION; PERINEURAL ONCE
Status: COMPLETED | OUTPATIENT
Start: 2022-02-27 | End: 2022-02-28

## 2022-02-27 RX ORDER — SODIUM CHLORIDE 9 MG/ML
25 INJECTION, SOLUTION INTRAVENOUS PRN
Status: DISCONTINUED | OUTPATIENT
Start: 2022-02-27 | End: 2022-03-01 | Stop reason: HOSPADM

## 2022-02-27 RX ORDER — SODIUM CHLORIDE 0.9 % (FLUSH) 0.9 %
5-40 SYRINGE (ML) INJECTION EVERY 12 HOURS SCHEDULED
Status: DISCONTINUED | OUTPATIENT
Start: 2022-02-27 | End: 2022-03-01 | Stop reason: HOSPADM

## 2022-02-27 RX ADMIN — LACTOBACILLUS TAB 4 TABLET: TAB at 14:03

## 2022-02-27 RX ADMIN — Medication 10 ML: at 11:27

## 2022-02-27 RX ADMIN — PIPERACILLIN AND TAZOBACTAM 3375 MG: 3; .375 INJECTION, POWDER, LYOPHILIZED, FOR SOLUTION INTRAVENOUS at 11:24

## 2022-02-27 RX ADMIN — ASPIRIN 325 MG: 325 TABLET, COATED ORAL at 08:18

## 2022-02-27 RX ADMIN — VANCOMYCIN HYDROCHLORIDE 1250 MG: 5 INJECTION, POWDER, LYOPHILIZED, FOR SOLUTION INTRAVENOUS at 22:05

## 2022-02-27 RX ADMIN — Medication 10 ML: at 22:08

## 2022-02-27 RX ADMIN — LACTOBACILLUS TAB 4 TABLET: TAB at 08:19

## 2022-02-27 RX ADMIN — ENOXAPARIN SODIUM 40 MG: 100 INJECTION SUBCUTANEOUS at 08:20

## 2022-02-27 RX ADMIN — VANCOMYCIN HYDROCHLORIDE 1250 MG: 5 INJECTION, POWDER, LYOPHILIZED, FOR SOLUTION INTRAVENOUS at 11:27

## 2022-02-27 RX ADMIN — HYDROCODONE BITARTRATE AND ACETAMINOPHEN 2 TABLET: 5; 325 TABLET ORAL at 14:03

## 2022-02-27 RX ADMIN — PIPERACILLIN AND TAZOBACTAM 3375 MG: 3; .375 INJECTION, POWDER, LYOPHILIZED, FOR SOLUTION INTRAVENOUS at 02:48

## 2022-02-27 RX ADMIN — LACTOBACILLUS TAB 4 TABLET: TAB at 22:02

## 2022-02-27 RX ADMIN — HYDROCODONE BITARTRATE AND ACETAMINOPHEN 2 TABLET: 5; 325 TABLET ORAL at 08:18

## 2022-02-27 ASSESSMENT — PAIN SCALES - GENERAL
PAINLEVEL_OUTOF10: 1
PAINLEVEL_OUTOF10: 4

## 2022-02-27 NOTE — CARE COORDINATION
Met with the patient. The patient is from home alone and wants to return home. Patient does have 3x a wk drsgs and is agreeable and has selected [facility] after foc provided. Nursing just updated CM that the patient does need IV therapy at d/c. The patient will need to get a PICC line on Monday. Will also need to verify patient cost of IV at d/c. If too expensive the patient wants to go to [facility]. FOC provided for SNF choice. The patient does feel he would be able to administer his own IV atbs at d/c. CM will need to follow. IV check sent.

## 2022-02-27 NOTE — CARE COORDINATION
IV BENEFIT REQUEST FORM    FAX FROM: [facility]                        [address] [place]    REQUESTED BY: Electronically signed by Tova Cotter RN on 2022 at 4:12 PM                                               RN/C3: PHONE: [phone])     DATE:/TIME OF REQUEST: 22  TIME: 4:12 PM      TO: [facility]      FAX TO: [phone]    PHONE: [phone]     THIS PATIENT HAS BEEN IDENTIFIED TO POSSIBLY NEED LONG TERM IV'S. PLEASE CHECK INSURANCE COVERAGE FOR THE FOLLOWING PT/DRUGS. PATIENT'S NAME: Roger Maria                              [facility]   PATIENT'S : 1955  PATIENT ADDRESS: [address] [place] [address]  SSN:    ()     PAYOR NAME:  Payor: Myramarija Alexander / Plan: Aleksander Baeza PPO / Product Type: Medicare /     DRUG: Vancomycin                    DOSE: 1250 mg           FREQUENCY: Q 12 HR    __________ CHECK HERE IF PT HAS NO INSURANCE AND REQUESTING SELF PAY COST. *IF [facility] IS NOT A PROVIDER FOR THIS PATIENT, PLEASE FORWARD INFO VIA FAX TO [facility] @ [phone],(PHONE NUMBER: [phone]) TO RUN BENEFIT VERIFICATION AND NOTIFY THE ABOVE C3 OF THIS PLAN. (FAX FACE SHEET WITH DEMOGRAPHICS AND INSURANCE INFO WITH THIS FORM.)  PLEASE FAX BENEFIT INFO TO: THE [phone]    This message is intended only for the use of the individual or entity to which it is addressed and may contain information that is privileged, confidential, and exempt from disclosure under applicable law. If the reader of the notice is not intended recipient of the employee/agent responsible for delivering the message to the intended recipient, you are hereby notified than any dissemination, distribution or copying of this communication is strictly prohibited.  Please contact the sender for further instructions on handling the information.

## 2022-02-27 NOTE — PROGRESS NOTES
PODIATRIC MEDICINE AND SURGERY  CONSULT PROGRESS NOTE    INTERVAL EVENTS:  Patient resting comfortably at bedside  Vital signs: afebrile and hemodynamically stable   WBC downtrending, 6.7, on vanc/zosyn  Intraoperative abscess cultures with staph aureus  POD 2 s/p right foot I&D, progressing as expected in post operative period  ID on board,  Patient will need HHC for 3x week dressing changes  OK to D/C from podiatry standpoint after PT/ID/HHC finals reccs    ASSESSMENT:  74year old male POD 2s/p right foot I&D. Operative site left open with iodoform packing. Dressing intact without strike though. No purulence noted on exam today with mild edema and continuing resolution of erythema to operative site. WBC WNL, on vanc/zosyn. Intraoperative culture with staph aureus, ID on board. Mild sanguinous drainage to operative site. Able to move all digits. Pain well controlled. Will continue daily dressing changes with packing in post operative period while in house. HHC to do dressing changes 3x week. OK to D/C from podiatry standpoint once ID/PT/HHC evaluate. PLAN AND RECOMMENDATIONS[de-identified]  Patient's case to be discussed with staff, Dr. Zulema Callejas, who will provide final recommendations going forward  POD2 s/p R Foot I&D  NWB to RLE with use of post operative shoe and assistive devices, ok to heel WB for transfers. Dressing to stay clean, dry, intact. Podiatry to change dressing while in house. RLE with adaptic, iodoform packing to operative site, 4x4s, ABD, kerlix, ACE. If strike through noted please reinforce. Elevate B/L LE at or above heart level while resting  Intraoperative cultures staph aureus, ID on board. HHC 3x week changes with silvercel. -HHC Instructions: cut or fold dressing to fit wound, loosely pack wound with silvercel, ensure dressing does not overlap the wound margins, cover with adaptic, and secure with non occlusive DSD.   Vasc Consult/outpt f/u- ART duplex ultrasound revealing 30% stenosis right distal PT, without occlusion  Antibiotic coverage per primary/ID  Pain management per primary team   Podiatry to follow while in house, ok to D/C from podiatry standpoint  Patient will need follow up with Dr. Simona Honeycutt within 7-10 days     Pertinent HPI:   Patient resting comfortably in bed. Operative dressing clean, dry, intact without strike through. Extremity elevated. Patient relates no pain to RLE. States mild tingling to toes during dressing changes. No other complaints. No current facility-administered medications on file prior to encounter. Current Outpatient Medications on File Prior to Encounter   Medication Sig Dispense Refill    clindamycin (CLEOCIN) 300 MG capsule Take 1 capsule by mouth 4 times daily for 10 days 40 capsule 0    predniSONE (DELTASONE) 10 MG tablet Take 3 tabs for 3 days, then 2 tabs for 3 days, then 1 tab for 3 days, then 1/2 tab for 3 days, then stop. 20 tablet 0    aspirin 325 MG EC tablet Take 1 tablet by mouth daily 1 tablet 0    triamcinolone (KENALOG) 0.1 % cream Apply topically qam daily Monday through Friday only. Take weekend off. Do not use on face, groin, armpits, breasts tissue. 454 g 0    testosterone cypionate (DEPOTESTOTERONE CYPIONATE) 200 MG/ML injection Inject 1 mL into the muscle every 14 days for 90 doses.  8 mL 0    Syringe/Needle, Disp, (SYRINGE 3CC/52JU4-2/2\") 22G X 1-1/2\" 3 ML MISC 1 actuation by Does not apply route every 14 days 2 each 5    melatonin 1 MG tablet Take 1 tablet by mouth nightly as needed for Sleep 30 tablet     vitamin E 1000 units capsule Take 1,000 Units by mouth daily      vitamin D (CHOLECALCIFEROL) 1000 UNIT TABS tablet Take 6,000 Units by mouth daily      Multiple Vitamins-Minerals (ONE-A-DAY MENS HEALTH FORMULA) TABS Take by mouth      magnesium (MAGNESIUM-OXIDE) 250 MG TABS tablet Take 250 mg by mouth daily Take 3 pills daily      calcium carbonate (OYSTER SHELL CALCIUM 500 MG) 1250 MG tablet Take 1 tablet by mouth daily Glucosamine-Chondroitin 7103-5582 MG/30ML LIQD Take by mouth Take 3 pills daily      Omega-3 Fatty Acids (FISH OIL) 1200 MG CAPS Take  by mouth daily. OBJECTIVE:  /75   Pulse 83   Temp 98.1 °F (36.7 °C) (Oral)   Resp 18   Ht 6' (1.829 m)   Wt 260 lb (117.9 kg)   SpO2 97%   BMI 35.26 kg/m²    Patient AAOx3, NAD    RLE Focused Exam:    Pulses palpable, CFT less than 3 seconds  Mild edema to operative site   Resolving erythema to operative site noted to dorsal right foot and lateral aspect  Operative site remains open with mild sanguinous drainage, no purulence noted, no necrosis noted  Tissue to operative site noted to be granular  No malodor noted  Residual breakdown noted to dorsal aspect of incision site with mild ecchymosis to level of ankle as expected in post operative period  Sensation intact  Able to move all toes, ankle joint  Progressing as expected in post operative period      LABS:   Lab Results   Component Value Date    WBC 6.7 02/27/2022    HGB 14.0 02/27/2022    HCT 41.0 (L) 02/27/2022    MCV 84.1 02/27/2022     02/27/2022     Lab Results   Component Value Date     02/27/2022    K 4.0 02/27/2022     02/27/2022    CO2 26 02/27/2022    BUN 11 02/27/2022    CREATININE 0.80 02/27/2022    GLUCOSE 95 02/27/2022    GLUCOSE 105 02/03/2012    CALCIUM 8.7 02/27/2022      Lab Results   Component Value Date    LABALBU 4.2 02/24/2022     Lab Results   Component Value Date    SEDRATE 14 02/24/2022     Lab Results   Component Value Date    CRP 66.4 (H) 02/24/2022     Lab Results   Component Value Date    LABA1C 5.7 02/24/2022       MICROBIOLOGY:   Wound Culture, Intraoperative, 2/25/22, Staph Aureus  Blood Culture NGTMICHAEL Osman DPM, JESSEM PGY-1  Podiatric Surgery Resident  Podiatry On Call Pager: [phone]  February 27, 2022  1:29 PM    The resident/student was under my direct supervision during today's patient encounter.  reflection of my personal examination, assessment and treatment plan. The patient was seen and examined with the resident/student. The above findings and recommendations were reviewed and I agree with above treatment plan. Any questions or concerns, please Dr. Chris Sin or podiatry resident on call.     Denae Mendez DPM  Podiatry Attending  03/08/22  3:15 PM

## 2022-02-27 NOTE — PROGRESS NOTES
Infectious Diseases Inpatient Progress Note          HISTORY OF PRESENT ILLNESS:  Follow up right foot cellulitis with large abscess on IV vancomycin and Zosyn, well tolerated. I discussed case with podiatry who reported draining 60 cc of pus. Wound is left open wound with packing. Pain is well controlled. No fevers. Wound culture positive for staph aureus with pending susceptibility. Mild diarrhea    Current Medications:     lactobacillus acidophilus  4 tablet Oral TID    sodium chloride flush  5-40 mL IntraVENous 2 times per day    sodium chloride flush  5-40 mL IntraVENous 2 times per day    enoxaparin  40 mg SubCUTAneous Daily    piperacillin-tazobactam  3,375 mg IntraVENous Q8H    aspirin  325 mg Oral Daily    vancomycin  1,250 mg IntraVENous Q12H    vancomycin (VANCOCIN) intermittent dosing (placeholder)   Other RX Placeholder       Allergies:  Patient has no known allergies. Review of Systems  14 system review is negative other than HPI    Physical Exam  Vitals:    02/26/22 1045 02/26/22 1500 02/26/22 2158 02/27/22 0710   BP: 134/83 139/68 139/85 135/75   Pulse: 97 88 89 83   Resp: 18 18 19 18   Temp: 97.5 °F (36.4 °C) 98.2 °F (36.8 °C) 97.9 °F (36.6 °C) 98.1 °F (36.7 °C)   TempSrc: Oral Oral Oral Oral   SpO2: 97% 97% 100% 97%   Weight:       Height:         General Appearance: alert and oriented to person, place and time, well-developed and well-nourished, in no acute distress  Skin: warm and dry, no rash. Head: normocephalic and atraumatic  Eyes: anicteric sclerae  ENT: oropharynx clear and moist with normal mucous membranes.  No oral thrush  Lungs: normal respiratory effort  Abdomen: soft, no tenderness  No leg edema  No erythema, no tenderness  Right foot with intact dressing    DATA:    Lab Results   Component Value Date    WBC 6.7 02/27/2022    HGB 14.0 02/27/2022    HCT 41.0 (L) 02/27/2022    MCV 84.1 02/27/2022     02/27/2022     Lab Results   Component Value Date CREATININE 0.80 02/27/2022    BUN 11 02/27/2022     02/27/2022    K 4.0 02/27/2022     02/27/2022    CO2 26 02/27/2022       Hepatic Function Panel:  Lab Results   Component Value Date    ALKPHOS 101 02/24/2022    ALT 18 02/24/2022    AST 10 02/24/2022    PROT 7.2 02/24/2022    BILITOT 0.5 02/24/2022    LABALBU 4.2 02/24/2022    LABALBU 4.8 02/03/2012       Microbiology:   Recent Labs     02/24/22 2026   BC No Growth to date. Any change in status will be called. Recent Labs     02/24/22 2026   BLOODCULT2 No Growth to date. Any change in status will be called. IMPRESSION:    · Right foot cellulitis with large abscess  · Staph aureus infection  · Failure of outpatient antibiotics    Patient Active Problem List   Diagnosis    Dyshidrotic eczema    Vitamin D deficiency    Obesity (BMI 30-39. 9)    Eczematous dermatitis    Adenomatous polyp of transverse colon- f/u due 9/2022    Low testosterone    Cellulitis of foot, right    Abscess of foot including toes, right    Cellulitis of right lower extremity    Cellulitis and abscess of foot       PLAN:  · Continue IV vancomycin   · DC Zosyn   · Check staph aureus sensitivity and accordingly make a decision about further antibiotic therapy  · PICC line  · Arrange for 4 weeks of IV antibiotics post discharge    Discussed with patient and RN    Gia Parmar MD

## 2022-02-27 NOTE — PROGRESS NOTES
Pharmacy Vancomycin Consult     Vancomycin Day: 4  Current Dosing: vanco 1250mg IV q12h      Recent Labs     02/25/22  0500 02/26/22  0510 02/26/22  0511 02/27/22  0501   BUN  --  12  --  11   CREATININE  --  0.82  --  0.80   WBC   < >  --  9.2 6.7    < > = values in this interval not displayed. Intake/Output Summary (Last 24 hours) at 2/27/2022 0905  Last data filed at 2/26/2022 1735  Gross per 24 hour   Intake 845 ml   Output --   Net 845 ml     Cultures  Recent Labs     02/25/22  1537 02/25/22  1428 02/24/22 2026   BC  --   --  No Growth to date. Any change in status will be called. BLOODCULT2  --   --  No Growth to date. Any change in status will be called. ORG Staphylococcus aureus*  --   --    COVID19  --  Not Detected  --      Height: 6' (182.9 cm), Weight: 260 lb (117.9 kg), Body mass index is 35.26 kg/m². Estimated Creatinine Clearance: 117 mL/min (based on SCr of 0.8 mg/dL). .    Trough:  Recent Labs     02/26/22  0510   VANCORANDOM 8.6*      Assessment/Plan:  Data updated in insightrx. Dosing therapeutic. Timing of future trough levels may be adjusted based on culture results, renal function, and clinical response.     Thank you,  Carlos Dumont, [facility] PharmD

## 2022-02-27 NOTE — PROGRESS NOTES
Physician Progress Note    2/27/2022   5:50 PM    Name:  Marnee Krabbe  MRN:    [id number]      Day: 3     Admit Date: 2/24/2022  4:52 PM  PCP: Bucky Raymond MD    Code Status:  Prior    Subjective:     He is doing well. 1 episode of diarrhea this morning. Pain is 3/10.      Current Facility-Administered Medications   Medication Dose Route Frequency Provider Last Rate Last Admin    lidocaine 2 % injection 5 mL  5 mL IntraDERmal Once Simuel Galeazzi, MD        sodium chloride flush 0.9 % injection 5-40 mL  5-40 mL IntraVENous 2 times per day Simuel Galeazzi, MD        sodium chloride flush 0.9 % injection 5-40 mL  5-40 mL IntraVENous PRN Kulwant Lara MD        0.9 % sodium chloride infusion  25 mL IntraVENous PRN Kulwant Lara MD        0.9 % sodium chloride infusion  250 mL IntraVENous Once Simuel Galeazzi, MD        lactobacillus acidophilus [facility]) 4 tablet  4 tablet Oral TID Kodak Clancy DO   4 tablet at 02/27/22 1403    sodium chloride flush 0.9 % injection 5-40 mL  5-40 mL IntraVENous 2 times per day Quincy Riling, DPM   10 mL at 02/27/22 1127    sodium chloride flush 0.9 % injection 5-40 mL  5-40 mL IntraVENous PRN Quincy Riling, DPM   10 mL at 02/26/22 1450    0.9 % sodium chloride infusion  25 mL IntraVENous PRN Quincy Riling, DPM        ondansetron (ZOFRAN-ODT) disintegrating tablet 4 mg  4 mg Oral Q8H PRN Quincy Riling, DPM        Or    ondansetron [facility]) injection 4 mg  4 mg IntraVENous Q6H PRN Quincy Riling, DPM        enoxaparin (LOVENOX) injection 40 mg  40 mg SubCUTAneous Daily Quincy Riling, DPM   40 mg at 02/27/22 0820    polyethylene glycol (GLYCOLAX) packet 17 g  17 g Oral Daily PRN Quincy Riling, DPM        acetaminophen (TYLENOL) tablet 650 mg  650 mg Oral Q6H PRN Quincy Riling, DPM        Or    acetaminophen (TYLENOL) suppository 650 mg  650 mg Rectal Q6H PRN Quincy Riling, DPM        aspirin EC tablet 325 mg  325 mg Oral Daily Quincy TAE Torres   325 mg at 22 0818    HYDROcodone-acetaminophen (NORCO) 5-325 MG per tablet 2 tablet  2 tablet Oral Q6H PRN Anayeli Davila, DPM   2 tablet at 22 1403    vancomycin (VANCOCIN) 1,250 mg in dextrose 5 % 250 mL IVPB  1,250 mg IntraVENous Q12H Anayeli Davila, DPM   Stopped at 22 1404    vancomycin (VANCOCIN) intermittent dosing (placeholder)   Other RX Placeholder Anayeli Davila, DPM           Physical Examination:      Vitals:  /75   Pulse 83   Temp 98.1 °F (36.7 °C) (Oral)   Resp 18   Ht 6' (1.829 m)   Wt 260 lb (117.9 kg)   SpO2 97%   BMI 35.26 kg/m²   Temp (24hrs), Av °F (36.7 °C), Min:97.9 °F (36.6 °C), Max:98.1 °F (36.7 °C)      General appearance: alert, cooperative and no distress. Obese  Mental Status: oriented to person, place and time and normal affect  Lungs: clear to auscultation bilaterally, normal effort  Heart: regular rate and rhythm, no murmur  Abdomen: soft, nontender, nondistended, bowel sounds present, no masses  Extremities: Right lower extremity is wrapped. Underneath on dorsum of foot is erythematous with area of fluctuance, warmth which is tender to palpation    Data:     Labs:  Recent Labs     22  0511 22  0501   WBC 9.2 6.7   HGB 13.5* 14.0    280     Recent Labs     22  0510 22  0501    136   K 4.3 4.0    101   CO2 25 26   BUN 12 11   CREATININE 0.82 0.80   GLUCOSE 101* 95     No results for input(s): AST, ALT, ALB, BILITOT, ALKPHOS in the last 72 hours. MRI R Foot:  Loculated rim-enhancing fluid collection at the level of the second through fifth metatarsals measuring approximately 2 cm in AP dimension by 6 cm in transverse dimension by 6.5 cm in craniocaudal dimension is most concerning for abscess.        No osteomyelitis. Assessment and Plan:        1.   Sepsis (leukocytosis, tachycardia) secondary to right foot cellulitis with abscess s/p I&D by podiatry : Improved  -ID is recommending PICC line and 4 weeks of IV Vancomycin  -perfusion optimized  -postoperative pain control per surgeon  -Monitor diarrhea    Obesity    Lovenox prophylaxis  Full code    SNF at d/c due to wound care, IV Abx. Patient will be medically ready following PICC placement.      >35 minutes in total care time    Electronically signed by Hannah Mclean DO on 2/27/2022 at 5:50 PM

## 2022-02-27 NOTE — PROGRESS NOTES
Pt assessment complete. Pt is alert and oriented. Denies any need for prn pain medication at this time rating pain at 2/10 to right lower extremity. Pt has been resting in bed with foot elevated and frequently turning on sides. Pt denies any nausea or abd distress and was given and HS snack. Circulation to right lower extremity intact. Call light in reach. Bed alarm on.

## 2022-02-27 NOTE — CARE COORDINATION
Met with the patient regarding [address] choices. Patient after [facility] for home care services. Spoke with patient regarding his need for daily dressing changes. Patient states he is unable to do them and his son cannot do them due to him being squeamish. Call placed to [facility], they do not do daily dressing changes at home. They can go out up 3 days a week, but they still would like to have someone learn to change the dressing. Per patient he has no one else. Will need to come up with a different d/c plan if the patient is only able to have dressings changed daily. Nursing is reaching out to podiatry to see if the patient can have dressing changes 3 times a week instead of daily. CM to follow.

## 2022-02-28 ENCOUNTER — APPOINTMENT (OUTPATIENT)
Dept: INTERVENTIONAL RADIOLOGY/VASCULAR | Age: 67
DRG: 854 | End: 2022-02-28
Payer: MEDICARE

## 2022-02-28 LAB
ANION GAP SERPL CALCULATED.3IONS-SCNC: 11 MEQ/L (ref 9–15)
BASOPHILS ABSOLUTE: 0.1 K/UL (ref 0–0.2)
BASOPHILS RELATIVE PERCENT: 1.2 %
BLOOD CULTURE, ROUTINE: NORMAL
BUN BLDV-MCNC: 12 MG/DL (ref 8–23)
CALCIUM SERPL-MCNC: 8.6 MG/DL (ref 8.5–9.9)
CHLORIDE BLD-SCNC: 100 MEQ/L (ref 95–107)
CO2: 25 MEQ/L (ref 20–31)
CREAT SERPL-MCNC: 0.92 MG/DL (ref 0.7–1.2)
CULTURE, BLOOD 2: NORMAL
EOSINOPHILS ABSOLUTE: 0.4 K/UL (ref 0–0.7)
EOSINOPHILS RELATIVE PERCENT: 5.7 %
GFR AFRICAN AMERICAN: >60
GFR NON-AFRICAN AMERICAN: >60
GLUCOSE BLD-MCNC: 115 MG/DL (ref 70–99)
HCT VFR BLD CALC: 43.3 % (ref 42–52)
HEMOGLOBIN: 14.5 G/DL (ref 14–18)
LYMPHOCYTES ABSOLUTE: 1.1 K/UL (ref 1–4.8)
LYMPHOCYTES RELATIVE PERCENT: 15.5 %
MCH RBC QN AUTO: 28.5 PG (ref 27–31.3)
MCHC RBC AUTO-ENTMCNC: 33.4 % (ref 33–37)
MCV RBC AUTO: 85.3 FL (ref 80–100)
MONOCYTES ABSOLUTE: 0.7 K/UL (ref 0.2–0.8)
MONOCYTES RELATIVE PERCENT: 9.7 %
NEUTROPHILS ABSOLUTE: 4.7 K/UL (ref 1.4–6.5)
NEUTROPHILS RELATIVE PERCENT: 67.9 %
PDW BLD-RTO: 13.8 % (ref 11.5–14.5)
PLATELET # BLD: 274 K/UL (ref 130–400)
POTASSIUM REFLEX MAGNESIUM: 4.4 MEQ/L (ref 3.4–4.9)
RBC # BLD: 5.08 M/UL (ref 4.7–6.1)
SODIUM BLD-SCNC: 136 MEQ/L (ref 135–144)
WBC # BLD: 7 K/UL (ref 4.8–10.8)

## 2022-02-28 PROCEDURE — 6360000002 HC RX W HCPCS

## 2022-02-28 PROCEDURE — 6370000000 HC RX 637 (ALT 250 FOR IP)

## 2022-02-28 PROCEDURE — 36415 COLL VENOUS BLD VENIPUNCTURE: CPT

## 2022-02-28 PROCEDURE — 2500000003 HC RX 250 WO HCPCS: Performed by: INTERNAL MEDICINE

## 2022-02-28 PROCEDURE — 2580000003 HC RX 258: Performed by: INTERNAL MEDICINE

## 2022-02-28 PROCEDURE — 85025 COMPLETE CBC W/AUTO DIFF WBC: CPT

## 2022-02-28 PROCEDURE — 2709999900 IR PICC WO SQ PORT/PUMP > 5 YEARS

## 2022-02-28 PROCEDURE — 6360000002 HC RX W HCPCS: Performed by: INTERNAL MEDICINE

## 2022-02-28 PROCEDURE — 36573 INSJ PICC RS&I 5 YR+: CPT | Performed by: RADIOLOGY

## 2022-02-28 PROCEDURE — 6370000000 HC RX 637 (ALT 250 FOR IP): Performed by: INTERNAL MEDICINE

## 2022-02-28 PROCEDURE — 97116 GAIT TRAINING THERAPY: CPT

## 2022-02-28 PROCEDURE — 80048 BASIC METABOLIC PNL TOTAL CA: CPT

## 2022-02-28 PROCEDURE — 02H633Z INSERTION OF INFUSION DEVICE INTO RIGHT ATRIUM, PERCUTANEOUS APPROACH: ICD-10-PCS | Performed by: RADIOLOGY

## 2022-02-28 PROCEDURE — 99232 SBSQ HOSP IP/OBS MODERATE 35: CPT | Performed by: INTERNAL MEDICINE

## 2022-02-28 PROCEDURE — 36573 INSJ PICC RS&I 5 YR+: CPT

## 2022-02-28 PROCEDURE — 97162 PT EVAL MOD COMPLEX 30 MIN: CPT

## 2022-02-28 PROCEDURE — 2580000003 HC RX 258

## 2022-02-28 PROCEDURE — 1210000000 HC MED SURG R&B

## 2022-02-28 RX ORDER — L. ACIDOPHILUS/L.BULGARICUS 1MM CELL
4 TABLET ORAL 3 TIMES DAILY
Qty: 120 TABLET | Refills: 0 | Status: SHIPPED | OUTPATIENT
Start: 2022-02-28 | End: 2022-04-11 | Stop reason: ALTCHOICE

## 2022-02-28 RX ORDER — TESTOSTERONE CYPIONATE 200 MG/ML
200 INJECTION INTRAMUSCULAR ONCE
Status: COMPLETED | OUTPATIENT
Start: 2022-02-28 | End: 2022-02-28

## 2022-02-28 RX ADMIN — SODIUM CHLORIDE 250 ML: 9 INJECTION, SOLUTION INTRAVENOUS at 09:45

## 2022-02-28 RX ADMIN — LIDOCAINE HYDROCHLORIDE 5 ML: 20 INJECTION, SOLUTION INFILTRATION; PERINEURAL at 09:44

## 2022-02-28 RX ADMIN — VANCOMYCIN HYDROCHLORIDE 1250 MG: 5 INJECTION, POWDER, LYOPHILIZED, FOR SOLUTION INTRAVENOUS at 11:58

## 2022-02-28 RX ADMIN — TESTOSTERONE CYPIONATE 200 MG: 200 INJECTION, SOLUTION INTRAMUSCULAR at 12:56

## 2022-02-28 RX ADMIN — Medication 10 ML: at 22:02

## 2022-02-28 RX ADMIN — Medication 10 ML: at 10:31

## 2022-02-28 RX ADMIN — LACTOBACILLUS TAB 4 TABLET: TAB at 09:04

## 2022-02-28 RX ADMIN — HYDROCODONE BITARTRATE AND ACETAMINOPHEN 2 TABLET: 5; 325 TABLET ORAL at 09:04

## 2022-02-28 RX ADMIN — VANCOMYCIN HYDROCHLORIDE 1250 MG: 5 INJECTION, POWDER, LYOPHILIZED, FOR SOLUTION INTRAVENOUS at 22:00

## 2022-02-28 RX ADMIN — LACTOBACILLUS TAB 4 TABLET: TAB at 14:03

## 2022-02-28 RX ADMIN — LACTOBACILLUS TAB 4 TABLET: TAB at 21:57

## 2022-02-28 ASSESSMENT — PAIN SCALES - GENERAL
PAINLEVEL_OUTOF10: 0
PAINLEVEL_OUTOF10: 3
PAINLEVEL_OUTOF10: 0

## 2022-02-28 ASSESSMENT — ENCOUNTER SYMPTOMS
SHORTNESS OF BREATH: 0
EYE DISCHARGE: 0
ABDOMINAL PAIN: 0
COLOR CHANGE: 0
ABDOMINAL DISTENTION: 0
SORE THROAT: 0
CONSTIPATION: 0
RHINORRHEA: 0

## 2022-02-28 NOTE — PROGRESS NOTES
PODIATRIC MEDICINE AND SURGERY  CONSULT PROGRESS NOTE    INTERVAL EVENTS:  Patient resting comfortably at bedside  Vital signs: afebrile and hemodynamically stable   WBC downtrending, 7.0, abx per ID  Intraoperative abscess cultures with staph aureus  POD 3 s/p right foot I&D, progressing as expected in post operative period  Patient will need HHC for 3x week dressing changes  OK to D/C from podiatry standpoint after PT/ID/HHC finals reccs    ASSESSMENT:  74year old male POD 3 s/p right foot I&D. Operative site left open with iodoform packing. Dressing intact without strike though. No purulence noted on exam today with mild edema and continuing resolution of erythema to operative site. WBC WNL. . Intraoperative culture with staph aureus, ID on board. Mild sanguinous drainage to operative site. Able to move all digits. Pain well controlled. Will continue daily dressing changes with packing in post operative period while in house. HHC to do dressing changes 3x week. OK to D/C from podiatry standpoint once ID/PT/HHC evaluate. PLAN AND RECOMMENDATIONS[de-identified]  Patient's case to be discussed with staff, Dr. Edmond Cole, who will provide final recommendations going forward  POD3 s/p R Foot I&D  NWB to RLE with use of post operative shoe and assistive devices, ok to heel WB for transfers. Dressing to stay clean, dry, intact. Podiatry to change dressing while in house. RLE with adaptic, iodoform packing to operative site, 4x4s, ABD, kerlix, ACE. If strike through noted please reinforce. Elevate B/L LE at or above heart level while resting  Intraoperative cultures staph aureus, ID on board. HHC 3x week changes with silvercel. -HHC Instructions: cut or fold dressing to fit wound, loosely pack wound with silvercel, ensure dressing does not overlap the wound margins, cover with adaptic, and secure with non occlusive DSD.   Vasc Consult/outpt f/u- ART duplex ultrasound revealing 30% stenosis right distal PT, without occlusion  Antibiotic coverage per primary/ID  Pain management per primary team   Podiatry to follow while in house, ok to D/C from podiatry standpoint  Patient will need follow up with Dr. Dawn Emanuel within 7-10 days     Pertinent HPI:   Patient resting comfortably in bed. Operative dressing clean, dry, intact without strike through. Extremity elevated. Patient relates no pain to RLE. States mild tingling to toes during dressing changes. No other complaints. No current facility-administered medications on file prior to encounter. Current Outpatient Medications on File Prior to Encounter   Medication Sig Dispense Refill    clindamycin (CLEOCIN) 300 MG capsule Take 1 capsule by mouth 4 times daily for 10 days 40 capsule 0    predniSONE (DELTASONE) 10 MG tablet Take 3 tabs for 3 days, then 2 tabs for 3 days, then 1 tab for 3 days, then 1/2 tab for 3 days, then stop. 20 tablet 0    aspirin 325 MG EC tablet Take 1 tablet by mouth daily 1 tablet 0    triamcinolone (KENALOG) 0.1 % cream Apply topically qam daily Monday through Friday only. Take weekend off. Do not use on face, groin, armpits, breasts tissue. 454 g 0    testosterone cypionate (DEPOTESTOTERONE CYPIONATE) 200 MG/ML injection Inject 1 mL into the muscle every 14 days for 90 doses.  8 mL 0    Syringe/Needle, Disp, (SYRINGE 3CC/24FN9-0/2\") 22G X 1-1/2\" 3 ML MISC 1 actuation by Does not apply route every 14 days 2 each 5    melatonin 1 MG tablet Take 1 tablet by mouth nightly as needed for Sleep 30 tablet     vitamin E 1000 units capsule Take 1,000 Units by mouth daily      vitamin D (CHOLECALCIFEROL) 1000 UNIT TABS tablet Take 6,000 Units by mouth daily      Multiple Vitamins-Minerals (ONE-A-DAY MENS HEALTH FORMULA) TABS Take by mouth      magnesium (MAGNESIUM-OXIDE) 250 MG TABS tablet Take 250 mg by mouth daily Take 3 pills daily      calcium carbonate (OYSTER SHELL CALCIUM 500 MG) 1250 MG tablet Take 1 tablet by mouth daily      Glucosamine-Chondroitin 7678-4379 MG/30ML LIQD Take by mouth Take 3 pills daily      Omega-3 Fatty Acids (FISH OIL) 1200 MG CAPS Take  by mouth daily. OBJECTIVE:  BP (!) 146/80   Pulse 82   Temp 97.3 °F (36.3 °C) (Oral)   Resp 16   Ht 6' (1.829 m)   Wt 260 lb (117.9 kg)   SpO2 94%   BMI 35.26 kg/m²    Patient AAOx3, NAD    RLE Focused Exam:    Pulses palpable, CFT less than 3 seconds  Mild edema to operative site   Resolving erythema to operative site noted to dorsal right foot and lateral aspect  Operative site remains open with mild sanguinous drainage, no purulence noted, no necrosis noted  Tissue to operative site noted to be granular  No malodor noted  Residual breakdown noted to dorsal aspect of incision site with mild ecchymosis to level of ankle as expected in post operative period  Sensation intact  Able to move all toes, ankle joint  Progressing as expected in post operative period      LABS:   Lab Results   Component Value Date    WBC 7.0 02/28/2022    HGB 14.5 02/28/2022    HCT 43.3 02/28/2022    MCV 85.3 02/28/2022     02/28/2022     Lab Results   Component Value Date     02/28/2022    K 4.4 02/28/2022     02/28/2022    CO2 25 02/28/2022    BUN 12 02/28/2022    CREATININE 0.92 02/28/2022    GLUCOSE 115 02/28/2022    GLUCOSE 105 02/03/2012    CALCIUM 8.6 02/28/2022      Lab Results   Component Value Date    LABALBU 4.2 02/24/2022     Lab Results   Component Value Date    SEDRATE 14 02/24/2022     Lab Results   Component Value Date    CRP 66.4 (H) 02/24/2022     Lab Results   Component Value Date    LABA1C 5.7 02/24/2022       MICROBIOLOGY:   Wound Culture, Intraoperative, 2/25/22, Staph Aureus  Blood Culture NGTD      Renee Ast, DPM, DPM PGY-2  Podiatric Surgery Resident  Podiatry On Call Pager: [phone]  February 28, 2022  12:04 PM    The resident/student was under my direct supervision during today's patient encounter.  reflection of my personal examination, assessment and treatment plan.  The patient was seen and examined with the resident/student. The above findings and recommendations were reviewed and I agree with above treatment plan. Any questions or concerns, please Dr. Lori Allen or podiatry resident on call.     Lang Salgado DPM  Podiatry Attending  03/08/22  3:15 PM

## 2022-02-28 NOTE — CARE COORDINATION
CALL RECEIVED FROM [facility]- PT % COVERAGE FOR ABX. PT WOULD LIKE TO RETURN HOME. WILL NEED [facility] FOR DISCHARGE.

## 2022-02-28 NOTE — PROGRESS NOTES
CLINICAL PHARMACY NOTE: MEDS TO BEDS    Total # of Prescriptions Filled: 1   The following medications were delivered to the patient:  · Lactobacillus Probiotic    Additional Documentation:

## 2022-02-28 NOTE — PROGRESS NOTES
2200 assessment as stated denies pain at this time, dressing to right foot dry intact,, foot elevated on pillow, no distress noted.  Electronically signed by Cyndie Barajas RN on 2/27/2022 at 10:53 PM

## 2022-02-28 NOTE — PROGRESS NOTES
Physical Therapy Med Surg Daily Treatment Note  Facility/Department: [facility]  Room: C902/O642-49       NAME: Joyce Geller  : 1955 (74 y.o.)  MRN: [id number]  CODE STATUS: Prior    Date of Service: 2022    Patient Diagnosis(es): Cellulitis of right foot [J13.518]  Cellulitis of right lower extremity [L03.115]   Chief Complaint   Patient presents with    Wound Check     pt sent by Podiatry to be admitted for wound to right foot - states that he is currently taking Clindamycin      Patient Active Problem List    Diagnosis Date Noted    MRSA (methicillin resistant Staphylococcus aureus) infection     Failure of outpatient treatment     Cellulitis and abscess of foot     Cellulitis of foot, right 2022    Abscess of foot including toes, right 2022    Cellulitis of right lower extremity 2022    Adenomatous polyp of transverse colon- f/u due 2019    Low testosterone 2019    Eczematous dermatitis     Obesity (BMI 30-39. 9)     Vitamin D deficiency     Dyshidrotic eczema         Past Medical History:   Diagnosis Date    BPH (benign prostatic hyperplasia) 2/3/2012    no treatment at this time    Chronic back pain     Colon polyp 2017    Dr. Krissy Bolton; f/u 5 years    Eczematous dermatitis     hands, feet and legs    ED (erectile dysfunction) 2/3/2012    Erectile dysfunction     Family history of early CAD 2/3/2012    Gastroesophageal reflux disease with hiatal hernia     History of hypertension     History of prediabetes     History of renal insufficiency syndrome     Obesity (BMI 30-39. 9)     Osteoarthritis     Vitamin D deficiency     Weight gain      Past Surgical History:   Procedure Laterality Date    COLONOSCOPY  2006    FOOT DEBRIDEMENT Right 2022    FOOT DEBRIDEMENT INCISION AND DRAINAGE performed by Margarito Gilford, DPM at [facility] [place] SCR[facility] NOT  [address] IND N/A 9/15/2017    COLONOSCOPY performed by Cate Aldrich Kieran Cody MD at [address] [place] [address]  5/9/14    bx, dilation marjorie    UPPER GASTROINTESTINAL ENDOSCOPY N/A 9/15/2017    EGD DILATION [facility] and biopsy performed by Jeanette Armas MD at [facility]       Restrictions  Restrictions/Precautions: Weight Bearing  Position Activity Restriction  Other position/activity restrictions: per podiatry note- [address] R LE with post op shoe and AD; ok for heel weight bearing for transfers- pt stated he told podiatry that he has been walking with heel weight bearing to bathroom    SUBJECTIVE   General  Chart Reviewed: Yes    Pre-Session Pain Report     Pain Screening  Patient Currently in Pain: Denies  Pre Treatment Pain Screening  Pain at present: 0  Intervention List: Patient able to continue with treatment    Post-Session Pain Report  Pain Assessment  Pain Level: 0         OBJECTIVE   Orientation  Overall Orientation Status: Within Functional Limits    Bed mobility  Supine to Sit: Independent  Sit to Supine: Independent    Transfers  Sit to Stand: Independent  Stand to sit:  Independent    Ambulation  Ambulation?: Yes  Ambulation 1  Surface: level tile  Device: No Device  Assistance: Independent  Gait Deviations: Slow Yue  Comments: indep with heel weight bearing R LE    Stairs/Curb  Stairs?: Yes  Stairs  # Steps : 10  Stairs Height: 8\"  Rails: Left ascending  Device: No Device  Assistance: Modified independent   Comment: indep with heel weight bearing R LE     Balance  Sitting - Static: Good  Sitting - Dynamic: Good  Standing - Static: Good  Standing - Dynamic: Good      AROM RLE (degrees)  RLE AROM: Exceptions  RLE General AROM: R ankle NT due to wound; hip/ knee WFL  AROM LLE (degrees)  LLE AROM : WFL    Activity Tolerance  Activity Tolerance: Pt limited by new weight bearing status R LE    PT Education  PT Education: Goals;PT Role;Plan of Care;General Safety     ASSESSMENT   Body structures, Functions, Activity limitations: Decreased functional mobility ; Decreased safe awareness;Decreased balance;Decreased ROM; Decreased endurance;Decreased strength  Assessment: Pt demonstrate indep with stairs 1 flight and gait with indep R LE heel weight bearing indicating safe for d/c home alone. Prognosis: Good  REQUIRES PT FOLLOW UP: Yes     Discharge Recommendations:  Continue to assess pending progress    Goals  Long term goals  Long term goal 1: Pt will demonstrate amb >/= 100 ft with indep and 100% compliance with R LE weight bearing status  Long term goal 2: Pt will demonstrate stair negotiation 2 flights with 1 rail mod indep and 100% compliance with R LE weight bearing status    PLAN    Times per week: 3-6  Safety Devices  Type of devices: Call light within reach,Left in bed     [facility] (6 CLICK) Gerry [address] 28 Inpatient Mobility Raw Score : 19     Therapy Time   Individual   Time In 1500   Time Out 1515   Minutes 15         gait 15 min    Aleta Pritchett, PT, 02/28/22 at 4:34 PM         Definitions for assistance levels  Independent = pt does not require any physical supervision or assistance from another person for activity completion. Device may be needed.   Stand by assistance = pt requires verbal cues or instructions from another person, close to but not touching, to perform the activity  Minimal assistance= pt performs 75% or more of the activity; assistance is required to complete the activity  Moderate assistance= pt performs 50% of the activity; assistance is required to complete the activity  Maximal assistance = pt performs 25% of the activity; assistance is required to complete the activity  Dependent = pt requires total physical assistance to accomplish the task

## 2022-02-28 NOTE — PLAN OF CARE
Problem: Skin Integrity:  Goal: Will show no infection signs and symptoms  Description: Will show no infection signs and symptoms  Outcome: Ongoing     Problem: Skin Integrity:  Goal: Absence of new skin breakdown  Description: Absence of new skin breakdown  Outcome: Ongoing     Problem: Pain:  Goal: Pain level will decrease  Description: Pain level will decrease  Outcome: Ongoing     Problem: Pain:  Goal: Control of acute pain  Description: Control of acute pain  Outcome: Ongoing     Problem: Pain:  Goal: Control of chronic pain  Description: Control of chronic pain  Outcome: Ongoing

## 2022-02-28 NOTE — PROGRESS NOTES
Infectious Diseases Inpatient Progress Note          HISTORY OF PRESENT ILLNESS:  Follow up right foot cellulitis with large abscess on IV vancomycin, well tolerated. I discussed case with podiatry who reported draining 60 cc of pus. Wound is left open wound with packing. Pain is well controlled. No fevers. Wound culture positive for methicillin-resistant staph aureus   Patient is a getting evaluated by physical therapy to see if he can go up the stairs  Vanco levels are low. There is a follow-up Vanco trough level in a.m. Current Medications:     sodium chloride flush  5-40 mL IntraVENous 2 times per day    lactobacillus acidophilus  4 tablet Oral TID    sodium chloride flush  5-40 mL IntraVENous 2 times per day    enoxaparin  40 mg SubCUTAneous Daily    aspirin  325 mg Oral Daily    vancomycin  1,250 mg IntraVENous Q12H    vancomycin (VANCOCIN) intermittent dosing (placeholder)   Other RX Placeholder       Allergies:  Patient has no known allergies. Review of Systems  14 system review is negative other than HPI    Physical Exam  Vitals:    02/26/22 2158 02/27/22 0710 02/27/22 1912 02/28/22 0744   BP: 139/85 135/75 136/75 (!) 146/80   Pulse: 89 83 93 82   Resp: 19 18 18 16   Temp: 97.9 °F (36.6 °C) 98.1 °F (36.7 °C) 97.5 °F (36.4 °C) 97.3 °F (36.3 °C)   TempSrc: Oral Oral Oral Oral   SpO2: 100% 97% 95% 94%   Weight:       Height:         General Appearance: alert and oriented to person, place and time, well-developed and well-nourished, in no acute distress  Skin: warm and dry, no rash. Head: normocephalic and atraumatic  Eyes: anicteric sclerae  ENT: oropharynx clear and moist with normal mucous membranes.  No oral thrush  Lungs: normal respiratory effort  Abdomen: soft, no tenderness  No leg edema  No erythema, no tenderness  Right foot with intact dressing    DATA:    Lab Results   Component Value Date    WBC 7.0 02/28/2022    HGB 14.5 02/28/2022    HCT 43.3 02/28/2022    MCV 85.3 02/28/2022     02/28/2022     Lab Results   Component Value Date    CREATININE 0.92 02/28/2022    BUN 12 02/28/2022     02/28/2022    K 4.4 02/28/2022     02/28/2022    CO2 25 02/28/2022       Hepatic Function Panel:  Lab Results   Component Value Date    ALKPHOS 101 02/24/2022    ALT 18 02/24/2022    AST 10 02/24/2022    PROT 7.2 02/24/2022    BILITOT 0.5 02/24/2022    LABALBU 4.2 02/24/2022    LABALBU 4.8 02/03/2012       Microbiology:   No results for input(s): BC in the last 72 hours. No results for input(s): Wendi Alemand in the last 72 hours. IMPRESSION:    · Right foot cellulitis with large abscess, status post I incision and drainage  · MRSA Staph aureus infection  · Failure of outpatient antibiotics    Patient Active Problem List   Diagnosis    Dyshidrotic eczema    Vitamin D deficiency    Obesity (BMI 30-39. 9)    Eczematous dermatitis    Adenomatous polyp of transverse colon- f/u due 9/2022    Low testosterone    Cellulitis of foot, right    Abscess of foot including toes, right    Cellulitis of right lower extremity    Cellulitis and abscess of foot    Failure of outpatient treatment       PLAN:  · Continue IV vancomycin   · Check Vanco trough level tomorrow and accordingly discharge on 4 weeks IV vancomycin  · Follow-up CBC BMP and Vanco trough weekly  · Vanco trough level before third dose  · Follow-up in 3 weeks  ·     Discussed with patient and     Sheldon Rubi MD

## 2022-02-28 NOTE — PROGRESS NOTES
Hospitalist Progress Note      PCP: Dameon Garvin MD    Date of Admission: 2/24/2022    Chief Complaint:    Chief Complaint   Patient presents with    Wound Check     pt sent by Podiatry to be admitted for wound to right foot - states that he is currently taking Clindamycin      Subjective:  Patient is having loose motions still; denies fevers, chills, sweats. States his testosterone shot was due today. 12 point ROS negative other than mentioned above     Medications:  Reviewed    Infusion Medications    sodium chloride      sodium chloride       Scheduled Medications    sodium chloride flush  5-40 mL IntraVENous 2 times per day    lactobacillus acidophilus  4 tablet Oral TID    sodium chloride flush  5-40 mL IntraVENous 2 times per day    enoxaparin  40 mg SubCUTAneous Daily    aspirin  325 mg Oral Daily    vancomycin  1,250 mg IntraVENous Q12H    vancomycin (VANCOCIN) intermittent dosing (placeholder)   Other RX Placeholder     PRN Meds: sodium chloride flush, sodium chloride, sodium chloride flush, sodium chloride, ondansetron **OR** ondansetron, polyethylene glycol, acetaminophen **OR** acetaminophen, HYDROcodone-acetaminophen      Intake/Output Summary (Last 24 hours) at 2/28/2022 1054  Last data filed at 2/28/2022 1031  Gross per 24 hour   Intake 1519.33 ml   Output --   Net 1519.33 ml     Exam:    BP (!) 146/80   Pulse 82   Temp 97.3 °F (36.3 °C) (Oral)   Resp 16   Ht 6' (1.829 m)   Wt 260 lb (117.9 kg)   SpO2 94%   BMI 35.26 kg/m²     General appearance: No apparent distress, appears stated age and cooperative. HEENT: Conjunctivae/corneas clear. Neck:  Trachea midline. Respiratory:  Normal respiratory effort. Clear to auscultation  Cardiovascular: Regular rate and rhythm  Abdomen: Soft, non-tender, non-distended with normal bowel sounds. Musculoskeletal: No clubbing, cyanosis or edema bilaterally. .  Neuro: Non Focal.   Capillary Refill: Brisk,< 3 seconds   Peripheral Pulses: +2 palpable, equal bilaterally     Labs:   Recent Labs     02/26/22  0511 02/27/22  0501 02/28/22  0626   WBC 9.2 6.7 7.0   HGB 13.5* 14.0 14.5   HCT 40.4* 41.0* 43.3    280 274     Recent Labs     02/26/22  0510 02/27/22  0501 02/28/22  0626    136 136   K 4.3 4.0 4.4    101 100   CO2 25 26 25   BUN 12 11 12   CREATININE 0.82 0.80 0.92   CALCIUM 8.3* 8.7 8.6     No results for input(s): AST, ALT, BILIDIR, BILITOT, ALKPHOS in the last 72 hours. No results for input(s): INR in the last 72 hours. No results for input(s): Rosie Laurie in the last 72 hours. Urinalysis:      Lab Results   Component Value Date    NITRU Negative 01/26/2017    BLOODU Negative 01/26/2017    SPECGRAV 1.015 01/26/2017    GLUCOSEU Negative 01/26/2017     Radiology:  MRI FOOT RIGHT W WO CONTRAST   Final Result      Loculated rim-enhancing fluid collection at the level of the second through fifth metatarsals measuring approximately 2 cm in AP dimension by 6 cm in transverse dimension by 6.5 cm in craniocaudal dimension is most concerning for abscess. No osteomyelitis. IR PICC WO SQ PORT/PUMP > 5 YEARS    (Results Pending)     Assessment/Plan:    1.  Sepsis (leukocytosis, tachycardia) secondary to right foot cellulitis with abscess s/p I&D by podiatry 2/25: Improved  -ID is recommending PICC line and 4 weeks of IV Vancomycin  -postoperative pain control per surgeon  -Continue lactobacilli  - ok for d/c; awaiting decision between [place] [id number] vs SNF    #Low Testosterone    - patient was due to for his scheduled shot today and is requesting it; will provide    Obesity    Lovenox prophylaxis  Full code     Active Hospital Problems    Diagnosis Date Noted    Failure of outpatient treatment [Z78.9]     Cellulitis and abscess of foot [L03.119, L02.619]     Cellulitis of right lower extremity [L03.115] 02/24/2022      Additional work up or/and treatment plan may be added today or then after based on clinical progression. I am managing a portion of pt care. Some medical issues are handled by other specialists. Additional work up and treatment should be done in out pt setting by pt PCP and other out pt providers. In addition to examining and evaluating pt, I spent additional time explaining care, normal and abnormal findings, and treatment plan. All of pt questions were answered. Counseling, diet and education were  provided. Case will be discussed with nursing staff when appropriate. Family will be updated if and when appropriate. Diet: ADULT DIET;  Regular    Code Status: Prior    PT/OT Eval     Electronically signed by Annamarie Harrison MD on 2/28/2022 at 10:54 AM

## 2022-02-28 NOTE — CARE COORDINATION
IV CHECK SENT 2/27/22- TO [facility]. MESSAGE SENT BACK THAT IT WAS SENT TO [facility]. CALL PLACED TO [facility] [phone]. SPOKE WITH PAUL. STATES THEY RECEIVED THE REFERRAL- DO NOT HAVE PRICING YET. WILL CALL ONCE CHECK IS COMPLETE. REFERRAL CALLED TO [facility] [address] [place] [address]. WILL CONTINUE TO FOLLOW PENDING IV COST FOR HOME INFUSION. PT WORKED [place] [id number]. PLAN FOR PT TO RETURN TO HOME [facility]. PLAN OF CARE DISCUSSED WITH DR. Edward Herrera. NEEDS VANCO TROUGH TOMORROW THEN PLAN FOR DC TOMORROW. WILL NEED FINAL RX FOR VANCO. WILL CONTINUE TO FOLLOW.

## 2022-02-28 NOTE — FLOWSHEET NOTE
3360- Patient AOx4, 5/10 pain, medicated at this time. Patient ambulating well independently WB to heel on right foot. Patient has no other needs at this time. Plan for PICC line today. Call light in reach. 1015- Patient returned from PICC placement. Single lumen PICC line flushed and has blood return, alcohol cap applied. Dressing is clean dry and intact, no drainage noted.

## 2022-02-28 NOTE — PROGRESS NOTES
Physical Therapy Med Surg Initial Assessment  Facility/Department: [facility]  Room: [facility]/E257-       NAME: Gabo Christina  : 1955 (74 y.o.)  MRN: [id number]  CODE STATUS: Prior    Date of Service: 2022    Patient Diagnosis(es): Cellulitis of right foot [E51.772]  Cellulitis of right lower extremity [L03.115]   Chief Complaint   Patient presents with    Wound Check     pt sent by Podiatry to be admitted for wound to right foot - states that he is currently taking Clindamycin      Patient Active Problem List    Diagnosis Date Noted    Failure of outpatient treatment     Cellulitis and abscess of foot     Cellulitis of foot, right 2022    Abscess of foot including toes, right 2022    Cellulitis of right lower extremity 2022    Adenomatous polyp of transverse colon- f/u due 2019    Low testosterone 2019    Eczematous dermatitis     Obesity (BMI 30-39. 9)     Vitamin D deficiency     Dyshidrotic eczema         Past Medical History:   Diagnosis Date    BPH (benign prostatic hyperplasia) 2/3/2012    no treatment at this time    Chronic back pain     Colon polyp 2017    Dr. Derek Ybarra; f/u 5 years    Eczematous dermatitis     hands, feet and legs    ED (erectile dysfunction) 2/3/2012    Erectile dysfunction     Family history of early CAD 2/3/2012    Gastroesophageal reflux disease with hiatal hernia     History of hypertension     History of prediabetes     History of renal insufficiency syndrome     Obesity (BMI 30-39. 9)     Osteoarthritis     Vitamin D deficiency     Weight gain      Past Surgical History:   Procedure Laterality Date    COLONOSCOPY  2006    FOOT DEBRIDEMENT Right 2022    FOOT DEBRIDEMENT INCISION AND DRAINAGE performed by Erica Hernandez DPM at [facility] SCRN NOT  [address] IND N/A 9/15/2017    COLONOSCOPY performed by Robert Joseph MD at [facility] ENDOSCOPY  5/9/14    bx, dilation marjorie    UPPER GASTROINTESTINAL ENDOSCOPY N/A 9/15/2017    EGD DILATION DANTEORY and biopsy performed by Juan Young MD at [facility]       Chart Reviewed: Yes  Patient assessed for rehabilitation services?: Yes    Restrictions:  Restrictions/Precautions: Weight Bearing  Position Activity Restriction  Other position/activity restrictions: per podiatry note- [address] R LE with post op shoe and AD; ok for heel weight bearing for transfers- pt stated he told podiatry that he has been walking with heel weight bearing to bathroom     SUBJECTIVE:      Pain  Pre Treatment Pain Screening  Pain at present: 0  Intervention List: Patient able to continue with treatment    Post Treatment Pain Screening:   Pain Screening  Patient Currently in Pain: Denies  Pain Assessment  Pain Level: 0    Prior Level of Function:  Social/Functional History  Lives With: Alone  Type of Home: House  Home Layout: Bed/Bath upstairs,Multi-level (7 steps to bedroom/ bathroom/ kitchen with 1 rail)  Home Access: Stairs to enter with rails  Entrance Stairs - Number of Steps: 7  Entrance Stairs - Rails: Left  Bathroom Shower/Tub: Walk-in shower  ADL Assistance: [facility] [address]: Independent  Homemaking Responsibilities: Yes  Ambulation Assistance: Independent (no AD)  Transfer Assistance: Independent  Active : Yes  Occupation: Full time employment  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    OBJECTIVE:   Vision: Impaired  Vision Exceptions: Wears glasses at all times  Hearing: Within functional limits    Cognition:  Overall Orientation Status: Within Functional Limits  Follows Commands: Within Functional Limits    Observation/Palpation  Posture: Fair  Observation: dressing intact R foot; indep demonstrates heel weight bearing without AD; PT donned post op shoe    ROM:  RLE AROM: Exceptions  RLE General AROM: R ankle NT due to wound; hip/ knee WFL  LLE AROM : WFL    Strength:  Strength RLE  Comment: grossly 4+/5  Strength LLE  Comment: grossly 5/5    Neuro:  Balance  Sitting - Static: Good  Sitting - Dynamic: Good  Standing - Static: Good  Standing - Dynamic: Good     Motor Control  Gross Motor?: WFL  Sensation  Overall Sensation Status: WFL    Bed mobility  Supine to Sit: Independent  Sit to Supine: Independent    Transfers  Sit to Stand: Independent  Stand to sit: Independent    Ambulation  Ambulation?: Yes  Ambulation 1  Surface: level tile  Device: No Device  Assistance: Supervision  Comments: cues for heel weight bearing R intermittently during turns    Stairs/Curb  Stairs?: No         Activity Tolerance  Activity Tolerance: Pt limited by new weight bearing status R LE          PT Education  PT Education: Goals;PT Role;Plan of Care;General Safety    ASSESSMENT:   Body structures, Functions, Activity limitations: Decreased functional mobility ; Decreased safe awareness;Decreased balance;Decreased ROM; Decreased endurance;Decreased strength  Decision Making: Medium Complexity  History: med  Exam: med  Clinical Presentation: med    Prognosis: Good    DISCHARGE RECOMMENDATIONS:  Discharge Recommendations: Continue to assess pending progress    Assessment: Pt is 74 y.o. male with cellulitis and wound of R LE. Pt exhibits difficulty with new weight bearing status R LE, decreased ROM, strength and balance requiring increased assistance requiring continued PT for safe d/c home and safe management of 2 flights of 7 steps into pt's home.   REQUIRES PT FOLLOW UP: Yes      PLAN OF CARE:  Plan  Times per week: 3-6  Current Treatment Recommendations: Fouzia Briseno Re-education,Patient/Caregiver Education & Training,ROM,Pain Management,Equipment Evaluation, Education, & procurement,Balance Training,Gait Training,Home Exercise Program,Functional Mobility Training,Stair training,Safety Education & Training,Positioning  Safety Devices  Type of devices: Call light within reach,Left in bed    Goals:  Long term goals  Long term goal 1: Pt will demonstrate amb >/= 100 ft with indep and 100% compliance with R LE weight bearing status  Long term goal 2: Pt will demonstrate stair negotiation 2 flights with 1 rail mod indep and 100% compliance with R LE weight bearing status    AMPAC (6 CLICK) BASIC MOBILITY  AM-PAC Inpatient Mobility Raw Score : 19    Therapy Time:   Individual   Time In 1135   Time Out 1150   Minutes [address] [place] [address] Tenzin Arroyo, 02/28/22 at 12:12 PM         Definitions for assistance levels  Independent = pt does not require any physical supervision or assistance from another person for activity completion. Device may be needed.   Stand by assistance = pt requires verbal cues or instructions from another person, close to but not touching, to perform the activity  Minimal assistance= pt performs 75% or more of the activity; assistance is required to complete the activity  Moderate assistance= pt performs 50% of the activity; assistance is required to complete the activity  Maximal assistance = pt performs 25% of the activity; assistance is required to complete the activity  Dependent = pt requires total physical assistance to accomplish the task

## 2022-03-01 VITALS
HEIGHT: 72 IN | WEIGHT: 260 LBS | RESPIRATION RATE: 18 BRPM | DIASTOLIC BLOOD PRESSURE: 78 MMHG | OXYGEN SATURATION: 99 % | SYSTOLIC BLOOD PRESSURE: 134 MMHG | TEMPERATURE: 98.7 F | BODY MASS INDEX: 35.21 KG/M2 | HEART RATE: 86 BPM

## 2022-03-01 LAB
ANION GAP SERPL CALCULATED.3IONS-SCNC: 12 MEQ/L (ref 9–15)
BASOPHILS ABSOLUTE: 0 K/UL (ref 0–0.2)
BASOPHILS RELATIVE PERCENT: 0.3 %
BLOOD CULTURE, ROUTINE: NORMAL
BUN BLDV-MCNC: 14 MG/DL (ref 8–23)
CALCIUM SERPL-MCNC: 8.8 MG/DL (ref 8.5–9.9)
CHLORIDE BLD-SCNC: 100 MEQ/L (ref 95–107)
CO2: 24 MEQ/L (ref 20–31)
CREAT SERPL-MCNC: 0.79 MG/DL (ref 0.7–1.2)
EOSINOPHILS ABSOLUTE: 0.3 K/UL (ref 0–0.7)
EOSINOPHILS RELATIVE PERCENT: 4.7 %
GFR AFRICAN AMERICAN: >60
GFR NON-AFRICAN AMERICAN: >60
GLUCOSE BLD-MCNC: 103 MG/DL (ref 70–99)
HCT VFR BLD CALC: 45.1 % (ref 42–52)
HEMOGLOBIN: 14.8 G/DL (ref 14–18)
LYMPHOCYTES ABSOLUTE: 1 K/UL (ref 1–4.8)
LYMPHOCYTES RELATIVE PERCENT: 15.2 %
MCH RBC QN AUTO: 28 PG (ref 27–31.3)
MCHC RBC AUTO-ENTMCNC: 32.7 % (ref 33–37)
MCV RBC AUTO: 85.6 FL (ref 80–100)
MONOCYTES ABSOLUTE: 0.7 K/UL (ref 0.2–0.8)
MONOCYTES RELATIVE PERCENT: 10.1 %
NEUTROPHILS ABSOLUTE: 4.7 K/UL (ref 1.4–6.5)
NEUTROPHILS RELATIVE PERCENT: 69.7 %
PDW BLD-RTO: 13.5 % (ref 11.5–14.5)
PLATELET # BLD: 271 K/UL (ref 130–400)
POTASSIUM REFLEX MAGNESIUM: 4.3 MEQ/L (ref 3.4–4.9)
RBC # BLD: 5.27 M/UL (ref 4.7–6.1)
SODIUM BLD-SCNC: 136 MEQ/L (ref 135–144)
VANCOMYCIN RANDOM: 12.1 UG/ML (ref 10–40)
WBC # BLD: 6.8 K/UL (ref 4.8–10.8)

## 2022-03-01 PROCEDURE — 6370000000 HC RX 637 (ALT 250 FOR IP)

## 2022-03-01 PROCEDURE — 80202 ASSAY OF VANCOMYCIN: CPT

## 2022-03-01 PROCEDURE — 36415 COLL VENOUS BLD VENIPUNCTURE: CPT

## 2022-03-01 PROCEDURE — 2580000003 HC RX 258

## 2022-03-01 PROCEDURE — 6370000000 HC RX 637 (ALT 250 FOR IP): Performed by: INTERNAL MEDICINE

## 2022-03-01 PROCEDURE — 97116 GAIT TRAINING THERAPY: CPT

## 2022-03-01 PROCEDURE — 2580000003 HC RX 258: Performed by: INTERNAL MEDICINE

## 2022-03-01 PROCEDURE — 6360000002 HC RX W HCPCS

## 2022-03-01 PROCEDURE — 85025 COMPLETE CBC W/AUTO DIFF WBC: CPT

## 2022-03-01 PROCEDURE — 97165 OT EVAL LOW COMPLEX 30 MIN: CPT

## 2022-03-01 PROCEDURE — 99232 SBSQ HOSP IP/OBS MODERATE 35: CPT | Performed by: INTERNAL MEDICINE

## 2022-03-01 PROCEDURE — 80048 BASIC METABOLIC PNL TOTAL CA: CPT

## 2022-03-01 RX ADMIN — ENOXAPARIN SODIUM 40 MG: 100 INJECTION SUBCUTANEOUS at 08:19

## 2022-03-01 RX ADMIN — VANCOMYCIN HYDROCHLORIDE 1250 MG: 5 INJECTION, POWDER, LYOPHILIZED, FOR SOLUTION INTRAVENOUS at 09:43

## 2022-03-01 RX ADMIN — ASPIRIN 325 MG: 325 TABLET, COATED ORAL at 08:18

## 2022-03-01 RX ADMIN — Medication 10 ML: at 08:57

## 2022-03-01 RX ADMIN — LACTOBACILLUS TAB 4 TABLET: TAB at 08:18

## 2022-03-01 RX ADMIN — HYDROCODONE BITARTRATE AND ACETAMINOPHEN 2 TABLET: 5; 325 TABLET ORAL at 09:02

## 2022-03-01 ASSESSMENT — PAIN DESCRIPTION - FREQUENCY: FREQUENCY: CONTINUOUS

## 2022-03-01 ASSESSMENT — PAIN DESCRIPTION - ORIENTATION: ORIENTATION: RIGHT

## 2022-03-01 ASSESSMENT — PAIN DESCRIPTION - ONSET: ONSET: ON-GOING

## 2022-03-01 ASSESSMENT — PAIN SCALES - GENERAL
PAINLEVEL_OUTOF10: 8
PAINLEVEL_OUTOF10: 0
PAINLEVEL_OUTOF10: 0
PAINLEVEL_OUTOF10: 5
PAINLEVEL_OUTOF10: 7

## 2022-03-01 ASSESSMENT — PAIN DESCRIPTION - PROGRESSION
CLINICAL_PROGRESSION: GRADUALLY IMPROVING
CLINICAL_PROGRESSION: GRADUALLY IMPROVING

## 2022-03-01 ASSESSMENT — PAIN DESCRIPTION - DESCRIPTORS: DESCRIPTORS: DISCOMFORT

## 2022-03-01 ASSESSMENT — PAIN - FUNCTIONAL ASSESSMENT: PAIN_FUNCTIONAL_ASSESSMENT: PREVENTS OR INTERFERES SOME ACTIVE ACTIVITIES AND ADLS

## 2022-03-01 ASSESSMENT — PAIN DESCRIPTION - LOCATION: LOCATION: FOOT

## 2022-03-01 ASSESSMENT — PAIN DESCRIPTION - PAIN TYPE: TYPE: ACUTE PAIN

## 2022-03-01 NOTE — PROGRESS NOTES
[facility] OCCUPATIONAL THERAPY EVALUATION - ACUTE     NAME: Akhil De León  : 1955 (74 y.o.)  MRN: [id number]  CODE STATUS: Prior  Room: B6/N571-98    Date of Service: 3/1/2022    Patient Diagnosis(es): Cellulitis of right foot [L10.543]  Cellulitis of right lower extremity [L03.115]   Chief Complaint   Patient presents with    Wound Check     pt sent by Podiatry to be admitted for wound to right foot - states that he is currently taking Clindamycin      Patient Active Problem List    Diagnosis Date Noted    MRSA (methicillin resistant Staphylococcus aureus) infection     Failure of outpatient treatment     Cellulitis and abscess of foot     Cellulitis of foot, right 2022    Abscess of foot including toes, right 2022    Cellulitis of right lower extremity 2022    Adenomatous polyp of transverse colon- f/u due 2019    Low testosterone 2019    Eczematous dermatitis     Obesity (BMI 30-39. 9)     Vitamin D deficiency     Dyshidrotic eczema         Past Medical History:   Diagnosis Date    BPH (benign prostatic hyperplasia) 2/3/2012    no treatment at this time    Chronic back pain     Colon polyp 2017    Dr. Benita Florentino; f/u 5 years    Eczematous dermatitis     hands, feet and legs    ED (erectile dysfunction) 2/3/2012    Erectile dysfunction     Family history of early CAD 2/3/2012    Gastroesophageal reflux disease with hiatal hernia     History of hypertension     History of prediabetes     History of renal insufficiency syndrome     Obesity (BMI 30-39. 9)     Osteoarthritis     Vitamin D deficiency     Weight gain      Past Surgical History:   Procedure Laterality Date    COLONOSCOPY  2006    FOOT DEBRIDEMENT Right 2022    FOOT DEBRIDEMENT INCISION AND DRAINAGE performed by Ann Nguyen DPM at [address] [place] [address] SCRN NOT  [address] [place] [address] N/A 9/15/2017    COLONOSCOPY performed by Vandana Lopez MD at [facility] UPPER GASTROINTESTINAL ENDOSCOPY  5/9/14    bx, dilation marjorie    UPPER GASTROINTESTINAL ENDOSCOPY N/A 9/15/2017    EGD DILATION DARIUS and biopsy performed by Jeanette Armas MD at [facility]        Restrictions  Restrictions/Precautions: Weight Bearing  Position Activity Restriction  Other position/activity restrictions: per podiatry note- [address] R LE with post op shoe and AD; ok for heel weight bearing for transfers- pt stated he told podiatry that he has been walking with heel weight bearing to bathroom     Safety Devices: Safety Devices  Safety Devices in place: Yes  Type of devices:  All fall risk precautions in place      Subjective  Pre Treatment Pain Screening  Pain at present: 0  Scale Used: Numeric Score  Intervention List: Patient able to continue with treatment,Patient declined any intervention    Pain Reassessment:   Pain Assessment  Patient Currently in Pain: Denies  Pain Assessment: 0-10  Pain Level: 0     Prior Level of Function:  Social/Functional History  Lives With: Alone  Type of Home: House  Home Layout: Bed/Bath upstairs,Multi-level (7 steps to bedroom/ bathroom/ kitchen with 1 rail)  Home Access: Stairs to enter with rails  Entrance Stairs - Number of Steps: 7  Entrance Stairs - Rails: Left  Bathroom Shower/Tub: Walk-in shower  Bathroom Equipment: Hand-held shower  ADL Assistance: Independent  Homemaking Assistance: Independent  Homemaking Responsibilities: Yes  Ambulation Assistance: Independent (no AD)  Transfer Assistance: Independent  Active : Yes  Occupation: Full time employment  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    OBJECTIVE:     Orientation Status:  Orientation  Overall Orientation Status: Within Functional Limits    Observation:  Observation/Palpation  Posture: Fair  Observation: dressing intact R foot; indep demonstrates heel weight bearing without AD; pt donned post op shoe independently    Cognition Status:  Cognition  Overall Cognitive Status: [facility]    Perception Status:  Perception  Overall Perceptual Status: WFL    Sensation Status:  Sensation  Overall Sensation Status: [facility]    Vision and Hearing Status:  Vision  Vision: Impaired  Vision Exceptions: Wears glasses at all times  Hearing  Hearing: Within functional limits     ROM:   LUE AROM (degrees)  LUE AROM : WFL  Left Hand AROM (degrees)  Left Hand AROM: WFL  RUE AROM (degrees)  RUE AROM : WFL  Right Hand AROM (degrees)  Right Hand AROM: WFL    Strength:  LUE Strength  Gross LUE Strength: WFL  L Hand General: 4/5  LUE Strength Comment: 4+/5 all planes  RUE Strength  Gross RUE Strength: WFL  R Hand General: 4/5  RUE Strength Comment: 4+/5 all planes    Coordination, Tone, Quality of Movement: Tone RUE  RUE Tone: Normotonic  Tone LUE  LUE Tone: Normotonic  Coordination  Movements Are Fluid And Coordinated: Yes    Hand Dominance:  Hand Dominance  Hand Dominance: Right    ADL Status:  ADL  Feeding: Independent  Grooming: Modified independent   UE Bathing: Modified independent   LE Bathing: Modified independent   UE Dressing: Modified independent   LE Dressing: Modified independent   Toileting: Modified independent   Additional Comments: Simulated ADLs as above.  Pt. demonstrates G awareness of heel weight bearing and requires no assist  Toilet Transfers  Toilet - Technique: Ambulating  Equipment Used: Grab bars  Toilet Transfer: Modified independent  Toilet Transfers Comments: Educated on ensuring limitation of weight bearing during transition with G follow through     Therapy key for assistance levels -   Independent = Pt. is able to perform task with no assistance but may require a device   Stand by assistance = Pt. does not perform task at an independent level but does not need physical assistance, requires verbal cues  Minimal, Moderate, Maximal Assistance = Pt. requires physical assistance (25%, 50%, 75% assist from helper) for task but is able to actively participate in task   Dependent = Pt. requires total assistance with task and is not able to actively participate with task completion     Functional Mobility:  Functional Mobility  Functional - Mobility Device: No device  Activity: To/from bathroom  Assist Level: Modified independent   Functional Mobility Comments: Maintains heel weight bearing throughout  Transfers  Sit to stand: Modified independent  Stand to sit: Modified independent    Bed Mobility  Bed mobility  Supine to Sit: Independent  Sit to Supine: Independent    Seated and Standing Balance:  Balance  Sitting Balance: Independent  Standing Balance: Independent    Functional Endurance:  Activity Tolerance  Activity Tolerance: Patient Tolerated treatment well    D/C Recommendations:  OT D/C RECOMMENDATIONS  REQUIRES OT FOLLOW UP: No    Equipment Recommendations:  OT Equipment Recommendations  Equipment Needed: No    OT Education:   OT Education  OT Education: [facility]  Patient Education: Educated pt. on role of acute care OT  Barriers to Learning: None    OT Follow Up:  OT D/C RECOMMENDATIONS  REQUIRES OT FOLLOW UP: No     Assessment/Discharge Disposition:  Assessment: Pt is a 74year old man from home who [facility] to [facility] with RLE cellulitis. Pt demonstrates no significant deficits with mobility and G recall of heel weight bearing.  Pt. requires no physical assist.  Prognosis: Good  No Skilled OT: Independent with ADL's,Safe to return home  Decision Making: Low Complexity  History: Pt's medical history is moderately complex  Exam: Pt. has no performance deficits  Assistance / Modification: Pt. requires no physical assist    Six Click Score   How much help for putting on and taking off regular lower body clothing?: None  How much help for Bathing?: None  How much help for Toileting?: None  How much help for putting on and taking off regular upper body clothing?: None  How much help for taking care of personal grooming?: None  How much help for eating meals?: None  AM-PAC Inpatient Daily Activity Raw Score: 24  AM-PAC Inpatient ADL T-Scale Score : 57.54  ADL Inpatient CMS 0-100% Score: 0    Plan:  Plan  Times per week: No acute OT    Goals:     Patient Goal: Patient goals : \"I want to get home\"     Discussed and agreed upon: Yes Comments:     Therapy Time:   OT Individual Minutes  Time In: 1031  Time Out: 1043  Minutes: 12    Eval: 12 minutes     Electronically signed by:     DANTE Tesfaye/L, OTR/L  3/1/2022, 10:53 AM

## 2022-03-01 NOTE — PROGRESS NOTES
Infectious Diseases Inpatient Progress Note          HISTORY OF PRESENT ILLNESS:  Follow up right foot cellulitis with large abscess on IV vancomycin, well tolerated. I discussed case with podiatry who reported draining 60 cc of pus. Wound is left open wound with packing. Pain is well controlled. No fevers. Wound culture positive for methicillin-resistant staph aureus   Right foot with decreased swelling and redness    Current Medications:     sodium chloride flush  5-40 mL IntraVENous 2 times per day    lactobacillus acidophilus  4 tablet Oral TID    sodium chloride flush  5-40 mL IntraVENous 2 times per day    enoxaparin  40 mg SubCUTAneous Daily    aspirin  325 mg Oral Daily    vancomycin  1,250 mg IntraVENous Q12H    vancomycin (VANCOCIN) intermittent dosing (placeholder)   Other RX Placeholder       Allergies:  Patient has no known allergies. Review of Systems  14 system review is negative other than HPI    Physical Exam  Vitals:    02/27/22 1912 02/28/22 0744 02/28/22 1915 03/01/22 0830   BP: 136/75 (!) 146/80 134/78    Pulse: 93 82 92 86   Resp: 18 16 16 18   Temp: 97.5 °F (36.4 °C) 97.3 °F (36.3 °C) 98.8 °F (37.1 °C) 98.7 °F (37.1 °C)   TempSrc: Oral Oral Oral Oral   SpO2: 95% 94% 98% 99%   Weight:       Height:         General Appearance: alert and oriented to person, place and time, well-developed and well-nourished, in no acute distress  Skin: warm and dry, no rash. Head: normocephalic and atraumatic  Eyes: anicteric sclerae  ENT: oropharynx clear and moist with normal mucous membranes. No oral thrush  Lungs: normal respiratory effort  Abdomen: soft, no tenderness  No leg edema  Right foot with intact dressing.   Photos were shared with patient  Right foot with decreased swelling and redness  DATA:    Lab Results   Component Value Date    WBC 6.8 03/01/2022    HGB 14.8 03/01/2022    HCT 45.1 03/01/2022    MCV 85.6 03/01/2022     03/01/2022     Lab Results   Component Value Date CREATININE 0.79 03/01/2022    BUN 14 03/01/2022     03/01/2022    K 4.3 03/01/2022     03/01/2022    CO2 24 03/01/2022       Hepatic Function Panel:  Lab Results   Component Value Date    ALKPHOS 101 02/24/2022    ALT 18 02/24/2022    AST 10 02/24/2022    PROT 7.2 02/24/2022    BILITOT 0.5 02/24/2022    LABALBU 4.2 02/24/2022    LABALBU 4.8 02/03/2012       Microbiology:   No results for input(s): BC in the last 72 hours. No results for input(s): Crescencio Bourgeois in the last 72 hours. IMPRESSION:    · Right foot cellulitis with large abscess, status post I incision and drainage  · MRSA Staph aureus infection  · Failure of outpatient antibiotics    Patient Active Problem List   Diagnosis    Dyshidrotic eczema    Vitamin D deficiency    Obesity (BMI 30-39. 9)    Eczematous dermatitis    Adenomatous polyp of transverse colon- f/u due 9/2022    Low testosterone    Cellulitis of foot, right    Abscess of foot including toes, right    Cellulitis of right lower extremity    Cellulitis and abscess of foot    Failure of outpatient treatment    MRSA (methicillin resistant Staphylococcus aureus) infection       PLAN:  · Change IV vancomycin 1.5 g IV every 12 hours follow-up Vanco trough level before third dose  · Discharge home with home health care on IV vancomycin for 4 weeks  · Follow-up CBC BMP and Vanco trough weekly  · CRP in 3 weeks  · Follow-up in 3 weeks  ·     Discussed with patient RN and home health care nurse    Paco Cedeno MD

## 2022-03-01 NOTE — PROGRESS NOTES
Vesta Medellin MD at [place] [address]  5/9/14    bx, dilation marjorie    UPPER GASTROINTESTINAL ENDOSCOPY N/A 9/15/2017    EGD DILATION [facility] and biopsy performed by Maurilio Healy MD at [facility]       Restrictions  Restrictions/Precautions: Weight Bearing  Position Activity Restriction  Other position/activity restrictions: per podiatry note- [address] R LE with post op shoe and AD; ok for heel weight bearing for transfers- pt stated he told podiatry that he has been walking with heel weight bearing to bathroom    SUBJECTIVE   General  Chart Reviewed: Yes  Family / Caregiver Present: No  Subjective  Subjective: \"I did stairs yesterday but I can do it again. \"    Pre-Session Pain Report  Pre Treatment Pain Screening  Pain at present: 0  Intervention List: Patient able to continue with treatment  Pain Screening  Patient Currently in Pain: Denies       Post-Session Pain Report  Pain Assessment  Pain Level: 0         OBJECTIVE        Bed mobility  Rolling to Right: Independent  Supine to Sit: Independent  Sit to Supine: Independent  Scooting: Independent  Comment: HOB flat, no HR. Transfers  Sit to Stand: Independent  Stand to sit:  Independent  Comment: Good technique and safety, no LOB    Ambulation  Ambulation?: Yes  More Ambulation?: No  Ambulation 1  Surface: level tile  Device: No Device  Assistance: Independent  Quality of Gait: short step length, maintains WB status  Gait Deviations: Slow Yue  Distance: 100' with two turns  Comments: indep with heel weight bearing R LE    Stairs/Curb  Stairs?: Yes  Stairs  # Steps : 12  Stairs Height: 6\"  Rails: None (utilizing counter as L HR)  Assistance: Modified independent   Comment: indep with heel weight bearing R LE, pt performing stairs with 6\" step x12 reps in room secondary to pt currently hooked up to IV antibiotics       ASSESSMENT   Assessment: Pt demonstrates indep with full flight of stairs and maintaining WB status throughout. Step ups performed to simulate full flight of stairs d/t pt hooked up to IV. Discharge Recommendations:  Continue to assess pending progress    Goals  Long term goals  Long term goal 1: Pt will demonstrate amb >/= 100 ft with indep and 100% compliance with R LE weight bearing status  Long term goal 2: Pt will demonstrate stair negotiation 2 flights with 1 rail mod indep and 100% compliance with R LE weight bearing status    PLAN    Times per week: 3-6  Plan Comment: Cont. POC  Safety Devices  Type of devices: Call light within reach,Left in bed     [facility] (6 CLICK) Gerry [address] 28 Inpatient Mobility Raw Score : 24     Therapy Time   Individual   Time In 1105   Time Out 1120   Minutes 15      BM/Trsf: 5  Gait: 10       Mukesh Borges PTA, 03/01/22 at 11:27 AM         Definitions for assistance levels  Independent = pt does not require any physical supervision or assistance from another person for activity completion. Device may be needed.   Stand by assistance = pt requires verbal cues or instructions from another person, close to but not touching, to perform the activity  Minimal assistance= pt performs 75% or more of the activity; assistance is required to complete the activity  Moderate assistance= pt performs 50% of the activity; assistance is required to complete the activity  Maximal assistance = pt performs 25% of the activity; assistance is required to complete the activity  Dependent = pt requires total physical assistance to accomplish the task

## 2022-03-01 NOTE — PROGRESS NOTES
Pharmacy Vancomycin Consult     Vancomycin Day: 6  Current Dosin mg q12h    Temp 24hr max:  98.8 F    Recent Labs     22  0626 22  0449   BUN 12 14   CREATININE 0.92 0.79   WBC 7.0 6.8       Intake/Output Summary (Last 24 hours) at 3/1/2022 1321  Last data filed at 3/1/2022 0913  Gross per 24 hour   Intake 967.12 ml   Output 350 ml   Net 617.12 ml     Cultures  Recent Labs     22  1537 22  1428 22   BC  --   --  No Growth to date. Any change in status will be called. BLOODCULT2  --   --  No Growth to date. Any change in status will be called. ORG Staph aureus MRSA*  --   --    COVID19  --  Not Detected  --      Height: 6' (182.9 cm), Weight: 260 lb (117.9 kg), Body mass index is 35.26 kg/m². Estimated Creatinine Clearance: 119 mL/min (based on SCr of 0.79 mg/dL). .    Trough:  Recent Labs     22  0449   VANCORANDOM 12.1      Assessment/Plan:  New data entered into [employer]. Random vancomycin level returned at 12.1 mg/L. With new data, current dosing is expected to be subtherapeutic (, Trough 9.1). Adjusting dosing to 1500 mg q12h, Bayesian model predicts:      mg/L*hr   Trough concentration at steady state 10.9 mg/L   Probability AUC is >400 mg/L*hr 39%   Probability trough >20 mg/L 5%   Probability of nephrotoxicity 6%     Therefore, will adjust dosing to 1500 mg q12h. A more conservative dosing approach was used due to concerns for accumulation. Anticipate a random level to be drawn with morning labs on 3/3. Timing of future trough levels may be adjusted based on culture results, renal function, and clinical response.     Thank you,    Shahzad Reaves, PharmD  PGY-1 Pharmacy Resident  3/1/2022 1:29 PM

## 2022-03-01 NOTE — PLAN OF CARE
See OT evaluation for all goals and OT POC.  Electronically signed by DANTE Strickland/L on 3/1/2022 at 10:55 AM

## 2022-03-01 NOTE — PROGRESS NOTES
03/01/22    From: HOME ALONE--INDEPENDENT    Admit: WOUND ON RT FOOT--FAILED OUTPT PO CLINDA    PMH: HTN, PRE-DM    Anticipated Discharge Disposition: Home w/ [address] and [facility]    Patient Mobility or PT/OT ordered: up independently  Consults: PODIATRY, WOUND CARE    Covid result &/or vacc status: NEEDS BOOSTER    Barriers to Discharge:  2/25--I/D RT FOOT  3x/week--Dressings w/ [employer] packing  NWB RT Foot/can bear wt on heal for transfers  IV VANCO x 4 weeks at d/c    Assessments: CMI DONE    Met with patient at bedside. Confirms he is able to go up stairs to enter his home. Stated he worked with PT yesterday to see if he could. Patient has son who lives nearby and able to help if needed. Call and secure voice mail message left for [facility] offered) about patient d/c today. [facility] is not open until 0830. CM to call [phone] when open to arrange delivery. CM/SW to continue to follow for d/c needs. Page 2 IMM reviewed, signed, and placed in chart. CM section of AMAN completed.

## 2022-03-01 NOTE — PROGRESS NOTES
Pt to be discharged to home with HHC/RN services. Pt PICC line dressing intact and alcohol cap intact. No redness or swelling. Flushed and patent. Reviewed new and current home medications with patient as well as discharge instructions for follow up and care of wound. Discussed NWB and surgical shoe support with patient. Answered all questions at this time. Pt to be discharged to home via private car. Pt given all scripts and meds as ordered.

## 2022-03-01 NOTE — DISCHARGE SUMMARY
Hospital Medicine Discharge Summary    Franki Current  :  1955  MRN:  [id number]    Admit date:  2022  Discharge date:  3/1/2022    Admitting Physician:  Jed Brush DO  Primary Care Physician:  Kurt Lan MD    Discharge Diagnoses:    Right foot cellulitis with abscess    Chief Complaint   Patient presents with    Wound Check     pt sent by Podiatry to be admitted for wound to right foot - states that he is currently taking Clindamycin      Hospital Course:   Patient presented from the wound clinic with sepsis secondary to a right foot cellulitis with abscess. Underwent I&D by podiatry; he has a PICC line and is being discharged home on IV Vancomycin for 4 weeks per ID recommendations with [facility]. Exam on discharge:   /78   Pulse 86   Temp 98.7 °F (37.1 °C) (Oral)   Resp 18   Ht 6' (1.829 m)   Wt 260 lb (117.9 kg)   SpO2 99%   BMI 35.26 kg/m²   General appearance: No apparent distress, appears stated age and cooperative. HEENT: Conjunctivae/corneas clear. Neck:  Trachea midline. Respiratory:  Normal respiratory effort. Clear to auscultation  Cardiovascular: Regular rate and rhythm  Abdomen: Soft, non-tender, non-distended with normal bowel sounds. Musculoskeletal: Foot wrapped  Neuro:  Non focal    Patient was seen by the following consultants   Consults:  IP CONSULT TO PODIATRY  PHARMACY TO DOSE VANCOMYCIN  IP CONSULT TO PODIATRY  IP CONSULT TO INFECTIOUS DISEASES  IP CONSULT TO HOME CARE NEEDS    Significant Diagnostic Studies:    Refer to chart     Please refer to chart if no studies are shown here    MRI FOOT RIGHT W WO CONTRAST    Result Date: 2022  EXAM: MRI FOOT RIGHT W WO CONTRAST HISTORY:  Abscess. Cellulitis.  COMPARISON : Foot radiographs 2022 TECHNIQUE: Multiplanar multisequence MRI of the right foot was performed without and with contrast. FINDINGS: Moderate degenerative changes at the first metatarsophalangeal joint with associated edema within the head of the first metatarsal and within the proximal phalanx of the great toe. Mild degenerative changes of the midfoot. Bone marrow signal is otherwise  normal. Subcutaneous soft tissue edema most significantly along the dorsum of the foot where there is a loculated rim-enhancing fluid collection at the level of the second through fifth metatarsals measuring approximately 2 cm in AP dimension by 6 cm in transverse dimension by 6.5 cm in craniocaudal dimension. Increased T2 signal within the musculature of the foot may represent myositis. Flexor and extensor tendons appear intact. Plantar fascia appears intact. Lisfranc ligament is intact. Loculated rim-enhancing fluid collection at the level of the second through fifth metatarsals measuring approximately 2 cm in AP dimension by 6 cm in transverse dimension by 6.5 cm in craniocaudal dimension is most concerning for abscess. No osteomyelitis. Discharge Medications:         Medication List      START taking these medications    lactobacillus acidophilus  Take 4 tablets by mouth 3 times daily     vancomycin  infusion  Commonly known as: VANCOCIN  Infuse 1,500 mg intravenously every 12 hours Compound per protocol.   Vancomycin trough before third dose and weekly  BMP CBC weekly  CRP in 2 weeks  Fax results to [phone]        CONTINUE taking these medications    aspirin 325 MG EC tablet  Take 1 tablet by mouth daily     calcium carbonate 1250 (500 Ca) MG tablet  Commonly known as: OYSTER SHELL CALCIUM 500 mg     Fish Oil 1200 MG Caps     Glucosamine-Chondroitin 8109-1722 MG/30ML Liqd     magnesium 250 MG Tabs tablet  Commonly known as: MAGNESIUM-OXIDE     melatonin 1 MG tablet  Take 1 tablet by mouth nightly as needed for Sleep     One-A-Day Mens Health Formula Tabs     SYRINGE 3CC/41FA6-0/2\" 22G X 1-1/2\" 3 ML Misc  1 actuation by Does not apply route every 14 days     testosterone cypionate 200 MG/ML injection  Commonly known as: Ebony Ye CYPIONATE  Inject 1 mL into the muscle every 14 days for 90 doses. triamcinolone 0.1 % cream  Commonly known as: KENALOG  Apply topically qam daily Monday through Friday only. Take weekend off. Do not use on face, groin, armpits, breasts tissue. vitamin D 1000 UNIT Tabs tablet  Commonly known as: CHOLECALCIFEROL     vitamin E 1000 units capsule        STOP taking these medications    clindamycin 300 MG capsule  Commonly known as: CLEOCIN     HYDROcodone-acetaminophen 5-325 MG per tablet  Commonly known as: Norco     predniSONE 10 MG tablet  Commonly known as: Oris Fuss           Where to Get Your Medications      These medications were sent to [address], [address] [place] [address]    Phone: [phone]   · lactobacillus acidophilus     You can get these medications from any pharmacy    Bring a paper prescription for each of these medications  · vancomycin  infusion         Disposition:   If discharged to Home, Any [facility] needs that were indicated and/or required as been addressed and set up by Social Work. Condition at discharge: good     Activity: activity as tolerated    Total time taken for discharging this patient: 40 minutes. Greater than 70% of time was spent focused exclusively on this patient. Time was taken to review chart, discuss plans with consultants, reconciling medications, discussing plan answering questions with patient.      Signed:  Isabella Callahan MD  3/1/2022, 11:38 AM  ----------------------------------------------------------------------------------------------------------------------    Karis Cotter

## 2022-03-01 NOTE — CARE COORDINATION
Spoke with Elder Hayes with [facility] about patient d/c today on BID ABX and 3 x wk  dressing changes. Elder Hayes will set up [facility] for Wednesday, 3/2/22, AM Vanco dose. Call to [facility] and spoke with Love Mckinney. Final ABX script and PICC placement report still need faxed to [phone]. Patient phone number, address, and emergency contact verified with Love Mckinney. Spoke with bedside nurse to speak with ID, to see if it is ok for patient to miss PM dose this evening. Awaiting response. CM/SW to continue to follow for d/c planning. 1215-Spoke with ID. Per ID, ok, to give ABX at 0800 and 2000. Aware that patient will miss PM dose of IV ABX this evening, but that Asher Swift will come in for 0800 dose. Updated patient, bedside nurse, and Asher Swift. All questions answered.

## 2022-03-02 ENCOUNTER — TELEPHONE (OUTPATIENT)
Dept: FAMILY MEDICINE CLINIC | Age: 67
End: 2022-03-02

## 2022-03-02 ENCOUNTER — CARE COORDINATION (OUTPATIENT)
Dept: CARE COORDINATION | Age: 67
End: 2022-03-02

## 2022-03-02 DIAGNOSIS — L02.619 CELLULITIS AND ABSCESS OF FOOT: Primary | ICD-10-CM

## 2022-03-02 DIAGNOSIS — L03.119 CELLULITIS AND ABSCESS OF FOOT: Primary | ICD-10-CM

## 2022-03-02 DIAGNOSIS — L03.115 CELLULITIS OF FOOT, RIGHT: Primary | ICD-10-CM

## 2022-03-02 LAB — CULTURE, BLOOD 2: NORMAL

## 2022-03-02 PROCEDURE — 1111F DSCHRG MED/CURRENT MED MERGE: CPT | Performed by: FAMILY MEDICINE

## 2022-03-02 NOTE — TELEPHONE ENCOUNTER
Marisa from [facility], calling to let you know they will be taking care of the pt for the iv therapy only nursing visit, will fax form to sign.

## 2022-03-02 NOTE — CARE COORDINATION
[facility] Transitions Initial Follow Up Call    Call within 2 business days of discharge: Yes    Patient: Win Sahni Patient : 1955   MRN: [id number]  Reason for Admission: -3/1/22 Cellulitis RLE; Right Foot Abscess; S/P Right Foot I&D; MRSA  Discharge Date: 3/1/22 RARS: Readmission Risk Score: 6.3 ( )      Last Discharge [facility]       Complaint Diagnosis Description Type Department Provider    22 Wound Check Cellulitis of right foot ED to Hosp-Admission (Discharged) (ADMITTED) JOHNIE Briceño MD; Gypsy Ferreira. .. Spoke with: Patient    Lucas Shone reports right foot soreness. States that Dressing is intact. Pt wearing Post Op shoe. Pt states that he did not receive order for a walker at discharge. Per EMR order was placed then cancelled. CTN placed several calls to Hospitalist, Care Manager 2W, and Wound Clinic. cTN awaiting call backs. Pt aware. Pt states that [facility] is there to administer IV antibiotics and do dressing change. Pt stated that he was not sent home with Wound Care supplies. Per Pt, [facility] nurse to order supplies. Pt states elimination patterns are normal. Appetite is good and is drinking plenty of fluids. Medication Review completed, 1111F order placed. PCP Hosp f/u scheduled. Pt to schedule Podiatry, Wound Clinic, and ID f/u's. CTN will keep Pt updated on DME status. Pt aware. CTN information given, will continue to monitor. Call placed to Dr Julio Garcia at number provided on Pt's AVS. Message left regarding purpose of this call and CTN contact information given. Call placed to [address] [place] spoke with Karen Islas, Care Manager. Karen Islas states that she could not see that DME was faxed to a DME company and that the Pt may have been given the order at discharge. Call placed to Pt to verify order among d/c papers. Pt stated that he did not receive the order. Call placed to [facility].  Message left regarding purpose of this call and CTN contact information given. Call back received. Advised this CTN to contact Podiatrist, or PCP for DME order. Call placed to Dr. Jp Ulloa (Podiatrist) office. They will send DME script to [facility]. Call placed to update Pt. Pt states that he was also supposed to get a shower chair. CTN will contact [facility] for request.     Call placed to [facility]. Message left regarding purpose of this call and CTN contact information given. [facility] nurse, Jing Morales called and left message stating that they do not get orders for shower chairs. CTN will route request to PCP. Instructions:  NWB to RLE with use of post operative shoe and assistive devices, ok to heel WB for transfers. Dressing to stay clean, dry, intact. Do not get dressing wet. RLE Wound Care:  HHC 3x week changes with silvercel. -HHC Instructions: cut or fold dressing to fit wound, loosely pack wound with silvercel, ensure dressing does not  overlap the wound margins, cover with adaptic, and secure with non occlusive DSD. Elevate B/L LE at or above heart level while resting      Facility: [facility]    Transitions of Care Initial Call    Was this an external facility discharge? No Discharge Facility:     Challenges to be reviewed by the provider   Additional needs identified to be addressed with provider: Yes  DME-Pt needs scrit for walker         Method of communication with provider : phone      Advance Care Planning:   Does patient have an Advance Directive: not addressed. Was this a readmission? No  Patient stated reason for admission: Cellulitis foot  Patients top risk factors for readmission: medical condition-Cellulitis Right Foot    Care Transition Nurse (CTN) contacted the patient by telephone to perform post hospital discharge assessment. Verified name and  with patient as identifiers. Provided introduction to self, and explanation of the CTN role.      CTN reviewed discharge instructions, medical action plan and red flags with patient who verbalized understanding. Patient given an opportunity to ask questions and does not have any further questions or concerns at this time. Were discharge instructions available to patient? Yes. Reviewed appropriate site of care based on symptoms and resources available to patient including: PCP, Specialist, Home health, When to call 911 and [address]. The patient agrees to contact the PCP office for questions related to their healthcare. Medication reconciliation was performed with patient, who verbalizes understanding of administration of home medications. Advised obtaining a 90-day supply of all daily and as-needed medications. Covid Risk Education     Educated patient about risk for severe COVID-19 due to risk factors according to CDC guidelines. CTN reviewed discharge instructions, medical action plan and red flag symptoms with the patient who verbalized understanding. Discussed COVID vaccination status: Yes. Education provided on COVID-19 vaccination as appropriate. Discussed exposure protocols and quarantine with CDC Guidelines. Patient was given an opportunity to verbalize any questions and concerns and agrees to contact CTN or health care provider for questions related to their healthcare. Reviewed and educated patient on any new and changed medications related to discharge diagnosis. Was patient discharged with a pulse oximeter? N/A Discussed and confirmed pulse oximeter discharge instructions and when to notify provider or seek emergency care. CTN provided contact information. Plan for follow-up call in 5-7 days based on severity of symptoms and risk factors.   Plan for next call: symptom management-pain, wound care  follow up appointment-PCP, Podiatry, ID, Wound Clinic  DME - walker        Care Transitions 24 Hour Call    Schedule Follow Up Appointment with PCP: Completed  Do you have any ongoing symptoms?: Yes  Patient-reported symptoms: Other (Comment: Right foot soreness)  Do you have a copy of your discharge instructions?: Yes  Do you have all of your prescriptions and are they filled?: Yes  Have you been contacted by a [address] [place] [address] Avenue?: No  Have you scheduled your follow up appointment?: Yes  How are you going to get to your appointment?: Car - family or friend to transport  Were you discharged with any Home Care or Post Acute Services: Yes  Post Acute Services: Home Health (Comment: [facility])  Do you feel like you have everything you need to keep you well at home?: No  Care Transitions Interventions     DME Assistance: Completed          Follow Up  Future Appointments   Date Time Provider Bianca Kramer   3/8/2022 11:45 AM Lea Velazquez MD [facility]   11/21/2022  5:00 PM Lea Velazquez MD [address]

## 2022-03-03 LAB
CULTURE WOUND: ABNORMAL
CULTURE WOUND: ABNORMAL
ORGANISM: ABNORMAL

## 2022-03-04 ENCOUNTER — TELEPHONE (OUTPATIENT)
Dept: FAMILY MEDICINE CLINIC | Age: 67
End: 2022-03-04

## 2022-03-04 DIAGNOSIS — L03.115 CELLULITIS OF FOOT, RIGHT: Primary | ICD-10-CM

## 2022-03-04 NOTE — TELEPHONE ENCOUNTER
[address]    Patient d/c from hosp  Waiting for a walker order to [facility] from hospital.     Order not received. Can pcp place order. ?     Please call patient back with an update

## 2022-03-07 ENCOUNTER — HOSPITAL ENCOUNTER (OUTPATIENT)
Dept: WOUND CARE | Age: 67
Discharge: HOME | End: 2022-03-07
Payer: MEDICARE

## 2022-03-07 VITALS
HEART RATE: 104 BPM | SYSTOLIC BLOOD PRESSURE: 147 MMHG | TEMPERATURE: 97.6 F | DIASTOLIC BLOOD PRESSURE: 87 MMHG | RESPIRATION RATE: 16 BRPM

## 2022-03-07 DIAGNOSIS — L97.513 RIGHT FOOT ULCER, WITH NECROSIS OF MUSCLE (HCC): Primary | ICD-10-CM

## 2022-03-07 PROCEDURE — 11042 DBRDMT SUBQ TIS 1ST 20SQCM/<: CPT

## 2022-03-07 RX ORDER — LIDOCAINE HYDROCHLORIDE 20 MG/ML
JELLY TOPICAL ONCE
Status: CANCELLED | OUTPATIENT
Start: 2022-03-07 | End: 2022-03-07

## 2022-03-07 RX ORDER — BACITRACIN ZINC AND POLYMYXIN B SULFATE 500; 1000 [USP'U]/G; [USP'U]/G
OINTMENT TOPICAL ONCE
Status: CANCELLED | OUTPATIENT
Start: 2022-03-07 | End: 2022-03-07

## 2022-03-07 RX ORDER — BACITRACIN, NEOMYCIN, POLYMYXIN B 400; 3.5; 5 [USP'U]/G; MG/G; [USP'U]/G
OINTMENT TOPICAL ONCE
Status: CANCELLED | OUTPATIENT
Start: 2022-03-07 | End: 2022-03-07

## 2022-03-07 RX ORDER — CLOBETASOL PROPIONATE 0.5 MG/G
OINTMENT TOPICAL ONCE
Status: CANCELLED | OUTPATIENT
Start: 2022-03-07 | End: 2022-03-07

## 2022-03-07 RX ORDER — BETAMETHASONE DIPROPIONATE 0.05 %
OINTMENT (GRAM) TOPICAL ONCE
Status: CANCELLED | OUTPATIENT
Start: 2022-03-07 | End: 2022-03-07

## 2022-03-07 RX ORDER — GINSENG 100 MG
CAPSULE ORAL ONCE
Status: CANCELLED | OUTPATIENT
Start: 2022-03-07 | End: 2022-03-07

## 2022-03-07 RX ORDER — LIDOCAINE HYDROCHLORIDE 40 MG/ML
SOLUTION TOPICAL ONCE
Status: CANCELLED | OUTPATIENT
Start: 2022-03-07 | End: 2022-03-07

## 2022-03-07 RX ORDER — ASCORBIC ACID 500 MG
500 TABLET ORAL DAILY
COMMUNITY

## 2022-03-07 RX ORDER — GENTAMICIN SULFATE 1 MG/G
OINTMENT TOPICAL ONCE
Status: CANCELLED | OUTPATIENT
Start: 2022-03-07 | End: 2022-03-07

## 2022-03-07 RX ORDER — LIDOCAINE 40 MG/G
CREAM TOPICAL ONCE
Status: CANCELLED | OUTPATIENT
Start: 2022-03-07 | End: 2022-03-07

## 2022-03-07 RX ORDER — LIDOCAINE 50 MG/G
OINTMENT TOPICAL ONCE
Status: CANCELLED | OUTPATIENT
Start: 2022-03-07 | End: 2022-03-07

## 2022-03-07 RX ORDER — CHOLECALCIFEROL (VITAMIN D3) 125 MCG
500 CAPSULE ORAL DAILY
COMMUNITY

## 2022-03-07 RX ORDER — ZINC GLUCONATE 50 MG
50 TABLET ORAL DAILY
COMMUNITY

## 2022-03-07 NOTE — PROGRESS NOTES
Leida Eldridge 37   Progress Note and Procedure Note      [address] RECORD NUMBER:  [id number]  AGE: 74 y.o. GENDER: male  : 1955  EPISODE DATE:  3/7/2022    Subjective:     Chief Complaint   Patient presents with    Wound Check     New visit- right foot post op         HISTORY of PRESENT ILLNESS HPI     Jennifer Johnson is a 74 y.o. male who presents today for wound/ulcer evaluation. History of Wound Context: Right foot surgical wound s/p I&D abscess/MRSA infection. He is improving. Denies nausea, vomiting, fevers, or chills. Wound/Ulcer Pain Timing/Severity: intermittent  Quality of pain: sharp  Severity:  1 / 10   Modifying Factors: Pain is relieved/improved with rest  Associated Signs/Symptoms: drainage and numbness    Ulcer Identification:  Ulcer Type: non-healing surgical  Contributing Factors: obesity    Wound: N/A        PAST MEDICAL HISTORY        Diagnosis Date    BPH (benign prostatic hyperplasia) 2/3/2012    no treatment at this time    Chronic back pain     Colon polyp 2017    Dr. Gilmer Salgado; f/u 5 years    Eczematous dermatitis     hands, feet and legs    ED (erectile dysfunction) 2/3/2012    Erectile dysfunction     Family history of early CAD 2/3/2012    Gastroesophageal reflux disease with hiatal hernia     History of hypertension     History of prediabetes     History of renal insufficiency syndrome     Obesity (BMI 30-39. 9)     Osteoarthritis     Vitamin D deficiency     Weight gain        PAST SURGICAL HISTORY    Past Surgical History:   Procedure Laterality Date    COLONOSCOPY  2006    FOOT DEBRIDEMENT Right 2022    FOOT DEBRIDEMENT INCISION AND DRAINAGE performed by Pedro Hawkins DPM at [address] [place] [address] SCRN NOT  [address] [place] [address] N/A 9/15/2017    COLONOSCOPY performed by Emi Luna MD at [address] [place] [address]  14    mechelle boston    UPPER GASTROINTESTINAL ENDOSCOPY N/A 9/15/2017    EGD DILATION SAVORY and biopsy performed by Janelle Hassan MD at [facility]    Family History   Problem Relation Age of Onset    Depression Mother     Heart Disease Paternal Grandmother     High Blood Pressure Paternal Grandmother     High Cholesterol Paternal Grandmother     Anemia Paternal Grandmother         B 12 deficiency    Heart Disease Brother     Heart Surgery Brother     Anemia Brother         B 12 deficiency       SOCIAL HISTORY    Social History     Tobacco Use    Smoking status: Former Smoker     Packs/day: 2.00     Years: 5.00     Pack years: 10.00     Quit date: 1990     Years since quittin.2    Smokeless tobacco: Never Used   Vaping Use    Vaping Use: Never used   Substance Use Topics    Alcohol use: No    Drug use: No       ALLERGIES    No Known Allergies    MEDICATIONS    Current Outpatient Medications on File Prior to Encounter   Medication Sig Dispense Refill    vancomycin (VANCOCIN) infusion Infuse 1,500 mg intravenously every 12 hours Compound per protocol. Vancomycin trough before third dose and weekly  BMP CBC weekly  CRP in 2 weeks  Fax results to [id number] 90 g 0    lactobacillus acidophilus [facility]) Take 4 tablets by mouth 3 times daily 120 tablet 0    aspirin 325 MG EC tablet Take 1 tablet by mouth daily 1 tablet 0    triamcinolone (KENALOG) 0.1 % cream Apply topically qam daily Monday through Friday only. Take weekend off. Do not use on face, groin, armpits, breasts tissue. 454 g 0    testosterone cypionate (DEPOTESTOTERONE CYPIONATE) 200 MG/ML injection Inject 1 mL into the muscle every 14 days for 90 doses.  8 mL 0    Syringe/Needle, Disp, (SYRINGE 3CC/70UQ2-3/2\") 22G X 1-1/2\" 3 ML MISC 1 actuation by Does not apply route every 14 days 2 each 5    melatonin 1 MG tablet Take 1 tablet by mouth nightly as needed for Sleep 30 tablet     vitamin E 1000 units capsule Take 1,000 Units by mouth daily      vitamin D (CHOLECALCIFEROL) 1000 UNIT TABS tablet Take 6,000 Units by mouth daily      Multiple Vitamins-Minerals (ONE-A-DAY MENS HEALTH FORMULA) TABS Take by mouth      magnesium (MAGNESIUM-OXIDE) 250 MG TABS tablet Take 250 mg by mouth daily Take 3 pills daily      calcium carbonate (OYSTER SHELL CALCIUM 500 MG) 1250 MG tablet Take 1 tablet by mouth daily      Glucosamine-Chondroitin 4088-6969 MG/30ML LIQD Take by mouth Take 3 pills daily      Omega-3 Fatty Acids (FISH OIL) 1200 MG CAPS Take  by mouth daily. No current facility-administered medications on file prior to encounter. REVIEW OF SYSTEMS    Pertinent items are noted in HPI. Objective:      BP (!) 147/87   Pulse 104   Temp 97.6 °F (36.4 °C) (Temporal)   Resp 16     Wt Readings from Last 3 Encounters:   02/24/22 260 lb (117.9 kg)   02/23/22 260 lb (117.9 kg)   02/16/22 268 lb (121.6 kg)       PHYSICAL EXAM  General Appearance: alert and oriented to person, place and time, well-developed and well-nourished, in no acute distress  Cardiovascular: normal rate and intact distal pulses  Extremities: no cyanosis and no clubbing  Musculoskeletal: normal range of motion, no joint swelling, deformity or tenderness  Neurologic: gait and coordination normal and speech normal    Assessment:     Active Hospital Problems    Diagnosis Date Noted    Right foot ulcer, with necrosis of muscle [facility]) [L97.513] 03/07/2022        Procedure Note  Indications:  Based on my examination of this patient's wound(s)/ulcer(s) today, debridement is required to promote healing and evaluate the wound base. Performed by: Quique Talbot DPM    Consent obtained:  Yes    Time out taken:  Yes    Pain Control:         Debridement:Excisional Debridement    Using tissue nippers the wound(s)/ulcer(s) was/were sharply debrided down through and including the removal of epidermis, dermis and subcutaneous tissue.         Devitalized Tissue Debrided:  fibrin, biofilm and slough    Pre Debridement Measurements:  Are located in the [facility]  Documentation Flow Sheet    Wound/Ulcer #: 1    Post Debridement Measurements:  Wound/Ulcer Descriptions are Pre Debridement except measurements:    Wound 02/24/22 Foot Right;Dorsal #1 (Active)   Wound Image   03/07/22 1008   Wound Etiology Surgical 03/07/22 1008   Dressing Status Clean;Dry; Intact 03/01/22 0913   Wound Cleansed Cleansed with saline 03/07/22 1008   Wound Length (cm) 3.6 cm 03/07/22 1008   Wound Width (cm) 1 cm 03/07/22 1008   Wound Depth (cm) 0.8 cm 03/07/22 1008   Wound Surface Area (cm^2) 3.6 cm^2 03/07/22 1008   Change in Wound Size % (l*w) -200 03/07/22 1008   Wound Volume (cm^3) 2.88 cm^3 03/07/22 1008   Post-Procedure Length (cm) 3.6 cm 03/07/22 1032   Post-Procedure Width (cm) 1 cm 03/07/22 1032   Post-Procedure Depth (cm) 0.8 cm 03/07/22 1032   Post-Procedure Surface Area (cm^2) 3.6 cm^2 03/07/22 1032   Post-Procedure Volume (cm^3) 2.88 cm^3 03/07/22 1032   Wound Assessment Rayne/red;Slough 03/07/22 1008   Drainage Amount Moderate 03/07/22 1008   Drainage Description Yellow;Serosanguinous;Green 03/07/22 1008   Odor None 03/07/22 1008   Diann-wound Assessment Intact 03/07/22 1008   Margins Defined edges 03/07/22 1008   Wound Thickness Description not for Pressure Injury Full thickness 03/07/22 1008   Number of days: 10        Incision 02/25/22 Foot Anterior;Right (Active)   Dressing Status Clean;Dry 03/01/22 0913   Drainage Amount None 03/01/22 0913   Odor None 03/01/22 0913   Number of days: 10       Percent of Wound/Ulcer Debrided: 100%    Total Surface Area Debrided:  3.6 sq cm     Diabetic/Pressure/Non Pressure Ulcers:  Ulcer: Non-Pressure ulcer, muscle necrosis\",      Bleeding:  Minimal    Hemostasis Achieved:  by pressure    Procedural Pain:  1  / 10     Post Procedural Pain:  1 / 10     Response to treatment:  Well tolerated by patient. Plan:     Continue offloading and Silvercel.   Plan of care was discussed with patient and he is in agreement. Treatment Note please see attached Discharge Instructions    In my professional opinion this patient would benefit from HBO Therapy: No    Written patient dismissal instructions given to patient and signed by patient or POA. Discharge [address] and Hyperbaric Medicine   Physician Orders and Discharge Instructions  [facility]  [address]  [facility]  Telephone: [phone]      NAME:  Dong Raines          YOB: 1955  MEDICAL RECORD NUMBER:  [id number]    Your  is:  Israelaurapadmini Paredes Care/Facility: [address] [place] [address]     Wound Location: Right Dorsal Foot     Dressing orders: 1. Cleanse wound(s) with normal saline. 2. Apply dry SILVERCEL OR CALCIUM ALGINATE WITH Ag or eqivalent to wound bed. 3. Cover with 4x4's and wrap with gauze (sin or kerlix)  4. Change  Every other day or Monday, Wednesday, and Friday    Compression:    Offloading Device: Post op shoe to right foot     Other Instructions: Continue antibiotics per Infectious Disease. Dressing Supplies:      Keep all dressings clean, dry and intact. Keep pressure off the wound(s) at all times. Follow up visit  2  Weeks Monday March 21, 2022 at     Please give 24 hour notice if unable to keep appointment. [phone]    If you experience any of the following, please call the Wound Care Service at  [phone] or go to the nearest emergency room. *Increase in pain *Temperature over 101 *Increase in drainage from your wound or a foul odor  *Uncontrolled swelling *Need for compression bandage changes due to slippage, breakthrough drainage       PLEASE NOTE: IF YOU ARE UNABLE TO OBTAIN WOUND SUPPLIES, CONTINUE TO USE THE SUPPLIES YOU HAVE AVAILABLE UNTIL YOU ARE ABLE TO REACH US.  IT IS MOST IMPORTANT TO KEEP THE WOUND COVERED AT ALL TIMES    Electronically signed by Demario Sykes Faustina Coronado on 3/7/2022 at 10:40 AM                  Electronically signed by Ru Mc DPM on 3/7/2022 at 10:41 AM

## 2022-03-07 NOTE — TELEPHONE ENCOUNTER
walker pended - need additional information needed.  -(please adjust if rolling walker is needed - Both pended for approval of which ever is needed

## 2022-03-07 NOTE — DISCHARGE INSTR - COC
Continuity of Care Form    Patient Name: Toshia Vargas   :  1955  MRN:  [id number]    Admit date:  3/7/2022  Discharge date:  ***    Code Status Order: Prior   Advance Directives:      Admitting Physician:  No admitting provider for patient encounter. PCP: Stacey Alford MD    Discharging Nurse: [facility] Unit/Room#: No information available for this encounter. Discharging Unit Phone Number: ***    Emergency Contact:   Extended Emergency Contact Information  Primary Emergency Contact: Romero Hawk [facility] Phone: [phone]  Relation: Child    Past Surgical History:  Past Surgical History:   Procedure Laterality Date    COLONOSCOPY  2006    FOOT DEBRIDEMENT Right 2022    FOOT DEBRIDEMENT INCISION AND DRAINAGE performed by Alfa Becker DPM at [address] IND N/A 9/15/2017    COLONOSCOPY performed by Maurilio Healy MD at [address] [place] [address]  14    bx, dilation jarmoszuk    UPPER GASTROINTESTINAL ENDOSCOPY N/A 9/15/2017    [facility]       Immunization History:   Immunization History   Administered Date(s) Administered    Influenza Virus Vaccine 10/08/2015    Influenza, Quadv, adjuvanted, 65 yrs +, IM, PF (Fluad) 10/09/2020, 2021    Pneumococcal Polysaccharide ([id number]) 2012, 10/09/2020       Active Problems:  Patient Active Problem List   Diagnosis Code    Dyshidrotic eczema L30.1    Vitamin D deficiency E55.9    Obesity (BMI 30-39. 9) E66.9    Eczematous dermatitis L30.9    Adenomatous polyp of transverse colon- f/u due 2022 D12.3    Low testosterone R79.89    Cellulitis of foot, right L03.115    Abscess of foot including toes, right L02.611    Cellulitis of right lower extremity L03.115    Cellulitis and abscess of foot L03.119, L02.619    Failure of outpatient treatment Z78.9    MRSA (methicillin resistant Staphylococcus aureus) infection A49.02    Right foot ulcer, with necrosis of muscle ([address] L97.513       Isolation/Infection:   Isolation            No Isolation          Patient Infection Status       Infection Onset Added Last Indicated Last Indicated By Review Planned Expiration Resolved Resolved By    [facility] 02/25/22 02/28/22 02/25/22 Culture, Anaerobic and Aerobic        Wound-foot 2/25/22            Nurse Assessment:  Last Vital Signs: BP (!) 147/87   Pulse 104   Temp 97.6 °F (36.4 °C) (Temporal)   Resp 16     Last documented pain score (0-10 scale):    Last Weight:   Wt Readings from Last 1 Encounters:   02/24/22 260 lb (117.9 kg)     Mental Status:  {IP PT MENTAL STATUS:20030}    IV Access:  { AMAN IV ACCESS:816227108}    Nursing Mobility/ADLs:  Walking   {P DME ERDM:914558580}  Transfer  {[facility] DME WQDJ:910274308}  Bathing  {[facility] DME VZIN:546499566}  Dressing  {[facility] DME FOHO:410672032}  Toileting  {[facility] DME NCIV:690039823}  Feeding  {[facility] DME GTPH:796580119}  Med Admin  {[facility] DME HIXT:924912783}  Med Delivery   {[facility] MED Delivery:915766928}    Wound Care Documentation and Therapy:  Wound 02/24/22 Foot Right;Dorsal #1 (Active)   Wound Image   03/07/22 1008   Wound Etiology Surgical 03/07/22 1008   Dressing Status Clean;Dry; Intact 03/01/22 0913   Wound Cleansed Cleansed with saline 03/07/22 1008   Wound Length (cm) 3.6 cm 03/07/22 1008   Wound Width (cm) 1 cm 03/07/22 1008   Wound Depth (cm) 0.8 cm 03/07/22 1008   Wound Surface Area (cm^2) 3.6 cm^2 03/07/22 1008   Change in Wound Size % (l*w) -200 03/07/22 1008   Wound Volume (cm^3) 2.88 cm^3 03/07/22 1008   Post-Procedure Length (cm) 3.6 cm 03/07/22 1032   Post-Procedure Width (cm) 1 cm 03/07/22 1032   Post-Procedure Depth (cm) 0.8 cm 03/07/22 1032   Post-Procedure Surface Area (cm^2) 3.6 cm^2 03/07/22 1032   Post-Procedure Volume (cm^3) 2.88 cm^3 03/07/22 1032   Wound Assessment Macclenny/red;Slough 03/07/22 1008   Drainage Amount Moderate 03/07/22 1008 Drainage Description Yellow;Serosanguinous;Green 22 1008   Odor None 22 1008   Diann-wound Assessment Intact 22 1008   Margins Defined edges 22 1008   Wound Thickness Description not for Pressure Injury Full thickness 22 1008   Number of days: 10        Elimination:  Continence: Bowel: {YES / AD:55986}  Bladder: {YES / ZF:04780}  Urinary Catheter: {Urinary Catheter:997951324}   Colostomy/Ileostomy/Ileal Conduit: {YES / LX:75409}       Date of Last BM: ***  No intake or output data in the 24 hours ending 22 1037  No intake/output data recorded.     Safety Concerns:     [address] Safety Concerns:042860382}    Impairments/Disabilities:      [address] Impairments/Disabilities:713428571}    Nutrition Therapy:  Current Nutrition Therapy:   [address] Diet List:043248260}    Routes of Feeding: {CHP DME Other Feedings:418661411}  Liquids: {Slp liquid thickness:80361}  Daily Fluid Restriction: {CHP DME Yes amt example:199612324}  Last Modified Barium Swallow with Video (Video Swallowing Test): {Done Not Done NEPK:252055060}    Treatments at the Time of Hospital Discharge:   Respiratory Treatments: ***  Oxygen Therapy:  {Therapy; copd oxygen:79270}  Ventilator:    { CC Vent ZZNE:794017770}    Rehab Therapies: {THERAPEUTIC INTERVENTION:5908259107}  Weight Bearing Status/Restrictions: [address]  Weight Bearin}  Other Medical Equipment (for information only, NOT a DME order):  {EQUIPMENT:346690175}  Other Treatments: ***    Patient's personal belongings (please select all that are sent with patient):  {P DME Belongings:997511248}    RN SIGNATURE:  {Esignature:150948623}    CASE MANAGEMENT/SOCIAL WORK SECTION    Inpatient Status Date: ***    Readmission Risk Assessment Score:  Readmission Risk              Risk of Unplanned Readmission:  0           Discharging to Facility/ Agency   Name:   Address:  Phone:  Fax:    Dialysis Facility (if applicable)   Name:  Address:  Dialysis Schedule:  Phone:  Fax:    / signature: {Esignature:455093613}    PHYSICIAN SECTION    Prognosis: {Prognosis:0064904349}    Condition at Discharge: Amaris Hernandez Patient Condition:184028479}    Rehab Potential (if transferring to Rehab): {Prognosis:0616326455}    Recommended Labs or Other Treatments After Discharge: ***    Physician Certification: I certify the above information and transfer of [employer]  is necessary for the continuing treatment of the diagnosis listed and that he requires {Admit to Appropriate Level of Care:28023} for {GREATER/LESS:893149109} 30 days.      Update Admission H&P: {CHP DME Changes in SXCAN:775179724}    PHYSICIAN SIGNATURE:  {Esignature:532630143}

## 2022-03-07 NOTE — CODE DOCUMENTATION
[address] Physician Billing Sheet. Franki Current  AGE: 74 y.o.    GENDER: male  : 1955  TODAY'S DATE:  3/7/2022    ICD-10 CODES  Active Hospital Problems    Diagnosis Date Noted    Right foot ulcer, with necrosis of muscle [facility]) [W92.618] 2022       PHYSICIAN PROCEDURES    CPT CODE  06419 - RT, 79      Electronically signed by Quique Talbot DPM on 3/7/2022 at 10:45 AM

## 2022-03-08 ENCOUNTER — OFFICE VISIT (OUTPATIENT)
Dept: FAMILY MEDICINE CLINIC | Age: 67
End: 2022-03-08
Payer: MEDICARE

## 2022-03-08 VITALS
BODY MASS INDEX: 36.44 KG/M2 | TEMPERATURE: 97.7 F | DIASTOLIC BLOOD PRESSURE: 86 MMHG | WEIGHT: 269 LBS | HEIGHT: 72 IN | HEART RATE: 93 BPM | SYSTOLIC BLOOD PRESSURE: 128 MMHG | OXYGEN SATURATION: 97 %

## 2022-03-08 DIAGNOSIS — Z22.322 MRSA (METHICILLIN RESISTANT STAPH AUREUS) CULTURE POSITIVE: ICD-10-CM

## 2022-03-08 DIAGNOSIS — R79.89 LOW TESTOSTERONE: ICD-10-CM

## 2022-03-08 DIAGNOSIS — L97.513 RIGHT FOOT ULCER, WITH NECROSIS OF MUSCLE (HCC): Primary | ICD-10-CM

## 2022-03-08 PROCEDURE — 99214 OFFICE O/P EST MOD 30 MIN: CPT | Performed by: FAMILY MEDICINE

## 2022-03-08 PROCEDURE — 1111F DSCHRG MED/CURRENT MED MERGE: CPT | Performed by: FAMILY MEDICINE

## 2022-03-08 NOTE — PROGRESS NOTES
Post-Discharge Transitional Care Follow Up      Joyce Geller   YOB: 1955    Date of Office Visit:  3/8/2022  Date of Hospital Admission: 2/24/22  Date of Hospital Discharge: 3/1/22  Readmission Risk Score (high >=14%. Medium >=10%):Readmission Risk Score: 6.3 ( )      Care management risk score Rising risk (score 2-5) and Complex Care (Scores >=6): 1     Non face to face  following discharge, date last encounter closed (first attempt may have been earlier): 3/2/2022  1:18 PM     Call initiated 2 business days of discharge: Yes     Right foot ulcer, with necrosis of muscle ([address]  -     HI DISCHARGE MEDS RECONCILED W/ CURRENT OUTPATIENT MED LIST  MRSA (methicillin resistant staph aureus) culture positive  -     HI DISCHARGE MEDS RECONCILED W/ CURRENT OUTPATIENT MED LIST  Low testosterone  -     Testosterone, free, total; Future      Medical Decision Making: low complexity  Return for Patient is due for testosterone labs in May. Recheck blood pressure then. Recheck weight then. Subjective:   Patient feeling better. Pain under better control after drainage. On IV antibiotics and tolerating that. Started a probiotic which was a little hard on him the dose that he was prescribed so he is added yogurt and decrease the probiotic. Otherwise feeling fine. Up-to-date on his testosterone injection. They did it while he was in the hospital.      Inpatient course: Discharge summary reviewed- see chart. Interval history/Current status: improved    Patient Active Problem List   Diagnosis    Dyshidrotic eczema    Vitamin D deficiency    Obesity (BMI 30-39. 9)    Eczematous dermatitis    Adenomatous polyp of transverse colon- f/u due 9/2022    Low testosterone    Cellulitis of foot, right    Abscess of foot including toes, right    Cellulitis of right lower extremity    Cellulitis and abscess of foot    Failure of outpatient treatment    MRSA (methicillin resistant Staphylococcus aureus) infection    Right foot ulcer, with necrosis of muscle ([address]       Medications listed as ordered at the time of discharge from hospital  [unfilled]     Medications marked \"taking\" at this time  Outpatient Medications Marked as Taking for the 3/8/22 encounter (Office Visit) with Zaheer Knight MD   Medication Sig Dispense Refill    ascorbic acid (VITAMIN C) 500 MG tablet Take 500 mg by mouth daily      vitamin B-12 (CYANOCOBALAMIN) 500 MCG tablet Take 500 mcg by mouth daily      zinc 50 MG TABS tablet Take 50 mg by mouth daily      vancomycin (VANCOCIN) infusion Infuse 1,500 mg intravenously every 12 hours Compound per protocol. Vancomycin trough before third dose and weekly  BMP CBC weekly  CRP in 2 weeks  Fax results to [id number] 90 g 0    lactobacillus acidophilus [facility]) Take 4 tablets by mouth 3 times daily 120 tablet 0    aspirin 325 MG EC tablet Take 1 tablet by mouth daily 1 tablet 0    triamcinolone (KENALOG) 0.1 % cream Apply topically qam daily Monday through Friday only. Take weekend off. Do not use on face, groin, armpits, breasts tissue. 454 g 0    testosterone cypionate (DEPOTESTOTERONE CYPIONATE) 200 MG/ML injection Inject 1 mL into the muscle every 14 days for 90 doses.  8 mL 0    Syringe/Needle, Disp, (SYRINGE 3CC/88IN8-9/2\") 22G X 1-1/2\" 3 ML MISC 1 actuation by Does not apply route every 14 days 2 each 5    melatonin 1 MG tablet Take 1 tablet by mouth nightly as needed for Sleep 30 tablet     vitamin E 1000 units capsule Take 1,000 Units by mouth daily      vitamin D (CHOLECALCIFEROL) 1000 UNIT TABS tablet Take 6,000 Units by mouth daily      Multiple Vitamins-Minerals (ONE-A-DAY MENS HEALTH FORMULA) TABS Take by mouth      magnesium (MAGNESIUM-OXIDE) 250 MG TABS tablet Take 250 mg by mouth daily Take 3 pills daily      calcium carbonate (OYSTER SHELL CALCIUM 500 MG) 1250 MG tablet Take 1 tablet by mouth daily      Glucosamine-Chondroitin 7841-1828 MG/30ML LIQD Take by mouth Take 3 pills daily      Omega-3 Fatty Acids (FISH OIL) 1200 MG CAPS Take  by mouth daily. Medications patient taking as of now reconciled against medications ordered at time of hospital discharge: Yes    Review of Systems   Constitutional: Negative for chills and fever. Skin: Positive for wound. Allergic/Immunologic: Negative for environmental allergies, food allergies and immunocompromised state. Hematological: Negative for adenopathy. Does not bruise/bleed easily. Psychiatric/Behavioral: Negative for behavioral problems and sleep disturbance. Objective:    /86   Pulse 93   Temp 97.7 °F (36.5 °C)   Ht 6' (1.829 m)   Wt 269 lb (122 kg)   SpO2 97%   BMI 36.48 kg/m²   Physical Exam  Constitutional:       General: He is not in acute distress. Appearance: Normal appearance. He is well-developed. He is not toxic-appearing. HENT:      Head: Normocephalic and atraumatic. Right Ear: Hearing and tympanic membrane normal.      Left Ear: Hearing and tympanic membrane normal.      Nose: Nose normal. No nasal deformity. Eyes:      General: Lids are normal.         Right eye: No discharge. Left eye: No discharge. Conjunctiva/sclera: Conjunctivae normal.      Pupils: Pupils are equal, round, and reactive to light. Neck:      Thyroid: No thyroid mass or thyromegaly. Vascular: No JVD. Trachea: Trachea and phonation normal.   Cardiovascular:      Rate and Rhythm: Normal rate and regular rhythm. Pulmonary:      Effort: No accessory muscle usage or respiratory distress. Musculoskeletal:      Cervical back: Full passive range of motion without pain. Comments: FROM all large muscle groups and joints as witnessed when walking to exam table, getting on, and getting off the exam table. Skin:     General: Skin is warm and dry. Findings: No rash.       Comments: Wound currently bandaged and not examined because patient will be examined [facility] center   Neurological:      Mental Status: He is alert. Motor: No tremor or atrophy. Gait: Gait normal.   Psychiatric:         Speech: Speech normal.         Behavior: Behavior normal.         Thought Content: Thought content normal.         An electronic signature was used to authenticate this note.   --Michael Ulloa MD

## 2022-03-08 NOTE — PATIENT INSTRUCTIONS
Patient will continue with wound care for her foot. Antibiotics ordered. Probiotic on board. Testosterone lab will be due in May.   Ordered

## 2022-03-09 ENCOUNTER — TELEPHONE (OUTPATIENT)
Dept: INFECTIOUS DISEASES | Age: 67
End: 2022-03-09

## 2022-03-09 ENCOUNTER — CARE COORDINATION (OUTPATIENT)
Dept: CARE COORDINATION | Age: 67
End: 2022-03-09

## 2022-03-09 LAB
ANION GAP SERPL CALCULATED.3IONS-SCNC: 15 MEQ/L (ref 9–15)
BANDED NEUTROPHILS RELATIVE PERCENT: 2 %
BASOPHILS ABSOLUTE: 0.1 K/UL (ref 0–0.2)
BASOPHILS RELATIVE PERCENT: 3 %
BUN BLDV-MCNC: 10 MG/DL (ref 8–23)
CALCIUM SERPL-MCNC: 8.5 MG/DL (ref 8.5–9.9)
CHLORIDE BLD-SCNC: 101 MEQ/L (ref 95–107)
CO2: 21 MEQ/L (ref 20–31)
CREAT SERPL-MCNC: 0.75 MG/DL (ref 0.7–1.2)
EOSINOPHILS ABSOLUTE: 0.1 K/UL (ref 0–0.7)
EOSINOPHILS RELATIVE PERCENT: 4 %
GFR AFRICAN AMERICAN: >60
GFR NON-AFRICAN AMERICAN: >60
GLUCOSE BLD-MCNC: 132 MG/DL (ref 70–99)
HCT VFR BLD CALC: 43.8 % (ref 42–52)
HEMOGLOBIN: 14.6 G/DL (ref 14–18)
LYMPHOCYTES ABSOLUTE: 0.4 K/UL (ref 1–4.8)
LYMPHOCYTES RELATIVE PERCENT: 14 %
MCH RBC QN AUTO: 28.1 PG (ref 27–31.3)
MCHC RBC AUTO-ENTMCNC: 33.3 % (ref 33–37)
MCV RBC AUTO: 84.3 FL (ref 80–100)
METAMYELOCYTES RELATIVE PERCENT: 1 %
MONOCYTES ABSOLUTE: 0.4 K/UL (ref 0.2–0.8)
MONOCYTES RELATIVE PERCENT: 13.5 %
NEUTROPHILS ABSOLUTE: 1.9 K/UL (ref 1.4–6.5)
NEUTROPHILS RELATIVE PERCENT: 63 %
PDW BLD-RTO: 14.1 % (ref 11.5–14.5)
PLATELET # BLD: 174 K/UL (ref 130–400)
PLATELET SLIDE REVIEW: NORMAL
POTASSIUM SERPL-SCNC: 4 MEQ/L (ref 3.4–4.9)
RBC # BLD: 5.2 M/UL (ref 4.7–6.1)
RBC # BLD: NORMAL 10*6/UL
SODIUM BLD-SCNC: 137 MEQ/L (ref 135–144)
VANCOMYCIN TROUGH: 7.2 UG/ML (ref 10–20)
WBC # BLD: 2.9 K/UL (ref 4.8–10.8)

## 2022-03-09 NOTE — TELEPHONE ENCOUNTER
Per Dr Pj Watson, in review of Lab results, wants to inquire how patient is doing and if he is Infusing IV Abx correctly. Dr Pj Watson reviewed Nolvia Ridgel resulting at 7.2 as of this morning, 3/9/22. Found patient was Dc from [facility] on 3/1/22 and prescribed IV Vancomycin at 1.5 G, Q 12 hrs. Labs were drawn this morning. Call to patient, he states he has been Infusing IV Abx at 8:30am and 8:30pm. The [facility] nurse was there this morning prior to his IV Infusion. Patient states he has no problem with Infusion. No Leaks and completes the dose. Will update Dr Pj Watson. 4:45pm Per Dr Pj Watson, must emphasize to the patient to Infuse every drop of the IV Abx. Will also send Update to [facility].

## 2022-03-09 NOTE — CARE COORDINATION
[facility] [facility] Transitions Follow Up Call    3/9/2022    Patient: Vinnie Barrios  Patient : 1955   MRN: [id number]  Reason for Admission: -3/1/22 Cellulitis RLE; Right Foot Abscess; S/P Right Foot I&D; MRSA  Discharge Date: 3/1/22 RARS: Readmission Risk Score: 6.3 ( )         Spoke with: Patient    Fortunato Collar denies pain to right foot, wound dressing is intact, continues IV antibiotic. [facility] was out today to draw labs. Pt is still awaiting walker and shower chair. Stated pharmacy had to reach out to physician office for ICD codes for insurance purposes. Pt has all wound care supplies needed and is aware to contact [address] if he runs out. Pt continues to wear Post op shoe when ambulating. Pt stated that he purchased suctioned grab bars for the shower and a boot cover for his foot. Discussed safety issues with ambulating and showering         precautions. Pt v/u. Pt denies Transportation, Home, or Medication needs. CTN information given, will continue to monitor. PCP Hosp F/U completed 3/8/22  Podiatry Post Op F/U  completed 3/7/22    Dressing orders: 1. Cleanse wound(s) with normal saline. 2. Apply dry SILVERCEL OR CALCIUM ALGINATE WITH Ag or eqivalent to wound bed. 3. Cover with 4x4's and wrap with gauze (sin or kerlix)  4. Change  Every other day or Monday, Wednesday, and Friday    Offloading Device: Post op shoe to right foot      Other Instructions: Continue antibiotics per Infectious Disease. Keep all dressings clean, dry and intact. Keep pressure off the wound(s) at all times.     Care Transitions Follow Up Call    Needs to be reviewed by the provider   Additional needs identified to be addressed with provider: No  none         Method of communication with provider : none      Care Transition Nurse (CTN) contacted the patient by telephone to follow up after admission on 22. Verified name and  with patient as identifiers.     Addressed changes since last contact: DME-pt still awaiting walker and shower chair  Discussed follow-up appointments. If no appointment was previously scheduled, appointment scheduling offered: Yes. Is follow up appointment scheduled within 7 days of discharge? Yes. Advance Care Planning:   Does patient have an Advance Directive: not addressed. CTN reviewed discharge instructions, medical action plan and red flags with patient and discussed any barriers to care and/or understanding of plan of care after discharge. Discussed appropriate site of care based on symptoms and resources available to patient including: PCP, Specialist, Home health, When to call 911 and [address]. The patient agrees to contact the PCP office for questions related to their healthcare. Patients top risk factors for readmission: functional physical ability  medical condition-Right foot abcess, cellulitis  Interventions to address risk factors: Obtained and reviewed discharge summary and/or continuity of care documents      Non-[facility] follow up appointment(s):     CTN provided contact information for future needs. Plan for follow-up call in 5-7 days based on severity of symptoms and risk factors. Plan for next call: symptom management-right foot wound  medication management-IV antibiotics  DME-walker, shower chair        Care Transitions Subsequent and Final Call    Subsequent and Final Calls  Are you currently active with any services?: [address] [facility] Interventions     DME Assistance: Completed   Other Interventions:            Follow Up  Future Appointments   Date Time Provider Bianca Kramer   3/14/2022  1:40 PM SCHEDULE, GABRIELLE GUERRIER PCP [facility] EMERGENCY [facility] [place]   3/18/2022  9:00 AM Zeina Ravi, DO [address] [facility]   3/21/2022 10:00 AM Kayla De La Torre, [address] [place] [address]   3/28/2022  1:50 PM SCHEDULE, GABRIELLE GUERRIER PCP [facility] Janeebrad Garcia   11/21/2022  5:00 PM Teodoro Mcdermott MD [facility] Madonna Patton LPN

## 2022-03-09 NOTE — PROGRESS NOTES
Physician Progress Note      PATIENT:               Alessia Galeana  CSN #:                  [id number]  :                       1955  ADMIT DATE:       2022 4:52 PM  [address] [place] DATE:        3/1/2022 1:20 PM  RESPONDING  PROVIDER #:        Debby Silva DPM          QUERY TEXT:    Patient admitted with right foot cellulitis and abscess. Per Op note dated    documentation of incision and drainage with debridement of Rt foot. To   accurately reflect the procedure performed please document if debridement was   excisional or nonexcisional and the deepest depth of tissue incised/removed as   down to and including: The medical record reflects the following:  Risk Factors: right foot I&D/debridement  Clinical Indicators:  op note \" a 6 cm linear incision was planned and   performed. Incision was made with a 15 blade extending towards the 3 webspace. Upon incision asha purulence was expressed. 40 cc asha purulence and   sanguinous drainage was milked from the right foot. After majority of   purulence was milked from the right foot, hemostat was used to explore the   surrounding tissue medially, laterally, distal, and proximal. Superficial   tissue was noted to be loosely adhered, all tissue adhesions were freed in the   region of the previous abscess space.  Deep culture swab wa  Treatment: I&D with debridement, antibiotics, culture, imaging, podiatry   consult    Thank you, Leonor Hernández RN BSN [facility]  [phone]  Options provided:  -- Incision and drainage with nonexcisional debridement of subcutaneous tissue  -- Excisional debridement of subcutaneous tissue  -- Incision and drainage with nonexcisional debridement of fascia  -- Excisional debridement of fascia  -- Incision and drainage with nonexcisional debridement of muscle  -- Excisional debridement of muscle  -- Other - I will add my own diagnosis  -- Disagree - Not applicable / Not valid  -- Disagree - Clinically unable to determine / Unknown  -- Refer to Clinical Documentation Reviewer    PROVIDER RESPONSE TEXT:    Incision and drainage with non-excisional debridement of fascia of right foot   was performed during procedure on 2/25.     Query created by: Candice Alvarez on 2/28/2022 9:49 AM      Electronically signed by:  Juanito Pennington DPM 3/9/2022 10:32 AM

## 2022-03-14 ENCOUNTER — NURSE ONLY (OUTPATIENT)
Dept: FAMILY MEDICINE CLINIC | Age: 67
End: 2022-03-14
Payer: MEDICARE

## 2022-03-14 DIAGNOSIS — R79.89 LOW TESTOSTERONE: Primary | ICD-10-CM

## 2022-03-14 PROCEDURE — 96372 THER/PROPH/DIAG INJ SC/IM: CPT | Performed by: FAMILY MEDICINE

## 2022-03-14 RX ORDER — TESTOSTERONE CYPIONATE 200 MG/ML
200 INJECTION INTRAMUSCULAR ONCE
Status: COMPLETED | OUTPATIENT
Start: 2022-03-14 | End: 2022-03-14

## 2022-03-14 RX ADMIN — TESTOSTERONE CYPIONATE 200 MG: 200 INJECTION INTRAMUSCULAR at 13:43

## 2022-03-14 NOTE — PROGRESS NOTES
Patient given Testosterone 200mg IM left gluteal..  Will return in 2 weeks for next injection    Patient tolerated injection well.     Administrations This Visit     testosterone cypionate (DEPOTESTOTERONE CYPIONATE) injection 200 mg     Admin Date  03/14/2022 Action  Given Dose  200 mg Route  IntraMUSCular Administered By  Chantelle Beck LPN

## 2022-03-16 ENCOUNTER — CARE COORDINATION (OUTPATIENT)
Dept: CARE COORDINATION | Age: 67
End: 2022-03-16

## 2022-03-16 LAB
ANION GAP SERPL CALCULATED.3IONS-SCNC: 12 MEQ/L (ref 9–15)
ATYPICAL LYMPHOCYTE RELATIVE PERCENT: 2 %
BANDED NEUTROPHILS RELATIVE PERCENT: 1 %
BASOPHILS ABSOLUTE: 0.1 K/UL (ref 0–0.2)
BASOPHILS RELATIVE PERCENT: 3 %
BUN BLDV-MCNC: 12 MG/DL (ref 8–23)
BURR CELLS: ABNORMAL
C-REACTIVE PROTEIN: 13.4 MG/L (ref 0–5)
CALCIUM SERPL-MCNC: 8.9 MG/DL (ref 8.5–9.9)
CHLORIDE BLD-SCNC: 99 MEQ/L (ref 95–107)
CO2: 23 MEQ/L (ref 20–31)
CREAT SERPL-MCNC: 0.75 MG/DL (ref 0.7–1.2)
EOSINOPHILS ABSOLUTE: 0.3 K/UL (ref 0–0.7)
EOSINOPHILS RELATIVE PERCENT: 9 %
GFR AFRICAN AMERICAN: >60
GFR NON-AFRICAN AMERICAN: >60
GLUCOSE BLD-MCNC: 171 MG/DL (ref 70–99)
HCT VFR BLD CALC: 43.6 % (ref 42–52)
HEMOGLOBIN: 14.4 G/DL (ref 14–18)
LYMPHOCYTES ABSOLUTE: 0.6 K/UL (ref 1–4.8)
LYMPHOCYTES RELATIVE PERCENT: 14 %
MCH RBC QN AUTO: 27.4 PG (ref 27–31.3)
MCHC RBC AUTO-ENTMCNC: 33 % (ref 33–37)
MCV RBC AUTO: 83.2 FL (ref 80–100)
MONOCYTES ABSOLUTE: 0.4 K/UL (ref 0.2–0.8)
MONOCYTES RELATIVE PERCENT: 9.5 %
NEUTROPHILS ABSOLUTE: 2.4 K/UL (ref 1.4–6.5)
NEUTROPHILS RELATIVE PERCENT: 62 %
PDW BLD-RTO: 14 % (ref 11.5–14.5)
PLATELET # BLD: 210 K/UL (ref 130–400)
PLATELET SLIDE REVIEW: NORMAL
POTASSIUM SERPL-SCNC: 3.9 MEQ/L (ref 3.4–4.9)
RBC # BLD: 5.24 M/UL (ref 4.7–6.1)
SODIUM BLD-SCNC: 134 MEQ/L (ref 135–144)
VANCOMYCIN TROUGH: 9.6 UG/ML (ref 10–20)
WBC # BLD: 3.8 K/UL (ref 4.8–10.8)

## 2022-03-16 NOTE — CARE COORDINATION
[address] Transitions Follow Up Call    3/16/2022    Patient: Dot Pineda  Patient : 1955   MRN: [id number]  Reason for Admission: -3/1/22 Cellulitis RLE; Right Foot Abscess; S/P Right Foot I&D; MRSA  Discharge Date: 3/1/22 RARS: Readmission Risk Score: 6.3 ( )         Spoke with: Patient    Hanh Corona reports that IV antibiotics continue, along with dressing changes. Pt stated that wound is draining light pinkish fluid, has all wound care supplies. Denies pain to right foot, n/v/d, fever, chills. Pt continues to wear Post Op shoe when ambulating. Pt has all DME in place (wheelchair, shower chair). Pt stated that [facility] labs today. Has ID VV scheduled 3/18/22 and Podiatry F/U scheduled 3/21/21. Pt denies Transportation, Home, or Medication needs. CTN information given, will continue to monitor. PCP Hosp F/U completed 3/8/22  Podiatry Post Op F/U  completed 3/7/22     Dressing orders: 1. Cleanse wound(s) with normal saline. 2. Apply dry SILVERCEL OR CALCIUM ALGINATE WITH Ag or eqivalent to wound bed. 3. Cover with 4x4's and wrap with gauze (sin or kerlix)  4. Change  Every other day or Monday, Wednesday, and Friday     Offloading Device: Post op shoe to right foot      Other Instructions: Continue antibiotics per Infectious Disease.      Keep all dressings clean, dry and intact.  Keep pressure off the wound(s) at all times.      Care Transitions Follow Up Call    Needs to be reviewed by the provider   Additional needs identified to be addressed with provider: No  none         Method of communication with provider : none      Care Transition Nurse (CTN) contacted the patient by telephone to follow up after admission on 22. Verified name and  with patient as identifiers. Addressed changes since last contact: none  Discussed follow-up appointments. If no appointment was previously scheduled, appointment scheduling offered: Yes.    Is follow up appointment scheduled within 7

## 2022-03-18 ENCOUNTER — TELEMEDICINE (OUTPATIENT)
Dept: INFECTIOUS DISEASES | Age: 67
End: 2022-03-18
Payer: MEDICARE

## 2022-03-18 DIAGNOSIS — A49.02 MRSA (METHICILLIN RESISTANT STAPHYLOCOCCUS AUREUS) INFECTION: Primary | ICD-10-CM

## 2022-03-18 DIAGNOSIS — S91.301A OPEN WOUND OF RIGHT FOOT, INITIAL ENCOUNTER: ICD-10-CM

## 2022-03-18 DIAGNOSIS — L02.611 ABSCESS OF RIGHT FOOT: ICD-10-CM

## 2022-03-18 DIAGNOSIS — Z98.890 HISTORY OF INCISION AND DRAINAGE: ICD-10-CM

## 2022-03-18 DIAGNOSIS — L03.115 CELLULITIS OF RIGHT FOOT: ICD-10-CM

## 2022-03-18 PROCEDURE — 99214 OFFICE O/P EST MOD 30 MIN: CPT | Performed by: INTERNAL MEDICINE

## 2022-03-18 NOTE — PROGRESS NOTES
3/18/2022    TELEHEALTH EVALUATION -- Audio/Visual (During PW-03 public health emergency)      Linda Lou (:  1955) has requested an audio/video evaluation and was evaluated through a real time audio-video encounter. The patient ( or guardian if applicable ) is aware that this is a billable service, which includes applicable co-pays. The virtual visit was conducted with the patient's ( or legal guardian's if applicable ) consent. The visit was conducted pursuant to the emergency declaration under the [address] [place] [address], [address] [place] [address] authority and the [employer] Act. Patient identification was verified, and a caregiver was present when appropriate. The patient was located in a state where the provider was licensed to provide care. The patient was located at an establishment in a state where the provider was licensed to provide care. Due to this being a TeleHealth encounter, evaluation of the following organ systems is limited: Vitals/Constitutional/EENT/Resp/CV/GI//MS/Neuro/Skin/Heme-Lymph-Imm.}  Infectious Disease Progress Note       3/18/2022    Patient is a followup regarding  · Right foot cellulitis with large abscess, status post I incision and drainage  · MRSA Staph aureus infection  · Failure of outpatient antibiotics    Op note reviewed. Photos seen of the wound prior to and after surgery. Current photo wound reviewed as well. Patient also has some of the photos in his phone that he is able to show me. Overall healing now, no erythema, no drainage. No pain. Has \"good feeling\" in the foot per patient.      On Vanco IV      Lab Results   Component Value Date    WBC 3.8 (L) 2022    HGB 14.4 2022    HCT 43.6 2022    MCV 83.2 2022     2022     Lab Results   Component Value Date     2022    K 3.9 2022    K 4.3 2022    CL 99 2022    CO2 23 03/16/2022    BUN 12 03/16/2022    CREATININE 0.75 03/16/2022    GLUCOSE 171 03/16/2022    GLUCOSE 105 02/03/2012    CALCIUM 8.9 03/16/2022        WBC trends are being monitored. Antibiotic doses are being adjusted per most recent renal labs. There were no vitals filed for this visit. Patient Active Problem List   Diagnosis    Dyshidrotic eczema    Vitamin D deficiency    Obesity (BMI 30-39. 9)    Eczematous dermatitis    Adenomatous polyp of transverse colon- f/u due 9/2022    Low testosterone    Cellulitis of foot, right    Abscess of foot including toes, right    Cellulitis of right lower extremity    Cellulitis and abscess of foot    Failure of outpatient treatment    MRSA (methicillin resistant Staphylococcus aureus) infection    Right foot ulcer, with necrosis of muscle ([address]       Op note from Feb 2022 reviewed by podiatry \" Upon incision asha purulence was expressed. 40 cc asha purulence and   sanguinous drainage was milked from the right foot\" Patient had I+D    ASSESSMENT:    · Right foot cellulitis with large abscess, status post I incision and drainage  · MRSA Staph aureus infection      PLAN:  · IV vancomycin for 4 weeks past 3/1/2022  · Follow-up CBC, CRP, BMP and Vanco trough weekly - discussed all labs with patient. · Adjust vanco level - will increase to 1.75mg BID with trough prior to 4th dose. Order provided. · Follow trough levels following the change. · Wound care.          Imaging and labs were reviewed per medical records and any ID pertinent labs were also addressed  Time spent today in combination of reviewing patient's chart, medications, labs, pictures when pertinent, provider communication as necessary, counseling patient, care/coordination not otherwise reported here, and patient face to face virtual visit 30 min.   >50% of that time spent counseling and coordination of patient care  Addressed preventive measures such as vaccinations, the importance of annual exam with the PCP, and the importance of health maintenance by dietary and exercise regimens. All questions were answered from an ID perspective and to the best of my ability. Social distancing measures, the importance of face masks that properly cover the nose and mouth in public, and proper hand hygiene will continue to be addressed    Risks and benefits of ID prescribed medications were discussed with patient or supporting staff caring for the patient as appropriate and feedback obtained.      Zeina Christian, DO

## 2022-03-21 ENCOUNTER — HOSPITAL ENCOUNTER (OUTPATIENT)
Dept: WOUND CARE | Age: 67
Discharge: HOME | End: 2022-03-21
Payer: MEDICARE

## 2022-03-21 VITALS
SYSTOLIC BLOOD PRESSURE: 153 MMHG | RESPIRATION RATE: 16 BRPM | DIASTOLIC BLOOD PRESSURE: 93 MMHG | HEART RATE: 106 BPM | TEMPERATURE: 98.3 F

## 2022-03-21 DIAGNOSIS — L97.513 RIGHT FOOT ULCER, WITH NECROSIS OF MUSCLE (HCC): Primary | ICD-10-CM

## 2022-03-21 PROCEDURE — 11042 DBRDMT SUBQ TIS 1ST 20SQCM/<: CPT

## 2022-03-21 RX ORDER — LIDOCAINE HYDROCHLORIDE 40 MG/ML
SOLUTION TOPICAL ONCE
Status: CANCELLED | OUTPATIENT
Start: 2022-03-21 | End: 2022-03-21

## 2022-03-21 RX ORDER — LIDOCAINE HYDROCHLORIDE 20 MG/ML
JELLY TOPICAL ONCE
Status: CANCELLED | OUTPATIENT
Start: 2022-03-21 | End: 2022-03-21

## 2022-03-21 RX ORDER — CLOBETASOL PROPIONATE 0.5 MG/G
OINTMENT TOPICAL ONCE
Status: CANCELLED | OUTPATIENT
Start: 2022-03-21 | End: 2022-03-21

## 2022-03-21 RX ORDER — BACITRACIN ZINC AND POLYMYXIN B SULFATE 500; 1000 [USP'U]/G; [USP'U]/G
OINTMENT TOPICAL ONCE
Status: CANCELLED | OUTPATIENT
Start: 2022-03-21 | End: 2022-03-21

## 2022-03-21 RX ORDER — LIDOCAINE 50 MG/G
OINTMENT TOPICAL ONCE
Status: CANCELLED | OUTPATIENT
Start: 2022-03-21 | End: 2022-03-21

## 2022-03-21 RX ORDER — GINSENG 100 MG
CAPSULE ORAL ONCE
Status: CANCELLED | OUTPATIENT
Start: 2022-03-21 | End: 2022-03-21

## 2022-03-21 RX ORDER — BACITRACIN, NEOMYCIN, POLYMYXIN B 400; 3.5; 5 [USP'U]/G; MG/G; [USP'U]/G
OINTMENT TOPICAL ONCE
Status: CANCELLED | OUTPATIENT
Start: 2022-03-21 | End: 2022-03-21

## 2022-03-21 RX ORDER — GENTAMICIN SULFATE 1 MG/G
OINTMENT TOPICAL ONCE
Status: CANCELLED | OUTPATIENT
Start: 2022-03-21 | End: 2022-03-21

## 2022-03-21 RX ORDER — BETAMETHASONE DIPROPIONATE 0.05 %
OINTMENT (GRAM) TOPICAL ONCE
Status: CANCELLED | OUTPATIENT
Start: 2022-03-21 | End: 2022-03-21

## 2022-03-21 RX ORDER — LIDOCAINE 40 MG/G
CREAM TOPICAL ONCE
Status: CANCELLED | OUTPATIENT
Start: 2022-03-21 | End: 2022-03-21

## 2022-03-21 NOTE — DISCHARGE INSTR - COC
Continuity of Care Form    Patient Name: Irving Hedrick   :  1955  MRN:  [id number]    Admit date:  3/21/2022  Discharge date:  ***    Code Status Order: Prior   Advance Directives:      Admitting Physician:  No admitting provider for patient encounter. PCP: Sabiha Sosa MD    Discharging Nurse: [facility] Unit/Room#: No information available for this encounter. Discharging Unit Phone Number: ***    Emergency Contact:   Extended Emergency Contact Information  Primary Emergency Contact: Elton Vidal Phone: [phone]  Relation: Child    Past Surgical History:  Past Surgical History:   Procedure Laterality Date    COLONOSCOPY  2006    FOOT DEBRIDEMENT Right 2022    FOOT DEBRIDEMENT INCISION AND DRAINAGE performed by Marie Mendoza DPM at [address] NOT  [address] IND N/A 9/15/2017    COLONOSCOPY performed by Mihai Castle MD at [address] [place] [address]  14    bx, dilation jarmoszuk    UPPER GASTROINTESTINAL ENDOSCOPY N/A 9/15/2017    EGD DILATION [facility] and biopsy performed by Mihai Castle MD at [facility]       Immunization History:   Immunization History   Administered Date(s) Administered    Influenza Virus Vaccine 10/08/2015    Influenza, Quadv, adjuvanted, 65 yrs +, IM, PF (Fluad) 10/09/2020, 2021    Pneumococcal Polysaccharide ([id number]) 2012, 10/09/2020       Active Problems:  Patient Active Problem List   Diagnosis Code    Dyshidrotic eczema L30.1    Vitamin D deficiency E55.9    Obesity (BMI 30-39. 9) E66.9    Eczematous dermatitis L30.9    Adenomatous polyp of transverse colon- f/u due 2022 D12.3    Low testosterone R79.89    Cellulitis of foot, right L03.115    Abscess of foot including toes, right L02.611    Cellulitis of right lower extremity L03.115    Cellulitis and abscess of foot L03.119, L02.619    Failure of outpatient treatment Z78.9    MRSA (methicillin resistant Staphylococcus aureus) infection A49.02    Right foot ulcer, with necrosis of muscle ([address] L97.513       Isolation/Infection:   Isolation            No Isolation          Patient Infection Status       Infection Onset Added Last Indicated Last Indicated By Review Planned Expiration Resolved Resolved By    [facility] 02/25/22 02/28/22 02/25/22 Culture, Anaerobic and Aerobic        Wound-foot 2/25/22            Nurse Assessment:  Last Vital Signs: BP (!) 153/93   Pulse 106   Temp 98.3 °F (36.8 °C) (Temporal)   Resp 16     Last documented pain score (0-10 scale):    Last Weight:   Wt Readings from Last 1 Encounters:   03/08/22 269 lb (122 kg)     Mental Status:  {IP PT MENTAL STATUS:20030}    IV Access:  { AMAN IV ACCESS:063768702}    Nursing Mobility/ADLs:  Walking   {CHP DME ZYCR:698652949}  Transfer  {P DME ZBCT:470032421}  Bathing  {CHP DME ZYFM:029560854}  Dressing  {CHP DME MQCW:612200638}  Toileting  {CHP DME HJRA:922190150}  Feeding  {P DME TPGP:788020776}  Med Admin  {P DME NOJU:582277524}  Med Delivery   { AMAN MED Delivery:379117457}    Wound Care Documentation and Therapy:  Wound 02/24/22 Foot Right;Dorsal #1 (Active)   Wound Image   03/21/22 1006   Wound Etiology Non-Healing Surgical 03/21/22 1006   Dressing Status Clean;Dry; Intact 03/01/22 0913   Wound Cleansed Cleansed with saline 03/21/22 1006   Dressing/Treatment Alginate with Ag;Dry dressing 03/07/22 1047   Wound Length (cm) 2.6 cm 03/21/22 1006   Wound Width (cm) 0.8 cm 03/21/22 1006   Wound Depth (cm) 0.3 cm 03/21/22 1006   Wound Surface Area (cm^2) 2.08 cm^2 03/21/22 1006   Change in Wound Size % (l*w) -73.33 03/21/22 1006   Wound Volume (cm^3) 0.624 cm^3 03/21/22 1006   Post-Procedure Length (cm) 2.6 cm 03/21/22 1022   Post-Procedure Width (cm) 0.8 cm 03/21/22 1022   Post-Procedure Depth (cm) 0.3 cm 03/21/22 1022   Post-Procedure Surface Area (cm^2) 2.08 cm^2 03/21/22 1022   Post-Procedure Volume (cm^3) 0.624 cm^3 03/21/22 1022   Undermining Starts ___ O'Clock 12:00 22 1006   Undermining Ends___ O'Clock 3:00 22 1006   Undermining Maxium Distance (cm) .2 22 1006   Wound Assessment [place]/red;Slough 22 1006   Drainage Amount Moderate 22 1006   Drainage Description Serosanguinous;Green 22 1006   Odor None 22 1006   Diann-wound Assessment Intact 22 1006   Margins Defined edges 22 1006   Wound Thickness Description not for Pressure Injury Full thickness 22 1006   Number of days: 24        Elimination:  Continence: Bowel: {YES / UA:58358}  Bladder: {YES / IY:18830}  Urinary Catheter: {Urinary Catheter:198985193}   Colostomy/Ileostomy/Ileal Conduit: {YES / }       Date of Last BM: ***  No intake or output data in the 24 hours ending 22 1024  No intake/output data recorded.     Safety Concerns:     [address] Safety Concerns:651255730}    Impairments/Disabilities:      [address] Impairments/Disabilities:465661720}    Nutrition Therapy:  Current Nutrition Therapy:   [address] Diet List:656035945}    Routes of Feeding: {[facility] DME Other Feedings:243185975}  Liquids: {Slp liquid thickness:67361}  Daily Fluid Restriction: {CHP DME Yes amt example:989094325}  Last Modified Barium Swallow with Video (Video Swallowing Test): {Done Not Done ERYF:477357297}    Treatments at the Time of Hospital Discharge:   Respiratory Treatments: ***  Oxygen Therapy:  {Therapy; copd oxygen:80120}  Ventilator:    { CC Vent ICXT:309238325}    Rehab Therapies: {THERAPEUTIC INTERVENTION:2338735404}  Weight Bearing Status/Restrictions: [address] Weight Bearin}  Other Medical Equipment (for information only, NOT a DME order):  {EQUIPMENT:315821534}  Other Treatments: ***    Patient's personal belongings (please select all that are sent with patient):  {[facility] DME Belongings:533200232}    RN SIGNATURE:  {Esignature:343710578}    CASE MANAGEMENT/SOCIAL WORK SECTION    Inpatient Status Date: ***    Readmission Risk Assessment Score:  Readmission Risk              Risk of Unplanned Readmission:  0           Discharging to Facility/ Agency   Name:   Address:  Phone:  Fax:    Dialysis Facility (if applicable)   Name:  Address:  Dialysis Schedule:  Phone:  Fax:    / signature: {Esignature:675623649}    PHYSICIAN SECTION    Prognosis: {Prognosis:6349517238}    Condition at Discharge: Amaris Hernandez Patient Condition:015652284}    Rehab Potential (if transferring to Rehab): {Prognosis:1578415298}    Recommended Labs or Other Treatments After Discharge: ***    Physician Certification: I certify the above information and transfer of Yoly Banda  is necessary for the continuing treatment of the diagnosis listed and that he requires {Admit to Appropriate Level of Care:87219} for {GREATER/LESS:143235550} 30 days.      Update Admission H&P: {CHP DME Changes in BWIMT:242289153}    PHYSICIAN SIGNATURE:  {Esignature:974985588}

## 2022-03-21 NOTE — CODE DOCUMENTATION
[address] [facility] Physician Billing Sheet. Rochelle Carlisle  AGE: 74 y.o.    GENDER: male  : 1955  TODAY'S DATE:  3/21/2022    ICD-10  [facility] Problems    Diagnosis Date Noted    Right foot ulcer, with necrosis of muscle ([facility] [L97.513] 2022    MRSA (methicillin resistant Staphylococcus aureus) infection [A49.02]        PHYSICIAN PROCEDURES    CPT CODE  70163 - RT, 79      Electronically signed by Consuelo Alcala DPM on 3/21/2022 at 10:28 AM

## 2022-03-21 NOTE — PROGRESS NOTES
[facility]   Progress Note and Procedure Note      [address] RECORD NUMBER:  [id number]  AGE: 74 y.o. GENDER: male  : 1955  EPISODE DATE:  3/21/2022    Subjective:     Chief Complaint   Patient presents with    Wound Check     right foot wound recheck         HISTORY of PRESENT ILLNESS HPI     Toshia Vargas is a 74 y.o. male who presents today for wound/ulcer evaluation. History of Wound Context: Right foot surgical wound. He is s/p I&D MRSA infection. His on IV abx per ID. Denies nausea, vomiting, fevers, or chills. Wound/Ulcer Pain Timing/Severity: intermittent  Quality of pain: sharp  Severity:  1 / 10   Modifying Factors: Pain is relieved/improved with rest  Associated Signs/Symptoms: drainage, numbness and pain    Ulcer Identification:  Ulcer Type: non-healing surgical  Contributing Factors: obesity    Wound: N/A        PAST MEDICAL HISTORY        Diagnosis Date    BPH (benign prostatic hyperplasia) 2/3/2012    no treatment at this time    Chronic back pain     Colon polyp 2017    Dr. Vesta Medellin; f/u 5 years    Eczematous dermatitis     hands, feet and legs    ED (erectile dysfunction) 2/3/2012    Erectile dysfunction     Family history of early CAD 2/3/2012    Gastroesophageal reflux disease with hiatal hernia     History of hypertension     History of prediabetes     History of renal insufficiency syndrome     Obesity (BMI 30-39. 9)     Osteoarthritis     Vitamin D deficiency     Weight gain        PAST SURGICAL HISTORY    Past Surgical History:   Procedure Laterality Date    COLONOSCOPY  2006    FOOT DEBRIDEMENT Right 2022    FOOT DEBRIDEMENT INCISION AND DRAINAGE performed by Alfa Becker DPM at [address] [place] [address] SCRN NOT  [address] [place] [address] N/A 9/15/2017    COLONOSCOPY performed by Maurilio Healy MD at [place] [address]  14    mechelle boston    UPPER GASTROINTESTINAL ENDOSCOPY N/A 9/15/2017    EGD DILATION [facility]ORY and biopsy performed by Maurilio Healy MD at [facility]    Family History   Problem Relation Age of Onset    Depression Mother     Heart Disease Paternal Grandmother     High Blood Pressure Paternal Grandmother     High Cholesterol Paternal Grandmother     Anemia Paternal Grandmother         B 15 deficiency    Heart Disease Brother     Heart Surgery Brother     Anemia Brother         B 12 deficiency       SOCIAL HISTORY    Social History     Tobacco Use    Smoking status: Former Smoker     Packs/day: 2.00     Years: 5.00     Pack years: 10.00     Quit date: 1990     Years since quittin.2    Smokeless tobacco: Never Used   Vaping Use    Vaping Use: Never used   Substance Use Topics    Alcohol use: No    Drug use: No       ALLERGIES    No Known Allergies    MEDICATIONS    Current Outpatient Medications on File Prior to Encounter   Medication Sig Dispense Refill    ascorbic acid (VITAMIN C) 500 MG tablet Take 500 mg by mouth daily      vitamin B-12 (CYANOCOBALAMIN) 500 MCG tablet Take 500 mcg by mouth daily      zinc 50 MG TABS tablet Take 50 mg by mouth daily      vancomycin (VANCOCIN) infusion Infuse 1,500 mg intravenously every 12 hours Compound per protocol. Vancomycin trough before third dose and weekly  BMP CBC weekly  CRP in 2 weeks  Fax results to [id number] 90 g 0    lactobacillus acidophilus [facility]) Take 4 tablets by mouth 3 times daily 120 tablet 0    aspirin 325 MG EC tablet Take 1 tablet by mouth daily 1 tablet 0    triamcinolone (KENALOG) 0.1 % cream Apply topically qam daily Monday through Friday only. Take weekend off. Do not use on face, groin, armpits, breasts tissue. 454 g 0    testosterone cypionate (DEPOTESTOTERONE CYPIONATE) 200 MG/ML injection Inject 1 mL into the muscle every 14 days for 90 doses.  8 mL 0    Syringe/Needle, Disp, (SYRINGE 3CC/22PJ6-3/2\") 22G X 1-1/2\" 3 ML MISC 1 actuation by Does not apply route every 14 days 2 each 5    melatonin 1 MG tablet Take 1 tablet by mouth nightly as needed for Sleep 30 tablet     vitamin E 1000 units capsule Take 1,000 Units by mouth daily      vitamin D (CHOLECALCIFEROL) 1000 UNIT TABS tablet Take 6,000 Units by mouth daily      Multiple Vitamins-Minerals (ONE-A-DAY MENS HEALTH FORMULA) TABS Take by mouth      magnesium (MAGNESIUM-OXIDE) 250 MG TABS tablet Take 250 mg by mouth daily Take 3 pills daily      calcium carbonate (OYSTER SHELL CALCIUM 500 MG) 1250 MG tablet Take 1 tablet by mouth daily      Glucosamine-Chondroitin 5974-3203 MG/30ML LIQD Take by mouth Take 3 pills daily      Omega-3 Fatty Acids (FISH OIL) 1200 MG CAPS Take  by mouth daily. No current facility-administered medications on file prior to encounter. REVIEW OF SYSTEMS    Pertinent items are noted in HPI. Objective:      BP (!) 153/93   Pulse 106   Temp 98.3 °F (36.8 °C) (Temporal)   Resp 16     Wt Readings from Last 3 Encounters:   03/08/22 269 lb (122 kg)   02/24/22 260 lb (117.9 kg)   02/23/22 260 lb (117.9 kg)       PHYSICAL EXAM  General Appearance: alert and oriented to person, place and time, well-developed and well-nourished, in no acute distress  Cardiovascular: normal rate and intact distal pulses  Extremities: no cyanosis and no clubbing  Musculoskeletal: normal range of motion, no joint swelling, deformity or tenderness  Neurologic: gait and coordination normal and speech normal    Assessment:     Active Hospital Problems    Diagnosis Date Noted    Right foot ulcer, with necrosis of muscle ([email or web address] [L97.513] 03/07/2022    MRSA (methicillin resistant Staphylococcus aureus) infection [A49.02]         Procedure Note  Indications:  Based on my examination of this patient's wound(s)/ulcer(s) today, debridement is required to promote healing and evaluate the wound base.     Performed by: Myra Kim DPM    Consent obtained:  Yes    Time out taken: Yes    Pain Control:         Debridement:Excisional Debridement    Using tissue nippers the wound(s)/ulcer(s) was/were sharply debrided down through and including the removal of epidermis, dermis and subcutaneous tissue. Devitalized Tissue Debrided:  fibrin, biofilm and slough    Pre Debridement Measurements:  Are located in the Silt  Documentation Flow Sheet    Wound/Ulcer #: 1    Post Debridement Measurements:  Wound/Ulcer Descriptions are Pre Debridement except measurements:    Wound 02/24/22 Foot Right;Dorsal #1 (Active)   Wound Image   03/21/22 1006   Wound Etiology Non-Healing Surgical 03/21/22 1006   Dressing Status Clean;Dry; Intact 03/01/22 0913   Wound Cleansed Cleansed with saline 03/21/22 1006   Dressing/Treatment Alginate with Ag;Dry dressing 03/07/22 1047   Wound Length (cm) 2.6 cm 03/21/22 1006   Wound Width (cm) 0.8 cm 03/21/22 1006   Wound Depth (cm) 0.3 cm 03/21/22 1006   Wound Surface Area (cm^2) 2.08 cm^2 03/21/22 1006   Change in Wound Size % (l*w) -73.33 03/21/22 1006   Wound Volume (cm^3) 0.624 cm^3 03/21/22 1006   Post-Procedure Length (cm) 2.6 cm 03/21/22 1022   Post-Procedure Width (cm) 0.8 cm 03/21/22 1022   Post-Procedure Depth (cm) 0.3 cm 03/21/22 1022   Post-Procedure Surface Area (cm^2) 2.08 cm^2 03/21/22 1022   Post-Procedure Volume (cm^3) 0.624 cm^3 03/21/22 1022   Undermining Starts ___ O'Clock 12:00 03/21/22 1006   Undermining Ends___ O'Clock 3:00 03/21/22 1006   Undermining Maxium Distance (cm) .2 03/21/22 1006   Wound Assessment [place]/red;Slough 03/21/22 1006   Drainage Amount Moderate 03/21/22 1006   Drainage Description Serosanguinous;Green 03/21/22 1006   Odor None 03/21/22 1006   Diann-wound Assessment Intact 03/21/22 1006   Margins Defined edges 03/21/22 1006   Wound Thickness Description not for Pressure Injury Full thickness 03/21/22 1006   Number of days: 24             Percent of Wound/Ulcer Debrided: 100%    Total Surface Area Debrided:  2.08 sq cm Diabetic/Pressure/Non Pressure Ulcers:  Ulcer: Non-Pressure ulcer, muscle necrosis\",      Bleeding:  Minimal    Hemostasis Achieved:  by pressure    Procedural Pain:  1  / 10     Post Procedural Pain:  1 / 10     Response to treatment:  Well tolerated by patient. Plan:     Continue offloading and silvercel. Plan of care was discussed with patient and he is in agreement. Treatment Note please see attached Discharge Instructions    In my professional opinion this patient would benefit from HBO Therapy: No    Written patient dismissal instructions given to patient and signed by patient or POA. Discharge [address] and Hyperbaric Medicine   Physician Orders and Discharge Instructions  [facility]  [address] [facility]  Telephone: [phone]        NAME:  Urbano Brothers                                                                                               YOB: 1955  MEDICAL RECORD NUMBER:  [id number]     Your  is:  Lexus Brunner     Home Care/Facility: [address] [place] [address]      Wound Location: Right Dorsal Foot      Dressing orders: 1. Cleanse wound(s) with normal saline. 2. Apply dry SILVERCEL OR CALCIUM ALGINATE WITH Ag or eqivalent to wound bed. 3. Cover with 4x4's and wrap with gauze (sin or kerlix)  4. Change  Every other day or Monday, Wednesday, and Friday     Compression:     Offloading Device: Post op shoe to right foot      Other Instructions: Call Infectious Disease about rash on arms. Continue antibiotics per Infectious Disease.      Dressing Supplies:  Supplied by home health care      Keep all dressings clean, dry and intact. Keep pressure off the wound(s) at all times.      Follow up visit  2  Weeks Monday April 4, 2022 at      Please give 24 hour notice if unable to keep appointment.  [phone]     If you experience any of the following, please call the Wound Care Service at  [phone] or go to the nearest emergency room. *Increase in pain         *Temperature over 101           *Increase in drainage from your wound or a foul odor  *Uncontrolled swelling            *Need for compression bandage changes due to slippage, breakthrough drainage       PLEASE NOTE: IF YOU ARE UNABLE TO OBTAIN WOUND SUPPLIES, CONTINUE TO USE THE SUPPLIES YOU HAVE AVAILABLE UNTIL YOU ARE ABLE TO REACH US.  IT IS MOST IMPORTANT TO KEEP THE WOUND COVERED AT ALL TIMES    Electronically signed by Tasia Tee DPM on 3/21/2022 at 10:25 AM          Electronically signed by Tasia Tee DPM on 3/21/2022 at 10:25 AM

## 2022-03-23 LAB
ANION GAP SERPL CALCULATED.3IONS-SCNC: 16 MEQ/L (ref 9–15)
BASOPHILS ABSOLUTE: 0.2 K/UL (ref 0–0.2)
BASOPHILS RELATIVE PERCENT: 3.2 %
BUN BLDV-MCNC: 10 MG/DL (ref 8–23)
CALCIUM SERPL-MCNC: 8.6 MG/DL (ref 8.5–9.9)
CHLORIDE BLD-SCNC: 103 MEQ/L (ref 95–107)
CO2: 21 MEQ/L (ref 20–31)
CREAT SERPL-MCNC: 0.76 MG/DL (ref 0.7–1.2)
EOSINOPHILS ABSOLUTE: 0.4 K/UL (ref 0–0.7)
EOSINOPHILS RELATIVE PERCENT: 9.1 %
GFR AFRICAN AMERICAN: >60
GFR NON-AFRICAN AMERICAN: >60
GLUCOSE BLD-MCNC: 128 MG/DL (ref 70–99)
HCT VFR BLD CALC: 41.2 % (ref 42–52)
HEMOGLOBIN: 13.8 G/DL (ref 14–18)
LYMPHOCYTES ABSOLUTE: 0.6 K/UL (ref 1–4.8)
LYMPHOCYTES RELATIVE PERCENT: 12.4 %
MCH RBC QN AUTO: 27.2 PG (ref 27–31.3)
MCHC RBC AUTO-ENTMCNC: 33.4 % (ref 33–37)
MCV RBC AUTO: 81.5 FL (ref 80–100)
MONOCYTES ABSOLUTE: 0.6 K/UL (ref 0.2–0.8)
MONOCYTES RELATIVE PERCENT: 12.3 %
NEUTROPHILS ABSOLUTE: 3.1 K/UL (ref 1.4–6.5)
NEUTROPHILS RELATIVE PERCENT: 63 %
PDW BLD-RTO: 14.4 % (ref 11.5–14.5)
PLATELET # BLD: 238 K/UL (ref 130–400)
POTASSIUM SERPL-SCNC: 3.8 MEQ/L (ref 3.4–4.9)
RBC # BLD: 5.06 M/UL (ref 4.7–6.1)
SODIUM BLD-SCNC: 140 MEQ/L (ref 135–144)
VANCOMYCIN TROUGH: 12.8 UG/ML (ref 10–20)
WBC # BLD: 4.9 K/UL (ref 4.8–10.8)

## 2022-03-27 ENCOUNTER — HOSPITAL ENCOUNTER (EMERGENCY)
Age: 67
Discharge: HOME | End: 2022-03-27
Payer: MEDICARE

## 2022-03-27 VITALS
BODY MASS INDEX: 35.21 KG/M2 | RESPIRATION RATE: 18 BRPM | TEMPERATURE: 98.8 F | HEART RATE: 89 BPM | OXYGEN SATURATION: 98 % | WEIGHT: 260 LBS | HEIGHT: 72 IN | DIASTOLIC BLOOD PRESSURE: 97 MMHG | SYSTOLIC BLOOD PRESSURE: 153 MMHG

## 2022-03-27 DIAGNOSIS — T36.8X5A ADVERSE EFFECT OF VANCOMYCIN, INITIAL ENCOUNTER: Primary | ICD-10-CM

## 2022-03-27 LAB
ALBUMIN SERPL-MCNC: 3.8 G/DL (ref 3.5–4.6)
ALP BLD-CCNC: 122 U/L (ref 35–104)
ALT SERPL-CCNC: 56 U/L (ref 0–41)
ANION GAP SERPL CALCULATED.3IONS-SCNC: 12 MEQ/L (ref 9–15)
AST SERPL-CCNC: 25 U/L (ref 0–40)
BASOPHILS ABSOLUTE: 0 K/UL (ref 0–0.2)
BASOPHILS RELATIVE PERCENT: 0.6 %
BILIRUB SERPL-MCNC: 0.4 MG/DL (ref 0.2–0.7)
BUN BLDV-MCNC: 11 MG/DL (ref 8–23)
CALCIUM SERPL-MCNC: 8.4 MG/DL (ref 8.5–9.9)
CHLORIDE BLD-SCNC: 104 MEQ/L (ref 95–107)
CO2: 23 MEQ/L (ref 20–31)
CREAT SERPL-MCNC: 0.81 MG/DL (ref 0.7–1.2)
EOSINOPHILS ABSOLUTE: 0.6 K/UL (ref 0–0.7)
EOSINOPHILS RELATIVE PERCENT: 9.1 %
GFR AFRICAN AMERICAN: >60
GFR NON-AFRICAN AMERICAN: >60
GLOBULIN: 2.3 G/DL (ref 2.3–3.5)
GLUCOSE BLD-MCNC: 138 MG/DL (ref 70–99)
HCT VFR BLD CALC: 44.5 % (ref 42–52)
HEMOGLOBIN: 14.4 G/DL (ref 14–18)
LACTIC ACID: 1.3 MMOL/L (ref 0.5–2.2)
LYMPHOCYTES ABSOLUTE: 0.6 K/UL (ref 1–4.8)
LYMPHOCYTES RELATIVE PERCENT: 9.1 %
MCH RBC QN AUTO: 26.7 PG (ref 27–31.3)
MCHC RBC AUTO-ENTMCNC: 32.3 % (ref 33–37)
MCV RBC AUTO: 82.7 FL (ref 80–100)
MONOCYTES ABSOLUTE: 1 K/UL (ref 0.2–0.8)
MONOCYTES RELATIVE PERCENT: 15.9 %
NEUTROPHILS ABSOLUTE: 4.2 K/UL (ref 1.4–6.5)
NEUTROPHILS RELATIVE PERCENT: 65.3 %
PDW BLD-RTO: 14.5 % (ref 11.5–14.5)
PLATELET # BLD: 217 K/UL (ref 130–400)
POTASSIUM SERPL-SCNC: 4.2 MEQ/L (ref 3.4–4.9)
RBC # BLD: 5.39 M/UL (ref 4.7–6.1)
SODIUM BLD-SCNC: 139 MEQ/L (ref 135–144)
TOTAL PROTEIN: 6.1 G/DL (ref 6.3–8)
WBC # BLD: 6.5 K/UL (ref 4.8–10.8)

## 2022-03-27 PROCEDURE — 83605 ASSAY OF LACTIC ACID: CPT

## 2022-03-27 PROCEDURE — 96374 THER/PROPH/DIAG INJ IV PUSH: CPT

## 2022-03-27 PROCEDURE — 80053 COMPREHEN METABOLIC PANEL: CPT

## 2022-03-27 PROCEDURE — 99283 EMERGENCY DEPT VISIT LOW MDM: CPT

## 2022-03-27 PROCEDURE — A4216 STERILE WATER/SALINE, 10 ML: HCPCS

## 2022-03-27 PROCEDURE — 2580000003 HC RX 258

## 2022-03-27 PROCEDURE — 2500000003 HC RX 250 WO HCPCS

## 2022-03-27 PROCEDURE — 36415 COLL VENOUS BLD VENIPUNCTURE: CPT

## 2022-03-27 PROCEDURE — 96375 TX/PRO/DX INJ NEW DRUG ADDON: CPT

## 2022-03-27 PROCEDURE — 6360000002 HC RX W HCPCS

## 2022-03-27 PROCEDURE — 93005 ELECTROCARDIOGRAM TRACING: CPT

## 2022-03-27 PROCEDURE — 85025 COMPLETE CBC W/AUTO DIFF WBC: CPT

## 2022-03-27 RX ORDER — DIPHENHYDRAMINE HYDROCHLORIDE 50 MG/ML
50 INJECTION INTRAMUSCULAR; INTRAVENOUS ONCE
Status: COMPLETED | OUTPATIENT
Start: 2022-03-27 | End: 2022-03-27

## 2022-03-27 RX ORDER — DIPHENHYDRAMINE HCL 25 MG
25 TABLET ORAL
Status: DISCONTINUED | OUTPATIENT
Start: 2022-03-27 | End: 2022-03-27

## 2022-03-27 RX ORDER — DIPHENHYDRAMINE HCL 25 MG
25 CAPSULE ORAL EVERY 6 HOURS PRN
Qty: 60 CAPSULE | Refills: 0 | Status: SHIPPED | OUTPATIENT
Start: 2022-03-27 | End: 2022-04-06

## 2022-03-27 RX ORDER — FAMOTIDINE 20 MG/1
20 TABLET, FILM COATED ORAL 2 TIMES DAILY
Qty: 60 TABLET | Refills: 0 | Status: SHIPPED | OUTPATIENT
Start: 2022-03-27

## 2022-03-27 RX ADMIN — DIPHENHYDRAMINE HYDROCHLORIDE 50 MG: 50 INJECTION, SOLUTION INTRAMUSCULAR; INTRAVENOUS at 18:48

## 2022-03-27 RX ADMIN — FAMOTIDINE 20 MG: 10 INJECTION, SOLUTION INTRAVENOUS at 18:48

## 2022-03-27 ASSESSMENT — ENCOUNTER SYMPTOMS
BLOOD IN STOOL: 0
NAUSEA: 0
CONSTIPATION: 0
SORE THROAT: 0
DIARRHEA: 0
ABDOMINAL PAIN: 0
COUGH: 0
ANAL BLEEDING: 0
RECTAL PAIN: 0
VOMITING: 0
TROUBLE SWALLOWING: 0
SHORTNESS OF BREATH: 0
PHOTOPHOBIA: 0
ABDOMINAL DISTENTION: 0

## 2022-03-27 NOTE — ED PROVIDER NOTES
[address] [facility] ED  eMERGENCY dEPARTMENT eNCOUnter      Pt Name: Jose J Wilson  MRN: [id number]  [place] 1955  Date of evaluation: 3/27/2022  Provider: EUNICE Thomas        HISTORY OF PRESENT ILLNESS    Jose J Wilson is a 74 y.o. male per chart review has ah/o left diabetic foot infection. Patient presents emergency department for 2 complaints. He reports he has been on IV vancomycin beginning of March, 3 days ago his dose was increased and is he is experiencing symptoms of red man syndrome. He has erythema and petechial rash to his chest, back, bilateral upper extremities. Also flushing to the face. Pruritus of the rash. He states he has contacted his infectious disease providers and they have an appointment tomorrow, there were no recommendations regarding therapy changes given, he states they told him it would \"go away with time. \"  He was not recommended to stop his antibiotic. He has not yet taken antihistamines at home. No shortness of breath or oropharyngeal swelling. He states that time he also follow-up as he has been having dark stools for the past 3 days and encouraged him to be evaluated for occult bleeding. He denies any bright red blood or noted blood. No rectal pain, constipation, diarrhea. States his stools are formed. No history of GI bleed, ulcer, hemorrhoids. He states he previously had acid reflux and would take medication, but he has changed his diet and does not have symptoms anymore, does not take PPI/H2. No abdominal pain, nausea, vomiting. No fatigue, lightheadedness, weakness. REVIEW OF SYSTEMS       Review of Systems   Constitutional: Negative for chills, diaphoresis, fatigue and fever. HENT: Negative for congestion, sore throat and trouble swallowing. Eyes: Negative for photophobia. Respiratory: Negative for cough and shortness of breath. Cardiovascular: Negative for chest pain and palpitations.    Gastrointestinal: Negative for abdominal by mouth daily    GLUCOSAMINE-CHONDROITIN [id number] MG/30ML LIQD    Take by mouth Take 3 pills daily    LACTOBACILLUS ACIDOPHILUS (FLORANEX)    Take 4 tablets by mouth 3 times daily    MAGNESIUM (MAGNESIUM-OXIDE) 250 MG TABS TABLET    Take 250 mg by mouth daily Take 3 pills daily    MULTIPLE VITAMINS-MINERALS (ONE-A-DAY MENS HEALTH FORMULA) TABS    Take by mouth    OMEGA-3 FATTY ACIDS (FISH OIL) 1200 MG CAPS    Take  by mouth daily. SYRINGE/NEEDLE, DISP, (SYRINGE 3CC/96VP8-2/2\") 22G X 1-1/2\" 3 ML MISC    1 actuation by Does not apply route every 14 days    TESTOSTERONE CYPIONATE (DEPOTESTOTERONE CYPIONATE) 200 MG/ML INJECTION    Inject 1 mL into the muscle every 14 days for 90 doses. TRIAMCINOLONE (KENALOG) 0.1 % CREAM    Apply topically qam daily Monday through Friday only. Take weekend off. Do not use on face, groin, armpits, breasts tissue. VANCOMYCIN (VANCOCIN) INFUSION    Infuse 1,500 mg intravenously every 12 hours Compound per protocol.   Vancomycin trough before third dose and weekly  BMP CBC weekly  CRP in 2 weeks  Fax results to [id number]    VITAMIN B-12 (CYANOCOBALAMIN) 500 MCG TABLET    Take 500 mcg by mouth daily    VITAMIN D (CHOLECALCIFEROL) 1000 UNIT TABS TABLET    Take 6,000 Units by mouth daily    VITAMIN E 1000 UNITS CAPSULE    Take 1,000 Units by mouth daily    ZINC 50 MG TABS TABLET    Take 50 mg by mouth daily       ALLERGIES     Vancomycin    FAMILY HISTORY       Family History   Problem Relation Age of Onset    Depression Mother     Heart Disease Paternal Grandmother     High Blood Pressure Paternal Grandmother     High Cholesterol Paternal Grandmother     Anemia Paternal Grandmother         B 12 deficiency    Heart Disease Brother     Heart Surgery Brother     Anemia Brother         B 12 deficiency          SOCIAL HISTORY       Social History     Socioeconomic History    Marital status:      Spouse name: None    Number of children: 1    Years of education: None  Highest education level: None   Occupational History    None   Tobacco Use    Smoking status: Former Smoker     Packs/day: 2.00     Years: 5.00     Pack years: 10.00     Quit date: 1990     Years since quittin.2    Smokeless tobacco: Never Used   Vaping Use    Vaping Use: Never used   Substance and Sexual Activity    Alcohol use: No    Drug use: No    Sexual activity: Yes     Partners: Female   Other Topics Concern    None   Social History Narrative    None     Social Determinants of Health     Financial Resource Strain: Low Risk     Difficulty of Paying Living Expenses: Not hard at all   Food Insecurity: No Food Insecurity    Worried About Running Out of Food in the Last Year: Never true    Ricardo of Food in the Last Year: Never true   Transportation Needs:     Lack of Transportation (Medical): Not on file    Lack of Transportation (Non-Medical):  Not on file   Physical Activity:     Days of Exercise per Week: Not on file    Minutes of Exercise per Session: Not on file   Stress:     Feeling of Stress : Not on file   Social Connections:     Frequency of Communication with Friends and Family: Not on file    Frequency of Social Gatherings with Friends and Family: Not on file    Attends [employer] Services: Not on file    Active Member of [address] [place] [address] [employer] or Organizations: Not on file    Attends Club or Organization Meetings: Not on file    Marital Status: Not on file   Intimate Partner Violence:     Fear of Current or Ex-Partner: Not on file    Emotionally Abused: Not on file    Physically Abused: Not on file    Sexually Abused: Not on file   Housing Stability:     Unable to Pay for Housing in the Last Year: Not on file    Number of [place] in the Last Year: Not on file    Unstable Housing in the Last Year: Not on file         PHYSICAL EXAM        ED Triage Vitals [22 1754]   BP Temp Temp src Pulse Resp SpO2 Height Weight   (!) 161/84 98.8 °F (37.1 °C) -- 104 19 98 % 6' (1.829 m) 260 lb (117.9 kg)       Physical Exam  Exam conducted with a chaperone present. Constitutional:       General: He is not in acute distress. Appearance: Normal appearance. He is not ill-appearing or diaphoretic. HENT:      Head: Normocephalic and atraumatic. Right Ear: External ear normal.      Left Ear: External ear normal.      Nose: Nose normal.      Mouth/Throat:      Mouth: Mucous membranes are moist.      Pharynx: Oropharynx is clear. No oropharyngeal exudate or posterior oropharyngeal erythema. Comments: No oropharyngeal swelling or erythema  Eyes:      General:         Right eye: No discharge. Left eye: No discharge. Extraocular Movements: Extraocular movements intact. Conjunctiva/sclera: Conjunctivae normal.   Cardiovascular:      Rate and Rhythm: Normal rate and regular rhythm. Pulmonary:      Effort: Pulmonary effort is normal. No respiratory distress. Breath sounds: Normal breath sounds. No wheezing. Abdominal:      General: Bowel sounds are normal. There is no distension. Palpations: Abdomen is soft. Tenderness: There is no abdominal tenderness. There is no guarding. Genitourinary:     Rectum: Normal. Guaiac result negative. Musculoskeletal:         General: Normal range of motion. Cervical back: Normal range of motion. No rigidity or tenderness. Skin:     General: Skin is warm. Findings: Rash (Flushing to neck and face diffusely; erythematous petechial rash to bilateral UE, chest, abdomen, back; no open lesions, wounds, evidence of secondary cellulitis or scratching) present. No bruising or lesion. Neurological:      Mental Status: He is alert and oriented to person, place, and time. Mental status is at baseline.    Psychiatric:         Mood and Affect: Mood normal.         Behavior: Behavior normal.           LABS:  Labs Reviewed   COMPREHENSIVE METABOLIC PANEL - Abnormal; Notable for the following components:       Result Value Glucose 138 (*)     Calcium 8.4 (*)     Total Protein 6.1 (*)     Alkaline Phosphatase 122 (*)     ALT 56 (*)     All other components within normal limits   CBC WITH AUTO DIFFERENTIAL - Abnormal; Notable for the following components:    MCH 26.7 (*)     MCHC 32.3 (*)     Lymphocytes Absolute 0.6 (*)     Monocytes Absolute 1.0 (*)     All other components within normal limits   LACTIC ACID         MDM:   Vitals:    Vitals:    03/27/22 1754 03/27/22 1834   BP: (!) 161/84 (!) 141/77   Pulse: 104 99   Resp: 19 18   Temp: 98.8 °F (37.1 °C)    SpO2: 98% 97%   Weight: 260 lb (117.9 kg)    Height: 6' (1.829 m)        EKG NSR HR 98 no acute ST changes regular intervals no axis deviation    Patient presents to the emergency department with 2 separate complaints. Patient states for the past 3 days he has been having dark stools. No pain, no bright red blood, no abdominal pain, diarrhea, nausea, vomiting, other complaints. States he has just been noticing this incidentally. States he contacted outpatient provider wanted him to get checked out for potential GI bleed. Patient has no history of this, has previous history of acid reflux but now is diet controlled and does not take medication, he is not on blood thinners. On examination guaiac is negative, physical examination is completely unremarkable. His hemoglobin is 14.4, Hct is 44.5 stable from previous studies. No lactic elevation. Discussed continued outpatient followup of this issue to resolution. Discussed signs and symptoms for which to return to ED. Patient also has been having an adverse reaction to his IV vancomycin for the past 3 days. His dose was increased 3 days ago and that is when he started havingg the reaction. Is been having symptoms of \"red man\" syndrome, flushed face and neck, erythema and petechial rash to his bilateral upper and lower extremities, chest, abdomen, back. He has not been having respiratory distress or complaints.   No hypotension. He is stable. Very comfortable. States he contacted his ID provider office and has an appointment with them tomorrow, they did not recommend that he stop taking his vancomycin at this time, they did not recommend any supportive control measures. He has not taken any symptom management at home. He is given IV Benadryl and IV Pepcid in the ED. Reassessed and some mild improvement in rash however still present. He remains stable and without complaints no distress. He will discuss with his ID providers tomorrow regarding management of his vancomycin will defer to them at this time. Patient states he feels well and will be discharged, will provide PO benadryl and PO pepcid rx for symptom management as needed. CRITICAL CARE TIME   Total CriticalCare time was 0 minutes, excluding separately reportable procedures. There was a high probability of clinically significant/life threatening deterioration in the patient's condition which required my urgent intervention. PROCEDURES:  Unlessotherwise noted below, none      Procedures      FINAL IMPRESSION      1.  Adverse effect of vancomycin, initial encounter          DISPOSITION/PLAN   DISPOSITION Decision To Discharge 03/27/2022 07:33:07 PM          EUNICE Peacock (electronically signed)  Attending Emergency Physician          Pablo Peacockma  03/27/22 1949

## 2022-03-28 ENCOUNTER — NURSE ONLY (OUTPATIENT)
Dept: FAMILY MEDICINE CLINIC | Age: 67
End: 2022-03-28
Payer: MEDICARE

## 2022-03-28 ENCOUNTER — TELEPHONE (OUTPATIENT)
Dept: INFECTIOUS DISEASES | Age: 67
End: 2022-03-28

## 2022-03-28 DIAGNOSIS — L03.115 CELLULITIS OF RIGHT LOWER EXTREMITY: Primary | ICD-10-CM

## 2022-03-28 DIAGNOSIS — R79.89 LOW TESTOSTERONE: Primary | ICD-10-CM

## 2022-03-28 LAB
EKG ATRIAL RATE: 98 BPM
EKG P AXIS: 13 DEGREES
EKG P-R INTERVAL: 178 MS
EKG Q-T INTERVAL: 346 MS
EKG QRS DURATION: 68 MS
EKG QTC CALCULATION (BAZETT): 441 MS
EKG R AXIS: 15 DEGREES
EKG T AXIS: 24 DEGREES
EKG VENTRICULAR RATE: 98 BPM
VANCOMYCIN TROUGH: 15.7 UG/ML (ref 10–20)

## 2022-03-28 PROCEDURE — 93010 ELECTROCARDIOGRAM REPORT: CPT | Performed by: INTERNAL MEDICINE

## 2022-03-28 PROCEDURE — 96372 THER/PROPH/DIAG INJ SC/IM: CPT | Performed by: FAMILY MEDICINE

## 2022-03-28 RX ORDER — DOXYCYCLINE HYCLATE 100 MG
100 TABLET ORAL 2 TIMES DAILY
Qty: 28 TABLET | Refills: 0 | Status: ON HOLD | OUTPATIENT
Start: 2022-03-28 | End: 2022-04-03 | Stop reason: HOSPADM

## 2022-03-28 RX ORDER — TESTOSTERONE CYPIONATE 200 MG/ML
200 INJECTION INTRAMUSCULAR ONCE
Status: COMPLETED | OUTPATIENT
Start: 2022-03-28 | End: 2022-03-28

## 2022-03-28 RX ADMIN — TESTOSTERONE CYPIONATE 200 MG: 200 INJECTION INTRAMUSCULAR at 14:10

## 2022-03-28 NOTE — TELEPHONE ENCOUNTER
[address] spoke with Man Ross relayed 's Order and She Verbalized Understanding. Called patient spoke with him read back order and He Verbalized Understanding.

## 2022-03-28 NOTE — TELEPHONE ENCOUNTER
Per  D/C Picc, D/C IV Abx. Place order for Doxy 100 mg p.o Bid for 2 weeks. Than F/U with ID. Called Ronnie lovett spoke with her relayed Dr's order and She Verbalized Understanding.

## 2022-03-28 NOTE — TELEPHONE ENCOUNTER
Received call from [facility]) States patient had a Reaction to IV VAnco .  Patient on IV Vanco 1.75 g Q 12 hours,  Sates patient went to E.R last night, rash all over his body and itch. E.R doctor prescribe him Benadryl 25 mg Q 8 hours and pepcid. We will consult ID Doctor.

## 2022-03-28 NOTE — PROGRESS NOTES
Patient given Testosterone 200mg IM right gluteal..  Will return in 2 weeks for next injection    Patient tolerated injection well.     Administrations This Visit     testosterone cypionate (DEPOTESTOTERONE CYPIONATE) injection 200 mg     Admin Date  03/28/2022 Action  Given Dose  200 mg Route  IntraMUSCular Administered By  Juarez Brannon LPN

## 2022-03-28 NOTE — ED NOTES
Patient given DC instructions and education and scripts  To Fu with [facility] tomorrow   Patient given education on anaphylactic reaction and when to return back to the hospital   Up with steady gait  Denies questions      Osman Coto RN  03/27/22 2002

## 2022-03-28 NOTE — PROGRESS NOTES
Called by my office. Patient had an allergic reaction to Vanco. Remove picc and stop vanco. He is at EOT. Plan on 2 weeks of PO Doxycycline - sent to pharmacy listed. Latest wound photo reviewed. Follow up in 2 weeks.      Zeina Christian, DO

## 2022-03-30 ENCOUNTER — HOSPITAL ENCOUNTER (INPATIENT)
Age: 67
LOS: 3 days | Discharge: HOME HEALTH | DRG: 815 | End: 2022-04-03
Attending: EMERGENCY MEDICINE | Admitting: INTERNAL MEDICINE
Payer: MEDICARE

## 2022-03-30 ENCOUNTER — TELEPHONE (OUTPATIENT)
Dept: INFECTIOUS DISEASES | Age: 67
End: 2022-03-30

## 2022-03-30 DIAGNOSIS — L97.513 RIGHT FOOT ULCER, WITH NECROSIS OF MUSCLE (HCC): ICD-10-CM

## 2022-03-30 DIAGNOSIS — D72.12 DRUG-INDUCED RASH WITH EOSINOPHILIA AND SYSTEMIC SYMPTOMS: ICD-10-CM

## 2022-03-30 DIAGNOSIS — T50.905A DRUG-INDUCED RASH WITH EOSINOPHILIA AND SYSTEMIC SYMPTOMS: ICD-10-CM

## 2022-03-30 DIAGNOSIS — T78.40XA ALLERGIC REACTION, INITIAL ENCOUNTER: Primary | ICD-10-CM

## 2022-03-30 PROBLEM — T36.95XA ALLERGIC REACTION DUE TO ANTIBACTERIAL DRUG: Status: ACTIVE | Noted: 2022-03-30

## 2022-03-30 LAB
ALBUMIN SERPL-MCNC: 3.6 G/DL (ref 3.5–4.6)
ALP BLD-CCNC: 102 U/L (ref 35–104)
ALT SERPL-CCNC: 36 U/L (ref 0–41)
ANION GAP SERPL CALCULATED.3IONS-SCNC: 16 MEQ/L (ref 9–15)
AST SERPL-CCNC: 18 U/L (ref 0–40)
BASOPHILS ABSOLUTE: 0.1 K/UL (ref 0–0.2)
BASOPHILS RELATIVE PERCENT: 0.4 %
BILIRUB SERPL-MCNC: 0.9 MG/DL (ref 0.2–0.7)
BUN BLDV-MCNC: 15 MG/DL (ref 8–23)
CALCIUM SERPL-MCNC: 8.2 MG/DL (ref 8.5–9.9)
CHLORIDE BLD-SCNC: 95 MEQ/L (ref 95–107)
CO2: 19 MEQ/L (ref 20–31)
CREAT SERPL-MCNC: 1.3 MG/DL (ref 0.7–1.2)
EOSINOPHILS ABSOLUTE: 0.5 K/UL (ref 0–0.7)
EOSINOPHILS RELATIVE PERCENT: 2.3 %
GFR AFRICAN AMERICAN: >60
GFR NON-AFRICAN AMERICAN: 54.8
GLOBULIN: 2.4 G/DL (ref 2.3–3.5)
GLUCOSE BLD-MCNC: 168 MG/DL (ref 70–99)
HCT VFR BLD CALC: 51.7 % (ref 42–52)
HEMOGLOBIN: 17.1 G/DL (ref 14–18)
LACTIC ACID: 4 MMOL/L (ref 0.5–2.2)
LYMPHOCYTES ABSOLUTE: 0.5 K/UL (ref 1–4.8)
LYMPHOCYTES RELATIVE PERCENT: 2.4 %
MCH RBC QN AUTO: 27 PG (ref 27–31.3)
MCHC RBC AUTO-ENTMCNC: 33.1 % (ref 33–37)
MCV RBC AUTO: 81.7 FL (ref 80–100)
MONOCYTES ABSOLUTE: 1.5 K/UL (ref 0.2–0.8)
MONOCYTES RELATIVE PERCENT: 7.3 %
NEUTROPHILS ABSOLUTE: 17.6 K/UL (ref 1.4–6.5)
NEUTROPHILS RELATIVE PERCENT: 87.6 %
PDW BLD-RTO: 15.4 % (ref 11.5–14.5)
PLATELET # BLD: 296 K/UL (ref 130–400)
POTASSIUM SERPL-SCNC: 4.5 MEQ/L (ref 3.4–4.9)
PROCALCITONIN: 0.22 NG/ML (ref 0–0.15)
RBC # BLD: 6.32 M/UL (ref 4.7–6.1)
SEDIMENTATION RATE, ERYTHROCYTE: 1 MM (ref 0–20)
SODIUM BLD-SCNC: 130 MEQ/L (ref 135–144)
TOTAL PROTEIN: 6 G/DL (ref 6.3–8)
WBC # BLD: 20.1 K/UL (ref 4.8–10.8)

## 2022-03-30 PROCEDURE — 85025 COMPLETE CBC W/AUTO DIFF WBC: CPT

## 2022-03-30 PROCEDURE — 6370000000 HC RX 637 (ALT 250 FOR IP): Performed by: INTERNAL MEDICINE

## 2022-03-30 PROCEDURE — 85652 RBC SED RATE AUTOMATED: CPT

## 2022-03-30 PROCEDURE — 83605 ASSAY OF LACTIC ACID: CPT

## 2022-03-30 PROCEDURE — 84145 PROCALCITONIN (PCT): CPT

## 2022-03-30 PROCEDURE — 87040 BLOOD CULTURE FOR BACTERIA: CPT

## 2022-03-30 PROCEDURE — 96361 HYDRATE IV INFUSION ADD-ON: CPT

## 2022-03-30 PROCEDURE — 36415 COLL VENOUS BLD VENIPUNCTURE: CPT

## 2022-03-30 PROCEDURE — G0378 HOSPITAL OBSERVATION PER HR: HCPCS

## 2022-03-30 PROCEDURE — 6360000002 HC RX W HCPCS: Performed by: EMERGENCY MEDICINE

## 2022-03-30 PROCEDURE — 99285 EMERGENCY DEPT VISIT HI MDM: CPT

## 2022-03-30 PROCEDURE — 80053 COMPREHEN METABOLIC PANEL: CPT

## 2022-03-30 PROCEDURE — 96372 THER/PROPH/DIAG INJ SC/IM: CPT

## 2022-03-30 PROCEDURE — 96374 THER/PROPH/DIAG INJ IV PUSH: CPT

## 2022-03-30 PROCEDURE — 2580000003 HC RX 258: Performed by: INTERNAL MEDICINE

## 2022-03-30 PROCEDURE — 96375 TX/PRO/DX INJ NEW DRUG ADDON: CPT

## 2022-03-30 PROCEDURE — 6360000002 HC RX W HCPCS: Performed by: INTERNAL MEDICINE

## 2022-03-30 PROCEDURE — 2580000003 HC RX 258: Performed by: EMERGENCY MEDICINE

## 2022-03-30 RX ORDER — DIPHENHYDRAMINE HCL 25 MG
50 TABLET ORAL 3 TIMES DAILY
Status: DISCONTINUED | OUTPATIENT
Start: 2022-03-30 | End: 2022-04-01

## 2022-03-30 RX ORDER — CALCIUM CARBONATE 500(1250)
1250 TABLET ORAL DAILY
Status: DISCONTINUED | OUTPATIENT
Start: 2022-03-30 | End: 2022-04-03 | Stop reason: HOSPADM

## 2022-03-30 RX ORDER — HYDRALAZINE HYDROCHLORIDE 20 MG/ML
10 INJECTION INTRAMUSCULAR; INTRAVENOUS EVERY 6 HOURS PRN
Status: DISCONTINUED | OUTPATIENT
Start: 2022-03-30 | End: 2022-04-03 | Stop reason: HOSPADM

## 2022-03-30 RX ORDER — METHYLPREDNISOLONE SODIUM SUCCINATE 40 MG/ML
40 INJECTION, POWDER, LYOPHILIZED, FOR SOLUTION INTRAMUSCULAR; INTRAVENOUS EVERY 12 HOURS
Status: DISCONTINUED | OUTPATIENT
Start: 2022-03-30 | End: 2022-04-02

## 2022-03-30 RX ORDER — ASCORBIC ACID 500 MG
500 TABLET ORAL DAILY
Status: DISCONTINUED | OUTPATIENT
Start: 2022-03-30 | End: 2022-04-03 | Stop reason: HOSPADM

## 2022-03-30 RX ORDER — FAMOTIDINE 20 MG/1
20 TABLET, FILM COATED ORAL 2 TIMES DAILY
Status: DISCONTINUED | OUTPATIENT
Start: 2022-03-30 | End: 2022-04-03 | Stop reason: HOSPADM

## 2022-03-30 RX ORDER — MONTELUKAST SODIUM 10 MG/1
10 TABLET ORAL 2 TIMES DAILY
Status: DISCONTINUED | OUTPATIENT
Start: 2022-03-30 | End: 2022-04-01

## 2022-03-30 RX ORDER — CETIRIZINE HYDROCHLORIDE 10 MG/1
10 TABLET ORAL 2 TIMES DAILY
Status: DISCONTINUED | OUTPATIENT
Start: 2022-03-30 | End: 2022-04-03 | Stop reason: HOSPADM

## 2022-03-30 RX ORDER — ONDANSETRON 2 MG/ML
4 INJECTION INTRAMUSCULAR; INTRAVENOUS ONCE
Status: COMPLETED | OUTPATIENT
Start: 2022-03-30 | End: 2022-03-30

## 2022-03-30 RX ORDER — 0.9 % SODIUM CHLORIDE 0.9 %
1000 INTRAVENOUS SOLUTION INTRAVENOUS ONCE
Status: COMPLETED | OUTPATIENT
Start: 2022-03-30 | End: 2022-03-30

## 2022-03-30 RX ORDER — SODIUM CHLORIDE 9 MG/ML
INJECTION, SOLUTION INTRAVENOUS CONTINUOUS
Status: DISCONTINUED | OUTPATIENT
Start: 2022-03-30 | End: 2022-04-01

## 2022-03-30 RX ADMIN — METHYLPREDNISOLONE SODIUM SUCCINATE 40 MG: 40 INJECTION, POWDER, FOR SOLUTION INTRAMUSCULAR; INTRAVENOUS at 16:14

## 2022-03-30 RX ADMIN — SODIUM CHLORIDE 1000 ML: 9 INJECTION, SOLUTION INTRAVENOUS at 11:29

## 2022-03-30 RX ADMIN — MONTELUKAST 10 MG: 10 TABLET, FILM COATED ORAL at 16:14

## 2022-03-30 RX ADMIN — OXYCODONE HYDROCHLORIDE AND ACETAMINOPHEN 500 MG: 500 TABLET ORAL at 18:09

## 2022-03-30 RX ADMIN — CETIRIZINE HYDROCHLORIDE 10 MG: 10 TABLET, FILM COATED ORAL at 21:00

## 2022-03-30 RX ADMIN — CALCIUM 1250 MG: 500 TABLET ORAL at 18:09

## 2022-03-30 RX ADMIN — FAMOTIDINE 20 MG: 20 TABLET ORAL at 20:11

## 2022-03-30 RX ADMIN — DIPHENHYDRAMINE HCL 50 MG: 25 TABLET ORAL at 16:14

## 2022-03-30 RX ADMIN — ONDANSETRON 4 MG: 2 INJECTION INTRAMUSCULAR; INTRAVENOUS at 11:30

## 2022-03-30 RX ADMIN — ASPIRIN 325 MG: 325 TABLET, COATED ORAL at 18:09

## 2022-03-30 RX ADMIN — SODIUM CHLORIDE: 9 INJECTION, SOLUTION INTRAVENOUS at 16:15

## 2022-03-30 RX ADMIN — DIPHENHYDRAMINE HCL 50 MG: 25 TABLET ORAL at 20:11

## 2022-03-30 RX ADMIN — ENOXAPARIN SODIUM 30 MG: 100 INJECTION SUBCUTANEOUS at 20:10

## 2022-03-30 ASSESSMENT — ENCOUNTER SYMPTOMS
ABDOMINAL PAIN: 0
PHOTOPHOBIA: 0
COUGH: 0
WHEEZING: 0
VOMITING: 0
ABDOMINAL DISTENTION: 0
EYE DISCHARGE: 0
CHEST TIGHTNESS: 0
SHORTNESS OF BREATH: 0
SORE THROAT: 0

## 2022-03-30 ASSESSMENT — PAIN SCALES - GENERAL: PAINLEVEL_OUTOF10: 0

## 2022-03-30 NOTE — H&P
Patient is 78-year-old male with past medical history of right foot infection was on IV vancomycin at home and was recently switched to p.o. doxycycline due to allergic reaction to vancomycin. Patient came in on the 27th of this month for adverse effects of vancomycin. Patient antibiotic was changed to p.o. doxycycline. Patient now comes in with tachycardia, itching, redness throughout his body. Patient rash is improved but still there and bothersome. Family History   Problem Relation Age of Onset    Depression Mother     Heart Disease Paternal Grandmother     High Blood Pressure Paternal Grandmother     High Cholesterol Paternal Grandmother     Anemia Paternal Grandmother         B 12 deficiency    Heart Disease Brother     Heart Surgery Brother     Anemia Brother         B 12 deficiency     No current facility-administered medications on file prior to encounter. Current Outpatient Medications on File Prior to Encounter   Medication Sig Dispense Refill    doxycycline hyclate (VIBRA-TABS) 100 MG tablet Take 1 tablet by mouth 2 times daily for 14 days 28 tablet 0    diphenhydrAMINE (BENADRYL ALLERGY) 25 MG capsule Take 1 capsule by mouth every 6 hours as needed for Itching or Allergies 60 capsule 0    famotidine (PEPCID) 20 MG tablet Take 1 tablet by mouth 2 times daily 60 tablet 0    ascorbic acid (VITAMIN C) 500 MG tablet Take 500 mg by mouth daily      vitamin B-12 (CYANOCOBALAMIN) 500 MCG tablet Take 500 mcg by mouth daily      zinc 50 MG TABS tablet Take 50 mg by mouth daily      vancomycin (VANCOCIN) infusion Infuse 1,500 mg intravenously every 12 hours Compound per protocol.   Vancomycin trough before third dose and weekly  BMP CBC weekly  CRP in 2 weeks  Fax results to [id number] 90 g 0    lactobacillus acidophilus [facility]) Take 4 tablets by mouth 3 times daily 120 tablet 0    aspirin 325 MG EC tablet Take 1 tablet by mouth daily 1 tablet 0    triamcinolone (KENALOG) 0.1 % UPPER GASTROINTESTINAL ENDOSCOPY  5/9/14    bx, dilation marjorie    UPPER GASTROINTESTINAL ENDOSCOPY N/A 9/15/2017    EGD DILATION SAVORY and biopsy performed by Stacy Cohn MD at [facility]     Lab Results   Component Value Date    WBC 20.1 (H) 03/30/2022    HGB 17.1 03/30/2022    HCT 51.7 03/30/2022    MCV 81.7 03/30/2022     03/30/2022     Lab Results   Component Value Date     03/30/2022    K 4.5 03/30/2022    K 4.3 03/01/2022    CL 95 03/30/2022    CO2 19 03/30/2022    BUN 15 03/30/2022    CREATININE 1.30 03/30/2022    GLUCOSE 168 03/30/2022    GLUCOSE 105 02/03/2012    CALCIUM 8.2 03/30/2022      ROS: 12 point review of system is negative except what was stated in HPI  HEENT: AT/NC, PERRLA, no JVD  HEART: s1/s2 wnl w/o s3, no m.r.g  LUNG: clear, no wheez  ABD: soft, NT  EXT: no edema, closed wound on the right foot healing  SKin :generalized rash  Neuro:no focal deficits  1) Kamilla syndrome  2) tachycardia  3) hyponatremia  Admit to obs  Zyrtec montelukast    IV fluids  IV steroids  Infectious disease consultation and allergy immunology consultation  Lopressor as needed for tachycardia

## 2022-03-30 NOTE — TELEPHONE ENCOUNTER
Asher Swift Nurse reports the Rash is worse, Red and Raised bumps and itchy. Pt has low grade fever at 100, feeling light-headed and dizzy. BP at 156/90, which is patient's average. Taking Benadryl as directed. Asher Swift Nurse states the dark stools reported from the other day has Resolved, but Not the Rash. Asher Swift Nurse at the home to D/c the Picc Line. She has encouraged the patient to go to the ER. Will update ID Physician. 10:15am, Per consult with ID Physician, Dr Charlotte Heath, have patient go to the ER. Return call to patient, mariem to go to the ER. Returned call to Asher Swift nurse Jet, she states patient has been sent to ER Dept via ambulance. She left the Picc Line in, just in case it is needed.

## 2022-03-30 NOTE — PLAN OF CARE
Assumed care of pt this shift. Pt is a/ox4 able to make needs known. Admission is completed, med rec was reconciled. VSS, pt has a head to toe rash due to a medication reaction. Rash appears red with some areas of blistering. Pt states that the rash on his face feels like it's leaking fluid. Pt does complain of the rash being itchy and burning.

## 2022-03-30 NOTE — ED PROVIDER NOTES
[facility] ED  eMERGENCY dEPARTMENT eNCOUnter      Pt Name: Clint Davidson  MRN: [id number]  [place] 1955  Date of evaluation: 3/30/2022  Provider: Aria De Jesus MD    CHIEF COMPLAINT       Chief Complaint   Patient presents with    Rash     pt was seen here sunday  for possible  allergic reaction to vancomycin    states he was switched to doxycicline onmonday  and rah has continued to get worse  pt was given 50 mg benadryl  and 125 solumedrol by squad   im          HISTORY OF PRESENT ILLNESS   (Location/Symptom, Timing/Onset,Context/Setting, Quality, Duration, Modifying Factors, Severity)  Note limiting factors. Clint Davidson is a 74 y.o. male who presents to the emergency department for evaluation of a worsening rash. Patient had developed a generalized rash over this past week believed to be secondary to vancomycin. It was considered to be \"red man\" syndrome and he was evaluated by infectious disease doctor 2 days ago stopped vancomycin to begin doxycycline. Over the last 48 hours he is worsened with a progressive rash and more redness. He states this is also affected his GI symptoms with nausea but no vomiting. Decreased p.o. intake. He has been on Benadryl and Pepcid for the rash but no steroids. Solu-Medrol was given prehospital as he came in by EMS. The antibiotics were initiated for a right foot infection secondary to diabetic neuropathy. No known fevers. HPI    NursingNotes were reviewed. REVIEW OF SYSTEMS    (2-9 systems for level 4, 10 or more for level 5)     Review of Systems   Constitutional: Positive for appetite change and fatigue. Negative for chills and diaphoresis. HENT: Negative for congestion, ear pain, mouth sores and sore throat. Eyes: Negative for photophobia and discharge. Respiratory: Negative for cough, chest tightness, shortness of breath and wheezing. Cardiovascular: Negative for chest pain and palpitations.    Gastrointestinal: Negative for abdominal distention, abdominal pain and vomiting. Endocrine: Negative for cold intolerance. Genitourinary: Negative for difficulty urinating. Musculoskeletal: Negative for arthralgias. Skin: Positive for rash. Negative for pallor. Allergic/Immunologic: Negative for immunocompromised state. Neurological: Negative for dizziness and syncope. Hematological: Negative for adenopathy. Psychiatric/Behavioral: Negative for agitation and hallucinations. All other systems reviewed and are negative. Except as noted above the remainder of the review of systems was reviewed and negative. PAST MEDICAL HISTORY     Past Medical History:   Diagnosis Date    BPH (benign prostatic hyperplasia) 2/3/2012    no treatment at this time    Chronic back pain     Colon polyp 09/2017    Dr. Mychal Peck; f/u 5 years    Eczematous dermatitis     hands, feet and legs    ED (erectile dysfunction) 2/3/2012    Erectile dysfunction     Family history of early CAD 2/3/2012    Gastroesophageal reflux disease with hiatal hernia     History of hypertension     History of prediabetes     History of renal insufficiency syndrome     Obesity (BMI 30-39. 9)     Osteoarthritis     Vitamin D deficiency     Weight gain          SURGICALHISTORY       Past Surgical History:   Procedure Laterality Date    COLONOSCOPY  9/25/2006    FOOT DEBRIDEMENT Right 2/25/2022    FOOT DEBRIDEMENT INCISION AND DRAINAGE performed by Delroy Dickens DPM at [address] [place] [address] SCRN NOT  [address] [facility] N/A 9/15/2017    COLONOSCOPY performed by Jaz Correa MD at [address] [place] [address]  5/9/14    bx, dilation jarmoszuk    UPPER GASTROINTESTINAL ENDOSCOPY N/A 9/15/2017    EGD DILATION SAVORY and biopsy performed by Jaz Correa MD at [address]       Previous Medications    ASCORBIC ACID (VITAMIN C) 500 MG TABLET    Take 500 mg by mouth daily    ASPIRIN 325 MG EC TABLET    Take 1 tablet by mouth daily    CALCIUM CARBONATE (OYSTER SHELL CALCIUM 500 MG) 1250 MG TABLET    Take 1 tablet by mouth daily    DIPHENHYDRAMINE (BENADRYL ALLERGY) 25 MG CAPSULE    Take 1 capsule by mouth every 6 hours as needed for Itching or Allergies    DOXYCYCLINE HYCLATE (VIBRA-TABS) 100 MG TABLET    Take 1 tablet by mouth 2 times daily for 14 days    FAMOTIDINE (PEPCID) 20 MG TABLET    Take 1 tablet by mouth 2 times daily    GLUCOSAMINE-CHONDROITIN 5253-4385 MG/30ML LIQD    Take by mouth Take 3 pills daily    LACTOBACILLUS ACIDOPHILUS (FLORANEX)    Take 4 tablets by mouth 3 times daily    MAGNESIUM (MAGNESIUM-OXIDE) 250 MG TABS TABLET    Take 250 mg by mouth daily Take 3 pills daily    MULTIPLE VITAMINS-MINERALS (ONE-A-DAY MENS HEALTH FORMULA) TABS    Take by mouth    OMEGA-3 FATTY ACIDS (FISH OIL) 1200 MG CAPS    Take  by mouth daily. SYRINGE/NEEDLE, DISP, (SYRINGE 3CC/44BR9-2/2\") 22G X 1-1/2\" 3 ML MISC    1 actuation by Does not apply route every 14 days    TESTOSTERONE CYPIONATE (DEPOTESTOTERONE CYPIONATE) 200 MG/ML INJECTION    Inject 1 mL into the muscle every 14 days for 90 doses. TRIAMCINOLONE (KENALOG) 0.1 % CREAM    Apply topically qam daily Monday through Friday only. Take weekend off. Do not use on face, groin, armpits, breasts tissue. VANCOMYCIN (VANCOCIN) INFUSION    Infuse 1,500 mg intravenously every 12 hours Compound per protocol.   Vancomycin trough before third dose and weekly  BMP CBC weekly  CRP in 2 weeks  Fax results to [phone]    VITAMIN B-12 (CYANOCOBALAMIN) 500 MCG TABLET    Take 500 mcg by mouth daily    VITAMIN D (CHOLECALCIFEROL) 1000 UNIT TABS TABLET    Take 6,000 Units by mouth daily    VITAMIN E 1000 UNITS CAPSULE    Take 1,000 Units by mouth daily    ZINC 50 MG TABS TABLET    Take 50 mg by mouth daily       ALLERGIES     Vancomycin    FAMILY HISTORY       Family History   Problem Relation Age of Onset    Depression Mother     Heart Disease Paternal Grandmother     High Blood Pressure Paternal Grandmother     High Cholesterol Paternal Grandmother     Anemia Paternal Grandmother         B 12 deficiency    Heart Disease Brother     Heart Surgery Brother     Anemia Brother         B 12 deficiency          SOCIAL HISTORY       Social History     Socioeconomic History    Marital status:      Spouse name: Not on file    Number of children: 1    Years of education: Not on file    Highest education level: Not on file   Occupational History    Not on file   Tobacco Use    Smoking status: Former Smoker     Packs/day: 2.00     Years: 5.00     Pack years: 10.00     Quit date: 1990     Years since quittin.2    Smokeless tobacco: Never Used   Vaping Use    Vaping Use: Never used   Substance and Sexual Activity    Alcohol use: No    Drug use: No    Sexual activity: Yes     Partners: Female   Other Topics Concern    Not on file   Social History Narrative    Not on file     Social Determinants of Health     Financial Resource Strain: Low Risk     Difficulty of Paying Living Expenses: Not hard at all   Food Insecurity: No Food Insecurity    Worried About [address] in the Last Year: Never true    [address] in the Last Year: Never true   Transportation Needs:     Lack of Transportation (Medical): Not on file    Lack of Transportation (Non-Medical):  Not on file   Physical Activity:     Days of Exercise per Week: Not on file    Minutes of Exercise per Session: Not on file   Stress:     Feeling of Stress : Not on file   Social Connections:     Frequency of Communication with Friends and Family: Not on file    Frequency of Social Gatherings with Friends and Family: Not on file    Attends Yarsanism Services: Not on file    Active Member of Clubs or Organizations: Not on file    Attends Club or Organization Meetings: Not on file    Marital Status: Not on file   Intimate Partner Violence:     Fear of Current or Ex-Partner: Not on file    Emotionally Abused: Not on file    Physically Abused: Not on file    Sexually Abused: Not on file   Housing Stability:     Unable to Pay for Housing in the Last Year: Not on file    Number of [place] in the Last Year: Not on file    Unstable Housing in the Last Year: Not on file       SCREENINGS    [place] Coma Scale  Eye Opening: Spontaneous  Best Verbal Response: Oriented  Best Motor Response: Obeys commands  [place] Coma Scale Score: 15 @FLOW([id number])@      PHYSICAL EXAM    (up to 7 for level 4, 8 or more for level 5)     ED Triage Vitals [03/30/22 1045]   BP Temp Temp src Pulse Resp SpO2 Height Weight   130/79 99.5 °F (37.5 °C) -- 120 18 99 % -- --       Physical Exam  Vitals and nursing note reviewed. Constitutional:       Appearance: He is well-developed. HENT:      Head: Normocephalic. Nose: Nose normal.      Mouth/Throat:      Mouth: Mucous membranes are moist.   Eyes:      Conjunctiva/sclera: Conjunctivae normal.      Pupils: Pupils are equal, round, and reactive to light. Cardiovascular:      Rate and Rhythm: Normal rate and regular rhythm. Heart sounds: Normal heart sounds. Pulmonary:      Effort: Pulmonary effort is normal.      Breath sounds: Normal breath sounds. Abdominal:      General: Bowel sounds are normal.      Palpations: Abdomen is soft. Tenderness: There is no abdominal tenderness. There is no guarding. Musculoskeletal:         General: Normal range of motion. Cervical back: Normal range of motion and neck supple. Skin:     General: Skin is warm and dry. Capillary Refill: Capillary refill takes less than 2 seconds. Findings: Erythema and rash present. Rash is macular and papular. Comments: Diffuse erythematous rash causing redness from the neck up. Extensive involvement of the arms and torso also with a maculopapular generalized rash somewhat raised and itching.     Overall fairly diffuse rash worse upper torso the lower. Neurological:      Mental Status: He is alert and oriented to person, place, and time. DIAGNOSTIC RESULTS     EKG: All EKG's are interpreted by the Emergency Department Physician who either signs or Co-signsthis chart in the absence of a cardiologist.      RADIOLOGY:   [place] such as CT, Ultrasound and MRI are read by the radiologist. Plain radiographic images are visualized and preliminarily interpreted by the emergency physician with the below findings:      Interpretation per the Radiologist below, if available at the time ofthis note:    No orders to display         ED BEDSIDE ULTRASOUND:   Performed by ED Physician - none    LABS:  Labs Reviewed   PROCALCITONIN - Abnormal; Notable for the following components:       Result Value    Procalcitonin 0.22 (*)     All other components within normal limits   CBC WITH AUTO DIFFERENTIAL - Abnormal; Notable for the following components:    WBC 20.1 (*)     RBC 6.32 (*)     RDW 15.4 (*)     Neutrophils Absolute 17.6 (*)     Lymphocytes Absolute 0.5 (*)     Monocytes Absolute 1.5 (*)     All other components within normal limits   COMPREHENSIVE METABOLIC PANEL - Abnormal; Notable for the following components:    Sodium 130 (*)     CO2 19 (*)     Anion Gap 16 (*)     Glucose 168 (*)     CREATININE 1.30 (*)     GFR Non- 54.8 (*)     Calcium 8.2 (*)     Total Protein 6.0 (*)     Total Bilirubin 0.9 (*)     All other components within normal limits   LACTIC ACID - Abnormal; Notable for the following components:    Lactic Acid 4.0 (*)     All other components within normal limits    Narrative:     CALL  [facility] tel. [phone],  LACTIC ACID results called to and read back by Ana Maria Marsh RN, 03/30/2022  12:00, by UBALDO   CULTURE, BLOOD 2   CULTURE, BLOOD 1   SEDIMENTATION RATE       All other labs were within normal range or not returned as of this dictation.     EMERGENCY DEPARTMENT COURSE and DIFFERENTIAL DIAGNOSIS/MDM:   Vitals:    Vitals:    03/30/22 1045 03/30/22 1201 03/30/22 1220   BP: 130/79 130/75    Pulse: 120  111   Resp: 18     Temp: 99.5 °F (37.5 °C)     SpO2: 99% 92%             MDM patient with substantial generalized rash likely \"red man\" syndrome. Ever, he does have increasing white blood count at 20,000 now with lactic acidosis and tachycardia. He will be admitted for further management of the rash and infectious disease consultation        CONSULTS:  IP CONSULT TO INFECTIOUS DISEASES  IP CONSULT TO ALLERGY & IMMUNOLOGY    PROCEDURES:  Unless otherwise noted below, none     Procedures    FINAL IMPRESSION      1. Allergic reaction, initial encounter    2. Drug-induced rash with eosinophilia and systemic symptoms          DISPOSITION/PLAN   DISPOSITION Admitted 03/30/2022 01:20:59 PM      PATIENT REFERRED TO:  No follow-up provider specified.     DISCHARGE MEDICATIONS:  New Prescriptions    No medications on file          (Please note that portions of this note were completed with a voice recognition program.  Efforts were made to edit the dictations but occasionally words are mis-transcribed.)    Molly Dorsey MD (electronically signed)  Attending Emergency Physician          Molly Dorsey MD  04/01/22 4545

## 2022-03-30 NOTE — PROGRESS NOTES
Spiritual Care Services     Summary of Visit:      Spiritual Assessment/Intervention/Outcomes:    Encounter Summary  Services provided to[de-identified] Patient  Referral/Consult From[de-identified] Rounding  Support System: Spouse,Children,Sabianist/renetta community  Continue Visiting: No  Complexity of Encounter: Moderate  Length of Encounter: 15 minutes  Spiritual Assessment Completed: Yes  Advance Care Planning: Yes  Routine  Type: Initial     Spiritual/Church  Type: Spiritual support  Assessment: Calm,Approachable  Intervention: Prayer  Outcome: Expressed gratitude        Primary Decision Maker (Healthcare Proxy)  Patient's Healthcare Decision Maker is[de-identified] Named in [facility] [address] / Beliefs  Do you have any ethnic, cultural, sacramental, or spiritual Mu-ism needs you would like us to be aware of while you are in the hospital?: No    Care Plan:        [id number]   Electronically signed by Ronnie Xavier on 3/30/22 at 4:26 PM EDT     To reach a  for emotional and spiritual support, place an [facility] consult request.   If a  is needed immediately, dial 0 and ask to page the on-call .

## 2022-03-30 NOTE — CARE COORDINATION
03/30/22    From:HOME ALONE AMBULATES INDEPENDENTLY CURRENT [address] FOR IV AB [facility] WOUND CENTER Q.O.D.     Admit: R FOOT INF RED KERWNI SYNDROME, LEUKOCYTOSIS LACT ACIDOSIS    PMH: BPH HTN,     Anticipated Discharge [address]    Patient Mobility or PT/OT ordered:N/A USES WALKER PRN D/T FOOT INFECTION    Consults:ALLERGIST    Covid result &/or vacc status:    Barriers to Discharge  99.5--TACHY 115  LACTIC 4.0  WBC 20.1  PRO DOMO 0.2  BLOOD CX  R FOOT DRESSINGS  IV STEROIDS  PO ANTIHISTAMINES  ALLERGIST NEEDS TO SEE     Assessments:CMI DONE

## 2022-03-30 NOTE — CARE COORDINATION
[facility] AT [place] Case Management Initial Discharge Assessment    Met with Patient to discuss discharge plan. PT HAS PICC LINE & R FOOT DRSG      PCP: Bill Lopez MD                                Date of Last Visit:    [address] Patient: No        [facility] Notified: no    If no PCP, list provided? N/A    Discharge Planning    Living Arrangements: independently at home    Who do you live with? ALONE    Who helps you with your care:  self    If lives at home:     Do you have any barriers navigating in your home? no    Patient can perform ADL? Yes    Current Services (outpatient and in home) :  [facility])    Dialysis: No    Is transportation available to get to your appointments? Yes PT DRIVES    DME Equipment:  yes - WALKER    Respiratory equipment: None    Respiratory provider:  no     Pharmacy:  yes - [facility]    Consult with Medication Assistance Program?  No      Patient agreeable to [facility]? Yes, [facility]    Patient agreeable to SNF/Rehab? N/A    Other discharge needs identified? Other TBD CM TO ASSESS DAILY    Does Patient Have a High-Risk for Readmission Diagnosis (CHF, PN, MI, COPD)? No      Initial Discharge Plan? (Note: please see concurrent daily documentation for any updates after initial note).     RETURN HOME [address] (PT HAS PICC LINE)    Readmission Risk              Risk of Unplanned Readmission:  0         Electronically signed by Armando Alston RN on 3/30/2022 at 2:38 PM

## 2022-03-31 LAB
ALBUMIN SERPL-MCNC: 3 G/DL (ref 3.5–4.6)
ALP BLD-CCNC: 67 U/L (ref 35–104)
ALT SERPL-CCNC: 28 U/L (ref 0–41)
ANION GAP SERPL CALCULATED.3IONS-SCNC: 10 MEQ/L (ref 9–15)
AST SERPL-CCNC: 10 U/L (ref 0–40)
BASOPHILS ABSOLUTE: 0.1 K/UL (ref 0–0.2)
BASOPHILS RELATIVE PERCENT: 0.5 %
BILIRUB SERPL-MCNC: 0.3 MG/DL (ref 0.2–0.7)
BUN BLDV-MCNC: 18 MG/DL (ref 8–23)
CALCIUM SERPL-MCNC: 8.4 MG/DL (ref 8.5–9.9)
CHLORIDE BLD-SCNC: 99 MEQ/L (ref 95–107)
CO2: 18 MEQ/L (ref 20–31)
CREAT SERPL-MCNC: 1.23 MG/DL (ref 0.7–1.2)
EOSINOPHILS ABSOLUTE: 0.4 K/UL (ref 0–0.7)
EOSINOPHILS RELATIVE PERCENT: 2.3 %
GFR AFRICAN AMERICAN: >60
GFR NON-AFRICAN AMERICAN: 58.4
GLOBULIN: 2.2 G/DL (ref 2.3–3.5)
GLUCOSE BLD-MCNC: 173 MG/DL (ref 70–99)
HCT VFR BLD CALC: 44.5 % (ref 42–52)
HEMOGLOBIN: 14.8 G/DL (ref 14–18)
LYMPHOCYTES ABSOLUTE: 0.2 K/UL (ref 1–4.8)
LYMPHOCYTES RELATIVE PERCENT: 1.4 %
MCH RBC QN AUTO: 27.1 PG (ref 27–31.3)
MCHC RBC AUTO-ENTMCNC: 33.2 % (ref 33–37)
MCV RBC AUTO: 81.6 FL (ref 80–100)
MONOCYTES ABSOLUTE: 1 K/UL (ref 0.2–0.8)
MONOCYTES RELATIVE PERCENT: 6.1 %
NEUTROPHILS ABSOLUTE: 14.9 K/UL (ref 1.4–6.5)
NEUTROPHILS RELATIVE PERCENT: 89.7 %
PDW BLD-RTO: 14.9 % (ref 11.5–14.5)
PLATELET # BLD: 239 K/UL (ref 130–400)
POTASSIUM SERPL-SCNC: 4.5 MEQ/L (ref 3.4–4.9)
RBC # BLD: 5.45 M/UL (ref 4.7–6.1)
SODIUM BLD-SCNC: 127 MEQ/L (ref 135–144)
TOTAL PROTEIN: 5.2 G/DL (ref 6.3–8)
VANCOMYCIN RANDOM: <4 UG/ML (ref 10–40)
WBC # BLD: 16.6 K/UL (ref 4.8–10.8)

## 2022-03-31 PROCEDURE — 96361 HYDRATE IV INFUSION ADD-ON: CPT

## 2022-03-31 PROCEDURE — 2580000003 HC RX 258: Performed by: INTERNAL MEDICINE

## 2022-03-31 PROCEDURE — 80053 COMPREHEN METABOLIC PANEL: CPT

## 2022-03-31 PROCEDURE — 99223 1ST HOSP IP/OBS HIGH 75: CPT | Performed by: INTERNAL MEDICINE

## 2022-03-31 PROCEDURE — 1210000000 HC MED SURG R&B

## 2022-03-31 PROCEDURE — 85025 COMPLETE CBC W/AUTO DIFF WBC: CPT

## 2022-03-31 PROCEDURE — 6360000002 HC RX W HCPCS: Performed by: INTERNAL MEDICINE

## 2022-03-31 PROCEDURE — 80202 ASSAY OF VANCOMYCIN: CPT

## 2022-03-31 PROCEDURE — 6370000000 HC RX 637 (ALT 250 FOR IP): Performed by: INTERNAL MEDICINE

## 2022-03-31 PROCEDURE — 96376 TX/PRO/DX INJ SAME DRUG ADON: CPT

## 2022-03-31 PROCEDURE — 36415 COLL VENOUS BLD VENIPUNCTURE: CPT

## 2022-03-31 PROCEDURE — 96372 THER/PROPH/DIAG INJ SC/IM: CPT

## 2022-03-31 PROCEDURE — 99211 OFF/OP EST MAY X REQ PHY/QHP: CPT

## 2022-03-31 RX ORDER — ACETAMINOPHEN 325 MG/1
650 TABLET ORAL EVERY 4 HOURS PRN
Status: DISCONTINUED | OUTPATIENT
Start: 2022-03-31 | End: 2022-04-03 | Stop reason: HOSPADM

## 2022-03-31 RX ORDER — METOPROLOL TARTRATE 5 MG/5ML
2.5 INJECTION INTRAVENOUS EVERY 6 HOURS PRN
Status: DISCONTINUED | OUTPATIENT
Start: 2022-03-31 | End: 2022-04-03 | Stop reason: HOSPADM

## 2022-03-31 RX ADMIN — Medication 30 G: at 21:09

## 2022-03-31 RX ADMIN — ACETAMINOPHEN 650 MG: 325 TABLET ORAL at 17:12

## 2022-03-31 RX ADMIN — OXYCODONE HYDROCHLORIDE AND ACETAMINOPHEN 500 MG: 500 TABLET ORAL at 09:38

## 2022-03-31 RX ADMIN — FAMOTIDINE 20 MG: 20 TABLET ORAL at 21:00

## 2022-03-31 RX ADMIN — MONTELUKAST 10 MG: 10 TABLET, FILM COATED ORAL at 17:12

## 2022-03-31 RX ADMIN — DIPHENHYDRAMINE HCL 50 MG: 25 TABLET ORAL at 20:59

## 2022-03-31 RX ADMIN — SODIUM CHLORIDE: 9 INJECTION, SOLUTION INTRAVENOUS at 14:03

## 2022-03-31 RX ADMIN — METHYLPREDNISOLONE SODIUM SUCCINATE 40 MG: 40 INJECTION, POWDER, FOR SOLUTION INTRAMUSCULAR; INTRAVENOUS at 03:15

## 2022-03-31 RX ADMIN — CETIRIZINE HYDROCHLORIDE 10 MG: 10 TABLET, FILM COATED ORAL at 09:38

## 2022-03-31 RX ADMIN — METHYLPREDNISOLONE SODIUM SUCCINATE 40 MG: 40 INJECTION, POWDER, FOR SOLUTION INTRAMUSCULAR; INTRAVENOUS at 14:03

## 2022-03-31 RX ADMIN — CALCIUM 1250 MG: 500 TABLET ORAL at 09:38

## 2022-03-31 RX ADMIN — DIPHENHYDRAMINE HCL 50 MG: 25 TABLET ORAL at 14:03

## 2022-03-31 RX ADMIN — DIPHENHYDRAMINE HCL 50 MG: 25 TABLET ORAL at 09:38

## 2022-03-31 RX ADMIN — ENOXAPARIN SODIUM 30 MG: 100 INJECTION SUBCUTANEOUS at 21:00

## 2022-03-31 RX ADMIN — MONTELUKAST 10 MG: 10 TABLET, FILM COATED ORAL at 05:36

## 2022-03-31 RX ADMIN — FAMOTIDINE 20 MG: 20 TABLET ORAL at 09:38

## 2022-03-31 RX ADMIN — ACETAMINOPHEN 650 MG: 325 TABLET ORAL at 08:38

## 2022-03-31 RX ADMIN — ENOXAPARIN SODIUM 30 MG: 100 INJECTION SUBCUTANEOUS at 09:41

## 2022-03-31 RX ADMIN — ASPIRIN 325 MG: 325 TABLET, COATED ORAL at 09:38

## 2022-03-31 RX ADMIN — CETIRIZINE HYDROCHLORIDE 10 MG: 10 TABLET, FILM COATED ORAL at 20:59

## 2022-03-31 ASSESSMENT — PAIN SCALES - GENERAL
PAINLEVEL_OUTOF10: 0
PAINLEVEL_OUTOF10: 0
PAINLEVEL_OUTOF10: 2
PAINLEVEL_OUTOF10: 4

## 2022-03-31 ASSESSMENT — ENCOUNTER SYMPTOMS
DIARRHEA: 0
NAUSEA: 0
COUGH: 0
SHORTNESS OF BREATH: 0
VOMITING: 0

## 2022-03-31 NOTE — PROGRESS NOTES
Progress Note  Date:3/31/2022       [facility]  Patient Name:Mason Vela     YOB: 1955     Age:66 y.o. Subjective    Subjective:  Symptoms:  He reports malaise and weakness. No shortness of breath, cough, chest pain, headache, chest pressure, anorexia, diarrhea or anxiety. Diet:  No nausea or vomiting. Review of Systems   Respiratory: Negative for cough and shortness of breath. Cardiovascular: Negative for chest pain. Gastrointestinal: Negative for anorexia, diarrhea, nausea and vomiting. Neurological: Positive for weakness. Objective         Vitals Last 24 Hours:  TEMPERATURE:  Temp  Av.5 °F (37.5 °C)  Min: 98.2 °F (36.8 °C)  Max: 101.3 °F (38.5 °C)  RESPIRATIONS RANGE: Resp  Av.2  Min: 18  Max: 22  PULSE OXIMETRY RANGE: SpO2  Av.2 %  Min: 92 %  Max: 99 %  PULSE RANGE: Pulse  Av.9  Min: 103  Max: 121  BLOOD PRESSURE RANGE: Systolic (70QYQ), OKR:240 , Min:120 , SDM:918   ; Diastolic (78WTE), LZO:06, Min:52, Max:79    I/O (24Hr): Intake/Output Summary (Last 24 hours) at 3/31/2022 1024  Last data filed at 3/31/2022 0530  Gross per 24 hour   Intake 1254.64 ml   Output 200 ml   Net 1054.64 ml     Objective:  General Appearance: In no acute distress. Vital signs: (most recent): Blood pressure (!) 126/55, pulse 121, temperature 101.3 °F (38.5 °C), temperature source Oral, resp. rate 18, height 6' (1.829 m), weight 277 lb (125.6 kg), SpO2 94 %. HEENT: Normal HEENT exam.    Lungs:  He is not in respiratory distress. No decreased breath sounds or wheezes. Heart: S1 normal and S2 normal.    Abdomen: Abdomen is soft. Bowel sounds are normal.   There is no epigastric area or suprapubic area tenderness. Pulses: Distal pulses are intact. Neurological: Patient is alert. Pupils:  Pupils are equal, round, and reactive to light. Skin:  Warm and dry.     generalized rash, oozing yellowish material from scartching  Labs/Imaging/Diagnostics    Labs:  CBC:  Recent Labs     03/30/22  1115   WBC 20.1*   RBC 6.32*   HGB 17.1   HCT 51.7   MCV 81.7   RDW 15.4*        CHEMISTRIES:  Recent Labs     03/30/22  1115   *   K 4.5   CL 95   CO2 19*   BUN 15   CREATININE 1.30*   GLUCOSE 168*     PT/INR:No results for input(s): PROTIME, INR in the last 72 hours. APTT:No results for input(s): APTT in the last 72 hours. LIVER PROFILE:  Recent Labs     03/30/22  1115   AST 18   ALT 36   BILITOT 0.9*   ALKPHOS 102       Imaging Last 24 Hours:  No results found. Assessment//Plan           Hospital Problems           Last Modified POA    * (Principal) Allergic reaction due to antibacterial drug 3/30/2022 Yes      fever   Tachycardia  hyponatremia  Assessment & Plan  3/31: Spoke with ID hold doxycycline for now. Patient right foot does not look infected. Patient symptoms has been better since yesterday. But still itching. We will get a Vanco level. Follow-up labs today. Continue with NS. Tachycardia due to fever, lopressor prn.   Electronically signed by Beto Quinonez MD on 3/31/22 at 10:24 AM EDT

## 2022-03-31 NOTE — CARE COORDINATION
This LSW met with patient at bedside this afternoon. Patient is not feeling well this afternoon. Jameson Meza RN aware. Patient states that he will return home upon discharge.   Electronically signed by LILIA Graham, LSW on 3/31/22 at 4:06 PM EDT

## 2022-03-31 NOTE — PROGRESS NOTES
Blaise Mayfield MD at [address] [place] [address]  14    bx, dilation jarmago    UPPER GASTROINTESTINAL ENDOSCOPY N/A 9/15/2017    EGD DILATION [facility] and biopsy performed by Ed Russo MD at [facility] 34    Family History   Problem Relation Age of Onset    Depression Mother     Heart Disease Paternal Grandmother     High Blood Pressure Paternal Grandmother     High Cholesterol Paternal Grandmother     Anemia Paternal Grandmother         B 12 deficiency    Heart Disease Brother     Heart Surgery Brother     Anemia Brother         B 12 deficiency       SOCIAL HISTORY    Social History     Tobacco Use    Smoking status: Former Smoker     Packs/day: 2.00     Years: 5.00     Pack years: 10.00     Quit date: 1990     Years since quittin.2    Smokeless tobacco: Never Used   Vaping Use    Vaping Use: Never used   Substance Use Topics    Alcohol use: No    Drug use: No       ALLERGIES    Allergies   Allergen Reactions    Vancomycin      Rash       MEDICATIONS    No current facility-administered medications on file prior to encounter. Current Outpatient Medications on File Prior to Encounter   Medication Sig Dispense Refill    doxycycline hyclate (VIBRA-TABS) 100 MG tablet Take 1 tablet by mouth 2 times daily for 14 days 28 tablet 0    diphenhydrAMINE (BENADRYL ALLERGY) 25 MG capsule Take 1 capsule by mouth every 6 hours as needed for Itching or Allergies 60 capsule 0    famotidine (PEPCID) 20 MG tablet Take 1 tablet by mouth 2 times daily 60 tablet 0    ascorbic acid (VITAMIN C) 500 MG tablet Take 500 mg by mouth daily      vitamin B-12 (CYANOCOBALAMIN) 500 MCG tablet Take 500 mcg by mouth daily      zinc 50 MG TABS tablet Take 50 mg by mouth daily      vancomycin (VANCOCIN) infusion Infuse 1,500 mg intravenously every 12 hours Compound per protocol.   Vancomycin trough before third dose and weekly  BMP CBC weekly  CRP in 2 weeks  Fax results to [id number] 90 g 0    lactobacillus acidophilus [facility]) Take 4 tablets by mouth 3 times daily 120 tablet 0    aspirin 325 MG EC tablet Take 1 tablet by mouth daily 1 tablet 0    triamcinolone (KENALOG) 0.1 % cream Apply topically qam daily Monday through Friday only. Take weekend off. Do not use on face, groin, armpits, breasts tissue. 454 g 0    testosterone cypionate (DEPOTESTOTERONE CYPIONATE) 200 MG/ML injection Inject 1 mL into the muscle every 14 days for 90 doses. 8 mL 0    Syringe/Needle, Disp, (SYRINGE 3CC/29YY3-3/2\") 22G X 1-1/2\" 3 ML MISC 1 actuation by Does not apply route every 14 days 2 each 5    vitamin E 1000 units capsule Take 1,000 Units by mouth daily      vitamin D (CHOLECALCIFEROL) 1000 UNIT TABS tablet Take 6,000 Units by mouth daily      Multiple Vitamins-Minerals (ONE-A-DAY MENS HEALTH FORMULA) TABS Take by mouth      magnesium (MAGNESIUM-OXIDE) 250 MG TABS tablet Take 250 mg by mouth daily Take 3 pills daily      calcium carbonate (OYSTER SHELL CALCIUM 500 MG) 1250 MG tablet Take 1 tablet by mouth daily      Glucosamine-Chondroitin 9156-6152 MG/30ML LIQD Take by mouth Take 3 pills daily      Omega-3 Fatty Acids (FISH OIL) 1200 MG CAPS Take  by mouth daily. Objective    BP (!) 126/55   Pulse 121   Temp 101.3 °F (38.5 °C) (Oral)   Resp 18   Ht 6' (1.829 m)   Wt 277 lb (125.6 kg)   SpO2 94%   BMI 37.57 kg/m²     LABS:  WBC:    Lab Results   Component Value Date    WBC 20.1 03/30/2022     H/H:    Lab Results   Component Value Date    HGB 17.1 03/30/2022    HCT 51.7 03/30/2022     PTT:  No results found for: APTT, PTT[APTT}  PT/INR:  No results found for: PROTIME, INR  HgBA1c:    Lab Results   Component Value Date    LABA1C 5.7 02/24/2022       Assessment   Saji Risk Score: Saji Scale Score: 22    Patient Active Problem List   Diagnosis    Dyshidrotic eczema    Vitamin D deficiency    Obesity (BMI 30-39. 9)    Eczematous dermatitis    Adenomatous polyp of transverse colon- f/u due 9/2022    Low testosterone    Cellulitis of foot, right    Abscess of foot including toes, right    Cellulitis of right lower extremity    Cellulitis and abscess of foot    Failure of outpatient treatment    MRSA (methicillin resistant Staphylococcus aureus) infection    Right foot ulcer, with necrosis of muscle (HCC)    Allergic reaction due to antibacterial drug       Measurements:  Wound 02/24/22 Foot Right;Dorsal #1 (Active)   Wound Image   03/21/22 1006   Wound Etiology Surgical 03/31/22 1150   Dressing Status Clean;Dry; Intact 03/31/22 0530   Wound Cleansed Irrigated with saline 03/31/22 1150   Dressing/Treatment Open to air 03/31/22 1150   Offloading for Diabetic Foot Ulcers Offloading ordered;Post op shoe 03/21/22 1037   Dressing Change Due 03/31/22 03/31/22 0530   Wound Length (cm) 2.6 cm 03/21/22 1006   Wound Width (cm) 0.8 cm 03/21/22 1006   Wound Depth (cm) 0.3 cm 03/21/22 1006   Wound Surface Area (cm^2) 2.08 cm^2 03/21/22 1006   Change in Wound Size % (l*w) -73.33 03/21/22 1006   Wound Volume (cm^3) 0.624 cm^3 03/21/22 1006   Post-Procedure Length (cm) 2.6 cm 03/21/22 1022   Post-Procedure Width (cm) 0.8 cm 03/21/22 1022   Post-Procedure Depth (cm) 0.3 cm 03/21/22 1022   Post-Procedure Surface Area (cm^2) 2.08 cm^2 03/21/22 1022   Post-Procedure Volume (cm^3) 0.624 cm^3 03/21/22 1022   Undermining Starts ___ O'Clock 12:00 03/21/22 1006   Undermining Ends___ O'Clock 3:00 03/21/22 1006   Undermining Maxium Distance (cm) .2 03/21/22 1006   Wound Assessment Dry 03/31/22 1150   Drainage Amount None 03/31/22 1150   Drainage Description Serosanguinous;Green 03/21/22 1006   Odor None 03/31/22 1150   Diann-wound Assessment Intact 03/31/22 1150   Margins Defined edges 03/31/22 1150   Wound Thickness Description not for Pressure Injury Full thickness 03/21/22 1006   Number of days: 34     Assessment:    Right dorsal foot with a healing surgical wound.  The area is closed and dry at this time, flaky- scab in the wound bed. No s/sx of infection. Patient does have allergy dermatitis to his entire body, include both feet. The dry healing wound cleanse with saline and dried thoroughly at this time. Patient instructed to keep his follow-up appointment with Dr. Lawyer Figueroa. Plan   Plan of Care: Wound 02/24/22 Foot Right;Dorsal #1-Dressing/Treatment: Open to air    Specialty Bed Required : No   [] Low Air Loss   [] Pressure Redistribution  [] Fluid Immersion  [] Bariatric  [] Other:     Current Diet: ADULT DIET;  Regular  Dietician consult:  N/A    Discharge Plan:  Placement for patient upon discharge: home with support    Patient appropriate for Outpatient [address]: Yes    Referrals:  []   [] [facility]  [] Supplies  [] Other    Patient/Caregiver Teaching:  Level of patient/caregiver understanding able to:   [] Indicates understanding       [] Needs reinforcement  [] Unsuccessful      [] Verbal Understanding  [] Demonstrated understanding       [] No evidence of learning  [] Refused teaching         [x] N/A       Electronically signed by GRIFFIN Prado, RN, [facility] on 3/31/2022 at 1:09 PM

## 2022-04-01 LAB
ALBUMIN SERPL-MCNC: 2.7 G/DL (ref 3.5–4.6)
ALP BLD-CCNC: 66 U/L (ref 35–104)
ALT SERPL-CCNC: 26 U/L (ref 0–41)
ANION GAP SERPL CALCULATED.3IONS-SCNC: 13 MEQ/L (ref 9–15)
AST SERPL-CCNC: 8 U/L (ref 0–40)
BASOPHILS ABSOLUTE: 0 K/UL (ref 0–0.2)
BASOPHILS RELATIVE PERCENT: 0.3 %
BILIRUB SERPL-MCNC: <0.2 MG/DL (ref 0.2–0.7)
BUN BLDV-MCNC: 19 MG/DL (ref 8–23)
CALCIUM SERPL-MCNC: 8 MG/DL (ref 8.5–9.9)
CHLORIDE BLD-SCNC: 100 MEQ/L (ref 95–107)
CO2: 19 MEQ/L (ref 20–31)
CREAT SERPL-MCNC: 1.03 MG/DL (ref 0.7–1.2)
EOSINOPHILS ABSOLUTE: 0.8 K/UL (ref 0–0.7)
EOSINOPHILS RELATIVE PERCENT: 7.8 %
GFR AFRICAN AMERICAN: >60
GFR NON-AFRICAN AMERICAN: >60
GLOBULIN: 2.2 G/DL (ref 2.3–3.5)
GLUCOSE BLD-MCNC: 125 MG/DL (ref 70–99)
HCT VFR BLD CALC: 44.7 % (ref 42–52)
HEMOGLOBIN: 15 G/DL (ref 14–18)
LYMPHOCYTES ABSOLUTE: 0.4 K/UL (ref 1–4.8)
LYMPHOCYTES RELATIVE PERCENT: 3.8 %
MCH RBC QN AUTO: 27.2 PG (ref 27–31.3)
MCHC RBC AUTO-ENTMCNC: 33.5 % (ref 33–37)
MCV RBC AUTO: 81.2 FL (ref 80–100)
MONOCYTES ABSOLUTE: 0.7 K/UL (ref 0.2–0.8)
MONOCYTES RELATIVE PERCENT: 6.7 %
NEUTROPHILS ABSOLUTE: 8.4 K/UL (ref 1.4–6.5)
NEUTROPHILS RELATIVE PERCENT: 81.4 %
PDW BLD-RTO: 15 % (ref 11.5–14.5)
PLATELET # BLD: 228 K/UL (ref 130–400)
POTASSIUM SERPL-SCNC: 4.8 MEQ/L (ref 3.4–4.9)
RBC # BLD: 5.5 M/UL (ref 4.7–6.1)
SODIUM BLD-SCNC: 132 MEQ/L (ref 135–144)
TOTAL PROTEIN: 4.9 G/DL (ref 6.3–8)
WBC # BLD: 10.4 K/UL (ref 4.8–10.8)

## 2022-04-01 PROCEDURE — 36415 COLL VENOUS BLD VENIPUNCTURE: CPT

## 2022-04-01 PROCEDURE — 6360000002 HC RX W HCPCS: Performed by: INTERNAL MEDICINE

## 2022-04-01 PROCEDURE — 99232 SBSQ HOSP IP/OBS MODERATE 35: CPT | Performed by: INTERNAL MEDICINE

## 2022-04-01 PROCEDURE — 1210000000 HC MED SURG R&B

## 2022-04-01 PROCEDURE — 2580000003 HC RX 258: Performed by: INTERNAL MEDICINE

## 2022-04-01 PROCEDURE — 6370000000 HC RX 637 (ALT 250 FOR IP): Performed by: INTERNAL MEDICINE

## 2022-04-01 PROCEDURE — 85025 COMPLETE CBC W/AUTO DIFF WBC: CPT

## 2022-04-01 PROCEDURE — 80053 COMPREHEN METABOLIC PANEL: CPT

## 2022-04-01 RX ORDER — DIAPER,BRIEF,INFANT-TODD,DISP
EACH MISCELLANEOUS 3 TIMES DAILY
Status: DISCONTINUED | OUTPATIENT
Start: 2022-04-01 | End: 2022-04-03 | Stop reason: HOSPADM

## 2022-04-01 RX ORDER — AMMONIUM LACTATE 12 G/100G
LOTION TOPICAL PRN
Status: DISCONTINUED | OUTPATIENT
Start: 2022-04-01 | End: 2022-04-03 | Stop reason: HOSPADM

## 2022-04-01 RX ORDER — MONTELUKAST SODIUM 10 MG/1
10 TABLET ORAL DAILY
Status: DISCONTINUED | OUTPATIENT
Start: 2022-04-02 | End: 2022-04-03 | Stop reason: HOSPADM

## 2022-04-01 RX ORDER — DIPHENHYDRAMINE HCL 25 MG
50 TABLET ORAL EVERY 6 HOURS PRN
Status: DISCONTINUED | OUTPATIENT
Start: 2022-04-01 | End: 2022-04-03 | Stop reason: HOSPADM

## 2022-04-01 RX ADMIN — Medication 5 ML: at 11:34

## 2022-04-01 RX ADMIN — ENOXAPARIN SODIUM 30 MG: 100 INJECTION SUBCUTANEOUS at 11:33

## 2022-04-01 RX ADMIN — ASPIRIN 325 MG: 325 TABLET, COATED ORAL at 11:31

## 2022-04-01 RX ADMIN — HYDROCORTISONE ACETATE: 1 CREAM TOPICAL at 11:34

## 2022-04-01 RX ADMIN — CETIRIZINE HYDROCHLORIDE 10 MG: 10 TABLET, FILM COATED ORAL at 20:35

## 2022-04-01 RX ADMIN — Medication: at 11:34

## 2022-04-01 RX ADMIN — METHYLPREDNISOLONE SODIUM SUCCINATE 40 MG: 40 INJECTION, POWDER, FOR SOLUTION INTRAMUSCULAR; INTRAVENOUS at 03:22

## 2022-04-01 RX ADMIN — CETIRIZINE HYDROCHLORIDE 10 MG: 10 TABLET, FILM COATED ORAL at 11:31

## 2022-04-01 RX ADMIN — HYDROCORTISONE ACETATE: 1 CREAM TOPICAL at 15:54

## 2022-04-01 RX ADMIN — DIPHENHYDRAMINE HCL 50 MG: 25 TABLET ORAL at 11:31

## 2022-04-01 RX ADMIN — Medication 5 ML: at 20:36

## 2022-04-01 RX ADMIN — OXYCODONE HYDROCHLORIDE AND ACETAMINOPHEN 500 MG: 500 TABLET ORAL at 11:31

## 2022-04-01 RX ADMIN — DIPHENHYDRAMINE HCL 50 MG: 25 TABLET ORAL at 20:36

## 2022-04-01 RX ADMIN — MONTELUKAST 10 MG: 10 TABLET, FILM COATED ORAL at 05:34

## 2022-04-01 RX ADMIN — FAMOTIDINE 20 MG: 20 TABLET ORAL at 20:35

## 2022-04-01 RX ADMIN — FAMOTIDINE 20 MG: 20 TABLET ORAL at 11:31

## 2022-04-01 RX ADMIN — CALCIUM 1250 MG: 500 TABLET ORAL at 11:31

## 2022-04-01 RX ADMIN — SODIUM CHLORIDE: 9 INJECTION, SOLUTION INTRAVENOUS at 01:35

## 2022-04-01 RX ADMIN — METHYLPREDNISOLONE SODIUM SUCCINATE 40 MG: 40 INJECTION, POWDER, FOR SOLUTION INTRAMUSCULAR; INTRAVENOUS at 15:54

## 2022-04-01 RX ADMIN — Medication: at 20:36

## 2022-04-01 RX ADMIN — HYDROCORTISONE ACETATE: 1 CREAM TOPICAL at 20:36

## 2022-04-01 ASSESSMENT — PAIN SCALES - GENERAL
PAINLEVEL_OUTOF10: 0

## 2022-04-01 ASSESSMENT — ENCOUNTER SYMPTOMS
SHORTNESS OF BREATH: 0
DIARRHEA: 0
NAUSEA: 0
COUGH: 0
VOMITING: 0

## 2022-04-01 NOTE — PROGRESS NOTES
Shift assessment complete. VSS. Pt is Axox4. Lungs clear. abd is soft, bowel sounds present. He said he has been passing gas but no BM in 2 days. Rash From head to toe, red, and dry/flaky. States his mouth is sore inside and out. Magic mouthwash given. Lac hydrin applied to skin. Meds given per mar. Call light within reach. Will continue to monitor. Electronically signed by Nathan Sanders RN on 4/1/2022 at 12:00 PM    1500 Pt is very dry and itchy. Student helped him wash up and lotion was applied. New sheets placed on bed. Pt denies nay further needs. Call light within reach. Will continue to monitor. 1600 Pts arms and back are weeping. Juancho Malady and got the fluids discontinued.  Electronically signed by Nathan Sanders RN on 4/1/2022 at 4:18 PM

## 2022-04-01 NOTE — PROGRESS NOTES
Assessment documented. Rash throughout, right foot wound is well approximated, no exudate, redness around it. Denies any discomfort or pain. Independent in room. Call light within reach.

## 2022-04-01 NOTE — CARE COORDINATION
This LSW met with patient at bedside this am. Patient is resting comfortably. Patient continues to state that he will return home upon discharge. IMM completed as patient was switched to inpatient. SHEAW/ ARPITA to continue to follow.   Electronically signed by LILIA Flores, STUART on 4/1/22 at 10:12 AM EDT

## 2022-04-01 NOTE — CONSULTS
3/31/2022      · Right foot cellulitis with large abscess, status post I incision and drainage hx  · MRSA Staph aureus infection  · Failure of outpatient antibiotics then placed on Vanco IV. Initially did fine, then developed itching and rash. Now with fevers. Was taken off of Vanco and placed on Doxycycline but the fever persistent. Op note reviewed. Patient has generalized puffiness. States that he had two testosterone shots during this time but prior to the second shot, he had the rash already. He also tells me that his symptoms with the vanco started with developmenet of sores in the mouth. After his dose of vanco was increased to therapeutic levels, he broke out in the rash and developed more lip swelling and sores in the mouth. Rashes all over from drug allergy. Patient does not perceive any new visual deficits  No scleral icterus or nasal congestion  No obvious exudate Otic, OP or NP  No obvious cervical adenopathy. No diaphoresis or flushing in the face  Patient is able to flex and extend the neck with ease. Patient can inhale and exhale without any difficulty and chest seems to be expanding symmetrically. No conversational dyspnea. Patient does not feel any palpitations   Patient's abdomen is not protruberant beyond their normal size. No new swelling of their joints, no rigidity. No calf pain  No observed neurological changes or slurred speech when speaking to the patient, cranial nerves appear intact. Mood and Affect: Mood normal with appropriate behavior in general. Appropriate responses pertaining to the conversation      The foot wound is healing well.      Lab Results   Component Value Date    WBC 16.6 (H) 03/31/2022    HGB 14.8 03/31/2022    HCT 44.5 03/31/2022    MCV 81.6 03/31/2022     03/31/2022     Lab Results   Component Value Date     03/31/2022    K 4.5 03/31/2022    K 4.3 03/01/2022    CL 99 03/31/2022    CO2 18 03/31/2022    BUN 18 03/31/2022 CREATININE 1.23 03/31/2022    GLUCOSE 173 03/31/2022    GLUCOSE 105 02/03/2012    CALCIUM 8.4 03/31/2022        WBC trends are being monitored. Antibiotic doses are being adjusted per most recent renal labs. Vitals:    03/31/22 2032   BP: (!) 123/55   Pulse: 101   Resp: 18   Temp: 98.4 °F (36.9 °C)   SpO2: 96%           Patient Active Problem List   Diagnosis    Dyshidrotic eczema    Vitamin D deficiency    Obesity (BMI 30-39. 9)    Eczematous dermatitis    Adenomatous polyp of transverse colon- f/u due 9/2022    Low testosterone    Cellulitis of foot, right    Abscess of foot including toes, right    Cellulitis of right lower extremity    Cellulitis and abscess of foot    Failure of outpatient treatment    MRSA (methicillin resistant Staphylococcus aureus) infection    Right foot ulcer, with necrosis of muscle ([address]    Allergic reaction due to antibacterial drug       Op note from Feb 2022 reviewed by podiatry \" Upon incision asha purulence was expressed. 40 cc asha purulence and   sanguinous drainage was milked from the right foot\" Patient had I+D    ASSESSMENT:    · Right foot cellulitis with large abscess, status post I incision and drainage  · MRSA Staph aureus infection  · Fever and rash - persistent  · Drug allergy rash - possible SJS with sores in the mouth. PLAN:  · IV vancomycin for 4 weeks past 3/1/2022 initially prescribed but he had severe allergy reaction that was attributed to the Vanco once his dose was increased to therapeutic levels. The rash has persisted. He was placed on Doxycyline and the rash and fevers persisted. · Adjust vanco level - vanco trough pending to see if he still has a concentration - last trough was still elevated on 3/28. If so, this may still be allergy to vanco.   · Hold doxycycline - discussed with primary. Mionitor fever curve. Could this be drug fever? If so, he should trend down. · Procal 0.22  · Wound care.      Observe off of abx and fever should come down      [facility], DO

## 2022-04-02 ENCOUNTER — APPOINTMENT (OUTPATIENT)
Dept: GENERAL RADIOLOGY | Age: 67
DRG: 815 | End: 2022-04-02
Payer: MEDICARE

## 2022-04-02 PROBLEM — L02.611 ABSCESS OF RIGHT FOOT: Status: ACTIVE | Noted: 2022-02-24

## 2022-04-02 LAB
ALBUMIN SERPL-MCNC: 3 G/DL (ref 3.5–4.6)
ALP BLD-CCNC: 65 U/L (ref 35–104)
ALT SERPL-CCNC: 25 U/L (ref 0–41)
ANION GAP SERPL CALCULATED.3IONS-SCNC: 12 MEQ/L (ref 9–15)
ANISOCYTOSIS: ABNORMAL
AST SERPL-CCNC: 9 U/L (ref 0–40)
BANDED NEUTROPHILS RELATIVE PERCENT: 7 %
BASOPHILS ABSOLUTE: 0.3 K/UL (ref 0–0.2)
BASOPHILS RELATIVE PERCENT: 2 %
BILIRUB SERPL-MCNC: <0.2 MG/DL (ref 0.2–0.7)
BUN BLDV-MCNC: 14 MG/DL (ref 8–23)
BURR CELLS: ABNORMAL
CALCIUM SERPL-MCNC: 8.5 MG/DL (ref 8.5–9.9)
CHLORIDE BLD-SCNC: 99 MEQ/L (ref 95–107)
CO2: 22 MEQ/L (ref 20–31)
CREAT SERPL-MCNC: 0.96 MG/DL (ref 0.7–1.2)
EOSINOPHILS ABSOLUTE: 0.8 K/UL (ref 0–0.7)
EOSINOPHILS RELATIVE PERCENT: 5 %
GFR AFRICAN AMERICAN: >60
GFR NON-AFRICAN AMERICAN: >60
GLOBULIN: 1.9 G/DL (ref 2.3–3.5)
GLUCOSE BLD-MCNC: 114 MG/DL (ref 70–99)
HCT VFR BLD CALC: 41.9 % (ref 42–52)
HEMOGLOBIN: 13.9 G/DL (ref 14–18)
LYMPHOCYTES ABSOLUTE: 1.4 K/UL (ref 1–4.8)
LYMPHOCYTES RELATIVE PERCENT: 9 %
MCH RBC QN AUTO: 27 PG (ref 27–31.3)
MCHC RBC AUTO-ENTMCNC: 33.1 % (ref 33–37)
MCV RBC AUTO: 81.6 FL (ref 80–100)
METAMYELOCYTES RELATIVE PERCENT: 1 %
MONOCYTES ABSOLUTE: 1.1 K/UL (ref 0.2–0.8)
MONOCYTES RELATIVE PERCENT: 6.8 %
MYELOCYTE PERCENT: 2 %
NEUTROPHILS ABSOLUTE: 12.2 K/UL (ref 1.4–6.5)
NEUTROPHILS RELATIVE PERCENT: 70 %
PDW BLD-RTO: 15.2 % (ref 11.5–14.5)
PLATELET # BLD: 221 K/UL (ref 130–400)
PLATELET SLIDE REVIEW: NORMAL
POIKILOCYTES: ABNORMAL
POTASSIUM SERPL-SCNC: 4.7 MEQ/L (ref 3.4–4.9)
RBC # BLD: 5.14 M/UL (ref 4.7–6.1)
SODIUM BLD-SCNC: 133 MEQ/L (ref 135–144)
TOTAL PROTEIN: 4.9 G/DL (ref 6.3–8)
WBC # BLD: 15.3 K/UL (ref 4.8–10.8)

## 2022-04-02 PROCEDURE — 1210000000 HC MED SURG R&B

## 2022-04-02 PROCEDURE — 6370000000 HC RX 637 (ALT 250 FOR IP): Performed by: INTERNAL MEDICINE

## 2022-04-02 PROCEDURE — 80053 COMPREHEN METABOLIC PANEL: CPT

## 2022-04-02 PROCEDURE — 6360000002 HC RX W HCPCS: Performed by: INTERNAL MEDICINE

## 2022-04-02 PROCEDURE — 85025 COMPLETE CBC W/AUTO DIFF WBC: CPT

## 2022-04-02 PROCEDURE — 36415 COLL VENOUS BLD VENIPUNCTURE: CPT

## 2022-04-02 PROCEDURE — 73630 X-RAY EXAM OF FOOT: CPT

## 2022-04-02 PROCEDURE — 99232 SBSQ HOSP IP/OBS MODERATE 35: CPT | Performed by: INTERNAL MEDICINE

## 2022-04-02 RX ORDER — DOCUSATE SODIUM 100 MG/1
100 CAPSULE, LIQUID FILLED ORAL DAILY
Status: DISCONTINUED | OUTPATIENT
Start: 2022-04-02 | End: 2022-04-03 | Stop reason: HOSPADM

## 2022-04-02 RX ORDER — PREDNISONE 20 MG/1
20 TABLET ORAL DAILY
Status: DISCONTINUED | OUTPATIENT
Start: 2022-04-02 | End: 2022-04-03 | Stop reason: HOSPADM

## 2022-04-02 RX ADMIN — HYDROCORTISONE ACETATE: 1 CREAM TOPICAL at 08:33

## 2022-04-02 RX ADMIN — OXYCODONE HYDROCHLORIDE AND ACETAMINOPHEN 500 MG: 500 TABLET ORAL at 08:34

## 2022-04-02 RX ADMIN — MONTELUKAST 10 MG: 10 TABLET, FILM COATED ORAL at 08:34

## 2022-04-02 RX ADMIN — CALCIUM 1250 MG: 500 TABLET ORAL at 08:35

## 2022-04-02 RX ADMIN — DOCUSATE SODIUM 100 MG: 100 CAPSULE, LIQUID FILLED ORAL at 08:34

## 2022-04-02 RX ADMIN — FAMOTIDINE 20 MG: 20 TABLET ORAL at 08:35

## 2022-04-02 RX ADMIN — PREDNISONE 20 MG: 20 TABLET ORAL at 13:58

## 2022-04-02 RX ADMIN — FAMOTIDINE 20 MG: 20 TABLET ORAL at 21:04

## 2022-04-02 RX ADMIN — CETIRIZINE HYDROCHLORIDE 10 MG: 10 TABLET, FILM COATED ORAL at 08:35

## 2022-04-02 RX ADMIN — CETIRIZINE HYDROCHLORIDE 10 MG: 10 TABLET, FILM COATED ORAL at 21:04

## 2022-04-02 RX ADMIN — HYDROCORTISONE ACETATE: 1 CREAM TOPICAL at 21:05

## 2022-04-02 RX ADMIN — METHYLPREDNISOLONE SODIUM SUCCINATE 40 MG: 40 INJECTION, POWDER, FOR SOLUTION INTRAMUSCULAR; INTRAVENOUS at 03:46

## 2022-04-02 RX ADMIN — ASPIRIN 325 MG: 325 TABLET, COATED ORAL at 08:35

## 2022-04-02 ASSESSMENT — ENCOUNTER SYMPTOMS
SHORTNESS OF BREATH: 0
COUGH: 0
VOMITING: 0
DIARRHEA: 0
NAUSEA: 0

## 2022-04-02 ASSESSMENT — PAIN SCALES - GENERAL: PAINLEVEL_OUTOF10: 0

## 2022-04-02 NOTE — PROGRESS NOTES
Infectious Diseases Inpatient Progress Note          HISTORY OF PRESENT ILLNESS:  Follow up severe drug rash secondary to vancomycin, right foot abscess status post 4 weeks of IV antibiotics on p.o. prednisone, well tolerated. Has persistent generalized rash with skin peeling, mouth soreness. No shortness of breath. No fevers. No cough  Current Medications:     docusate sodium  100 mg Oral Daily    predniSONE  20 mg Oral Daily    montelukast  10 mg Oral Daily    hydrocortisone   Topical TID    cetirizine  10 mg Oral BID    ascorbic acid  500 mg Oral Daily    aspirin  325 mg Oral Daily    calcium elemental  1,250 mg Oral Daily    famotidine  20 mg Oral BID       Allergies:  Vancomycin      Review of Systems  14 system review is negative other than HPI    Physical Exam  Vitals:    04/01/22 1445 04/01/22 2045 04/02/22 0723 04/02/22 0825   BP: 122/64 134/72 128/67    Pulse: 108 110 88 102   Resp: 18 18 16    Temp: 99 °F (37.2 °C) 98.1 °F (36.7 °C) 97.7 °F (36.5 °C)    TempSrc: Oral Oral Oral    SpO2: 99% 98% 97%    Weight:       Height:         General Appearance: alert and oriented to person, place and time, well-developed and well-nourished, in no acute distress  Skin: Diffuse erythroderma with skin peeling, involving whole body. Head: normocephalic and atraumatic  Eyes: anicteric sclerae  ENT: oropharynx clear and moist with normal mucous membranes.  No oral thrush  Lungs: normal respiratory effort, clear lungs  Heart normal S1-S2 no murmur  Abdomen: soft, no tenderness  No leg edema  Right foot dorsal aspect with erythema, no tenderness  Right foot dorsal aspect with healed incision, no swelling or induration, no warmth or drainage      DATA:    Lab Results   Component Value Date    WBC 15.3 (H) 04/02/2022    HGB 13.9 (L) 04/02/2022    HCT 41.9 (L) 04/02/2022    MCV 81.6 04/02/2022     04/02/2022     Lab Results   Component Value Date    CREATININE 0.96 04/02/2022    BUN 14 04/02/2022     (L) 04/02/2022    K 4.7 04/02/2022    CL 99 04/02/2022    CO2 22 04/02/2022       Hepatic Function Panel:  Lab Results   Component Value Date    ALKPHOS 65 04/02/2022    ALT 25 04/02/2022    AST 9 04/02/2022    PROT 4.9 04/02/2022    BILITOT <0.2 04/02/2022    LABALBU 3.0 04/02/2022    LABALBU 4.8 02/03/2012       IMPRESSION:    · Severe drug rash  · Right foot abscess, treated and resolved. Doubt ongoing infectious process at this point  · Reactive leukocytosis secondary to steroids    Patient Active Problem List   Diagnosis    Dyshidrotic eczema    Vitamin D deficiency    Obesity (BMI 30-39. 9)    Eczematous dermatitis    Adenomatous polyp of transverse colon- f/u due 9/2022    Low testosterone    Cellulitis of foot, right    Abscess of foot including toes, right    Cellulitis of right lower extremity    Cellulitis and abscess of foot    Failure of outpatient treatment    MRSA (methicillin resistant Staphylococcus aureus) infection    Right foot ulcer, with necrosis of muscle (HCC)    Allergic reaction due to antibacterial drug       PLAN:  · Zyrtec  · P.o.  prednisone  · I do not see any indication for additional antibiotics at this point  · Avoid using vancomycin at any point in the future as discussed with the patient  · Follow-up clinically    Discussed with patient    Shirley Aquino MD

## 2022-04-02 NOTE — CONSULTS
PODIATRIC MEDICINE AND SURGERY  CONSULT HISTORY AND PHYSICAL      Consulting Service:  Medicine  Requesting Provider: Ene Ferguson  Opinion/advice regarding: right foot  Staff Doctor:  Dr. Gonzalez Szymanski:  This 74 y.o. male with significant PMH of BPH, ED, GERD, HTN, obesity, osteoarthritis, vitamin D deficiency and recent hx of right foot incision and drainage of abscess on 2/25/22 positive for MRSA admitted for allergic reaction 2/2 antibacterial drug. Podiatry consulted for right foot wound. PLAN AND RECOMMENDATIONS[de-identified]  XR right foot ordered  Weight bearing as tolerated to right foot  Patient should elevate RLE at or above heart level while resting  Dressing change today consisted of maxorb to wound followed by DSD. Antibiotic coverage per ID, appreciate recs  Patient discussed with staff, Dr. Karis Dawson. Low suspicion infection right foot given increased redness coinciding with recent full body rash. The wound itself appears to be filling in and healing well. Will monitor over the next few days. Discontinued use of compression stocking to RLE in the mean time. Patient will need follow up after discharge with DR. Karis Dawson. HPI:   Per H&P, \"Patient is 78-year-old male with past medical history of right foot infection was on IV vancomycin at home and was recently switched to p.o. doxycycline due to allergic reaction to vancomycin. Patient came in on the 27th of this month for adverse effects of vancomycin. Patient antibiotic was changed to p.o. doxycycline. Patient now comes in with tachycardia, itching, redness throughout his body. Patient rash is improved but still there and bothersome. \"    Patient well known to Dr. Karis Dawson and seen recently 3/21 in wound care center. Wound to dorsum of right foot has been filling in and healing without complication.  Patient states the foot has become increasingly red over the past two days, more so over the past 24 hrs since having the compression device place don the RLE. Denies pain. Past Medical History:   Diagnosis Date    BPH (benign prostatic hyperplasia) 2/3/2012    no treatment at this time    Chronic back pain     Colon polyp 09/2017    Dr. Mireya Garcia; f/u 5 years    Eczematous dermatitis     hands, feet and legs    ED (erectile dysfunction) 2/3/2012    Erectile dysfunction     Family history of early CAD 2/3/2012    Gastroesophageal reflux disease with hiatal hernia     History of hypertension     History of prediabetes     History of renal insufficiency syndrome     Obesity (BMI 30-39. 9)     Osteoarthritis     Vitamin D deficiency     Weight gain        Past Surgical History:   Procedure Laterality Date    COLONOSCOPY  9/25/2006    FOOT DEBRIDEMENT Right 2/25/2022    FOOT DEBRIDEMENT INCISION AND DRAINAGE performed by Jose Willis DPM at [id number] [facility] [id number] [facility] N/A 9/15/2017    COLONOSCOPY performed by Kimmie Brink MD at [place] [address]  5/9/14    bx, dilation [email or web address]    UPPER GASTROINTESTINAL ENDOSCOPY N/A 9/15/2017    EGD DILATION [facility] and biopsy performed by Kimmie Brink MD at [facility]       No current facility-administered medications on file prior to encounter.      Current Outpatient Medications on File Prior to Encounter   Medication Sig Dispense Refill    doxycycline hyclate (VIBRA-TABS) 100 MG tablet Take 1 tablet by mouth 2 times daily for 14 days 28 tablet 0    diphenhydrAMINE (BENADRYL ALLERGY) 25 MG capsule Take 1 capsule by mouth every 6 hours as needed for Itching or Allergies 60 capsule 0    famotidine (PEPCID) 20 MG tablet Take 1 tablet by mouth 2 times daily 60 tablet 0    ascorbic acid (VITAMIN C) 500 MG tablet Take 500 mg by mouth daily      vitamin B-12 (CYANOCOBALAMIN) 500 MCG tablet Take 500 mcg by mouth daily      zinc 50 MG TABS tablet Take 50 mg by mouth daily      lactobacillus acidophilus (FLORANEX) Take 4 tablets by mouth 3 times daily 120 tablet 0    aspirin 325 MG EC tablet Take 1 tablet by mouth daily 1 tablet 0    triamcinolone (KENALOG) 0.1 % cream Apply topically qam daily Monday through Friday only. Take weekend off. Do not use on face, groin, armpits, breasts tissue. 454 g 0    testosterone cypionate (DEPOTESTOTERONE CYPIONATE) 200 MG/ML injection Inject 1 mL into the muscle every 14 days for 90 doses. 8 mL 0    Syringe/Needle, Disp, (SYRINGE 3CC/43VZ9-8/2\") 22G X 1-1/2\" 3 ML MISC 1 actuation by Does not apply route every 14 days 2 each 5    vitamin E 1000 units capsule Take 1,000 Units by mouth daily      vitamin D (CHOLECALCIFEROL) 1000 UNIT TABS tablet Take 6,000 Units by mouth daily      Multiple Vitamins-Minerals (ONE-A-DAY MENS HEALTH FORMULA) TABS Take by mouth      magnesium (MAGNESIUM-OXIDE) 250 MG TABS tablet Take 250 mg by mouth daily Take 3 pills daily      calcium carbonate (OYSTER SHELL CALCIUM 500 MG) 1250 MG tablet Take 1 tablet by mouth daily      Glucosamine-Chondroitin 5489-3576 MG/30ML LIQD Take by mouth Take 3 pills daily      Omega-3 Fatty Acids (FISH OIL) 1200 MG CAPS Take  by mouth daily. Allergies   Allergen Reactions    Vancomycin Hives, Itching and Other (See Comments)     Rash all over with sores in the mouth as well.  Fevers        Family History   Problem Relation Age of Onset    Depression Mother     Heart Disease Paternal Grandmother     High Blood Pressure Paternal Grandmother     High Cholesterol Paternal Grandmother     Anemia Paternal Grandmother         B 12 deficiency    Heart Disease Brother     Heart Surgery Brother     Anemia Brother         B 12 deficiency       Social History     Socioeconomic History    Marital status:      Spouse name: Not on file    Number of children: 1    Years of education: Not on file    Highest education level: Not on file   Occupational History    Not on file   Tobacco Use    Smoking status: Former Smoker     Packs/day: 2.00     Years: 5.00     Pack years: 10.00     Quit date: 1990     Years since quittin.2    Smokeless tobacco: Never Used   Vaping Use    Vaping Use: Never used   Substance and Sexual Activity    Alcohol use: No    Drug use: No    Sexual activity: Yes     Partners: Female   Other Topics Concern    Not on file   Social History Narrative    Not on file     Social Determinants of Health     Financial Resource Strain: Low Risk     Difficulty of Paying Living Expenses: Not hard at all   Food Insecurity: No Food Insecurity    Worried About [address] in the Last Year: Never true    [address] in the Last Year: Never true   Transportation Needs:     Lack of Transportation (Medical): Not on file    Lack of Transportation (Non-Medical): Not on file   Physical Activity:     Days of Exercise per Week: Not on file    Minutes of Exercise per Session: Not on file   Stress:     Feeling of Stress : Not on file   Social Connections:     Frequency of Communication with Friends and Family: Not on file    Frequency of Social Gatherings with Friends and Family: Not on file    Attends [employer] Services: Not on file    Active Member of Clubs or Organizations: Not on file    Attends Club or Organization Meetings: Not on file    Marital Status: Not on file   Intimate Partner Violence:     Fear of Current or Ex-Partner: Not on file    Emotionally Abused: Not on file    Physically Abused: Not on file    Sexually Abused: Not on file   Housing Stability:     Unable to Pay for Housing in the Last Year: Not on file    Number of [place] in the Last Year: Not on file    Unstable Housing in the Last Year: Not on file       Review of Systems  CONSTITUTIONAL: No fevers, chills, diaphoresis  HEENT: Denies epistaxis or tinnitus  EYES: No diplopia or blurry vision. CARDIOVASCULAR: No chest pain, dyspnea, palpitations, orthopnea, PND, ankle edema.   PULM: No dyspnea, tachypnea, wheezing  GI: No dysphagia/odynophagia, constipation, diarrhea, changes in stool habits, hematochezia, melena. : No new urinary complaints, including dysuria, gross hematuria or pyuria. NEURO: admits to generalized weakness  MUSC-SKEL: denies pain right foot   PSY: No concerns regarding depression, anxiety or panic. INTEGUMENTARY: full body erythema and peeling. admits to open skin lesion dorsum right foot. See below    OBJECTIVE:  /67   Pulse 102   Temp 97.7 °F (36.5 °C) (Oral)   Resp 16   Ht 6' (1.829 m)   Wt 277 lb (125.6 kg)   SpO2 97%   BMI 37.57 kg/m²   Patient is alert and oriented x 3 in NAD. Vascular:   Palpable Dorsalis Pedis and Palpable Posterior Tibial Pulses B/L   Capillary Fill time < 3 seconds to B/L digits  Skin temperature warm to warm tibial tuberosity to the digits B/L  Hair growth absent to digits  mild edema right foot    Neurological:   Epicritic sensation intact B/L    Musculoskeletal/Orthopaedic:   Structural Deformities:  5/5 muscle strength Dorsiflexion, Plantarflexion, Inversion, Eversion B/L  ROM decreased pedal and ankle joints B/L. No pain on palpation to right foot    Dermatological:   Diffuse rash noted to arms, neck, head, lower legs: erythematous and peeling. The left foot appears mostly spared though there is discoloration to plantar left heel. Right foot with erythema as noted in photo below. Hyperkeratosis noted diffusely to plantar right foot. There is a very small open lesion to the dorsal right foot which appears mostly healed and shallow with no drainage noted. Mild edema right foot.      4/2/22:      LABS:   Lab Results   Component Value Date    WBC 15.3 (H) 04/02/2022    HGB 13.9 (L) 04/02/2022    HCT 41.9 (L) 04/02/2022    MCV 81.6 04/02/2022     04/02/2022     Lab Results   Component Value Date     04/02/2022    K 4.7 04/02/2022    K 4.3 03/01/2022    CL 99 04/02/2022    CO2 22 04/02/2022    BUN 14 04/02/2022    CREATININE 0.96 04/02/2022    GLUCOSE 114 04/02/2022    GLUCOSE 105 02/03/2012    CALCIUM 8.5 04/02/2022      Lab Results   Component Value Date    LABALBU 3.0 (L) 04/02/2022     Lab Results   Component Value Date    SEDRATE 1 03/30/2022     Lab Results   Component Value Date    CRP 13.4 (H) 03/16/2022     Lab Results   Component Value Date    LABA1C 5.7 02/24/2022     No results found for: EAG    MICROBIOLOGY:   Blood Culture:  3/30/22 NGTD        IMAGING:   XR right foot pending      Patient's case will be discussed with staff, who will provide final recommendations. Thank you for the consult. Michell Charles DPM, PGY3  Please first page Podiatry On Call, [phone]  April 2, 2022  11:53 AM    The resident/student was under my direct supervision during today's patient encounter. reflection of my personal examination, assessment and treatment plan. The patient was seen and examined with the resident/student. The above findings and recommendations were reviewed and I agree with above treatment plan. Any questions or concerns, please Dr. Luis Garces or podiatry resident on call.     Britany Suarez DPM  Podiatry Attending  04/12/22  10:10 AM

## 2022-04-02 NOTE — PROGRESS NOTES
4/1/2022      · Right foot cellulitis with large abscess, status post I incision and drainage hx  · MRSA Staph aureus infection  · Failure of outpatient antibiotics then placed on Vanco IV. Initially did fine, then developed itching and rash. Now with fevers. Was taken off of Vanco and placed on Doxycycline but the fever persistent. Op note reviewed. Patient has generalized puffiness. States that he had two testosterone shots during this time but prior to the second shot, he had the rash already. He also tells me that his symptoms with the vanco started with developmenet of sores in the mouth. After his dose of vanco was increased to therapeutic levels, he broke out in the rash and developed more lip swelling and sores in the mouth. Rashes all over from drug allergy. All abx have been stopped. The rash is WORSE today and more confluent, darker, although the edema is coming down  Patient does not perceive any new visual deficits  No scleral icterus or nasal congestion  No obvious exudate Otic, OP or NP  No obvious cervical adenopathy. No diaphoresis or flushing in the face  Patient is able to flex and extend the neck with ease. Patient can inhale and exhale without any difficulty and chest seems to be expanding symmetrically. No conversational dyspnea. Patient does not feel any palpitations   Patient's abdomen is not protruberant beyond their normal size. No new swelling of their joints, no rigidity. No calf pain  No observed neurological changes or slurred speech when speaking to the patient, cranial nerves appear intact. Mood and Affect: Mood normal with appropriate behavior in general. Appropriate responses pertaining to the conversation      The foot wound is erythematous now and there is scant serosanguinous drainage.  Not expressable    Lab Results   Component Value Date    WBC 10.4 04/01/2022    HGB 15.0 04/01/2022    HCT 44.7 04/01/2022    MCV 81.2 04/01/2022     04/01/2022     Lab Results   Component Value Date     04/01/2022    K 4.8 04/01/2022    K 4.3 03/01/2022     04/01/2022    CO2 19 04/01/2022    BUN 19 04/01/2022    CREATININE 1.03 04/01/2022    GLUCOSE 125 04/01/2022    GLUCOSE 105 02/03/2012    CALCIUM 8.0 04/01/2022        WBC trends are being monitored. Antibiotic doses are being adjusted per most recent renal labs. Vitals:    04/01/22 2045   BP: 134/72   Pulse: 110   Resp: 18   Temp: 98.1 °F (36.7 °C)   SpO2: 98%           Patient Active Problem List   Diagnosis    Dyshidrotic eczema    Vitamin D deficiency    Obesity (BMI 30-39. 9)    Eczematous dermatitis    Adenomatous polyp of transverse colon- f/u due 9/2022    Low testosterone    Cellulitis of foot, right    Abscess of foot including toes, right    Cellulitis of right lower extremity    Cellulitis and abscess of foot    Failure of outpatient treatment    MRSA (methicillin resistant Staphylococcus aureus) infection    Right foot ulcer, with necrosis of muscle ([address]    Allergic reaction due to antibacterial drug       Op note from Feb 2022 reviewed by podiatry \" Upon incision asha purulence was expressed. 40 cc asha purulence and   sanguinous drainage was milked from the right foot\" Patient had I+D    ASSESSMENT:    · Right foot cellulitis with large abscess, status post I incision and drainage, now with possible developing cellulitis again   · MRSA Staph aureus infection  · Fever and rash - persistent  · Drug allergy rash - SJS with sores in the mouth. He has been off of abx and Vanco level is <4 so unsure what he is reacting to. Meds reviewed. PLAN:  · IV vancomycin for 4 weeks past 3/1/2022 initially prescribed but he had severe allergy reaction that was attributed to the Vanco once his dose was increased to therapeutic levels. The rash has persisted. He was placed on Doxycyline and the rash and fevers persisted. He was taken off of all abx.  The right foot is now

## 2022-04-02 NOTE — PROGRESS NOTES
Assessment documented. Rash throughout appears worse that previous night. Dr. Dewey Single rounding and notified, she requested that lovenox be held. Right foot wound is well approximated, no exudate, redness around it. Denies any discomfort or pain. Independent in room. Call light within reach.

## 2022-04-02 NOTE — PROGRESS NOTES
Progress Note  Date:2022       GULD:[id number]  Patient Name:Mason Chavez     YOB: 1955     Age:66 y.o. Subjective    Subjective:  Symptoms:  He reports malaise and weakness. No shortness of breath, cough, chest pain, headache, chest pressure, anorexia, diarrhea or anxiety. Diet:  No nausea or vomiting. Review of Systems   Respiratory: Negative for cough and shortness of breath. Cardiovascular: Negative for chest pain. Gastrointestinal: Negative for anorexia, diarrhea, nausea and vomiting. Neurological: Positive for weakness. Objective         Vitals Last 24 Hours:  TEMPERATURE:  Temp  Av.3 °F (36.8 °C)  Min: 97.7 °F (36.5 °C)  Max: 99 °F (37.2 °C)  RESPIRATIONS RANGE: Resp  Av.3  Min: 16  Max: 18  PULSE OXIMETRY RANGE: SpO2  Av %  Min: 97 %  Max: 99 %  PULSE RANGE: Pulse  Av  Min: 88  Max: 110  BLOOD PRESSURE RANGE: Systolic (71CRM), RCE:037 , Min:122 , GRB:743   ; Diastolic (12CIK), PJY:34, Min:64, Max:72    I/O (24Hr): Intake/Output Summary (Last 24 hours) at 2022 1046  Last data filed at 2022 0557  Gross per 24 hour   Intake 1029.06 ml   Output --   Net 1029.06 ml     Objective:  General Appearance: In no acute distress. Vital signs: (most recent): Blood pressure 128/67, pulse 102, temperature 97.7 °F (36.5 °C), temperature source Oral, resp. rate 16, height 6' (1.829 m), weight 277 lb (125.6 kg), SpO2 97 %. HEENT: Normal HEENT exam.    Lungs:  He is not in respiratory distress. No decreased breath sounds or wheezes. Heart: S1 normal and S2 normal.    Abdomen: Abdomen is soft. Bowel sounds are normal.   There is no epigastric area or suprapubic area tenderness. Pulses: Distal pulses are intact. Neurological: Patient is alert. Pupils:  Pupils are equal, round, and reactive to light. Skin:  Warm and dry.     generalized rash, oozing yellowish material from scartching  Labs/Imaging/Diagnostics Labs:  CBC:  Recent Labs     03/31/22  1350 04/01/22  0658 04/02/22  0514   WBC 16.6* 10.4 15.3*   RBC 5.45 5.50 5.14   HGB 14.8 15.0 13.9*   HCT 44.5 44.7 41.9*   MCV 81.6 81.2 81.6   RDW 14.9* 15.0* 15.2*    228 221     CHEMISTRIES:  Recent Labs     03/31/22  1350 04/01/22  0657 04/02/22  0515   * 132* 133*   K 4.5 4.8 4.7   CL 99 100 99   CO2 18* 19* 22   BUN 18 19 14   CREATININE 1.23* 1.03 0.96   GLUCOSE 173* 125* 114*     PT/INR:No results for input(s): PROTIME, INR in the last 72 hours. APTT:No results for input(s): APTT in the last 72 hours. LIVER PROFILE:  Recent Labs     03/31/22  1350 04/01/22  0657 04/02/22  0515   AST 10 8 9   ALT 28 26 25   BILITOT 0.3 <0.2 <0.2   ALKPHOS 67 66 65       Imaging Last 24 Hours:  No results found. Assessment//Plan           Hospital Problems           Last Modified POA    * (Principal) Allergic reaction due to antibacterial drug 3/30/2022 Yes      fever   Tachycardia  hyponatremia  Assessment & Plan    3/31: Spoke with ID hold doxycycline for now. Patient right foot does not look infected. Patient symptoms has been better since yesterday. But still itching. We will get a Vanco level. Follow-up labs today. Continue with NS. Tachycardia due to fever, lopressor prn.  4/1: Patient was seen and evaluated. Symptoms slowly improving. Fevers improving. Hyponatremia room. Make benadryl prn, anticipate discharge in 1 to 2 days based on pt clinical course. 4/2: Patient's allergic reaction is getting better. Anticipate discharge tomorrow. No overnight events, no new complaints. No fever. FU ID final abx recommendaiton, right foot is red but not warm.  FU podiatry  Electronically signed by Gabby Shepard MD on 3/31/22 at 10:24 AM EDT

## 2022-04-02 NOTE — PROGRESS NOTES
04/2/22     From:HOME ALONE AMBULATES INDEPENDENTLY CURRENT [facility] Q.O.D. Admit: R FOOT INF RED KERWIN SYNDROME, LEUKOCYTOSIS LACT ACIDOSIS     PMH: BPH HTN,      Anticipated Discharge [address] [facility]     Patient Mobility or PT/OT ordered:N/A USES WALKER PRN D/T FOOT INFECTION     Consults:ALLERGIST     Covid result &/or vacc status:     Barriers to Discharge: ALLERGIST NEEDS TO SEE      Assessments:CMI DONE

## 2022-04-03 VITALS
OXYGEN SATURATION: 97 % | DIASTOLIC BLOOD PRESSURE: 76 MMHG | HEIGHT: 72 IN | WEIGHT: 277 LBS | RESPIRATION RATE: 16 BRPM | BODY MASS INDEX: 37.52 KG/M2 | SYSTOLIC BLOOD PRESSURE: 132 MMHG | HEART RATE: 84 BPM | TEMPERATURE: 97.9 F

## 2022-04-03 PROCEDURE — 6370000000 HC RX 637 (ALT 250 FOR IP): Performed by: INTERNAL MEDICINE

## 2022-04-03 PROCEDURE — 6370000000 HC RX 637 (ALT 250 FOR IP): Performed by: ALLERGY & IMMUNOLOGY

## 2022-04-03 RX ORDER — METHYLPREDNISOLONE 4 MG/1
TABLET ORAL
Qty: 1 KIT | Refills: 0 | Status: SHIPPED | OUTPATIENT
Start: 2022-04-03 | End: 2022-04-09

## 2022-04-03 RX ORDER — CETIRIZINE HYDROCHLORIDE 10 MG/1
10 TABLET ORAL 2 TIMES DAILY
Qty: 14 TABLET | Refills: 0 | Status: SHIPPED | OUTPATIENT
Start: 2022-04-03 | End: 2022-04-10

## 2022-04-03 RX ORDER — PETROLATUM, YELLOW 100 %
JELLY (GRAM) MISCELLANEOUS 2 TIMES DAILY
Status: DISCONTINUED | OUTPATIENT
Start: 2022-04-03 | End: 2022-04-03 | Stop reason: HOSPADM

## 2022-04-03 RX ADMIN — FAMOTIDINE 20 MG: 20 TABLET ORAL at 07:36

## 2022-04-03 RX ADMIN — CETIRIZINE HYDROCHLORIDE 10 MG: 10 TABLET, FILM COATED ORAL at 07:35

## 2022-04-03 RX ADMIN — OXYCODONE HYDROCHLORIDE AND ACETAMINOPHEN 500 MG: 500 TABLET ORAL at 07:35

## 2022-04-03 RX ADMIN — MONTELUKAST 10 MG: 10 TABLET, FILM COATED ORAL at 07:35

## 2022-04-03 RX ADMIN — PREDNISONE 20 MG: 20 TABLET ORAL at 07:35

## 2022-04-03 RX ADMIN — Medication: at 14:51

## 2022-04-03 RX ADMIN — CALCIUM 1250 MG: 500 TABLET ORAL at 07:36

## 2022-04-03 RX ADMIN — ASPIRIN 325 MG: 325 TABLET, COATED ORAL at 07:35

## 2022-04-03 RX ADMIN — DOCUSATE SODIUM 100 MG: 100 CAPSULE, LIQUID FILLED ORAL at 07:35

## 2022-04-03 RX ADMIN — HYDROCORTISONE: 25 CREAM TOPICAL at 14:50

## 2022-04-03 ASSESSMENT — PAIN SCALES - GENERAL: PAINLEVEL_OUTOF10: 1

## 2022-04-03 NOTE — CONSULTS
Patient is a very pleasant 74 y.o. gentleman who was on vanco for a foot infection and developed a rash and sores under his tongue. The rash is moderately itchy and has involved some shedding of the skin. He has never had this before. He has some associated irritation in and or around the eyes. He notes that since the vanco has been stopped that he has been having gradual improvement. He states there have been no new meds as part of his regimen otherwise over the past several months. Allergies   Allergen Reactions    Vancomycin Hives, Itching and Other (See Comments)     Rash all over with sores in the mouth as well. Fevers        No current facility-administered medications on file prior to encounter. Current Outpatient Medications on File Prior to Encounter   Medication Sig Dispense Refill    diphenhydrAMINE (BENADRYL ALLERGY) 25 MG capsule Take 1 capsule by mouth every 6 hours as needed for Itching or Allergies 60 capsule 0    famotidine (PEPCID) 20 MG tablet Take 1 tablet by mouth 2 times daily 60 tablet 0    ascorbic acid (VITAMIN C) 500 MG tablet Take 500 mg by mouth daily      vitamin B-12 (CYANOCOBALAMIN) 500 MCG tablet Take 500 mcg by mouth daily      zinc 50 MG TABS tablet Take 50 mg by mouth daily      lactobacillus acidophilus [facility]) Take 4 tablets by mouth 3 times daily 120 tablet 0    aspirin 325 MG EC tablet Take 1 tablet by mouth daily 1 tablet 0    triamcinolone (KENALOG) 0.1 % cream Apply topically qam daily Monday through Friday only. Take weekend off. Do not use on face, groin, armpits, breasts tissue. 454 g 0    testosterone cypionate (DEPOTESTOTERONE CYPIONATE) 200 MG/ML injection Inject 1 mL into the muscle every 14 days for 90 doses.  8 mL 0    Syringe/Needle, Disp, (SYRINGE 3CC/85YO9-9/2\") 22G X 1-1/2\" 3 ML MISC 1 actuation by Does not apply route every 14 days 2 each 5    vitamin E 1000 units capsule Take 1,000 Units by mouth daily      vitamin D (CHOLECALCIFEROL) 1000 UNIT TABS tablet Take 6,000 Units by mouth daily      Multiple Vitamins-Minerals (ONE-A-DAY MENS HEALTH FORMULA) TABS Take by mouth      magnesium (MAGNESIUM-OXIDE) 250 MG TABS tablet Take 250 mg by mouth daily Take 3 pills daily      calcium carbonate (OYSTER SHELL CALCIUM 500 MG) 1250 MG tablet Take 1 tablet by mouth daily      Glucosamine-Chondroitin 0938-5470 MG/30ML LIQD Take by mouth Take 3 pills daily      Omega-3 Fatty Acids (FISH OIL) 1200 MG CAPS Take  by mouth daily. Past Medical History:   Diagnosis Date    BPH (benign prostatic hyperplasia) 2/3/2012    no treatment at this time    Chronic back pain     Colon polyp 2017    Dr. Bandar Russo; f/u 5 years    Eczematous dermatitis     hands, feet and legs    ED (erectile dysfunction) 2/3/2012    Erectile dysfunction     Family history of early CAD 2/3/2012    Gastroesophageal reflux disease with hiatal hernia     History of hypertension     History of prediabetes     History of renal insufficiency syndrome     Obesity (BMI 30-39. 9)     Osteoarthritis     Vitamin D deficiency     Weight gain        Social History     Socioeconomic History    Marital status:      Spouse name: Not on file    Number of children: 1    Years of education: Not on file    Highest education level: Not on file   Occupational History    Not on file   Tobacco Use    Smoking status: Former Smoker     Packs/day: 2.00     Years: 5.00     Pack years: 10.00     Quit date: 1990     Years since quittin.2    Smokeless tobacco: Never Used   Vaping Use    Vaping Use: Never used   Substance and Sexual Activity    Alcohol use: No    Drug use: No    Sexual activity: Yes     Partners: Female   Other Topics Concern    Not on file   Social History Narrative    Not on file     Social Determinants of Health     Financial Resource Strain: Low Risk     Difficulty of Paying Living Expenses: Not hard at all   Food Insecurity: No Food Insecurity    Worried About Running Out of Food in the Last Year: Never true    Ricardo of Food in the Last Year: Never true   Transportation Needs:     Lack of Transportation (Medical): Not on file    Lack of Transportation (Non-Medical): Not on file   Physical Activity:     Days of Exercise per Week: Not on file    Minutes of Exercise per Session: Not on file   Stress:     Feeling of Stress : Not on file   Social Connections:     Frequency of Communication with Friends and Family: Not on file    Frequency of Social Gatherings with Friends and Family: Not on file    Attends Catholic Services: Not on file    Active Member of [address] [place] [address] [employer] or Organizations: Not on file    Attends Club or Organization Meetings: Not on file    Marital Status: Not on file   Intimate Partner Violence:     Fear of Current or Ex-Partner: Not on file    Emotionally Abused: Not on file    Physically Abused: Not on file    Sexually Abused: Not on file   Housing Stability:     Unable to Pay for Housing in the Last Year: Not on file    Number of [place] in the Last Year: Not on file    Unstable Housing in the Last Year: Not on file       Past Medical History:   Diagnosis Date    BPH (benign prostatic hyperplasia) 2/3/2012    no treatment at this time    Chronic back pain     Colon polyp 09/2017    Dr. See Diez; f/u 5 years    Eczematous dermatitis     hands, feet and legs    ED (erectile dysfunction) 2/3/2012    Erectile dysfunction     Family history of early CAD 2/3/2012    Gastroesophageal reflux disease with hiatal hernia     History of hypertension     History of prediabetes     History of renal insufficiency syndrome     Obesity (BMI 30-39. 9)     Osteoarthritis     Vitamin D deficiency     Weight gain        Family History   Problem Relation Age of Onset    Depression Mother     Heart Disease Paternal Grandmother     High Blood Pressure Paternal Grandmother     High Cholesterol Paternal call with questions or concerns at [phone].

## 2022-04-03 NOTE — PROGRESS NOTES
Spoke with Dr Arnulfo Willoughby via telephone to clarify orders for d/c. Ordered hydrocortisone cream 2.5% BID. Apply warm washcloth to face, cream, and then petroleum jelly x 10 days. Authorized order for discharge.   Electronically signed by Melissa Charles RN on 4/3/2022 at 11:08 AM

## 2022-04-03 NOTE — PROGRESS NOTES
PODIATRIC MEDICINE AND SURGERY  CONSULT PROGRESS NOTE      Consulting Service:  Medicine  Requesting Provider: Rafael Hernández  Opinion/advice regarding: right foot  Staff Doctor:  Dr. Adrian Centeno:  This 74 y.o. male with significant PMH of BPH, ED, GERD, HTN, obesity, osteoarthritis, vitamin D deficiency and recent hx of right foot incision and drainage of abscess on 2/25/22 positive for MRSA admitted for allergic reaction 2/2 antibacterial drug. Podiatry consulted for right foot wound. PLAN AND RECOMMENDATIONS[de-identified]  Weight bearing as tolerated to right foot  Patient should elevate RLE at or above heart level while resting  Dressing change today consisted of maxorb to wound, 4x4 between toes, followed by DSD. Per ID, no abx needed  Patient discussed with staff, Dr. Kashif Norton. Low suspicion infection right foot given increased redness coinciding with recent full body rash. The wound itself appears to be filling in and healing well. Will monitor over the next few days. Discontinued use of compression stocking to RLE in the mean time. Patient will need follow up after discharge with DR. Kashif Norton. Interval HPI:   AF, HDS, NAEO  No acute or significant change from XR 2/23/22    Past Medical History:   Diagnosis Date    BPH (benign prostatic hyperplasia) 2/3/2012    no treatment at this time    Chronic back pain     Colon polyp 09/2017    Dr. Jimmie Villarreal; f/u 5 years    Eczematous dermatitis     hands, feet and legs    ED (erectile dysfunction) 2/3/2012    Erectile dysfunction     Family history of early CAD 2/3/2012    Gastroesophageal reflux disease with hiatal hernia     History of hypertension     History of prediabetes     History of renal insufficiency syndrome     Obesity (BMI 30-39. 9)     Osteoarthritis     Vitamin D deficiency     Weight gain        Past Surgical History:   Procedure Laterality Date    COLONOSCOPY  9/25/2006    FOOT DEBRIDEMENT Right 2/25/2022    FOOT DEBRIDEMENT INCISION AND DRAINAGE performed by Eliseo Benitez DPM at [address] NOT  [address] IND N/A 9/15/2017    COLONOSCOPY performed by Mckayla Martinez MD at [address] [place] [address]  5/9/14    bx, dilation jarmago    UPPER GASTROINTESTINAL ENDOSCOPY N/A 9/15/2017    EGD DILATION SAVORY and biopsy performed by Mckayla [facility] MD at [facility]       No current facility-administered medications on file prior to encounter. Current Outpatient Medications on File Prior to Encounter   Medication Sig Dispense Refill    diphenhydrAMINE (BENADRYL ALLERGY) 25 MG capsule Take 1 capsule by mouth every 6 hours as needed for Itching or Allergies 60 capsule 0    famotidine (PEPCID) 20 MG tablet Take 1 tablet by mouth 2 times daily 60 tablet 0    ascorbic acid (VITAMIN C) 500 MG tablet Take 500 mg by mouth daily      vitamin B-12 (CYANOCOBALAMIN) 500 MCG tablet Take 500 mcg by mouth daily      zinc 50 MG TABS tablet Take 50 mg by mouth daily      lactobacillus acidophilus [facility]) Take 4 tablets by mouth 3 times daily 120 tablet 0    aspirin 325 MG EC tablet Take 1 tablet by mouth daily 1 tablet 0    triamcinolone (KENALOG) 0.1 % cream Apply topically qam daily Monday through Friday only. Take weekend off. Do not use on face, groin, armpits, breasts tissue. 454 g 0    testosterone cypionate (DEPOTESTOTERONE CYPIONATE) 200 MG/ML injection Inject 1 mL into the muscle every 14 days for 90 doses.  8 mL 0    Syringe/Needle, Disp, (SYRINGE 3CC/64AM1-8/2\") 22G X 1-1/2\" 3 ML MISC 1 actuation by Does not apply route every 14 days 2 each 5    vitamin E 1000 units capsule Take 1,000 Units by mouth daily      vitamin D (CHOLECALCIFEROL) 1000 UNIT TABS tablet Take 6,000 Units by mouth daily      Multiple Vitamins-Minerals (ONE-A-DAY MENS HEALTH FORMULA) TABS Take by mouth      magnesium (MAGNESIUM-OXIDE) 250 MG TABS tablet Take 250 mg by mouth daily Take 3 pills daily      calcium carbonate (OYSTER SHELL CALCIUM 500 MG) 1250 MG tablet Take 1 tablet by mouth daily      Glucosamine-Chondroitin 5607-9928 MG/30ML LIQD Take by mouth Take 3 pills daily      Omega-3 Fatty Acids (FISH OIL) 1200 MG CAPS Take  by mouth daily. Allergies   Allergen Reactions    Vancomycin Hives, Itching and Other (See Comments)     Rash all over with sores in the mouth as well. Fevers        Family History   Problem Relation Age of Onset    Depression Mother     Heart Disease Paternal Grandmother     High Blood Pressure Paternal Grandmother     High Cholesterol Paternal Grandmother     Anemia Paternal Grandmother         B 12 deficiency    Heart Disease Brother     Heart Surgery Brother     Anemia Brother         B 12 deficiency       Social History     Socioeconomic History    Marital status:      Spouse name: Not on file    Number of children: 1    Years of education: Not on file    Highest education level: Not on file   Occupational History    Not on file   Tobacco Use    Smoking status: Former Smoker     Packs/day: 2.00     Years: 5.00     Pack years: 10.00     Quit date: 1990     Years since quittin.2    Smokeless tobacco: Never Used   Vaping Use    Vaping Use: Never used   Substance and Sexual Activity    Alcohol use: No    Drug use: No    Sexual activity: Yes     Partners: Female   Other Topics Concern    Not on file   Social History Narrative    Not on file     Social Determinants of Health     Financial Resource Strain: Low Risk     Difficulty of Paying Living Expenses: Not hard at all   Food Insecurity: No Food Insecurity    Worried About Running Out of Food in the Last Year: Never true    [address] in the Last Year: Never true   Transportation Needs:     Lack of Transportation (Medical): Not on file    Lack of Transportation (Non-Medical):  Not on file   Physical Activity:     Days of Exercise per Week: Not on file    Minutes of Exercise per Session: Not on file   Stress:     Feeling of Stress : Not on file   Social Connections:     Frequency of Communication with Friends and Family: Not on file    Frequency of Social Gatherings with Friends and Family: Not on file    Attends [employer] Services: Not on file    Active Member of Clubs or Organizations: Not on file    Attends Club or Organization Meetings: Not on file    Marital Status: Not on file   Intimate Partner Violence:     Fear of Current or Ex-Partner: Not on file    Emotionally Abused: Not on file    Physically Abused: Not on file    Sexually Abused: Not on file   Housing Stability:     Unable to Pay for Housing in the Last Year: Not on file    Number of [place] in the Last Year: Not on file    Unstable Housing in the Last Year: Not on file       Review of Systems  CONSTITUTIONAL: No fevers, chills, diaphoresis  HEENT: Denies epistaxis or tinnitus  EYES: No diplopia or blurry vision. CARDIOVASCULAR: No chest pain, dyspnea, palpitations, orthopnea, PND, ankle edema. PULM: No dyspnea, tachypnea, wheezing  GI: No dysphagia/odynophagia, constipation, diarrhea, changes in stool habits, hematochezia, melena. : No new urinary complaints, including dysuria, gross hematuria or pyuria. NEURO: admits to generalized weakness  MUSC-SKEL: denies pain right foot   PSY: No concerns regarding depression, anxiety or panic. INTEGUMENTARY: full body erythema and peeling. admits to open skin lesion dorsum right foot. See below    OBJECTIVE:  /76   Pulse 84   Temp 97.9 °F (36.6 °C) (Oral)   Resp 16   Ht 6' (1.829 m)   Wt 277 lb (125.6 kg)   SpO2 97%   BMI 37.57 kg/m²   Patient is alert and oriented x 3 in NAD.      Vascular:   Palpable Dorsalis Pedis and Palpable Posterior Tibial Pulses B/L   Capillary Fill time < 3 seconds to B/L digits  Skin temperature warm to warm tibial tuberosity to the digits B/L  Hair growth absent to digits  mild edema right foot    Neurological:   Epicritic sensation intact B/L    Musculoskeletal/Orthopaedic: Structural Deformities:  5/5 muscle strength Dorsiflexion, Plantarflexion, Inversion, Eversion B/L  ROM decreased pedal and ankle joints B/L. No pain on palpation to right foot    Dermatological:   Diffuse rash noted to arms, neck, head, lower legs: erythematous and peeling. The redness to the left foot appears to be worsening today with lateral dorsal foot and plantar foot involvement. Right foot with erythema deepening in color. Hyperkeratosis noted diffusely to plantar right foot. There is a very small open lesion to the dorsal right foot which appears mostly healed and shallow with no drainage noted. Mild edema right foot. 4/3/22:      LABS:   Lab Results   Component Value Date    WBC 15.3 (H) 04/02/2022    HGB 13.9 (L) 04/02/2022    HCT 41.9 (L) 04/02/2022    MCV 81.6 04/02/2022     04/02/2022     Lab Results   Component Value Date     04/02/2022    K 4.7 04/02/2022    K 4.3 03/01/2022    CL 99 04/02/2022    CO2 22 04/02/2022    BUN 14 04/02/2022    CREATININE 0.96 04/02/2022    GLUCOSE 114 04/02/2022    GLUCOSE 105 02/03/2012    CALCIUM 8.5 04/02/2022      Lab Results   Component Value Date    LABALBU 3.0 (L) 04/02/2022     Lab Results   Component Value Date    SEDRATE 1 03/30/2022     Lab Results   Component Value Date    CRP 13.4 (H) 03/16/2022     Lab Results   Component Value Date    LABA1C 5.7 02/24/2022     No results found for: EAG    MICROBIOLOGY:   Blood Culture:  3/30/22 NGTD        IMAGING:   XR right foot 4/2/22:  Impression       NO ACUTE OR SIGNIFICANT CHANGE FROM 2/23/2022 IDENTIFIED. Patient's case will be discussed with staff, who will provide final recommendations. Serjio Be DPM, PGY3  Please first page Podiatry On Call, [phone]  April 3, 2022  11:01 AM    The resident/student was under my direct supervision during today's patient encounter. reflection of my personal examination, assessment and treatment plan.   The patient was seen and examined with the resident/student. The above findings and recommendations were reviewed and I agree with above treatment plan. Any questions or concerns, please Dr. Jefferson Ugarte or podiatry resident on call.     Rosemary Quevedo DPM  Podiatry Attending  04/12/22  10:10 AM

## 2022-04-03 NOTE — DISCHARGE SUMMARY
Discharge Summary    Date: 4/3/2022  Patient Name: Cinthia Beard YOB: 1955 Age: 74 y.o. Admit Date: 3/30/2022  Discharge Date: 4/3/2022  Discharge Condition: [address]    Admission Diagnosis  Allergic reaction, initial encounter (T78.40XA); Drug-induced rash with eosinophilia and systemic symptoms (D72.12, T50.905A); Allergic reaction due to antibacterial drug (T36.95XA)     Discharge Diagnosis  Principal Problem: Allergic reaction due to antibacterial drugActive Problems: Abscess of right footResolved Problems: * No resolved hospital problems. 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 Received IV steroids, Zyrtec montelukast Benadryl. Patient skin is peeling off is improving. Patient was discharged on Medrol Dosepak and Zyrtec. PICC line will be removed prior to discharge. No antibiotics as per ID recommendation. Right foot wound is healing no infection noted. X-ray of the right foot is stable. recommend to by Cetaphil ointment as outpt for the dry skin    Consultants:  IP CONSULT TO INFECTIOUS DISEASESIP CONSULT TO ALLERGY & IMMUNOLOGYIP CONSULT TO PODIATRY    Surgeries/procedures Performed:       Treatments:            Discharge Plan/Disposition:  Home    Hospital/Incidental Findings Requiring Follow Up:    Patient Instructions:    Diet:    Activity:  For number of days (if applicable): Other Instructions:    Provider Follow-Up:   No follow-ups on file.      Significant Diagnostic Studies:    Recent Labs:  Admission on 03/30/2022Procalcitonin                                 Date: 03/30/2022Value: 0.22*       Ref range: 0.00 - 0.15 ng/mL  Status: Final              Comment: Suspected Sepsis:Low likelihood of sepsis  <.50 ng/mLIncreased likelihood of sepsis 0.50-2.00 ng/mLAntibiotics encouragedHigh risk of sepsis/shock   >2.00 ng/mLAntibiotics strongly encouragedSuspected Lower Respiratory Tract Infections:Low likelihood of bacterial infection  <0.24 ng/mLIncreased likelihood of bacterial infection >0.24 ng/mLAntibiotics encouragedWith successful antibiotic therapy, PCT levels should decreaserapidly. (Half-life of 24 to 36 hours. )Procalcitonin values from samples collected within the first6 hours of systemic infection may still be low. Retesting may be indicated. Values from day 1 and day 4 can be entered into the Change inProcalcitonin Calculator to determine the patient'sMortality Risk [email or web address]://www.clark.info/. com)In healthy neonates, plasma Procalcitonin (PCT) concentrationsincrease gradually after birth, reaching peak values at about24 hours of age then decrease to normal values below 0.5                        ng/mLby 48-72 hours of age. Culture, Blood 2                              Date: 03/30/2022Value: No Growth to date. Any change in status will be *                     Status: PreliminaryBlood Culture, Routine                        Date: 03/30/2022Value: No Growth to date.   Any change in status will be *                     Status: PreliminaryWBC                                           Date: 03/30/2022Value: 20.1*       Ref range: 4.8 - 10.8 K/uL    Status: FinalRBC                                           Date: 03/30/2022Value: 6.32*       Ref range: 4.70 - 6.10 M/uL   Status: FinalHemoglobin                                    Date: 03/30/2022Value: 17.1        Ref range: 14.0 - 18.0 g/dL   Status: FinalHematocrit                                    Date: 03/30/2022Value: 51.7        Ref range: 42.0 - 52.0 %      Status: FinalMCV                                           Date: 03/30/2022Value: 81.7        Ref range: 80.0 - 100.0 fL    Status: [address]                                           Date: 03/30/2022Value: 27.0        Ref range: 27.0 - 31.3 pg     Status: [address]                                          Date: 03/30/2022Value: 33.1        Ref range: 33.0 - 37.0 %      Status: FinalRDW Date: 03/30/2022Value: 15.4*       Ref range: 11.5 - 14.5 %      Status: FinalPlatelets                                     Date: 03/30/2022Value: 296         Ref range: 130 - 400 K/uL     Status: FinalNeutrophils %                                 Date: 03/30/2022Value: 87.6        Ref range: %                  Status: FinalLymphocytes %                                 Date: 03/30/2022Value: 2.4         Ref range: %                  Status: FinalMonocytes %                                   Date: 03/30/2022Value: 7.3         Ref range: %                  Status: FinalEosinophils %                                 Date: 03/30/2022Value: 2.3         Ref range: %                  Status: FinalBasophils %                                   Date: 03/30/2022Value: 0.4         Ref range: %                  Status: FinalNeutrophils Absolute                          Date: 03/30/2022Value: 17.6*       Ref range: 1.4 - 6.5 K/uL     Status: FinalLymphocytes Absolute                          Date: 03/30/2022Value: 0.5*        Ref range: 1.0 - 4.8 K/uL     Status: FinalMonocytes Absolute                            Date: 03/30/2022Value: 1.5*        Ref range: 0.2 - 0.8 K/uL     Status: FinalEosinophils Absolute                          Date: 03/30/2022Value: 0.5         Ref range: 0.0 - 0.7 K/uL     Status: FinalBasophils Absolute                            Date: 03/30/2022Value: 0.1         Ref range: 0.0 - 0.2 K/uL     Status: FinalSodium                                        Date: 03/30/2022Value: 130*        Ref range: 135 - 144 mEq/L    Status: FinalPotassium                                     Date: 03/30/2022Value: 4.5         Ref range: 3.4 - 4.9 mEq/L    Status: FinalChloride                                      Date: 03/30/2022Value: 95          Ref range: 95 - 107 mEq/L     Status: FinalCO2                                           Date: 03/30/2022Value: 19*         Ref range: 20 - 31 mEq/L      Status: FinalAnion Gap Ref range: %                  Status: FinalEosinophils %                                 Date: 03/31/2022Value: 2.3         Ref range: %                  Status: FinalBasophils %                                   Date: 03/31/2022Value: 0.5         Ref range: %                  Status: FinalNeutrophils Absolute                          Date: 03/31/2022Value: 14.9*       Ref range: 1.4 - 6.5 K/uL     Status: FinalLymphocytes Absolute                          Date: 03/31/2022Value: 0.2*        Ref range: 1.0 - 4.8 K/uL     Status: FinalMonocytes Absolute                            Date: 03/31/2022Value: 1.0*        Ref range: 0.2 - 0.8 K/uL     Status: FinalEosinophils Absolute                          Date: 03/31/2022Value: 0.4         Ref range: 0.0 - 0.7 K/uL     Status: FinalBasophils Absolute                            Date: 03/31/2022Value: 0.1         Ref range: 0.0 - 0.2 K/uL     Status: FinalSodium                                        Date: 03/31/2022Value: 127*        Ref range: 135 - 144 mEq/L    Status: FinalPotassium                                     Date: 03/31/2022Value: 4.5         Ref range: 3.4 - 4.9 mEq/L    Status: FinalChloride                                      Date: 03/31/2022Value: 99          Ref range: 95 - 107 mEq/L     Status: FinalCO2                                           Date: 03/31/2022Value: 18*         Ref range: 20 - 31 mEq/L      Status: FinalAnion Gap                                     Date: 03/31/2022Value: 10          Ref range: 9 - 15 mEq/L       Status: FinalGlucose                                       Date: 03/31/2022Value: 173*        Ref range: 70 - 99 mg/dL      Status: FinalBUN                                           Date: 03/31/2022Value: 18          Ref range: 8 - 23 mg/dL       Status: FinalCREATININE                                    Date: 03/31/2022Value: 1.23*       Ref range: 0.70 - 1.20 mg/dL  Status: FinalGFR Non- Date: 03/31/2022Value: 58.4*       Ref range: >60                Status: Final              Comment: >60 mL/min/1.73m2 EGFR, calc. for ages 25 and older using theMDRD formula (not corrected for weight), is valid for stablerenal function. GFR                           Date: 03/31/2022Value: >60.0       Ref range: >60                Status: Final              Comment: >60 mL/min/1.73m2 EGFR, calc. for ages 25 and older using theMDRD formula (not corrected for weight), is valid for stablerenal function. Calcium                                       Date: 03/31/2022Value: 8.4*        Ref range: 8.5 - 9.9 mg/dL    Status: FinalTotal Protein                                 Date: 03/31/2022Value: 5.2*        Ref range: 6.3 - 8.0 g/dL     Status: FinalAlbumin                                       Date: 03/31/2022Value: 3.0*        Ref range: 3.5 - 4.6 g/dL     Status: FinalTotal Bilirubin                               Date: 03/31/2022Value: 0.3         Ref range: 0.2 - 0.7 mg/dL    Status: FinalAlkaline Phosphatase                          Date: 03/31/2022Value: 67          Ref range: 35 - 104 U/L       Status: FinalALT                                           Date: 03/31/2022Value: 28          Ref range: 0 - 41 U/L         Status: FinalAST                                           Date: 03/31/2022Value: 10          Ref range: 0 - 40 U/L         Status: FinalGlobulin                                      Date: 03/31/2022Value: 2.2*        Ref range: 2.3 - 3.5 g/dL     Status: FinalVancomycin Rm                                 Date: 03/31/2022Value: <4.0*       Ref range: 10.0 - 40.0 ug/mL  Status: [address] [facility]                                           Date: 04/01/2022Value: 10.4        Ref range: 4.8 - 10.8 K/uL    Status: FinalRBC                                           Date: 04/01/2022Value: 5.50        Ref range: 4.70 - 6.10 M/uL   Status: FinalHemoglobin                                    Date: 04/01/2022Value: 15.0        Ref range: 14.0 - 18.0 g/dL   Status: FinalHematocrit                                    Date: 04/01/2022Value: 44.7        Ref range: 42.0 - 52.0 %      Status: FinalMCV                                           Date: 04/01/2022Value: 81.2        Ref range: 80.0 - 100.0 fL    Status: [address] [place]                                           Date: 04/01/2022Value: 27.2        Ref range: 27.0 - 31.3 pg     Status: [address]                                          Date: 04/01/2022Value: 33.5        Ref range: 33.0 - 37.0 %      Status: FinalRDW                                           Date: 04/01/2022Value: 15.0*       Ref range: 11.5 - 14.5 %      Status: FinalPlatelets                                     Date: 04/01/2022Value: 228         Ref range: 130 - 400 K/uL     Status: FinalNeutrophils %                                 Date: 04/01/2022Value: 81.4        Ref range: %                  Status: FinalLymphocytes %                                 Date: 04/01/2022Value: 3.8         Ref range: %                  Status: FinalMonocytes %                                   Date: 04/01/2022Value: 6.7         Ref range: %                  Status: FinalEosinophils %                                 Date: 04/01/2022Value: 7.8         Ref range: %                  Status: FinalBasophils %                                   Date: 04/01/2022Value: 0.3         Ref range: %                  Status: FinalNeutrophils Absolute                          Date: 04/01/2022Value: 8.4*        Ref range: 1.4 - 6.5 K/uL     Status: FinalLymphocytes Absolute                          Date: 04/01/2022Value: 0.4*        Ref range: 1.0 - 4.8 K/uL     Status: FinalMonocytes Absolute                            Date: 04/01/2022Value: 0.7         Ref range: 0.2 - 0.8 K/uL     Status: FinalEosinophils Absolute                          Date: 04/01/2022Value: 0.8*        Ref range: 0.0 - 0.7 K/uL     Status: FinalBasophils Absolute                            Date: 04/01/2022Value: 0.0         Ref range: 0.0 - 0.2 K/uL     Status: FinalSodium                                        Date: 04/01/2022Value: 132*        Ref range: 135 - 144 mEq/L    Status: FinalPotassium                                     Date: 04/01/2022Value: 4.8         Ref range: 3.4 - 4.9 mEq/L    Status: FinalChloride                                      Date: 04/01/2022Value: 100         Ref range: 95 - 107 mEq/L     Status: FinalCO2                                           Date: 04/01/2022Value: 19*         Ref range: 20 - 31 mEq/L      Status: FinalAnion Gap                                     Date: 04/01/2022Value: 13          Ref range: 9 - 15 mEq/L       Status: FinalGlucose                                       Date: 04/01/2022Value: 125*        Ref range: 70 - 99 mg/dL      Status: FinalBUN                                           Date: 04/01/2022Value: 19          Ref range: 8 - 23 mg/dL       Status: FinalCREATININE                                    Date: 04/01/2022Value: 1.03        Ref range: 0.70 - 1.20 mg/dL  Status: FinalGFR Non-                      Date: 04/01/2022Value: >60.0       Ref range: >60                Status: Final              Comment: >60 mL/min/1.73m2 EGFR, calc. for ages 25 and older using theMDRD formula (not corrected for weight), is valid for stablerenal function. GFR                           Date: 04/01/2022Value: >60.0       Ref range: >60                Status: Final              Comment: >60 mL/min/1.73m2 EGFR, calc. for ages 25 and older using theMDRD formula (not corrected for weight), is valid for stablerenal function. Calcium                                       Date: 04/01/2022Value: 8.0*        Ref range: 8.5 - 9.9 mg/dL    Status: FinalTotal Protein                                 Date: 04/01/2022Value: 4.9*        Ref range: 6.3 - 8.0 g/dL     Status: FinalAlbumin Date: 04/01/2022Value: 2.7*        Ref range: 3.5 - 4.6 g/dL     Status: FinalTotal Bilirubin                               Date: 04/01/2022Value: <0.2        Ref range: 0.2 - 0.7 mg/dL    Status: FinalAlkaline Phosphatase                          Date: 04/01/2022Value: 66          Ref range: 35 - 104 U/L       Status: FinalALT                                           Date: 04/01/2022Value: 26          Ref range: 0 - 41 U/L         Status: FinalAST                                           Date: 04/01/2022Value: 8           Ref range: 0 - 40 U/L         Status: FinalGlobulin                                      Date: 04/01/2022Value: 2.2*        Ref range: 2.3 - 3.5 g/dL     Status: [address] [facility]                                           Date: 04/02/2022Value: 15.3*       Ref range: 4.8 - 10.8 K/uL    Status: FinalRBC                                           Date: 04/02/2022Value: 5.14        Ref range: 4.70 - 6.10 M/uL   Status: FinalHemoglobin                                    Date: 04/02/2022Value: 13.9*       Ref range: 14.0 - 18.0 g/dL   Status: FinalHematocrit                                    Date: 04/02/2022Value: 41.9*       Ref range: 42.0 - 52.0 %      Status: FinalMCV                                           Date: 04/02/2022Value: 81.6        Ref range: 80.0 - 100.0 fL    Status: [address]                                           Date: 04/02/2022Value: 27.0        Ref range: 27.0 - 31.3 pg     Status: [address]                                          Date: 04/02/2022Value: 33.1        Ref range: 33.0 - 37.0 %      Status: FinalRDW                                           Date: 04/02/2022Value: 15.2*       Ref range: 11.5 - 14.5 %      Status: FinalPlatelets                                     Date: 04/02/2022Value: 221         Ref range: 130 - 400 K/uL     Status: FinalPLATELET SLIDE REVIEW                         Date: 04/02/2022Value: Normal        Status: FinalNeutrophils % Date: 04/02/2022Value: 70.0        Ref range: %                  Status: FinalLymphocytes %                                 Date: 04/02/2022Value: 9.0         Ref range: %                  Status: FinalMonocytes %                                   Date: 04/02/2022Value: 6.8         Ref range: %                  Status: FinalEosinophils %                                 Date: 04/02/2022Value: 5.0         Ref range: %                  Status: FinalBasophils %                                   Date: 04/02/2022Value: 2.0         Ref range: %                  Status: FinalNeutrophils Absolute                          Date: 04/02/2022Value: 12.2*       Ref range: 1.4 - 6.5 K/uL     Status: FinalLymphocytes Absolute                          Date: 04/02/2022Value: 1.4         Ref range: 1.0 - 4.8 K/uL     Status: FinalMonocytes Absolute                            Date: 04/02/2022Value: 1.1*        Ref range: 0.2 - 0.8 K/uL     Status: FinalEosinophils Absolute                          Date: 04/02/2022Value: 0.8*        Ref range: 0.0 - 0.7 K/uL     Status: FinalBasophils Absolute                            Date: 04/02/2022Value: 0.3*        Ref range: 0.0 - 0.2 K/uL     Status: FinalBands Relative                                Date: 04/02/2022Value: 7           Ref range: %                  Status: FinalMetamyelocytes Relative                       Date: 04/02/2022Value: 1           Ref range: %                  Status: FinalMyelocyte Percent                             Date: 04/02/2022Value: 2           Ref range: %                  Status: FinalAnisocytosis                                  Date: 04/02/2022Value: 1+            Status: FinalPoikilocytes                                  Date: 04/02/2022Value: 2+            Status: FinalBurr Cells                                    Date: 04/02/2022Value: 2+            Status: FinalSodium                                        Date: 04/02/2022Value: 133* Ref range: 135 - 144 mEq/L    Status: FinalPotassium                                     Date: 04/02/2022Value: 4.7         Ref range: 3.4 - 4.9 mEq/L    Status: FinalChloride                                      Date: 04/02/2022Value: 99          Ref range: 95 - 107 mEq/L     Status: FinalCO2                                           Date: 04/02/2022Value: 22          Ref range: 20 - 31 mEq/L      Status: FinalAnion Gap                                     Date: 04/02/2022Value: 12          Ref range: 9 - 15 mEq/L       Status: FinalGlucose                                       Date: 04/02/2022Value: 114*        Ref range: 70 - 99 mg/dL      Status: FinalBUN                                           Date: 04/02/2022Value: 14          Ref range: 8 - 23 mg/dL       Status: FinalCREATININE                                    Date: 04/02/2022Value: 0.96        Ref range: 0.70 - 1.20 mg/dL  Status: FinalGFR Non-                      Date: 04/02/2022Value: >60.0       Ref range: >60                Status: Final              Comment: >60 mL/min/1.73m2 EGFR, calc. for ages 25 and older using theMDRD formula (not corrected for weight), is valid for stablerenal function. GFR                           Date: 04/02/2022Value: >60.0       Ref range: >60                Status: Final              Comment: >60 mL/min/1.73m2 EGFR, calc. for ages 25 and older using theMDRD formula (not corrected for weight), is valid for stablerenal function. Calcium                                       Date: 04/02/2022Value: 8.5         Ref range: 8.5 - 9.9 mg/dL    Status: FinalTotal Protein                                 Date: 04/02/2022Value: 4.9*        Ref range: 6.3 - 8.0 g/dL     Status: FinalAlbumin                                       Date: 04/02/2022Value: 3.0*        Ref range: 3.5 - 4.6 g/dL     Status: FinalTotal Bilirubin                               Date: 04/02/2022Value: <0.2        Ref range: 0.2 - 0.7 mg/dL    Status: FinalAlkaline Phosphatase                          Date: 04/02/2022Value: 65          Ref range: 35 - 104 U/L       Status: FinalALT                                           Date: 04/02/2022Value: 25          Ref range: 0 - 41 U/L         Status: FinalAST                                           Date: 04/02/2022Value: 9           Ref range: 0 - 40 U/L         Status: FinalGlobulin                                      Date: 04/02/2022Value: 1.9*        Ref range: 2.3 - 3.5 g/dL     Status: Final------------    Radiology last 7 days:  XR FOOT RIGHT (MIN 3 VIEWS)Result Date: 4/2/2022NO ACUTE OR SIGNIFICANT CHANGE FROM 2/23/2022 IDENTIFIED. Pending Labs   Order Current Status  Culture, Blood 1 Preliminary result  Culture, Blood 2 Preliminary result      Discharge Medications    Current Discharge Medication ListSTART taking these medicationscetirizine (ZYRTEC) 10 MG tabletTake 1 tablet by mouth 2 times daily for 7 daysQty: 14 tablet Refills: 0methylPREDNISolone (MEDROL DOSEPACK) 4 MG tabletTake by mouth. Qty: 1 kit Refills: 0    Current Discharge Medication List    Current Discharge Medication ListCONTINUE these medications which have NOT CHANGEDdiphenhydrAMINE (BENADRYL ALLERGY) 25 MG capsuleTake 1 capsule by mouth every 6 hours as needed for Itching or AllergiesQty: 60 capsule Refills: 0famotidine (PEPCID) 20 MG tabletTake 1 tablet by mouth 2 times dailyQty: 60 tablet Refills: 0ascorbic acid (VITAMIN C) 500 MG tabletTake 500 mg by mouth dailyvitamin B-12 (CYANOCOBALAMIN) 500 MCG tabletTake 500 mcg by mouth dailyzinc 50 MG TABS tabletTake 50 mg by mouth dailylactobacillus acidophilus (FLORANEX)Take 4 tablets by mouth 3 times dailyQty: 120 tablet Refills: 0aspirin 325 MG EC tabletTake 1 tablet by mouth dailyQty: 1 tablet Refills: 0Associated Diagnoses:Atherosclerosis of artery of lower extremity (HCC)triamcinolone (KENALOG) 0.1 % creamApply topically qam daily Monday through Friday only. Take weekend off. Do not use on face, groin, armpits, breasts tissue. Qty: 454 g Refills: 0Associated Diagnoses:Eczema, unspecified type; Dyshidrotic eczematestosterone cypionate (DEPOTESTOTERONE CYPIONATE) 200 MG/ML injectionInject 1 mL into the muscle every 14 days for 90 doses. Qty: 8 mL Refills: 0Associated Diagnoses:Low testosteroneSyringe/Needle, Disp, (SYRINGE 3CC/04MC3-0/2\") 22G X 1-1/2\" 3 ML MISC1 actuation by Does not apply route every 14 daysQty: 2 each Refills: 5vitamin E 1000 units capsuleTake 1,000 Units by mouth dailyvitamin D (CHOLECALCIFEROL) 1000 UNIT TABS tabletTake 6,000 Units by mouth dailyMultiple Vitamins-Minerals (ONE-A-DAY MENS HEALTH FORMULA) TABSTake by mouthmagnesium (MAGNESIUM-OXIDE) 250 MG TABS tabletTake 250 mg by mouth daily Take 3 pills dailycalcium carbonate (OYSTER SHELL CALCIUM 500 MG) 1250 MG tabletTake 1 tablet by mouth dailyGlucosamine-Chondroitin 5647-9680 MG/30ML LIQDTake by mouth Take 3 pills dailyOmega-3 Fatty Acids (FISH OIL) 1200 MG CAPSTake  by mouth daily. Current Discharge Medication ListSTOP taking these medicationsdoxycycline hyclate (VIBRA-TABS) 100 MG tabletComments:Reason for Stopping:vancomycin (VANCOCIN) infusionComments:Reason for Stopping:    Time Spent on Discharge:E] minutes were spent in patient examination, evaluation, counseling as well as medication reconciliation, prescriptions for required medications, discharge plan, and follow up.     Electronically signed by Tata Mcfarland MD on 4/3/22 at 10:13 AM EDT   overtime on dc summary was 45 min

## 2022-04-03 NOTE — PROGRESS NOTES
2100: Shift assessment complete, vss. Pt alert & oriented, calm & cooperative. Pt denies c/o pain, SOB, chest pain. 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 Skin is peeling/flaky. PM meds given as per MAR. Single lumen PICC flushed. Pt worried about not having a BM since being admitted -- prune juice was given. No further complaints at this time, call light is within reach. Safety maintained.     Electronically signed by Tram Javier RN on 4/2/22 at 11:18 PM EDT

## 2022-04-03 NOTE — DISCHARGE INSTR - DIET

## 2022-04-03 NOTE — DISCHARGE INSTR - COC
Continuity of Care Form    Patient Name: Cinthia Beard   :  1955  MRN:  [id number]    Admit date:  3/30/2022  Discharge date:  ***    Code Status Order: Full Code   Advance Directives:      Admitting Physician:  Chandu Salinas MD  PCP: Vasile Logan MD    Discharging Nurse: [facility]/Room#: H834/B268-65  Discharging Unit Phone Number: ***    Emergency Contact:   Extended Emergency Contact Information  Primary Emergency Contact: Julia Morning, [facility] Phone: [phone]  Relation: Child    Past Surgical History:  Past Surgical History:   Procedure Laterality Date    COLONOSCOPY  2006    FOOT DEBRIDEMENT Right 2022    FOOT DEBRIDEMENT INCISION AND DRAINAGE performed by Everett Puga DPM at [address] NOT  [address] IND N/A 9/15/2017    COLONOSCOPY performed by Steffen Rivera MD at [address] [place] [address]  14    bx, dilation jarmoszuk    UPPER GASTROINTESTINAL ENDOSCOPY N/A 9/15/2017    EGD DILATION [facility] and biopsy performed by Steffen Rivera MD at [facility]       Immunization History:   Immunization History   Administered Date(s) Administered    Influenza Virus Vaccine 10/08/2015    Influenza, Quadv, adjuvanted, 65 yrs +, IM, PF (Fluad) 10/09/2020, 2021    Pneumococcal Polysaccharide ([id number]) 2012, 10/09/2020       Active Problems:  Patient Active Problem List   Diagnosis Code    Dyshidrotic eczema L30.1    Vitamin D deficiency E55.9    Obesity (BMI 30-39. 9) E66.9    Eczematous dermatitis L30.9    Adenomatous polyp of transverse colon- f/u due 2022 D12.3    Low testosterone R79.89    Cellulitis of foot, right L03.115    Abscess of right foot L02.611    Cellulitis of right lower extremity L03.115    Cellulitis and abscess of foot L03.119, L02.619    Failure of outpatient treatment Z78.9    MRSA (methicillin resistant Staphylococcus aureus) infection A49.02    Right foot ulcer, with necrosis of muscle ([facility] L97.513    Allergic reaction due to antibacterial drug T36.95XA       Isolation/Infection:   Isolation            Contact          Patient Infection Status       Infection Onset Added Last Indicated Last Indicated By Review Planned Expiration Resolved Resolved By    [facility] 02/25/22 02/28/22 02/25/22 Culture, Anaerobic and Aerobic        Wound-foot 2/25/22            Nurse Assessment:  Last Vital Signs: /76   Pulse 84   Temp 97.9 °F (36.6 °C) (Oral)   Resp 16   Ht 6' (1.829 m)   Wt 277 lb (125.6 kg)   SpO2 97%   BMI 37.57 kg/m²     Last documented pain score (0-10 scale): Pain Level: 1  Last Weight:   Wt Readings from Last 1 Encounters:   03/31/22 277 lb (125.6 kg)     Mental Status:  {IP PT MENTAL STATUS:20030}    IV Access:  { AMAN IV ACCESS:186367839}    Nursing Mobility/ADLs:  Walking   {[facility] DME ADRO:192073423}  Transfer  {[facility] DME NZHK:956135522}  Bathing  {[facility] DME AIOZ:157911723}  Dressing  {[facility] DME RDBP:149463376}  Toileting  {[facility] DME CFME:559938012}  Feeding  {[facility] DME EIAF:096342283}  Med Admin  {[facility] DME MFWH:642497445}  Med Delivery   { AMAN MED Delivery:682036497}    Wound Care Documentation and Therapy:  Wound 02/24/22 Foot Right;Dorsal #1 (Active)   Wound Image   03/21/22 1006   Wound Etiology Surgical 04/01/22 2045   Dressing Status Dry;Clean; Intact 04/03/22 0734   Wound Cleansed Irrigated with saline 03/31/22 1150   Dressing/Treatment Dry dressing;Gauze dressing/dressing sponge 04/03/22 0734   Offloading for Diabetic Foot Ulcers Offloading ordered;Post op shoe 03/21/22 1037   Dressing Change Due 03/31/22 03/31/22 0530   Wound Length (cm) 2.6 cm 03/21/22 1006   Wound Width (cm) 0.8 cm 03/21/22 1006   Wound Depth (cm) 0.3 cm 03/21/22 1006   Wound Surface Area (cm^2) 2.08 cm^2 03/21/22 1006   Change in Wound Size % (l*w) -73.33 03/21/22 1006   Wound Volume (cm^3) 0.624 cm^3 03/21/22 1006   Post-Procedure Length (cm) 2.6 cm 03/21/22 1022   Post-Procedure Width (cm) 0.8 cm 22 1022   Post-Procedure Depth (cm) 0.3 cm 22 1022   Post-Procedure Surface Area (cm^2) 2.08 cm^2 22 1022   Post-Procedure Volume (cm^3) 0.624 cm^3 22 1022   Undermining Starts ___ O'Clock 12:00 22 1006   Undermining Ends___ O'Clock 3:00 22 1006   Undermining Maxium Distance (cm) .2 22 1006   Wound Assessment Pink/red;Eschar dry;Dry 22 2100   Drainage Amount None 22 1150   Drainage Description Serosanguinous;Green 22 1006   Odor None 22 1150   Diann-wound Assessment Intact 22 1150   Margins Defined edges 22 1150   Wound Thickness Description not for Pressure Injury Full thickness 22 1006   Number of days: 40     Podiatry Discharge instructions  1. Cleanse wound(s) with normal saline. 2. Apply dry SILVERCEL OR CALCIUM ALGINATE WITH Ag or eqivalent to wound bed. 3. Cover with 4x4's and wrap with gauze (sin or kerlix)  4. Change  Every other day or Monday, Wednesday, and Friday     Elimination:  Continence: Bowel: {YES / LI:22364}  Bladder: {YES / KM:57935}  Urinary Catheter: {Urinary Catheter:032958714}   Colostomy/Ileostomy/Ileal Conduit: {YES / TK:49038}       Date of Last BM: ***    Intake/Output Summary (Last 24 hours) at 4/3/2022 1256  Last data filed at 4/3/2022 1018  Gross per 24 hour   Intake 1680 ml   Output --   Net 1680 ml     I/O last 3 completed shifts: In: 2859.1 [P.O.:2130;  I.V.:729.1]  Out: -     Safety Concerns:     [address] Safety Concerns:137595544}    Impairments/Disabilities:      [address] Impairments/Disabilities:199621917}    Nutrition Therapy:  Current Nutrition Therapy:   [address] Diet List:626346208}    Routes of Feeding: {[facility]P DME Other Feedings:784913918}  Liquids: {Slp liquid thickness:87725}  Daily Fluid Restriction: {CHP DME Yes amt example:677961724}  Last Modified Barium Swallow with Video (Video Swallowing Test): {Done Not Done RB}    Treatments at the Time of Hospital Discharge: Respiratory Treatments: ***  Oxygen Therapy:  {Therapy; copd oxygen:05776}  Ventilator:    {[facility] Vent QXDN:649443735}    Rehab Therapies: {THERAPEUTIC INTERVENTION:3753831361}  Weight Bearing Status/Restrictions: { CC Weight Bearin}  Other Medical Equipment (for information only, NOT a DME order):  {EQUIPMENT:600630554}  Other Treatments: ***    Patient's personal belongings (please select all that are sent with patient):  {[facility] DME Belongings:886480929}    RN SIGNATURE:  {Esignature:465309970}    CASE MANAGEMENT/SOCIAL WORK SECTION    Inpatient Status Date: ***    Readmission Risk Assessment Score:  Readmission Risk              Risk of Unplanned Readmission:  16           Discharging to Facility/ [place] [address] [facility]   Address:  Phone:[phone]  Fax:      / signature: Electronically signed by Leidy Agrawal RN on 4/3/22 at 2:31 PM EDT    PHYSICIAN SECTION    Prognosis: {Prognosis:1881326989}    Condition at Discharge: [address] [place] [address] Patient Condition:094599832}    Rehab Potential (if transferring to Rehab): {Prognosis:3961232668}    Recommended Labs or Other Treatments After Discharge: ***    Physician Certification: I certify the above information and transfer of Venkatesh Davidson  is necessary for the continuing treatment of the diagnosis listed and that he requires {Admit to Appropriate Level of Care:94434} for {GREATER/LESS:249806718} 30 days.      Update Admission H&P: {CHP DME Changes in TCLUL:228250422}    PHYSICIAN SIGNATURE:  {Esignature:661553239}

## 2022-04-03 NOTE — PROGRESS NOTES
Assessment completed with Bk James RN. Alert and oriented x4. Generalized red rash with peeling skin. Reviewed d/c instructions with patient along with Celi Barrett RN. Verbalized understanding. Left floor via wheelchair and transport.   Electronically signed by Estrada Puente RN on 4/3/2022 at 4:11 PM

## 2022-04-04 ENCOUNTER — TELEPHONE (OUTPATIENT)
Dept: FAMILY MEDICINE CLINIC | Age: 67
End: 2022-04-04

## 2022-04-04 ENCOUNTER — CARE COORDINATION (OUTPATIENT)
Dept: CASE MANAGEMENT | Age: 67
End: 2022-04-04

## 2022-04-04 DIAGNOSIS — T36.95XA ALLERGIC REACTION DUE TO ANTIBACTERIAL DRUG: Primary | ICD-10-CM

## 2022-04-04 LAB
BLOOD CULTURE, ROUTINE: NORMAL
CULTURE, BLOOD 2: NORMAL

## 2022-04-04 PROCEDURE — 1111F DSCHRG MED/CURRENT MED MERGE: CPT | Performed by: FAMILY MEDICINE

## 2022-04-04 NOTE — TELEPHONE ENCOUNTER
Aldo coats, we figured out that [employer] \"  was the type of visit for his testosterone. Not actual lab draw.

## 2022-04-04 NOTE — CARE COORDINATION
[facility] Transitions Initial Follow Up Call    Call within 2 business days of discharge: Yes    Patient: Ora Fabry Patient : 1955   MRN: [id number]  Reason for Admission: 3/30/2022 - 4/3/2022 [facility]. Right foot wound/abscess, Allergic drug reaction/Adverse effect of vancomycin, Red Man Syndrome. Discharge Date: 4/3/22 RARS: Readmission Risk Score: 19.8 ( )  NR  CT    Last Discharge [facility]       Complaint Diagnosis Description Type Department Provider    3/30/22 Rash Allergic reaction, initial encounter . .. ED to Hosp-Admission (Discharged) (ADMITTED) Ward Madrigal JOHNNA Tomi Miner MD; Jacki Loera. .. START taking:  cetirizine (ZYRTEC)  hydrocortisone  methylPREDNISolone (MEDROL DOSEPACK)  STOP taking:  doxycycline hyclate 100 MG tablet (VIBRA-TABS)  vancomycin infusion (VANCOCIN)    Needs 1 wk PCP appt  Dr Galeana Seen  10:00  Dr Poonam Lara  1:30     Transitions of Care Initial Call    Was this an external facility discharge? No Discharge Facility:     Challenges to be reviewed by the provider   Additional needs identified to be addressed with provider: No  none             Method of communication with provider : none      Advance Care Planning:   Does patient have an Advance Directive: Clifford Combs primary decision maker [phone]     Was this a readmission? No  Patient stated reason for admission: Allergic rash on antibiotics Vancomycin and Doxycycline for left foot wound. Current: Charlette Ag reports generalized red weeping rash involving body entirety. States ears cracking and using vaseline on them. States his skin \"sweats\" and causes chills. No fever or flu-like symptoms. States skin areas do have some sloughing.  Enc to keep skin clean and dry, avoid tight clothing and tactile frictions to avoid skin re-injury and infection, v/u. Enc to wear softer materials that allow skin to breath, v/u.  Reviewed s/s cellulitis and skin infection to report to physician/return to ED. Pt states he started on vanco and was switched to doxy and feels both meds played a role. Brett Adams is now on allergy list and enc to discuss w/ physician/ID to review if Doxy should be included, v/u. Reports skin pain rated 2/10. No appetite or elimination concerns. No swallow concerns. In good spirits. Patients top risk factors for readmission: Skin infection/injury. Care Transition Nurse (CTN) contacted the patient by telephone to perform post hospital discharge assessment. Verified name and  with patient as identifiers. Provided introduction to self, and explanation of the CTN role. CTN reviewed discharge instructions, medical action plan and red flags with patient who verbalized understanding. Patient given an opportunity to ask questions and does not have any further questions or concerns at this time. Were discharge instructions available to patient? Yes. Reviewed appropriate site of care based on symptoms and resources available to patient including: When to call 911. The patient agrees to contact the PCP office for questions related to their healthcare. Medication reconciliation was performed with patient, who verbalizes understanding of administration of home medications. Advised obtaining a 90-day supply of all daily and as-needed medications. CTN provided contact information. Plan for follow-up call in 5-7 days based on severity of symptoms and risk factors. Plan for next call: CT FU, weeping rash, LLE foot wound. Care Transitions 24 Hour Call    Do you have any ongoing symptoms?: Yes  Patient-reported symptoms: Rash, Other (Comment: LLE healing wound.  Red man syndrome.)  Do you have a copy of your discharge instructions?: Yes  Do you have all of your prescriptions and are they filled?: Yes  Have you scheduled your follow up appointment?: Yes  Were you discharged with any Home Care or Post Acute Services: No  Do you feel like you have everything you need to keep you well at home?: Yes  Care Transitions Interventions         Follow Up  Future Appointments   Date Time Provider Bianca Kramer   4/4/2022 10:00 AM Tiffany Reynolds, [facility] [place]guiano   4/11/2022  4:30 PM SCHEDULE, [facility]   4/12/2022  1:30 PM Linda Nguyen MD [address]   4/25/2022  4:30 PM SCHEDULE, GABRI[facility] OLSON [facility]   11/21/2022  5:00 PM Jennifer Emerson MD [facility] [address], [facility]

## 2022-04-04 NOTE — TELEPHONE ENCOUNTER
Flaco calling from [facility], Geovanny Segovia has resumed HHc, discharged yesterday from [facility] for allergic reaction to vanvomycin. Pt's discharge Instructions state that the pt is to have labs done on the 11th and 25th Geovanny Segovia has no orders, please fax orders to [phone].

## 2022-04-05 ENCOUNTER — CARE COORDINATION (OUTPATIENT)
Dept: CASE MANAGEMENT | Age: 67
End: 2022-04-05

## 2022-04-05 NOTE — CARE COORDINATION
[facility] Transitions Follow Up Call    2022    Patient: Michelle Hernandez  Patient : 1955   MRN: [id number]  Reason for Admission: 3/30/2022 - 4/3/2022 [facility]. Right foot wound/abscess, Allergic drug reaction/Adverse effect of vancomycin, Red Man Syndrome. Discharge Date: 4/3/22 RARS: Readmission Risk Score: 19.8 ( )  NR  CT     Care Transitions Follow Up Call    Needs to be reviewed by the provider   Additional needs identified to be addressed with provider: No  none             Method of communication with provider : none      Care Transition Nurse (CTN) received call from patient by telephone to follow up after admission. Verified name and  with patient as identifiers. Addressed changes since last contact: Rosanna Hardy called to review how to use cortizone and advised to apply topically 2 times daily for 10 days. Advised to apply warm washcloth to face followed by hydrocortisone cream and petroleum jelly BID x 10 days per dc instruction. Pt reports his entire scalp feels tight today and ears are peeling. He is using [employer] products and states not painful to areas applied. States he has right ankle excoriation around parameter of ankle. Enc to wear loose fitting breathable fabrics and to avoid tight socks or cinched ankle pants, v/u. Enc to avoid shoes when at home and safe to be barefoot, v/u. Enc to avoid direct sun exposure this summer to new and healing skin, v/u. Enc to wear hats and sun protective clothing, v/u. Reviewed immediately after shower, before drying off, to apply light sesame oil to wet skin and lightly pat skin dry to help gently flake off peeling skin w/ less itching, v/u. No fevers, chills, or flu-like symptoms. Reviewed s/s of developing cellulitis or skin infections to report to physician/return to ED, v/u.   Discussed follow-up appointments. If no appointment was previously scheduled, appointment scheduling offered: Yes.    Is follow up appointment scheduled within 7 days of discharge? Rescheduled Dr Juanetta Collet appt for 4/11 11:00. CTN provided contact information for future needs. Plan for follow-up call in 7-10 days based on severity of symptoms and risk factors. Plan for next call: CT FU, Check skin concerns and healing process. Care Transitions Subsequent and Final Call    Subsequent and Final Calls  Do you have any ongoing symptoms?: Yes  Patient-reported symptoms: Rash, Other  Have your medications changed?: No  Do you have any questions related to your medications?: Yes  Patient Reports: Reviewed how to use cortisone  Do you currently have any active services?: No  Do you have any needs or concerns that I can assist you with?: No  Identified Barriers: Lack of Education  Care Transitions Interventions  Other Interventions:            Follow Up  Future Appointments   Date Time Provider Bianca Kramer   4/11/2022 11:00 AM Maddy Amaro, [address] [place] [address]   4/11/2022  4:30 PM SCHEDULE[facility]   4/12/2022  1:30 PM Genesis Cruz MD [address]   4/25/2022  4:30 PM SCHEDULE, [facility]   11/21/2022  5:00 PM Beau Jean MD [facility] [address], [facility]

## 2022-04-11 ENCOUNTER — HOSPITAL ENCOUNTER (OUTPATIENT)
Dept: WOUND CARE | Age: 67
Discharge: HOME | End: 2022-04-11
Payer: MEDICARE

## 2022-04-11 ENCOUNTER — NURSE ONLY (OUTPATIENT)
Dept: FAMILY MEDICINE CLINIC | Age: 67
End: 2022-04-11
Payer: MEDICARE

## 2022-04-11 VITALS
DIASTOLIC BLOOD PRESSURE: 90 MMHG | HEART RATE: 118 BPM | SYSTOLIC BLOOD PRESSURE: 123 MMHG | RESPIRATION RATE: 18 BRPM | TEMPERATURE: 98.9 F

## 2022-04-11 DIAGNOSIS — L97.513 RIGHT FOOT ULCER, WITH NECROSIS OF MUSCLE (HCC): Primary | ICD-10-CM

## 2022-04-11 DIAGNOSIS — R79.89 LOW TESTOSTERONE: Primary | ICD-10-CM

## 2022-04-11 DIAGNOSIS — L97.322 NON-PRESSURE CHRONIC ULCER OF LEFT ANKLE WITH FAT LAYER EXPOSED (HCC): ICD-10-CM

## 2022-04-11 PROCEDURE — 11042 DBRDMT SUBQ TIS 1ST 20SQCM/<: CPT

## 2022-04-11 PROCEDURE — 96372 THER/PROPH/DIAG INJ SC/IM: CPT | Performed by: FAMILY MEDICINE

## 2022-04-11 RX ORDER — BACITRACIN ZINC AND POLYMYXIN B SULFATE 500; 1000 [USP'U]/G; [USP'U]/G
OINTMENT TOPICAL ONCE
Status: CANCELLED | OUTPATIENT
Start: 2022-04-11 | End: 2022-04-11

## 2022-04-11 RX ORDER — LIDOCAINE HYDROCHLORIDE 20 MG/ML
JELLY TOPICAL ONCE
Status: CANCELLED | OUTPATIENT
Start: 2022-04-11 | End: 2022-04-11

## 2022-04-11 RX ORDER — BETAMETHASONE DIPROPIONATE 0.05 %
OINTMENT (GRAM) TOPICAL ONCE
Status: CANCELLED | OUTPATIENT
Start: 2022-04-11 | End: 2022-04-11

## 2022-04-11 RX ORDER — LIDOCAINE 40 MG/G
CREAM TOPICAL ONCE
Status: CANCELLED | OUTPATIENT
Start: 2022-04-11 | End: 2022-04-11

## 2022-04-11 RX ORDER — LIDOCAINE 50 MG/G
OINTMENT TOPICAL ONCE
Status: CANCELLED | OUTPATIENT
Start: 2022-04-11 | End: 2022-04-11

## 2022-04-11 RX ORDER — CLOBETASOL PROPIONATE 0.5 MG/G
OINTMENT TOPICAL ONCE
Status: CANCELLED | OUTPATIENT
Start: 2022-04-11 | End: 2022-04-11

## 2022-04-11 RX ORDER — TESTOSTERONE CYPIONATE 200 MG/ML
200 INJECTION INTRAMUSCULAR ONCE
Status: COMPLETED | OUTPATIENT
Start: 2022-04-11 | End: 2022-04-11

## 2022-04-11 RX ORDER — GINSENG 100 MG
CAPSULE ORAL ONCE
Status: CANCELLED | OUTPATIENT
Start: 2022-04-11 | End: 2022-04-11

## 2022-04-11 RX ORDER — GENTAMICIN SULFATE 1 MG/G
OINTMENT TOPICAL ONCE
Status: CANCELLED | OUTPATIENT
Start: 2022-04-11 | End: 2022-04-11

## 2022-04-11 RX ORDER — LIDOCAINE HYDROCHLORIDE 40 MG/ML
SOLUTION TOPICAL ONCE
Status: CANCELLED | OUTPATIENT
Start: 2022-04-11 | End: 2022-04-11

## 2022-04-11 RX ORDER — BACITRACIN, NEOMYCIN, POLYMYXIN B 400; 3.5; 5 [USP'U]/G; MG/G; [USP'U]/G
OINTMENT TOPICAL ONCE
Status: CANCELLED | OUTPATIENT
Start: 2022-04-11 | End: 2022-04-11

## 2022-04-11 RX ADMIN — TESTOSTERONE CYPIONATE 200 MG: 200 INJECTION INTRAMUSCULAR at 16:33

## 2022-04-11 ASSESSMENT — PAIN DESCRIPTION - LOCATION: LOCATION: ANKLE

## 2022-04-11 ASSESSMENT — PAIN DESCRIPTION - ORIENTATION: ORIENTATION: LEFT

## 2022-04-11 ASSESSMENT — PAIN SCALES - GENERAL: PAINLEVEL_OUTOF10: 8

## 2022-04-11 NOTE — PROGRESS NOTES
Patient given Testosterone 200mg IM left gluteal..  Will return in 2 weeks for next injection    Patient tolerated injection well.     Administrations This Visit     testosterone cypionate (DEPOTESTOTERONE CYPIONATE) injection 200 mg     Admin Date  04/11/2022 Action  Given Dose  200 mg Route  IntraMUSCular Administered By  Kinga Rosas LPN

## 2022-04-11 NOTE — PROGRESS NOTES
Leida Eldridge 37   Progress Note and Procedure Note      [address] RECORD NUMBER:  [id number]  AGE: 74 y.o. GENDER: male  : 1955  EPISODE DATE:  2022    Subjective:     Chief Complaint   Patient presents with    Wound Check     right foot wound         HISTORY of PRESENT ILLNESS HPI     Sanchez Lazo is a 74 y.o. male who presents today for wound/ulcer evaluation. History of Wound Context: Right foot surgical wound and new left ankle ulcers. States he developed red man syndrome following administration of Vanco.  States his skin peeled and he developed blisters on his left ankle. Denies nausea, vomiting, fevers, or chills. Wound/Ulcer Pain Timing/Severity: intermittent  Quality of pain: sharp  Severity:  1 / 10   Modifying Factors: Pain is relieved/improved with rest  Associated Signs/Symptoms: drainage and numbness    Ulcer Identification:  Ulcer Type: non-healing surgical  Contributing Factors: obesity    Wound: N/A        PAST MEDICAL HISTORY        Diagnosis Date    BPH (benign prostatic hyperplasia) 2/3/2012    no treatment at this time    Chronic back pain     Colon polyp 2017    Dr. Blaise Mayfield; f/u 5 years    Eczematous dermatitis     hands, feet and legs    ED (erectile dysfunction) 2/3/2012    Erectile dysfunction     Family history of early CAD 2/3/2012    Gastroesophageal reflux disease with hiatal hernia     History of hypertension     History of prediabetes     History of renal insufficiency syndrome     Obesity (BMI 30-39. 9)     Osteoarthritis     Vitamin D deficiency     Weight gain        PAST SURGICAL HISTORY    Past Surgical History:   Procedure Laterality Date    COLONOSCOPY  2006    FOOT DEBRIDEMENT Right 2022    FOOT DEBRIDEMENT INCISION AND DRAINAGE performed by Jackie Barraza DPM at [address] [place] [address] SCRN NOT  [address] [place] [address] N/A 9/15/2017    COLONOSCOPY performed by Ed Russo MD at [facility]  UPPER GASTROINTESTINAL ENDOSCOPY  14    bx, dilation marjorie    UPPER GASTROINTESTINAL ENDOSCOPY N/A 9/15/2017    EGD DILATION SAVORY and biopsy performed by Giana Hollis MD at [facility] 34    Family History   Problem Relation Age of Onset    Depression Mother     Heart Disease Paternal Grandmother     High Blood Pressure Paternal Grandmother     High Cholesterol Paternal Grandmother     Anemia Paternal Grandmother         B 12 deficiency    Heart Disease Brother     Heart Surgery Brother     Anemia Brother         B 12 deficiency       SOCIAL HISTORY    Social History     Tobacco Use    Smoking status: Former Smoker     Packs/day: 2.00     Years: 5.00     Pack years: 10.00     Quit date: 1990     Years since quittin.2    Smokeless tobacco: Never Used   Vaping Use    Vaping Use: Never used   Substance Use Topics    Alcohol use: No    Drug use: No       ALLERGIES    Allergies   Allergen Reactions    Vancomycin Hives, Itching and Other (See Comments)     Rash all over with sores in the mouth as well. Fevers        MEDICATIONS    Current Outpatient Medications on File Prior to Encounter   Medication Sig Dispense Refill    hydrocortisone 2.5 % cream Apply topically 2 times daily for 10 days Apply warm washcloth to face followed by hydrocortisone cream and petroleum jelly BID x 10 days. 1 each 0    famotidine (PEPCID) 20 MG tablet Take 1 tablet by mouth 2 times daily 60 tablet 0    ascorbic acid (VITAMIN C) 500 MG tablet Take 500 mg by mouth daily      vitamin B-12 (CYANOCOBALAMIN) 500 MCG tablet Take 500 mcg by mouth daily      zinc 50 MG TABS tablet Take 50 mg by mouth daily      aspirin 325 MG EC tablet Take 1 tablet by mouth daily 1 tablet 0    triamcinolone (KENALOG) 0.1 % cream Apply topically qam daily Monday through Friday only. Take weekend off. Do not use on face, groin, armpits, breasts tissue.  454 g 0    testosterone cypionate (DEPOTESTOTERONE CYPIONATE) 200 MG/ML injection Inject 1 mL into the muscle every 14 days for 90 doses. 8 mL 0    Syringe/Needle, Disp, (SYRINGE 3CC/48PC2-0/2\") 22G X 1-1/2\" 3 ML MISC 1 actuation by Does not apply route every 14 days 2 each 5    vitamin E 1000 units capsule Take 1,000 Units by mouth daily      vitamin D (CHOLECALCIFEROL) 1000 UNIT TABS tablet Take 6,000 Units by mouth daily      Multiple Vitamins-Minerals (ONE-A-DAY MENS HEALTH FORMULA) TABS Take by mouth      magnesium (MAGNESIUM-OXIDE) 250 MG TABS tablet Take 250 mg by mouth daily Take 3 pills daily      calcium carbonate (OYSTER SHELL CALCIUM 500 MG) 1250 MG tablet Take 1 tablet by mouth daily      Glucosamine-Chondroitin 6687-4940 MG/30ML LIQD Take by mouth Take 3 pills daily      Omega-3 Fatty Acids (FISH OIL) 1200 MG CAPS Take  by mouth daily. No current facility-administered medications on file prior to encounter. REVIEW OF SYSTEMS    Pertinent items are noted in HPI.     Objective:      BP (!) 123/90   Pulse 118   Temp 98.9 °F (37.2 °C) (Temporal)   Resp 18     Wt Readings from Last 3 Encounters:   03/31/22 277 lb (125.6 kg)   03/27/22 260 lb (117.9 kg)   03/08/22 269 lb (122 kg)       PHYSICAL EXAM  General Appearance: alert and oriented to person, place and time, well-developed and well-nourished, in no acute distress  Cardiovascular: normal rate and intact distal pulses  Extremities: no cyanosis and no clubbing  Musculoskeletal: normal range of motion, no joint swelling, deformity or tenderness  Neurologic: gait and coordination normal and speech normal    Assessment:     Active Hospital Problems    Diagnosis Date Noted    Non-pressure chronic ulcer of left ankle with [facility] layer [facility] [L97.322] 04/11/2022    Right foot ulcer, with necrosis of muscle [facility]) [L97.513] 03/07/2022        Procedure Note  Indications:  Based on my examination of this patient's wound(s)/ulcer(s) today, debridement is required to promote healing and evaluate the wound base. Performed by: Sergei Singleton DPM    Consent obtained:  Yes    Time out taken:  Yes    Pain Control:         Debridement:Excisional Debridement    Using tissue nippers the wound(s)/ulcer(s) was/were sharply debrided down through and including the removal of epidermis, dermis and subcutaneous tissue. Devitalized Tissue Debrided:  fibrin, biofilm and slough    Pre Debridement Measurements:  Are located in the [place]  Documentation Flow Sheet    Wound/Ulcer #: 1 and 2    Post Debridement Measurements:  Wound/Ulcer Descriptions are Pre Debridement except measurements:    Wound 02/24/22 Foot Right;Dorsal #1 (Active)   Wound Image   04/11/22 1046   Wound Etiology Non-Healing Surgical 04/11/22 1046   Dressing Status Dry;Clean; Intact 04/03/22 0734   Wound Cleansed Irrigated with saline 03/31/22 1150   Dressing/Treatment Dry dressing;Gauze dressing/dressing sponge 04/03/22 0734   Offloading for Diabetic Foot Ulcers Offloading ordered;Post op shoe 03/21/22 1037   Dressing Change Due 03/31/22 03/31/22 0530   Wound Length (cm) 0 cm 04/11/22 1046   Wound Width (cm) 0 cm 04/11/22 1046   Wound Depth (cm) 0 cm 04/11/22 1046   Wound Surface Area (cm^2) 0 cm^2 04/11/22 1046   Change in Wound Size % (l*w) 100 04/11/22 1046   Wound Volume (cm^3) 0 cm^3 04/11/22 1046   Post-Procedure Length (cm) 0.6 cm 04/11/22 1058   Post-Procedure Width (cm) 0.3 cm 04/11/22 1058   Post-Procedure Depth (cm) 0.1 cm 04/11/22 1058   Post-Procedure Surface Area (cm^2) 0.18 cm^2 04/11/22 1058   Post-Procedure Volume (cm^3) 0.018 cm^3 04/11/22 1058   Undermining Starts ___ O'Clock 12:00 03/21/22 1006   Undermining Ends___ O'Clock 3:00 03/21/22 1006   Undermining Maxium Distance (cm) .2 03/21/22 1006   Wound Assessment Dry 04/11/22 1046   Drainage Amount None 04/11/22 1046   Drainage Description Serosanguinous;Green 03/21/22 1006   Odor None 03/31/22 1150   Diann-wound Assessment Intact 03/31/22 1150 Margins Defined edges 03/31/22 1150   Wound Thickness Description not for Pressure Injury Full thickness 03/21/22 1006   Number of days: 45       Wound 04/11/22 Ankle Left;Medial #2  (Active)   Wound Image   04/11/22 1050   Wound Cleansed Cleansed with saline 04/11/22 1050   Wound Length (cm) 6.2 cm 04/11/22 1050   Wound Width (cm) 1.8 cm 04/11/22 1050   Wound Depth (cm) 0.1 cm 04/11/22 1050   Wound Surface Area (cm^2) 11.16 cm^2 04/11/22 1050   Wound Volume (cm^3) 1.116 cm^3 04/11/22 1050   Post-Procedure Length (cm) 6.2 cm 04/11/22 1058   Post-Procedure Width (cm) 1.8 cm 04/11/22 1058   Post-Procedure Depth (cm) 0.1 cm 04/11/22 1058   Post-Procedure Surface Area (cm^2) 11.16 cm^2 04/11/22 1058   Post-Procedure Volume (cm^3) 1.116 cm^3 04/11/22 1058   Wound Assessment Pink/red;Slough; Fluid filled blister 04/11/22 1050   Drainage Amount Moderate 04/11/22 1050   Drainage Description Yellow 04/11/22 1050   Odor None 04/11/22 1050   Diann-wound Assessment Fragile 04/11/22 1050   Margins Defined edges 04/11/22 1050   Wound Thickness Description not for Pressure Injury Full thickness 04/11/22 1050   Number of days: 0             Percent of Wound/Ulcer Debrided: 100%    Total Surface Area Debrided:  11.34 sq cm     Diabetic/Pressure/Non Pressure Ulcers:  Ulcer: Non-Pressure ulcer, fat layer exposed      Bleeding:  Minimal    Hemostasis Achieved:  by pressure    Procedural Pain:  1  / 10     Post Procedural Pain:  1 / 10     Response to treatment:  Well tolerated by patient. Plan:     Continue silvercel. Will add to left. Plan of care was discussed with patient and he is in agreement. Treatment Note please see attached Discharge Instructions    In my professional opinion this patient would benefit from HBO Therapy: No    Written patient dismissal instructions given to patient and signed by patient or POA.          Discharge Instructions       [address] [place] [address] Medicine   Physician Orders and Discharge Instructions  [facility] [address]  [facility]  Telephone: [phone]      [phone]        NAME: Madina Denis  DATE OF BIRTH:  1955  MEDICAL RECORD NUMBER:  [id number]     Your  is:  [place]     Home Care/Facility: [address] [place] [address]      Wound Location: Right Dorsal Foot, Left Medial Ankle      Dressing orders: 1. Cleanse wound(s) with normal saline. 2. Apply dry SILVERCEL OR CALCIUM ALGINATE WITH Ag or eqivalent to wound bed. 3. Cover with 4x4's and wrap with gauze (sin or kerlix)  4. Change  Every other day or Monday, Wednesday, and Friday     Compression:     Offloading Device: Post op shoe to right foot      Other Instructions:      Dressing Supplies:  Supplied by home health care      Keep all dressings clean, dry and intact.  Keep pressure off the wound(s) at all times.      Follow up visit  2  Weeks Monday April 25, 2022 at      Please give 24 hour notice if unable to keep appointment. [phone]     If you experience any of the following, please call the Wound Care Service at  [phone] or go to the nearest emergency room.        *Increase in pain         *Temperature over 101           *Increase in drainage from your wound or a foul odor  *Uncontrolled swelling            *Need for compression bandage changes due to slippage, breakthrough drainage       PLEASE NOTE: IF YOU ARE UNABLE TO OBTAIN WOUND SUPPLIES, CONTINUE TO USE THE SUPPLIES YOU HAVE AVAILABLE UNTIL YOU ARE ABLE TO [facility].  IT IS MOST IMPORTANT TO KEEP THE WOUND COVERED AT ALL TIMES     Electronically signed by Jyoti Flynn DPM on 4/11/2022 at 11:02 AM          Electronically signed by Jyoti Flynn DPM on 4/11/2022 at 11:03 AM

## 2022-04-11 NOTE — CODE DOCUMENTATION
[address] Physician Billing Sheet. Bryant Ly  AGE: 74 y.o.    GENDER: male  : 1955  TODAY'S DATE:  2022    ICD-10 CODES  Active Hospital Problems    Diagnosis Date Noted    Non-pressure chronic ulcer of left ankle with fat layer exposed ([facility] [L97.322] 2022    Right foot ulcer, with necrosis of muscle [facility] [V02.635] 2022       PHYSICIAN PROCEDURES    CPT CODE  21705 - RT, 79      Electronically signed by Aneudy Hinkle DPM on 2022 at 11:06 AM

## 2022-04-11 NOTE — DISCHARGE INSTR - COC
Continuity of Care Form    Patient Name: Lorraine Tirado   :  1955  MRN:  [id number]    Admit date:  2022  Discharge date:  ***    Code Status Order: Prior   Advance Directives:      Admitting Physician:  No admitting provider for patient encounter. PCP: Mary Briceno MD    Discharging Nurse: [facility] Unit/Room#: No information available for this encounter. Discharging Unit Phone Number: ***    Emergency Contact:   Extended Emergency Contact Information  Primary Emergency Contact: Ashley Chavez [facility] Phone: [phone]  Relation: Child    Past Surgical History:  Past Surgical History:   Procedure Laterality Date    COLONOSCOPY  2006    FOOT DEBRIDEMENT Right 2022    FOOT DEBRIDEMENT INCISION AND DRAINAGE performed by Yung Mccracken DPM at [address] NOT  [address] IND N/A 9/15/2017    COLONOSCOPY performed by Jose Paige MD at [address] [place] [address]  14    bx, dilation jarmoszuk    UPPER GASTROINTESTINAL ENDOSCOPY N/A 9/15/2017    EGD DILATION [facility] and biopsy performed by Jose Paige MD at [facility]       Immunization History:   Immunization History   Administered Date(s) Administered    Influenza Virus Vaccine 10/08/2015    Influenza, Quadv, adjuvanted, 65 yrs +, IM, PF (Fluad) 10/09/2020, 2021    Pneumococcal Polysaccharide (Calqghgln81) 2012, 10/09/2020       Active Problems:  Patient Active Problem List   Diagnosis Code    Dyshidrotic eczema L30.1    Vitamin D deficiency E55.9    Obesity (BMI 30-39. 9) E66.9    Eczematous dermatitis L30.9    Adenomatous polyp of transverse colon- f/u due 2022 D12.3    Low testosterone R79.89    Cellulitis of foot, right L03.115    Abscess of right foot L02.611    Cellulitis of right lower extremity L03.115    Cellulitis and abscess of foot L03.119, L02.619    Failure of outpatient treatment Z78.9    MRSA (methicillin resistant Staphylococcus aureus) infection A49.02    Right foot ulcer, with necrosis of muscle ([address] L97.513    Allergic reaction due to antibacterial drug T36.95XA       Isolation/Infection:   Isolation            No Isolation          Patient Infection Status       Infection Onset Added Last Indicated Last Indicated By Review Planned Expiration Resolved Resolved By    [facility] 02/25/22 02/28/22 02/25/22 Culture, Anaerobic and Aerobic        Wound-foot 2/25/22            Nurse Assessment:  Last Vital Signs: BP (!) 123/90   Pulse 118   Temp 98.9 °F (37.2 °C) (Temporal)   Resp 18     Last documented pain score (0-10 scale): Pain Level: 8  Last Weight:   Wt Readings from Last 1 Encounters:   03/31/22 277 lb (125.6 kg)     Mental Status:  {IP PT MENTAL STATUS:20030}    IV Access:  { AMAN IV ACCESS:740095415}    Nursing Mobility/ADLs:  Walking   {CHP DME NNBI:791591921}  Transfer  {P DME TGKT:297556238}  Bathing  {P DME XVGP:914925483}  Dressing  {P DME XLGS:576116250}  Toileting  {P DME RDXY:492044894}  Feeding  {[facility] DME APLP:714257813}  Med Admin  {[facility] DME HCTE:692304380}  Med Delivery   { AMAN MED Delivery:488511247}    Wound Care Documentation and Therapy:  Wound 02/24/22 Foot Right;Dorsal #1 (Active)   Wound Image   04/11/22 1046   Wound Etiology Non-Healing Surgical 04/11/22 1046   Dressing Status Dry;Clean; Intact 04/03/22 0734   Wound Cleansed Irrigated with saline 03/31/22 1150   Dressing/Treatment Dry dressing;Gauze dressing/dressing sponge 04/03/22 0734   Offloading for Diabetic Foot Ulcers Offloading ordered;Post op shoe 03/21/22 1037   Dressing Change Due 03/31/22 03/31/22 0530   Wound Length (cm) 0 cm 04/11/22 1046   Wound Width (cm) 0 cm 04/11/22 1046   Wound Depth (cm) 0 cm 04/11/22 1046   Wound Surface Area (cm^2) 0 cm^2 04/11/22 1046   Change in Wound Size % (l*w) 100 04/11/22 1046   Wound Volume (cm^3) 0 cm^3 04/11/22 1046   Post-Procedure Length (cm) 0.6 cm 04/11/22 1058   Post-Procedure Width (cm) 0.3 cm 04/11/22 1058   Post-Procedure Depth (cm) 0.1 cm 04/11/22 1058   Post-Procedure Surface Area (cm^2) 0.18 cm^2 04/11/22 1058   Post-Procedure Volume (cm^3) 0.018 cm^3 04/11/22 1058   Undermining Starts ___ O'Clock 12:00 03/21/22 1006   Undermining Ends___ O'Clock 3:00 03/21/22 1006   Undermining Maxium Distance (cm) .2 03/21/22 1006   Wound Assessment Dry 04/11/22 1046   Drainage Amount None 04/11/22 1046   Drainage Description Serosanguinous;Green 03/21/22 1006   Odor None 03/31/22 1150   Diann-wound Assessment Intact 03/31/22 1150   Margins Defined edges 03/31/22 1150   Wound Thickness Description not for Pressure Injury Full thickness 03/21/22 1006   Number of days: 45       Wound 04/11/22 Ankle Left;Medial #2  (Active)   Wound Image   04/11/22 1050   Wound Cleansed Cleansed with saline 04/11/22 1050   Wound Length (cm) 6.2 cm 04/11/22 1050   Wound Width (cm) 1.8 cm 04/11/22 1050   Wound Depth (cm) 0.1 cm 04/11/22 1050   Wound Surface Area (cm^2) 11.16 cm^2 04/11/22 1050   Wound Volume (cm^3) 1.116 cm^3 04/11/22 1050   Post-Procedure Length (cm) 6.2 cm 04/11/22 1058   Post-Procedure Width (cm) 1.8 cm 04/11/22 1058   Post-Procedure Depth (cm) 0.1 cm 04/11/22 1058   Post-Procedure Surface Area (cm^2) 11.16 cm^2 04/11/22 1058   Post-Procedure Volume (cm^3) 1.116 cm^3 04/11/22 1058   Wound Assessment Pink/red;Slough; Fluid filled blister 04/11/22 1050   Drainage Amount Moderate 04/11/22 1050   Drainage Description Yellow 04/11/22 1050   Odor None 04/11/22 1050   Diann-wound Assessment Fragile 04/11/22 1050   Margins Defined edges 04/11/22 1050   Wound Thickness Description not for Pressure Injury Full thickness 04/11/22 1050   Number of days: 0        Elimination:  Continence:    Bowel: {YES / UC:51218}  Bladder: {YES / IM:06353}  Urinary Catheter: {Urinary Catheter:573195903}   Colostomy/Ileostomy/Ileal Conduit: {YES / ID:33988}       Date of Last BM: ***  No intake or output data in the 24 hours ending 04/11/22 1101  No intake/output data recorded.     Safety Concerns:     [address] Safety Concerns:334097228}    Impairments/Disabilities:      [address] [facility] Impairments/Disabilities:470194384}    Nutrition Therapy:  Current Nutrition Therapy:   [address] [facility] Diet List:920017794}    Routes of Feeding: {CHP DME Other Feedings:540388419}  Liquids: {Slp liquid thickness:15087}  Daily Fluid Restriction: {CHP DME Yes amt example:720806194}  Last Modified Barium Swallow with Video (Video Swallowing Test): {Done Not Done NULH:126276779}    Treatments at the Time of Hospital Discharge:   Respiratory Treatments: ***  Oxygen Therapy:  {Therapy; copd oxygen:47319}  Ventilator:    { CC Vent BLYT:558595757}    Rehab Therapies: {THERAPEUTIC INTERVENTION:7188894776}  Weight Bearing Status/Restrictions: Amaris Hernandez  Weight Bearin}  Other Medical Equipment (for information only, NOT a DME order):  {EQUIPMENT:174266689}  Other Treatments: ***    Patient's personal belongings (please select all that are sent with patient):  {CHP DME Belongings:794014717}    RN SIGNATURE:  {Esignature:738791385}    CASE MANAGEMENT/SOCIAL WORK SECTION    Inpatient Status Date: ***    Readmission Risk Assessment Score:  Readmission Risk              Risk of Unplanned Readmission:  0           Discharging to Facility/ Agency   Name:   Address:  Phone:  Fax:    Dialysis Facility (if applicable)   Name:  Address:  Dialysis Schedule:  Phone:  Fax:    / signature: {Esignature:344260590}    PHYSICIAN SECTION    Prognosis: {Prognosis:5517581131}    Condition at Discharge: [address] Patient Condition:532252624}    Rehab Potential (if transferring to Rehab): {Prognosis:9988197944}    Recommended Labs or Other Treatments After Discharge: ***    Physician Certification: I certify the above information and transfer of Damon Nowak  is necessary for the continuing treatment of the diagnosis listed and that he requires {Admit to Appropriate Level of Care:28528} for {GREATER/LESS:044929106} 30 days.      Update Admission H&P: {CHP DME Changes in TBPBQ:841561603}    PHYSICIAN SIGNATURE:  {Esignature:270241130}

## 2022-04-12 ENCOUNTER — TELEMEDICINE (OUTPATIENT)
Dept: INFECTIOUS DISEASES | Age: 67
End: 2022-04-12
Payer: MEDICARE

## 2022-04-12 DIAGNOSIS — L03.115 CELLULITIS OF RIGHT LOWER EXTREMITY: Primary | ICD-10-CM

## 2022-04-12 DIAGNOSIS — T36.95XA ALLERGIC REACTION DUE TO ANTIBACTERIAL DRUG: ICD-10-CM

## 2022-04-12 PROCEDURE — 99213 OFFICE O/P EST LOW 20 MIN: CPT | Performed by: INTERNAL MEDICINE

## 2022-04-12 NOTE — PROGRESS NOTES
Priti Farley (:  1955) is a Established patient, here for evaluation of the following:    Assessment & Plan   Below is the assessment and plan developed based on review of pertinent history, physical exam, labs, studies, and medications    Right foot cellulitis with abscess, resolved  Severe drug rash secondary to vancomycin    Continue Benadryl as needed  Topical Lac-Hydrin cream for dry peeling skin  Follow-up as needed  HPI   Follow-up [facility] hospitalization for severe drug rash secondary to vancomycin. Follow-up right foot cellulitis with abscess formation currently healed. Left foot blister with improvement. Positive itching and skin peeling all over  Review of Systems   All other systems reviewed and are negative. Patient-Reported Vitals     Physical Exam  Constitutional:       General: He is not in acute distress. Appearance: Normal appearance. HENT:      Head: Normocephalic. Nose: No congestion. Eyes:      General: No scleral icterus. Pulmonary:      Effort: Pulmonary effort is normal. No respiratory distress. Abdominal:      General: There is no distension. Musculoskeletal:         General: No swelling. Skin:     Coloration: Skin is not jaundiced. Findings: Rash (.  Generalized maculopapular rash with skin peeling) present. Neurological:      Mental Status: He is alert and oriented to person, place, and time. Psychiatric:         Mood and Affect: Mood normal.         Behavior: Behavior normal.              On this date 2022 I have spent 20 minutes reviewing previous notes, test results and face to face (virtual) with the patient discussing the diagnosis and importance of compliance with the treatment plan as well as documenting on the day of the visit. Priti Farley, was evaluated through a synchronous (real-time) audio-video encounter.  The patient (or guardian if applicable) is aware that this is a billable service, which includes applicable co-pays. This Virtual Visit was conducted with patient's (and/or legal guardian's) consent. The visit was conducted pursuant to the emergency declaration under the [address] [place] [address] authority and the [employer] and [employer] General Act. Patient identification was verified, and a caregiver was present when appropriate. The patient was located at home in a state where the provider was licensed to provide care.        --Genesis Cruz MD

## 2022-04-13 ENCOUNTER — CARE COORDINATION (OUTPATIENT)
Dept: CASE MANAGEMENT | Age: 67
End: 2022-04-13

## 2022-04-13 ENCOUNTER — TELEPHONE (OUTPATIENT)
Dept: FAMILY MEDICINE CLINIC | Age: 67
End: 2022-04-13

## 2022-04-13 NOTE — CARE COORDINATION
[facility] Transitions Follow Up Call    2022    Patient: Tree Escudero  Patient : 1955   MRN: [id number]  Reason for Admission: 3/30/2022 - 4/3/2022 [facility]. Right foot wound/abscess, Allergic drug reaction/Adverse effect of vancomycin, Red Man Syndrome.   Discharge Date: 4/3/22 RARS: Readmission Risk Score: 19.8 ( )  NR  CT     Care Transitions Follow Up Call    Needs to be reviewed by the provider   Additional needs identified to be addressed with provider: No  none             Method of communication with provider : none      Care Transition Nurse (CTN) contacted the patient by telephone to follow up after admission. Verified name and  with patient as identifiers. Addressed changes since last contact: Angela Ríos reports he has drying itching skin w/ some areas of redness/soreness. Some areas or redness are \"bumby\" and \"peeling\". No fevers, chills, or flu-like symptoms. Wearing looser clothing and avoiding scratching or rubbing skin. Also has c/o dry mouth. Hydrating w/ electrolyte infused fluids and 7-Up. Using ointments to skin. Enc to consider Aloe Vera and oil based light lotions to use after showering, v/u. Enc to limit showers and avoid hot temps to deter loss of natural oils, v/u. Pt to continue to keep new skin clean, dry, protected from friction and long periods of sunlight, and moisturized. Pt to monitor and report any areas of redness w/ swelling, pain to touch, or areas that feel hot to touch or spreading in size to physician, v/u.   Discussed follow-up appointments. If no appointment was previously scheduled, appointment scheduling offered: Yes. Is follow up appointment scheduled within 7 days of discharge? Saw Dr Deng Bradley  11:00.     Advance Care Planning:   Does patient have an Advance Directive: Toribio Brunner primary decision maker [phone]     CTN reviewed discharge instructions, medical action plan and red flags with patient and discussed any barriers to care and/or understanding of plan of care after discharge. Discussed appropriate site of care based on symptoms and resources available to patient including: When to call 911. The patient agrees to contact the PCP office for questions related to their healthcare. Patients top risk factors for readmission: Red man syndrome  Interventions to address risk factors: Pt v/u to monitor skin daily and report any worsening symptoms and/or skin lesions. Non-[facility] follow up appointment(s):     CTN provided contact information for future needs. Plan for follow-up call in 7-10 days based on severity of symptoms and risk factors. Plan for next call: CT FU, Skin. Care Transitions Subsequent and Final Call    Subsequent and Final Calls  Do you have any ongoing symptoms?: Yes  Patient-reported symptoms: Other  Have your medications changed?: No  Do you have any questions related to your medications?: No  Do you currently have any active services?: No  Do you have any needs or concerns that I can assist you with?: No  Identified Barriers: Lack of Education  Care Transitions Interventions  Other Interventions:            Follow Up  Future Appointments   Date Time Provider Bianca Kramer   4/25/2022 10:30 AM Bulmaro Mccarthy, [address] [place] [address]   4/25/2022  4:30 PM SCHEDULE, GABRIELLE GUERRIER PCP [facility] Gloria Garcia   11/21/2022  5:00 PM Marion Connelly MD [facility] [address], [address]

## 2022-04-13 NOTE — TELEPHONE ENCOUNTER
Patient doing good with wound care. States that he is just concerned with the dryness that's going on.

## 2022-04-21 ENCOUNTER — CARE COORDINATION (OUTPATIENT)
Dept: CASE MANAGEMENT | Age: 67
End: 2022-04-21

## 2022-04-21 NOTE — CARE COORDINATION
[facility] Transitions Follow Up Call    2022    Patient: Alexander Olson  Patient : 1955   MRN: [id number]  Reason for Admission: 3/30/2022 - 4/3/2022 [facility]. Right foot wound/abscess, Allergic drug reaction/Adverse effect of vancomycin, Red Man Syndrome.   Discharge Date: 4/3/22 RARS: Readmission Risk Score: 19.8 ( )  NR  CT     Care Transitions Follow Up Call    Needs to be reviewed by the provider   Additional needs identified to be addressed with provider: No  none             Method of communication with provider : none      Care Transition Nurse (CTN) contacted the patient by telephone to follow up after admission. Verified name and  with patient as identifiers. Addressed changes since last contact: Richmond Chandler reports generalized skin dandruff. Is able to brush his hair now. Notes dry red bumbs to arms that are itchy. States most of his skin is peeling in small flakes and has generalized itching. He is attempting to avoid itching or rubbing and uses oil based lotions for comfort. Enc to use cold compress to itching areas. Wears gloves and protective clothing as needed. Notes some intermittent hand and leg swelling that resolves w/ elevation. No SOB, cough, or congestion concerns. Reports right foot only has \"a little spot left\" and left foot blisters resolved and dry. Is monitoring skin daily and v/u to report an areas of redness, swelling, heat to touch, oozing skin, and/ore fevers, chills, flu-like symptoms. Pt feels he is getting better. Good appetite and fluid intake. Is avoiding added salts. Normal eliminations. Discussed follow-up appointments. If no appointment was previously scheduled, appointment scheduling offered: Yes. Is follow up appointment scheduled within 7 days of discharge? Next appt Dr Milagros Braun  10:30.   CTN reviewed discharge instructions, medical action plan and red flags with patient and discussed any barriers to care and/or understanding of plan of care after discharge. Discussed appropriate site of care based on symptoms and resources available to patient including: When to call 911. The patient agrees to contact the PCP office for questions related to their healthcare. Patients top risk factors for readmission: Skin infection  Interventions to address risk factors: Reviewed s/s developing cellulitis and skin infections to report to physician/return to ED. Non-[facility] follow up appointment(s):     CTN provided contact information for future needs. No further follow-up call indicated based on severity of symptoms and risk factors. Care Transitions Subsequent and Final Call    Subsequent and Final Calls  Do you have any ongoing symptoms?: Yes  Patient-reported symptoms: Other  Have your medications changed?: No  Do you have any questions related to your medications?: No  Do you currently have any active services?: No  Do you have any needs or concerns that I can assist you with?: No  Identified Barriers: Lack of Education  Care Transitions Interventions  Other Interventions:            Follow Up  Future Appointments   Date Time Provider Bianca Kramer   4/25/2022 10:30 AM Ladoris Herlinda, [address] [place] [address]   4/25/2022  4:30 PM SCHEDULE, GABRIELLE GUERRIER PCP [facility] Gloria Garcia   11/21/2022  5:00 PM Praneeth Sharp MD [facility] [address] [place] [address]

## 2022-04-25 ENCOUNTER — HOSPITAL ENCOUNTER (OUTPATIENT)
Dept: WOUND CARE | Age: 67
Discharge: HOME | End: 2022-04-25
Payer: MEDICARE

## 2022-04-25 ENCOUNTER — NURSE ONLY (OUTPATIENT)
Dept: FAMILY MEDICINE CLINIC | Age: 67
End: 2022-04-25
Payer: MEDICARE

## 2022-04-25 VITALS
SYSTOLIC BLOOD PRESSURE: 135 MMHG | DIASTOLIC BLOOD PRESSURE: 81 MMHG | HEART RATE: 108 BPM | TEMPERATURE: 97.7 F | RESPIRATION RATE: 18 BRPM

## 2022-04-25 DIAGNOSIS — L97.513 RIGHT FOOT ULCER, WITH NECROSIS OF MUSCLE (HCC): Primary | ICD-10-CM

## 2022-04-25 DIAGNOSIS — R79.89 LOW TESTOSTERONE: Primary | ICD-10-CM

## 2022-04-25 PROCEDURE — 96372 THER/PROPH/DIAG INJ SC/IM: CPT | Performed by: FAMILY MEDICINE

## 2022-04-25 PROCEDURE — 99212 OFFICE O/P EST SF 10 MIN: CPT

## 2022-04-25 RX ORDER — LIDOCAINE 50 MG/G
OINTMENT TOPICAL ONCE
Status: CANCELLED | OUTPATIENT
Start: 2022-04-25 | End: 2022-04-25

## 2022-04-25 RX ORDER — GINSENG 100 MG
CAPSULE ORAL ONCE
Status: CANCELLED | OUTPATIENT
Start: 2022-04-25 | End: 2022-04-25

## 2022-04-25 RX ORDER — CLOBETASOL PROPIONATE 0.5 MG/G
OINTMENT TOPICAL ONCE
Status: CANCELLED | OUTPATIENT
Start: 2022-04-25 | End: 2022-04-25

## 2022-04-25 RX ORDER — BETAMETHASONE DIPROPIONATE 0.05 %
OINTMENT (GRAM) TOPICAL ONCE
Status: CANCELLED | OUTPATIENT
Start: 2022-04-25 | End: 2022-04-25

## 2022-04-25 RX ORDER — LIDOCAINE HYDROCHLORIDE 20 MG/ML
JELLY TOPICAL ONCE
Status: CANCELLED | OUTPATIENT
Start: 2022-04-25 | End: 2022-04-25

## 2022-04-25 RX ORDER — BACITRACIN ZINC AND POLYMYXIN B SULFATE 500; 1000 [USP'U]/G; [USP'U]/G
OINTMENT TOPICAL ONCE
Status: CANCELLED | OUTPATIENT
Start: 2022-04-25 | End: 2022-04-25

## 2022-04-25 RX ORDER — TESTOSTERONE CYPIONATE 200 MG/ML
200 INJECTION INTRAMUSCULAR ONCE
Status: COMPLETED | OUTPATIENT
Start: 2022-04-25 | End: 2022-04-25

## 2022-04-25 RX ORDER — LIDOCAINE HYDROCHLORIDE 40 MG/ML
SOLUTION TOPICAL ONCE
Status: CANCELLED | OUTPATIENT
Start: 2022-04-25 | End: 2022-04-25

## 2022-04-25 RX ORDER — GENTAMICIN SULFATE 1 MG/G
OINTMENT TOPICAL ONCE
Status: CANCELLED | OUTPATIENT
Start: 2022-04-25 | End: 2022-04-25

## 2022-04-25 RX ORDER — LIDOCAINE 40 MG/G
CREAM TOPICAL ONCE
Status: CANCELLED | OUTPATIENT
Start: 2022-04-25 | End: 2022-04-25

## 2022-04-25 RX ORDER — BACITRACIN, NEOMYCIN, POLYMYXIN B 400; 3.5; 5 [USP'U]/G; MG/G; [USP'U]/G
OINTMENT TOPICAL ONCE
Status: CANCELLED | OUTPATIENT
Start: 2022-04-25 | End: 2022-04-25

## 2022-04-25 RX ADMIN — TESTOSTERONE CYPIONATE 200 MG: 200 INJECTION INTRAMUSCULAR at 13:49

## 2022-04-25 NOTE — PROGRESS NOTES
Patient given Testosterone 200mg IM right gluteal..  Will return in 2 weeks for next injection    Patient tolerated injection well.     Administrations This Visit     testosterone cypionate (DEPOTESTOTERONE CYPIONATE) injection 200 mg     Admin Date  04/25/2022 Action  Given Dose  200 mg Route  IntraMUSCular Administered By  Navid Smith LPN

## 2022-04-25 NOTE — DISCHARGE INSTR - COC
Continuity of Care Form    Patient Name: Danette Cisneros   :  1955  MRN:  [id number]    Admit date:  2022  Discharge date:  ***    Code Status Order: Prior   Advance Directives:      Admitting Physician:  No admitting provider for patient encounter. PCP: Мария Vaughn MD    Discharging Nurse: [facility] Unit/Room#: No information available for this encounter. Discharging Unit Phone Number: ***    Emergency Contact:   Extended Emergency Contact Information  Primary Emergency Contact: Delta Tidwell [facility] Phone: [phone]  Relation: Child    Past Surgical History:  Past Surgical History:   Procedure Laterality Date    COLONOSCOPY  2006    FOOT DEBRIDEMENT Right 2022    FOOT DEBRIDEMENT INCISION AND DRAINAGE performed by Scott Albarran DPM at [address] [facility] [id number] [place] [address] N/A 9/15/2017    COLONOSCOPY performed by Gauri Aguirre MD at [address] [place] [address]  14    bx, dilation jarmoszuk    UPPER GASTROINTESTINAL ENDOSCOPY N/A 9/15/2017    EGD DILATION SAVORY and biopsy performed by Gauri Aguirre MD at [facility]       Immunization History:   Immunization History   Administered Date(s) Administered    Influenza Virus Vaccine 10/08/2015    Influenza, Quadv, adjuvanted, 65 yrs +, IM, PF (Fluad) 10/09/2020, 2021    Pneumococcal Polysaccharide (Tcvmzuhcn14) 2012, 10/09/2020       Active Problems:  Patient Active Problem List   Diagnosis Code    Dyshidrotic eczema L30.1    Vitamin D deficiency E55.9    Obesity (BMI 30-39. 9) E66.9    Eczematous dermatitis L30.9    Adenomatous polyp of transverse colon- f/u due 2022 D12.3    Low testosterone R79.89    Cellulitis of foot, right L03.115    Abscess of right foot L02.611    Cellulitis of right lower extremity L03.115    Cellulitis and abscess of foot L03.119, L02.619    Failure of outpatient treatment Z78.9    MRSA (methicillin resistant Staphylococcus aureus) infection A49.02    Right foot ulcer, with necrosis of muscle ([address] L97.513    Allergic reaction due to antibacterial drug T36.95XA    Non-pressure chronic ulcer of left ankle with fat layer exposed ([address] L97.322       Isolation/Infection:   Isolation            No Isolation          Patient Infection Status       Infection Onset Added Last Indicated Last Indicated By Review Planned Expiration Resolved Resolved By    [facility] 02/25/22 02/28/22 02/25/22 Culture, Anaerobic and Aerobic        Wound-foot 2/25/22            Nurse Assessment:  Last Vital Signs: There were no vitals taken for this visit. Last documented pain score (0-10 scale):    Last Weight:   Wt Readings from Last 1 Encounters:   03/31/22 277 lb (125.6 kg)     Mental Status:  {IP PT MENTAL STATUS:44568}    IV Access:  { AMAN IV ACCESS:706932594}    Nursing Mobility/ADLs:  Walking   {P DME XDXQ:271275342}  Transfer  {[facility] DME SQVJ:953885345}  Bathing  {[facility] DME IPRC:851088604}  Dressing  {P DME NGVN:200076514}  Toileting  {P DME YLMK:782573876}  Feeding  {[facility] DME ZDOS:079626999}  Med Admin  {[facility] DME ZZFR:612808925}  Med Delivery   { AMAN MED Delivery:480197091}    Wound Care Documentation and Therapy:  Wound 02/24/22 Foot Right;Dorsal #1 (Active)   Wound Image   04/11/22 1046   Wound Etiology Non-Healing Surgical 04/11/22 1046   Dressing Status Dry;Clean; Intact 04/03/22 0734   Wound Cleansed Cleansed with saline 04/11/22 1122   Dressing/Treatment Dry dressing;Gauze dressing/dressing sponge 04/03/22 0734   Offloading for Diabetic Foot Ulcers Offloading ordered;Post op shoe 04/11/22 1122   Dressing Change Due 03/31/22 03/31/22 0530   Wound Length (cm) 0 cm 04/11/22 1046   Wound Width (cm) 0 cm 04/11/22 1046   Wound Depth (cm) 0 cm 04/11/22 1046   Wound Surface Area (cm^2) 0 cm^2 04/11/22 1046   Change in Wound Size % (l*w) 100 04/11/22 1046   Wound Volume (cm^3) 0 cm^3 04/11/22 1046   Post-Procedure Length (cm) 0.6 cm 04/11/22 1058 Post-Procedure Width (cm) 0.3 cm 04/11/22 1058   Post-Procedure Depth (cm) 0.1 cm 04/11/22 1058   Post-Procedure Surface Area (cm^2) 0.18 cm^2 04/11/22 1058   Post-Procedure Volume (cm^3) 0.018 cm^3 04/11/22 1058   Wound Assessment Dry 04/11/22 1046   Drainage Amount None 04/11/22 1046   Odor None 03/31/22 1150   Diann-wound Assessment Intact 03/31/22 1150   Margins Defined edges 03/31/22 1150   Number of days: 59       Wound 04/11/22 Ankle Left;Medial #2  (Active)   Wound Image   04/11/22 1050   Wound Cleansed Cleansed with saline 04/11/22 1050   Wound Length (cm) 6.2 cm 04/11/22 1050   Wound Width (cm) 1.8 cm 04/11/22 1050   Wound Depth (cm) 0.1 cm 04/11/22 1050   Wound Surface Area (cm^2) 11.16 cm^2 04/11/22 1050   Wound Volume (cm^3) 1.116 cm^3 04/11/22 1050   Post-Procedure Length (cm) 6.2 cm 04/11/22 1058   Post-Procedure Width (cm) 1.8 cm 04/11/22 1058   Post-Procedure Depth (cm) 0.1 cm 04/11/22 1058   Post-Procedure Surface Area (cm^2) 11.16 cm^2 04/11/22 1058   Post-Procedure Volume (cm^3) 1.116 cm^3 04/11/22 1058   Wound Assessment Pink/red;Slough; Fluid filled blister 04/11/22 1050   Drainage Amount Moderate 04/11/22 1050   Drainage Description Yellow 04/11/22 1050   Odor None 04/11/22 1050   Diann-wound Assessment Fragile 04/11/22 1050   Margins Defined edges 04/11/22 1050   Wound Thickness Description not for Pressure Injury Full thickness 04/11/22 1050   Number of days: 13        Elimination:  Continence: Bowel: {YES / DX:25414}  Bladder: {YES / MN:60351}  Urinary Catheter: {Urinary Catheter:258506579}   Colostomy/Ileostomy/Ileal Conduit: {YES / WM:20033}       Date of Last BM: ***  No intake or output data in the 24 hours ending 04/25/22 1036  No intake/output data recorded.     Safety Concerns:     [address] Safety Concerns:623610928}    Impairments/Disabilities:      [address] Impairments/Disabilities:086093931}    Nutrition Therapy:  Current Nutrition Therapy:   [address] Diet List:111697243}    Routes of Feeding: {CHP DME Other Feedings:856158234}  Liquids: {Slp liquid thickness:42184}  Daily Fluid Restriction: {CHP DME Yes amt example:124697178}  Last Modified Barium Swallow with Video (Video Swallowing Test): {Done Not Done WE}    Treatments at the Time of Hospital Discharge:   Respiratory Treatments: ***  Oxygen Therapy:  {Therapy; copd oxygen:51257}  Ventilator:    {[facility] Vent [facility]:170379613}    Rehab Therapies: {THERAPEUTIC INTERVENTION:1668397394}  Weight Bearing Status/Restrictions: {[facility] Weight Bearin}  Other Medical Equipment (for information only, NOT a DME order):  {EQUIPMENT:039088923}  Other Treatments: ***    Patient's personal belongings (please select all that are sent with patient):  {[facility] DME Belongings:445521891}    RN SIGNATURE:  {Esignature:665455967}    CASE MANAGEMENT/SOCIAL WORK SECTION    Inpatient Status Date: ***    Readmission Risk Assessment Score:  Readmission Risk              Risk of Unplanned Readmission:  0           Discharging to Facility/ Agency   Name:   Address:  Phone:  Fax:    Dialysis Facility (if applicable)   Name:  Address:  Dialysis Schedule:  Phone:  Fax:    / signature: {Esignature:740070249}    PHYSICIAN SECTION    Prognosis: {Prognosis:1979456032}    Condition at Discharge: [address] Patient Condition:527202397}    Rehab Potential (if transferring to Rehab): {Prognosis:6059174305}    Recommended Labs or Other Treatments After Discharge: ***    Physician Certification: I certify the above information and transfer of Therese Matias  is necessary for the continuing treatment of the diagnosis listed and that he requires {Admit to Appropriate Level of Care:87225} for {GREATER/LESS:025318348} 30 days.      Update Admission H&P: {CHP DME Changes in MAQ:135087060}    PHYSICIAN SIGNATURE:  {Esignature:373048704}

## 2022-04-25 NOTE — CODE DOCUMENTATION
[address] Physician Billing Sheet. Jerod Parisi  AGE: 74 y.o.    GENDER: male  : 1955  TODAY'S DATE:  2022    ICD-10  [facility] Street Problems    Diagnosis Date Noted    Non-pressure chronic ulcer of left ankle with fat layer exposed ([facility] [L97.322] 2022    Right foot ulcer, with necrosis of muscle [facility]) [A03.510] 2022       PHYSICIAN PROCEDURES    CPT CODE  57559      Electronically signed by Pam Maria DPM on 2022 at 11:09 AM

## 2022-04-25 NOTE — PROGRESS NOTES
Leida Eldridge 35   Progress Note and Procedure Note      [address] RECORD NUMBER:  [id number]  AGE: 74 y.o. GENDER: male  : 1955  EPISODE DATE:  2022    Subjective:     Chief Complaint   Patient presents with    Wound Check     right foot and left ankle wounds         HISTORY of PRESENT ILLNESS HPI     Priti Farley is a 74 y.o. male who presents today for wound/ulcer evaluation. History of Wound Context: Right foot and left ankle ulcerations. He is improving. States the drainage has dried up over the area. Denies nausea, vomiting, fevers, or chills. Wound/Ulcer Pain Timing/Severity: intermittent  Quality of pain: sharp  Severity:  1 / 10   Modifying Factors: Pain is relieved/improved with rest  Associated Signs/Symptoms: edema, numbness and pain    Ulcer Identification:  Ulcer Type: venous and non-healing surgical  Contributing Factors: edema and obesity    Wound: N/A        PAST MEDICAL HISTORY        Diagnosis Date    BPH (benign prostatic hyperplasia) 2/3/2012    no treatment at this time    Chronic back pain     Colon polyp 2017    Dr. See Diez; f/u 5 years    Eczematous dermatitis     hands, feet and legs    ED (erectile dysfunction) 2/3/2012    Erectile dysfunction     Family history of early CAD 2/3/2012    Gastroesophageal reflux disease with hiatal hernia     History of hypertension     History of prediabetes     History of renal insufficiency syndrome     Obesity (BMI 30-39. 9)     Osteoarthritis     Vitamin D deficiency     Weight gain        PAST SURGICAL HISTORY    Past Surgical History:   Procedure Laterality Date    COLONOSCOPY  2006    FOOT DEBRIDEMENT Right 2022    FOOT DEBRIDEMENT INCISION AND DRAINAGE performed by Sergei Singleton DPM at [address] [place] [address] SCRN NOT  [address] [place] [address] N/A 9/15/2017    COLONOSCOPY performed by Lilliana See MD at [place] [address]  14 bx, dilation marjorie    UPPER GASTROINTESTINAL ENDOSCOPY N/A 9/15/2017    EGD DILATION SAVORY and biopsy performed by Shawn Skinner MD at [facility]    Family History   Problem Relation Age of Onset    Depression Mother     Heart Disease Paternal Grandmother     High Blood Pressure Paternal Grandmother     High Cholesterol Paternal Grandmother     Anemia Paternal Grandmother         B 12 deficiency    Heart Disease Brother     Heart Surgery Brother     Anemia Brother         B 12 deficiency       SOCIAL HISTORY    Social History     Tobacco Use    Smoking status: Former Smoker     Packs/day: 2.00     Years: 5.00     Pack years: 10.00     Quit date: 1990     Years since quittin.3    Smokeless tobacco: Never Used   Vaping Use    Vaping Use: Never used   Substance Use Topics    Alcohol use: No    Drug use: No       ALLERGIES    Allergies   Allergen Reactions    Vancomycin Hives, Itching and Other (See Comments)     Rash all over with sores in the mouth as well. Fevers        MEDICATIONS    Current Outpatient Medications on File Prior to Encounter   Medication Sig Dispense Refill    famotidine (PEPCID) 20 MG tablet Take 1 tablet by mouth 2 times daily 60 tablet 0    ascorbic acid (VITAMIN C) 500 MG tablet Take 500 mg by mouth daily      vitamin B-12 (CYANOCOBALAMIN) 500 MCG tablet Take 500 mcg by mouth daily      zinc 50 MG TABS tablet Take 50 mg by mouth daily      aspirin 325 MG EC tablet Take 1 tablet by mouth daily 1 tablet 0    triamcinolone (KENALOG) 0.1 % cream Apply topically qam daily Monday through Friday only. Take weekend off. Do not use on face, groin, armpits, breasts tissue. 454 g 0    testosterone cypionate (DEPOTESTOTERONE CYPIONATE) 200 MG/ML injection Inject 1 mL into the muscle every 14 days for 90 doses.  8 mL 0    Syringe/Needle, Disp, (SYRINGE 3CC/47RG0-7/2\") 22G X 1-1/2\" 3 ML MISC 1 actuation by Does not apply route every 14 days 2 each 5    vitamin E 1000 units capsule Take 1,000 Units by mouth daily      vitamin D (CHOLECALCIFEROL) 1000 UNIT TABS tablet Take 6,000 Units by mouth daily      Multiple Vitamins-Minerals (ONE-A-DAY MENS HEALTH FORMULA) TABS Take by mouth      magnesium (MAGNESIUM-OXIDE) 250 MG TABS tablet Take 250 mg by mouth daily Take 3 pills daily      calcium carbonate (OYSTER SHELL CALCIUM 500 MG) 1250 MG tablet Take 1 tablet by mouth daily      Glucosamine-Chondroitin 2582-3246 MG/30ML LIQD Take by mouth Take 3 pills daily      Omega-3 Fatty Acids (FISH OIL) 1200 MG CAPS Take  by mouth daily. No current facility-administered medications on file prior to encounter. REVIEW OF SYSTEMS    Pertinent items are noted in HPI. Objective:      /81   Pulse 108   Temp 97.7 °F (36.5 °C) (Temporal)   Resp 18     Wt Readings from Last 3 Encounters:   03/31/22 277 lb (125.6 kg)   03/27/22 260 lb (117.9 kg)   03/08/22 269 lb (122 kg)       PHYSICAL EXAM  General Appearance: alert and oriented to person, place and time, well-developed and well-nourished, in no acute distress  Cardiovascular: normal rate and intact distal pulses  Extremities: no cyanosis and no clubbing  Musculoskeletal: normal range of motion, no joint swelling, deformity or tenderness  Neurologic: gait and coordination normal and speech normal      Assessment:     Active Hospital Problems    Diagnosis Date Noted    Non-pressure chronic ulcer of left ankle with fat layer exposed ([address] [L97.322] 04/11/2022    Right foot ulcer, with necrosis of muscle ([address] [B99.066] 03/07/2022        Wound/Ulcer Descriptions/Measurements:    Wound 02/24/22 Foot Right;Dorsal #1 (Active)   Wound Image   04/25/22 1046   Wound Etiology Non-Healing Surgical 04/25/22 1046   Dressing Status Dry;Clean; Intact 04/03/22 0734   Wound Cleansed Cleansed with saline 04/25/22 1046   Dressing/Treatment Dry dressing;Gauze dressing/dressing sponge 04/03/22 0734 Offloading for Diabetic Foot Ulcers Offloading ordered;Post op shoe 04/11/22 1122   Dressing Change Due 03/31/22 03/31/22 0530   Wound Length (cm) 0 cm 04/25/22 1046   Wound Width (cm) 0 cm 04/25/22 1046   Wound Depth (cm) 0 cm 04/25/22 1046   Wound Surface Area (cm^2) 0 cm^2 04/25/22 1046   Change in Wound Size % (l*w) 100 04/25/22 1046   Wound Volume (cm^3) 0 cm^3 04/25/22 1046   Post-Procedure Length (cm) 0.6 cm 04/11/22 1058   Post-Procedure Width (cm) 0.3 cm 04/11/22 1058   Post-Procedure Depth (cm) 0.1 cm 04/11/22 1058   Post-Procedure Surface Area (cm^2) 0.18 cm^2 04/11/22 1058   Post-Procedure Volume (cm^3) 0.018 cm^3 04/11/22 1058   Undermining Starts ___ O'Clock 12:00 03/21/22 1006   Undermining Ends___ O'Clock 3:00 03/21/22 1006   Undermining Maxium Distance (cm) .2 03/21/22 1006   Wound Assessment Dry 04/11/22 1046   Drainage Amount None 04/25/22 1046   Drainage Description Serosanguinous;Green 03/21/22 1006   Odor None 03/31/22 1150   Diann-wound Assessment Intact 03/31/22 1150   Margins Defined edges 03/31/22 1150   Wound Thickness Description not for Pressure Injury Full thickness 03/21/22 1006   Number of days: 59       Wound 04/11/22 Ankle Left;Medial #2  (Active)   Wound Image   04/25/22 1046   Wound Cleansed Cleansed with saline 04/25/22 1046   Wound Length (cm) 0 cm 04/25/22 1046   Wound Width (cm) 0 cm 04/25/22 1046   Wound Depth (cm) 0 cm 04/25/22 1046   Wound Surface Area (cm^2) 0 cm^2 04/25/22 1046   Change in Wound Size % (l*w) 100 04/25/22 1046   Wound Volume (cm^3) 0 cm^3 04/25/22 1046   Wound Healing % 100 04/25/22 1046   Post-Procedure Length (cm) 6.2 cm 04/11/22 1058   Post-Procedure Width (cm) 1.8 cm 04/11/22 1058   Post-Procedure Depth (cm) 0.1 cm 04/11/22 1058   Post-Procedure Surface Area (cm^2) 11.16 cm^2 04/11/22 1058   Post-Procedure Volume (cm^3) 1.116 cm^3 04/11/22 1058   Wound Assessment Pink/red;Slough; Fluid filled blister 04/11/22 1050   Drainage Amount None 04/25/22 1046 Drainage Description Yellow 04/11/22 1050   Odor None 04/11/22 1050   Diann-wound Assessment Fragile 04/11/22 1050   Margins Defined edges 04/11/22 1050   Wound Thickness Description not for Pressure Injury Full thickness 04/11/22 1050   Number of days: 14              Plan:     He is to check his feet daily for signs of skin breakdown. Plan of care was discussed with patient and he is in agreement. Treatment Note please see attached Discharge Instructions    In my professional opinion this patient would benefit from HBO Therapy: No    Written patient dismissal instructions given to patient and signed by patient or POA. Discharge Instructions       Wound Clinic Physician Orders and Discharge [address] [facility]  [address]   [place], [address] [facility]  Telephone: [phone]    NAME:  oJelle Gan  YOB: 1955  MEDICAL RECORD NUMBER:  [id number]  DATE:  4/25/2022    Congratulations!! You have completed your treatment. 1. Return to your Primary Care Physician for all your health issues. 2. Resume your ordinary activities as tolerated. 3. Take your medications as prescribed by your primary care physician. 4. Check your skin daily for cracks, bruises, sores, or dryness. Use a moisturizer as needed. 5. Clean and dry your skin, using mild soap and warm water (not hot). 6. Avoid alcohol and caffeine and do not smoke. 7. Maintain a nutritious diet. 8. Avoid pressure on your wound site. Keep your legs elevated above the level of the heart whenever possible. 9. Continue to use wraps/stockings/compression as prescribed. 10. Replace compression stockings every four to six months as needed to ensure proper fit. 11. Wear well-fitting shoes and leg garments. THANK YOU FOR ALLOWING US TO SERVE YOU.  PLEASE CALL IF YOU DEVELOP ANOTHER WOUND. [phone]             Electronically signed by Darrian Bateman RN on 4/25/2022 at 10:59 AM  Electronically signed by Tiffany Reynolds DPM on 4/25/2022 at 11:07 AM          Electronically signed by Tiffany Reynolds DPM on 4/25/2022 at 11:07 AM

## 2022-04-25 NOTE — PLAN OF CARE
Problem: Wound:  Goal: Will show signs of wound healing; wound closure and no evidence of infection  Outcome: Completed     Problem: Pain:  Goal: Pain level will decrease  Outcome: Completed

## 2022-05-04 DIAGNOSIS — R79.89 LOW TESTOSTERONE: ICD-10-CM

## 2022-05-04 DIAGNOSIS — M79.89 SWOLLEN LEG: ICD-10-CM

## 2022-05-04 LAB — URIC ACID, SERUM: 7.3 MG/DL (ref 3.4–7)

## 2022-05-05 LAB
SEX HORMONE BINDING GLOBULIN: 45 NMOL/L (ref 11–80)
TESTOSTERONE FREE-NONMALE: 191.4 PG/ML (ref 47–244)
TESTOSTERONE TOTAL: 966 NG/DL (ref 220–1000)

## 2022-05-09 ENCOUNTER — NURSE ONLY (OUTPATIENT)
Dept: FAMILY MEDICINE CLINIC | Age: 67
End: 2022-05-09
Payer: MEDICARE

## 2022-05-09 DIAGNOSIS — R79.89 LOW TESTOSTERONE: Primary | ICD-10-CM

## 2022-05-09 PROCEDURE — 96372 THER/PROPH/DIAG INJ SC/IM: CPT | Performed by: FAMILY MEDICINE

## 2022-05-09 RX ORDER — TESTOSTERONE CYPIONATE 200 MG/ML
200 INJECTION INTRAMUSCULAR ONCE
Status: COMPLETED | OUTPATIENT
Start: 2022-05-09 | End: 2022-05-09

## 2022-05-09 RX ADMIN — TESTOSTERONE CYPIONATE 200 MG: 200 INJECTION INTRAMUSCULAR at 13:41

## 2022-05-09 NOTE — PROGRESS NOTES
Patient given Testosterone 200mg IM left gluteal..  Will return in 2 weeks for next injection    Patient tolerated injection well.     Administrations This Visit     testosterone cypionate (DEPOTESTOTERONE CYPIONATE) injection 200 mg     Admin Date  05/09/2022 Action  Given Dose  200 mg Route  IntraMUSCular Administered By  Ralf Lou LPN

## 2022-05-12 ENCOUNTER — OFFICE VISIT (OUTPATIENT)
Dept: FAMILY MEDICINE CLINIC | Age: 67
End: 2022-05-12
Payer: MEDICARE

## 2022-05-12 VITALS — HEIGHT: 72 IN | WEIGHT: 277 LBS | BODY MASS INDEX: 37.52 KG/M2 | TEMPERATURE: 98.6 F

## 2022-05-12 DIAGNOSIS — L30.1 DYSHIDROTIC ECZEMA: ICD-10-CM

## 2022-05-12 DIAGNOSIS — L30.9 DERMATITIS: ICD-10-CM

## 2022-05-12 DIAGNOSIS — L30.9 ECZEMA, UNSPECIFIED TYPE: ICD-10-CM

## 2022-05-12 DIAGNOSIS — E79.0 ELEVATED URIC ACID IN BLOOD: Primary | ICD-10-CM

## 2022-05-12 PROCEDURE — 99214 OFFICE O/P EST MOD 30 MIN: CPT | Performed by: FAMILY MEDICINE

## 2022-05-12 RX ORDER — TRIAMCINOLONE ACETONIDE 1 MG/G
CREAM TOPICAL
Qty: 454 G | Refills: 0 | Status: SHIPPED | OUTPATIENT
Start: 2022-05-12 | End: 2022-08-11 | Stop reason: SDUPTHER

## 2022-05-12 NOTE — PATIENT INSTRUCTIONS
For the new rash the patient will continue with sun avoidance. Consider sunscreen as well as long sleeves. Will repeat TSH and uric acid since these conditions can have some effect on the skin and the levels have previously been abnormal.  We will also check GUSTABO. Triamcinolone refilled.   Patient can use this twice a day to alleviate any itching that may occur related to the new rash

## 2022-05-12 NOTE — PROGRESS NOTES
Diagnosis Orders   1. Elevated uric acid in blood  Uric Acid   2. Dermatitis  TSH with Reflex    GUSTABO Screen With Reflex   3. Eczema, unspecified type  triamcinolone (KENALOG) 0.1 % cream   4. Dyshidrotic eczema  triamcinolone (KENALOG) 0.1 % cream     Return for Based on test results. Patient Instructions   For the new rash the patient will continue with sun avoidance. Consider sunscreen as well as long sleeves. Will repeat TSH and uric acid since these conditions can have some effect on the skin and the levels have previously been abnormal.  We will also check GUSTABO. Triamcinolone refilled. Patient can use this twice a day to alleviate any itching that may occur related to the new rash      Subjective:      Patient ID: Trina Marsh is a 74 y.o. male who presents for:  Chief Complaint   Patient presents with    Skin Exam    Skin Problem     Arm rash        Pt is here for rash on courtney forearms for about 3 weeks. Noted after rash and peeling from medication reaction to vancomycin    More discolored and swollen when it first occurred. some itching. Pt is avoiding sun and wearing long shirts when possible. No sunscreen. No creams    Reviewed most recent labs. Uric acid elevated. One normal tsh and one abnl tsh. Pt avoiding foods that raise uric acid      Current Outpatient Medications on File Prior to Visit   Medication Sig Dispense Refill    famotidine (PEPCID) 20 MG tablet Take 1 tablet by mouth 2 times daily 60 tablet 0    ascorbic acid (VITAMIN C) 500 MG tablet Take 500 mg by mouth daily      vitamin B-12 (CYANOCOBALAMIN) 500 MCG tablet Take 500 mcg by mouth daily      zinc 50 MG TABS tablet Take 50 mg by mouth daily      aspirin 325 MG EC tablet Take 1 tablet by mouth daily 1 tablet 0    testosterone cypionate (DEPOTESTOTERONE CYPIONATE) 200 MG/ML injection Inject 1 mL into the muscle every 14 days for 90 doses.  8 mL 0    Syringe/Needle, Disp, (SYRINGE 3CC/19BC2-9/2\") 22G X 1-1/2\" 3 ML MISC 1 actuation by Does not apply route every 14 days 2 each 5    vitamin E 1000 units capsule Take 1,000 Units by mouth daily      vitamin D (CHOLECALCIFEROL) 1000 UNIT TABS tablet Take 6,000 Units by mouth daily      Multiple Vitamins-Minerals (ONE-A-DAY MENS HEALTH FORMULA) TABS Take by mouth      magnesium (MAGNESIUM-OXIDE) 250 MG TABS tablet Take 250 mg by mouth daily Take 3 pills daily      calcium carbonate (OYSTER SHELL CALCIUM 500 MG) 1250 MG tablet Take 1 tablet by mouth daily      Glucosamine-Chondroitin 9445-6197 MG/30ML LIQD Take by mouth Take 3 pills daily      Omega-3 Fatty Acids (FISH OIL) 1200 MG CAPS Take  by mouth daily. No current facility-administered medications on file prior to visit. Past Medical History:   Diagnosis Date    BPH (benign prostatic hyperplasia) 2/3/2012    no treatment at this time    Chronic back pain     Colon polyp 09/2017    Dr. Sarah Watson; f/u 5 years    Eczematous dermatitis     hands, feet and legs    ED (erectile dysfunction) 2/3/2012    Erectile dysfunction     Family history of early CAD 2/3/2012    Gastroesophageal reflux disease with hiatal hernia     History of hypertension     History of prediabetes     History of renal insufficiency syndrome     Obesity (BMI 30-39. 9)     Osteoarthritis     Vitamin D deficiency     Weight gain      Past Surgical History:   Procedure Laterality Date    COLONOSCOPY  9/25/2006    FOOT DEBRIDEMENT Right 2/25/2022    FOOT DEBRIDEMENT INCISION AND DRAINAGE performed by Cathy Gonzalez DPM at [address] [place] [address] NOT  [address] [place] [address] N/A 9/15/2017    COLONOSCOPY performed by Stacy Cohn MD at [address] [place] [address]  5/9/14    bx, dilation jarmago    UPPER GASTROINTESTINAL ENDOSCOPY N/A 9/15/2017    EGD DILATION SAVORY and biopsy performed by Stacy Cohn MD at [address] [place] [address] Marital status:      Spouse name: Not on file    Number of children: 1    Years of education: Not on file    Highest education level: Not on file   Occupational History    Not on file   Tobacco Use    Smoking status: Former Smoker     Packs/day: 2.00     Years: 5.00     Pack years: 10.00     Quit date: 1990     Years since quittin.3    Smokeless tobacco: Never Used   Vaping Use    Vaping Use: Never used   Substance and Sexual Activity    Alcohol use: No    Drug use: No    Sexual activity: Yes     Partners: Female   Other Topics Concern    Not on file   Social History Narrative    Not on file     Social Determinants of Health     Financial Resource Strain: Low Risk     Difficulty of Paying Living Expenses: Not hard at all   Food Insecurity: No Food Insecurity    Worried About [employer] [employer] in the Last Year: Never true    [address]  [email or web address] in the Last Year: Never true   Transportation Needs:     Lack of Transportation (Medical): Not on file    Lack of Transportation (Non-Medical):  Not on file   Physical Activity:     Days of Exercise per Week: Not on file    Minutes of Exercise per Session: Not on file   Stress:     Feeling of Stress : Not on file   Social Connections:     Frequency of Communication with Friends and Family: Not on file    Frequency of Social Gatherings with Friends and Family: Not on file    Attends Judaism Services: Not on file    Active Member of [address] [place] [address] or Organizations: Not on file    Attends Club or Organization Meetings: Not on file    Marital Status: Not on file   Intimate Partner Violence:     Fear of Current or Ex-Partner: Not on file    Emotionally Abused: Not on file    Physically Abused: Not on file    Sexually Abused: Not on file   Housing Stability:     Unable to Pay for Housing in the Last Year: Not on file    Number of [place] in the Last Year: Not on file    Unstable Housing in the Last Year: Not on file     Family History   Problem Relation Age of Onset    Depression Mother     Heart Disease Paternal Grandmother     High Blood Pressure Paternal Grandmother     High Cholesterol Paternal Grandmother     Anemia Paternal Grandmother         B 12 deficiency    Heart Disease Brother     Heart Surgery Brother     Anemia Brother         B 12 deficiency     Allergies:  Vancomycin    Review of Systems   Constitutional: Negative for chills and fever. Skin: Positive for rash. Allergic/Immunologic: Negative for environmental allergies, food allergies and immunocompromised state. Hematological: Negative for adenopathy. Does not bruise/bleed easily. Psychiatric/Behavioral: Negative for behavioral problems and sleep disturbance. Objective:   Temp 98.6 °F (37 °C)   Ht 6' (1.829 m)   Wt 277 lb (125.6 kg)   BMI 37.57 kg/m²     Physical Exam  Constitutional:       General: He is not in acute distress. Appearance: Normal appearance. He is well-developed. He is not toxic-appearing. HENT:      Head: Normocephalic and atraumatic. Right Ear: Hearing and tympanic membrane normal.      Left Ear: Hearing and tympanic membrane normal.      Nose: Nose normal. No nasal deformity. Eyes:      General: Lids are normal.         Right eye: No discharge. Left eye: No discharge. Conjunctiva/sclera: Conjunctivae normal.      Pupils: Pupils are equal, round, and reactive to light. Neck:      Thyroid: No thyroid mass or thyromegaly. Vascular: No JVD. Trachea: Trachea and phonation normal.   Cardiovascular:      Rate and Rhythm: Normal rate and regular rhythm. Pulmonary:      Effort: No accessory muscle usage or respiratory distress. Musculoskeletal:      Cervical back: Full passive range of motion without pain. Comments: FROM all large muscle groups and joints as witnessed when walking to exam table, getting on, and getting off the exam table. Skin:     General: Skin is warm and dry. Findings: No rash. Comments: courtney dorsal forearms erythematous raised lesions diffusely covering surface and hands. No vesicles, pustules, warmth. Leathery texture noted. Neurological:      Mental Status: He is alert. Motor: No tremor or atrophy. Gait: Gait normal.   Psychiatric:         Speech: Speech normal.         Behavior: Behavior normal.         Thought Content: Thought content normal.         No results found for this visit on 05/12/22.     Recent Results (from the past 2016 hour(s))   Comprehensive Metabolic Panel    Collection Time: 02/23/22  9:00 AM   Result Value Ref Range    Sodium 134 (L) 135 - 144 mEq/L    Potassium 4.2 3.4 - 4.9 mEq/L    Chloride 99 95 - 107 mEq/L    CO2 24 20 - 31 mEq/L    Anion Gap 11 9 - 15 mEq/L    Glucose 109 (H) 70 - 99 mg/dL    BUN 12 8 - 23 mg/dL    CREATININE 0.73 0.70 - 1.20 mg/dL    GFR Non-African American >60.0 >60    GFR  >60.0 >60    Calcium 8.7 8.5 - 9.9 mg/dL    Total Protein 7.0 6.3 - 8.0 g/dL    Albumin 3.9 3.5 - 4.6 g/dL    Total Bilirubin 0.5 0.2 - 0.7 mg/dL    Alkaline Phosphatase 110 (H) 35 - 104 U/L    ALT 19 0 - 41 U/L    AST 11 0 - 40 U/L    Globulin 3.1 2.3 - 3.5 g/dL   CBC with Auto Differential    Collection Time: 02/23/22  9:00 AM   Result Value Ref Range    WBC 14.6 (H) 4.8 - 10.8 K/uL    RBC 5.60 4.70 - 6.10 M/uL    Hemoglobin 15.8 14.0 - 18.0 g/dL    Hematocrit 47.8 42.0 - 52.0 %    MCV 85.3 80.0 - 100.0 fL    MCH 28.1 27.0 - 31.3 pg    MCHC 33.0 33.0 - 37.0 %    RDW 13.9 11.5 - 14.5 %    Platelets 518 821 - 317 K/uL    Neutrophils % 84.6 %    Lymphocytes % 6.7 %    Monocytes % 7.1 %    Eosinophils % 0.9 %    Basophils % 0.7 %    Neutrophils Absolute 12.3 (H) 1.4 - 6.5 K/uL    Lymphocytes Absolute 1.0 1.0 - 4.8 K/uL    Monocytes Absolute 1.0 (H) 0.2 - 0.8 K/uL    Eosinophils Absolute 0.1 0.0 - 0.7 K/uL    Basophils Absolute 0.1 0.0 - 0.2 K/uL   C-Reactive Protein    Collection Time: 02/23/22  9:00 AM   Result Value Ref Range    CRP 29.1 (H) 0.0 - 5.0 mg/L   Uric Acid    Collection Time: 02/23/22  9:00 AM   Result Value Ref Range    Uric Acid, Serum 5.8 3.4 - 7.0 mg/dL   Sedimentation Rate    Collection Time: 02/23/22  9:15 AM   Result Value Ref Range    Sed Rate 8 0 - 20 mm   Culture, Blood 2    Collection Time: 02/23/22  9:16 AM    Specimen: Blood   Result Value Ref Range    Culture, Blood 2 No growth after 5 days of incubation. Culture, Blood 1    Collection Time: 02/23/22  9:17 AM    Specimen: Blood   Result Value Ref Range    Blood Culture, Routine No growth after 5 days of incubation.     Comprehensive Metabolic Panel    Collection Time: 02/24/22  5:00 PM   Result Value Ref Range    Sodium 133 (L) 135 - 144 mEq/L    Potassium 4.3 3.4 - 4.9 mEq/L    Chloride 95 95 - 107 mEq/L    CO2 30 20 - 31 mEq/L    Anion Gap 8 (L) 9 - 15 mEq/L    Glucose 113 (H) 70 - 99 mg/dL    BUN 16 8 - 23 mg/dL    CREATININE 0.85 0.70 - 1.20 mg/dL    GFR Non-African American >60.0 >60    GFR  >60.0 >60    Calcium 9.0 8.5 - 9.9 mg/dL    Total Protein 7.2 6.3 - 8.0 g/dL    Albumin 4.2 3.5 - 4.6 g/dL    Total Bilirubin 0.5 0.2 - 0.7 mg/dL    Alkaline Phosphatase 101 35 - 104 U/L    ALT 18 0 - 41 U/L    AST 10 0 - 40 U/L    Globulin 3.0 2.3 - 3.5 g/dL   CBC with Auto Differential    Collection Time: 02/24/22  5:00 PM   Result Value Ref Range    WBC 14.7 (H) 4.8 - 10.8 K/uL    RBC 5.62 4.70 - 6.10 M/uL    Hemoglobin 15.7 14.0 - 18.0 g/dL    Hematocrit 47.9 42.0 - 52.0 %    MCV 85.4 80.0 - 100.0 fL    MCH 27.9 27.0 - 31.3 pg    MCHC 32.7 (L) 33.0 - 37.0 %    RDW 13.9 11.5 - 14.5 %    Platelets 261 762 - 125 K/uL    Neutrophils % 84.7 %    Lymphocytes % 6.1 %    Monocytes % 7.8 %    Eosinophils % 0.8 %    Basophils % 0.6 %    Neutrophils Absolute 12.4 (H) 1.4 - 6.5 K/uL    Lymphocytes Absolute 0.9 (L) 1.0 - 4.8 K/uL    Monocytes Absolute 1.1 (H) 0.2 - 0.8 K/uL    Eosinophils Absolute 0.1 0.0 - 0.7 K/uL    Basophils Absolute 0.1 0.0 - 0.2 K/uL   Lactic Acid Collection Time: 02/24/22  5:00 PM   Result Value Ref Range    Lactic Acid 1.0 0.5 - 2.2 mmol/L   C-Reactive Protein    Collection Time: 02/24/22  5:00 PM   Result Value Ref Range    CRP 66.4 (H) 0.0 - 5.0 mg/L   Sedimentation Rate    Collection Time: 02/24/22  5:47 PM   Result Value Ref Range    Sed Rate 14 0 - 20 mm   Culture, Blood 2    Collection Time: 02/24/22  8:26 PM    Specimen: Blood   Result Value Ref Range    Culture, Blood 2 No growth after 5 days of incubation. Culture, Blood 1    Collection Time: 02/24/22  8:26 PM    Specimen: Blood   Result Value Ref Range    Blood Culture, Routine No growth after 5 days of incubation.     TYPE AND SCREEN    Collection Time: 02/24/22  8:26 PM   Result Value Ref Range    ABO/Rh A POS     Antibody Screen NEG    Hemoglobin A1c    Collection Time: 02/24/22  8:52 PM   Result Value Ref Range    Hemoglobin A1C 5.7 4.8 - 5.9 %   Basic Metabolic Panel w/ Reflex to MG    Collection Time: 02/25/22  5:00 AM   Result Value Ref Range    Sodium 136 135 - 144 mEq/L    Potassium reflex Magnesium 4.4 3.4 - 4.9 mEq/L    Chloride 100 95 - 107 mEq/L    CO2 25 20 - 31 mEq/L    Anion Gap 11 9 - 15 mEq/L    Glucose 111 (H) 70 - 99 mg/dL    BUN 13 8 - 23 mg/dL    CREATININE 0.89 0.70 - 1.20 mg/dL    GFR Non-African American >60.0 >60    GFR  >60.0 >60    Calcium 8.5 8.5 - 9.9 mg/dL   CBC with Auto Differential    Collection Time: 02/25/22  5:00 AM   Result Value Ref Range    WBC 12.1 (H) 4.8 - 10.8 K/uL    RBC 5.16 4.70 - 6.10 M/uL    Hemoglobin 14.6 14.0 - 18.0 g/dL    Hematocrit 43.6 42.0 - 52.0 %    MCV 84.5 80.0 - 100.0 fL    MCH 28.2 27.0 - 31.3 pg    MCHC 33.4 33.0 - 37.0 %    RDW 13.8 11.5 - 14.5 %    Platelets 530 057 - 363 K/uL    Neutrophils % 80.3 %    Lymphocytes % 8.1 %    Monocytes % 9.6 %    Eosinophils % 1.4 %    Basophils % 0.6 %    Neutrophils Absolute 9.7 (H) 1.4 - 6.5 K/uL    Lymphocytes Absolute 1.0 1.0 - 4.8 K/uL    Monocytes Absolute 1.2 (H) 0.2 - 0.8 K/uL    Eosinophils Absolute 0.2 0.0 - 0.7 K/uL    Basophils Absolute 0.1 0.0 - 0.2 K/uL   COVID-19, Rapid    Collection Time: 02/25/22  2:28 PM    Specimen: Nasopharyngeal Swab; Nasal   Result Value Ref Range    SARS-CoV-2, NAAT Not Detected Not Detected   Culture, Anaerobic and Aerobic    Collection Time: 02/25/22  3:37 PM    Specimen: Foot; Swab   Result Value Ref Range    CULTURE WOUND (A)      Direct Exam:  MANY NEUTROPHILS  Direct Exam:  MODERATE GRAM POSITIVE COCCI IN CLUSTERS  Cult,Aerobe/Anaerobe:  No anaerobic organisms isolated at 5 days. Performed at [facility] [address]  ([phone]      Organism Staph aureus MRSA (A)     CULTURE WOUND MODERATE GROWTH        Susceptibility    Staph aureus mrsa - BACTERIAL SUSCEPTIBILITY PANEL BY ROSEMARIE     benzylpenicillin >=0.5 Resistant mcg/mL     clindamycin <=0.25 Resistant mcg/mL     erythromycin >=8 Resistant mcg/mL     gentamicin* <=0.5 Sensitive mcg/mL      * Gentamicin is used only in combination with other active agents thattest susceptible.      inducible clindamycin resistance POSITIVE Resistant mcg/mL     oxacillin >=4 Resistant mcg/mL     tetracycline <=1 Sensitive mcg/mL     trimethoprim-sulfamethoxazole <=10 Sensitive mcg/mL     vancomycin 1 Sensitive mcg/mL   Vancomycin Level, Random    Collection Time: 02/26/22  5:10 AM   Result Value Ref Range    Vancomycin Rm 8.6 (L) 10.0 - 40.0 ug/mL   Basic Metabolic Panel w/ Reflex to MG    Collection Time: 02/26/22  5:10 AM   Result Value Ref Range    Sodium 138 135 - 144 mEq/L    Potassium reflex Magnesium 4.3 3.4 - 4.9 mEq/L    Chloride 102 95 - 107 mEq/L    CO2 25 20 - 31 mEq/L    Anion Gap 11 9 - 15 mEq/L    Glucose 101 (H) 70 - 99 mg/dL    BUN 12 8 - 23 mg/dL    CREATININE 0.82 0.70 - 1.20 mg/dL    GFR Non-African American >60.0 >60    GFR  >60.0 >60    Calcium 8.3 (L) 8.5 - 9.9 mg/dL   CBC with Auto Differential    Collection Time: 02/26/22  5:11 AM   Result Value Ref Range    WBC 9.2 4.8 - 10.8 K/uL    RBC 4.75 4.70 - 6.10 M/uL    Hemoglobin 13.5 (L) 14.0 - 18.0 g/dL    Hematocrit 40.4 (L) 42.0 - 52.0 %    MCV 85.1 80.0 - 100.0 fL    MCH 28.5 27.0 - 31.3 pg    MCHC 33.5 33.0 - 37.0 %    RDW 13.7 11.5 - 14.5 %    Platelets 726 758 - 606 K/uL    Neutrophils % 73.3 %    Lymphocytes % 12.2 %    Monocytes % 10.1 %    Eosinophils % 3.3 %    Basophils % 1.1 %    Neutrophils Absolute 6.7 (H) 1.4 - 6.5 K/uL    Lymphocytes Absolute 1.1 1.0 - 4.8 K/uL    Monocytes Absolute 0.9 (H) 0.2 - 0.8 K/uL    Eosinophils Absolute 0.3 0.0 - 0.7 K/uL    Basophils Absolute 0.1 0.0 - 0.2 K/uL   CBC with Auto Differential    Collection Time: 02/27/22  5:01 AM   Result Value Ref Range    WBC 6.7 4.8 - 10.8 K/uL    RBC 4.87 4.70 - 6.10 M/uL    Hemoglobin 14.0 14.0 - 18.0 g/dL    Hematocrit 41.0 (L) 42.0 - 52.0 %    MCV 84.1 80.0 - 100.0 fL    MCH 28.8 27.0 - 31.3 pg    MCHC 34.2 33.0 - 37.0 %    RDW 13.6 11.5 - 14.5 %    Platelets 046 297 - 123 K/uL    Neutrophils % 63.3 %    Lymphocytes % 18.9 %    Monocytes % 10.5 %    Eosinophils % 5.4 %    Basophils % 1.9 %    Neutrophils Absolute 4.2 1.4 - 6.5 K/uL    Lymphocytes Absolute 1.3 1.0 - 4.8 K/uL    Monocytes Absolute 0.7 0.2 - 0.8 K/uL    Eosinophils Absolute 0.4 0.0 - 0.7 K/uL    Basophils Absolute 0.1 0.0 - 0.2 K/uL   Basic Metabolic Panel w/ Reflex to MG    Collection Time: 02/27/22  5:01 AM   Result Value Ref Range    Sodium 136 135 - 144 mEq/L    Potassium reflex Magnesium 4.0 3.4 - 4.9 mEq/L    Chloride 101 95 - 107 mEq/L    CO2 26 20 - 31 mEq/L    Anion Gap 9 9 - 15 mEq/L    Glucose 95 70 - 99 mg/dL    BUN 11 8 - 23 mg/dL    CREATININE 0.80 0.70 - 1.20 mg/dL    GFR Non-African American >60.0 >60    GFR  >60.0 >60    Calcium 8.7 8.5 - 9.9 mg/dL   CBC with Auto Differential    Collection Time: 02/28/22  6:26 AM   Result Value Ref Range    WBC 7.0 4.8 - 10.8 K/uL    RBC 5.08 4.70 - 6.10 M/uL    Hemoglobin 14.5 14.0 - 18.0 g/dL Hematocrit 43.3 42.0 - 52.0 %    MCV 85.3 80.0 - 100.0 fL    MCH 28.5 27.0 - 31.3 pg    MCHC 33.4 33.0 - 37.0 %    RDW 13.8 11.5 - 14.5 %    Platelets 299 286 - 912 K/uL    Neutrophils % 67.9 %    Lymphocytes % 15.5 %    Monocytes % 9.7 %    Eosinophils % 5.7 %    Basophils % 1.2 %    Neutrophils Absolute 4.7 1.4 - 6.5 K/uL    Lymphocytes Absolute 1.1 1.0 - 4.8 K/uL    Monocytes Absolute 0.7 0.2 - 0.8 K/uL    Eosinophils Absolute 0.4 0.0 - 0.7 K/uL    Basophils Absolute 0.1 0.0 - 0.2 K/uL   Basic Metabolic Panel w/ Reflex to MG    Collection Time: 02/28/22  6:26 AM   Result Value Ref Range    Sodium 136 135 - 144 mEq/L    Potassium reflex Magnesium 4.4 3.4 - 4.9 mEq/L    Chloride 100 95 - 107 mEq/L    CO2 25 20 - 31 mEq/L    Anion Gap 11 9 - 15 mEq/L    Glucose 115 (H) 70 - 99 mg/dL    BUN 12 8 - 23 mg/dL    CREATININE 0.92 0.70 - 1.20 mg/dL    GFR Non-African American >60.0 >60    GFR  >60.0 >60    Calcium 8.6 8.5 - 9.9 mg/dL   CBC with Auto Differential    Collection Time: 03/01/22  4:49 AM   Result Value Ref Range    WBC 6.8 4.8 - 10.8 K/uL    RBC 5.27 4.70 - 6.10 M/uL    Hemoglobin 14.8 14.0 - 18.0 g/dL    Hematocrit 45.1 42.0 - 52.0 %    MCV 85.6 80.0 - 100.0 fL    MCH 28.0 27.0 - 31.3 pg    MCHC 32.7 (L) 33.0 - 37.0 %    RDW 13.5 11.5 - 14.5 %    Platelets 854 762 - 509 K/uL    Neutrophils % 69.7 %    Lymphocytes % 15.2 %    Monocytes % 10.1 %    Eosinophils % 4.7 %    Basophils % 0.3 %    Neutrophils Absolute 4.7 1.4 - 6.5 K/uL    Lymphocytes Absolute 1.0 1.0 - 4.8 K/uL    Monocytes Absolute 0.7 0.2 - 0.8 K/uL    Eosinophils Absolute 0.3 0.0 - 0.7 K/uL    Basophils Absolute 0.0 0.0 - 0.2 K/uL   Basic Metabolic Panel w/ Reflex to MG    Collection Time: 03/01/22  4:49 AM   Result Value Ref Range    Sodium 136 135 - 144 mEq/L    Potassium reflex Magnesium 4.3 3.4 - 4.9 mEq/L    Chloride 100 95 - 107 mEq/L    CO2 24 20 - 31 mEq/L    Anion Gap 12 9 - 15 mEq/L    Glucose 103 (H) 70 - 99 mg/dL BUN 14 8 - 23 mg/dL    CREATININE 0.79 0.70 - 1.20 mg/dL    GFR Non-African American >60.0 >60    GFR  >60.0 >60    Calcium 8.8 8.5 - 9.9 mg/dL   Vancomycin Level, Random    Collection Time: 03/01/22  4:49 AM   Result Value Ref Range    Vancomycin Rm 12.1 10.0 - 40.0 ug/mL   CBC with Auto Differential    Collection Time: 03/09/22  9:50 AM   Result Value Ref Range    WBC 2.9 (L) 4.8 - 10.8 K/uL    RBC 5.20 4.70 - 6.10 M/uL    Hemoglobin 14.6 14.0 - 18.0 g/dL    Hematocrit 43.8 42.0 - 52.0 %    MCV 84.3 80.0 - 100.0 fL    MCH 28.1 27.0 - 31.3 pg    MCHC 33.3 33.0 - 37.0 %    RDW 14.1 11.5 - 14.5 %    Platelets 514 926 - 724 K/uL    PLATELET SLIDE REVIEW Normal     Neutrophils % 63.0 %    Lymphocytes % 14.0 %    Monocytes % 13.5 %    Eosinophils % 4.0 %    Basophils % 3.0 %    Neutrophils Absolute 1.9 1.4 - 6.5 K/uL    Lymphocytes Absolute 0.4 (L) 1.0 - 4.8 K/uL    Monocytes Absolute 0.4 0.2 - 0.8 K/uL    Eosinophils Absolute 0.1 0.0 - 0.7 K/uL    Basophils Absolute 0.1 0.0 - 0.2 K/uL    Bands Relative 2 %    Metamyelocytes Relative 1 %    RBC Morphology Normal    Basic Metabolic Panel    Collection Time: 03/09/22  9:50 AM   Result Value Ref Range    Sodium 137 135 - 144 mEq/L    Potassium 4.0 3.4 - 4.9 mEq/L    Chloride 101 95 - 107 mEq/L    CO2 21 20 - 31 mEq/L    Anion Gap 15 9 - 15 mEq/L    Glucose 132 (H) 70 - 99 mg/dL    BUN 10 8 - 23 mg/dL    CREATININE 0.75 0.70 - 1.20 mg/dL    GFR Non-African American >60.0 >60    GFR  >60.0 >60    Calcium 8.5 8.5 - 9.9 mg/dL   Vancomycin Level, Trough    Collection Time: 03/09/22  9:50 AM   Result Value Ref Range    Vancomycin Tr 7.2 (L) 10.0 - 20.0 ug/mL   CBC with Auto Differential    Collection Time: 03/16/22  1:18 PM   Result Value Ref Range    WBC 3.8 (L) 4.8 - 10.8 K/uL    RBC 5.24 4.70 - 6.10 M/uL    Hemoglobin 14.4 14.0 - 18.0 g/dL    Hematocrit 43.6 42.0 - 52.0 %    MCV 83.2 80.0 - 100.0 fL    MCH 27.4 27.0 - 31.3 pg    MCHC 33.0 33.0 - 37.0 %    RDW 14.0 11.5 - 14.5 %    Platelets 137 773 - 413 K/uL    PLATELET SLIDE REVIEW Normal     Neutrophils % 62.0 %    Lymphocytes % 14.0 %    Monocytes % 9.5 %    Eosinophils % 9.0 %    Basophils % 3.0 %    Neutrophils Absolute 2.4 1.4 - 6.5 K/uL    Lymphocytes Absolute 0.6 (L) 1.0 - 4.8 K/uL    Monocytes Absolute 0.4 0.2 - 0.8 K/uL    Eosinophils Absolute 0.3 0.0 - 0.7 K/uL    Basophils Absolute 0.1 0.0 - 0.2 K/uL    Bands Relative 1 %    Atypical Lymphocytes Relative 2 %    Matilde Cells 1+    C-Reactive Protein    Collection Time: 03/16/22  1:18 PM   Result Value Ref Range    CRP 13.4 (H) 0.0 - 5.0 mg/L   Vancomycin Level, Trough    Collection Time: 03/16/22  1:18 PM   Result Value Ref Range    Vancomycin Tr 9.6 (L) 10.0 - 20.0 ug/mL   Basic Metabolic Panel    Collection Time: 03/16/22  1:18 PM   Result Value Ref Range    Sodium 134 (L) 135 - 144 mEq/L    Potassium 3.9 3.4 - 4.9 mEq/L    Chloride 99 95 - 107 mEq/L    CO2 23 20 - 31 mEq/L    Anion Gap 12 9 - 15 mEq/L    Glucose 171 (H) 70 - 99 mg/dL    BUN 12 8 - 23 mg/dL    CREATININE 0.75 0.70 - 1.20 mg/dL    GFR Non-African American >60.0 >60    GFR  >60.0 >60    Calcium 8.9 8.5 - 9.9 mg/dL   CBC with Auto Differential    Collection Time: 03/23/22  9:00 AM   Result Value Ref Range    WBC 4.9 4.8 - 10.8 K/uL    RBC 5.06 4.70 - 6.10 M/uL    Hemoglobin 13.8 (L) 14.0 - 18.0 g/dL    Hematocrit 41.2 (L) 42.0 - 52.0 %    MCV 81.5 80.0 - 100.0 fL    MCH 27.2 27.0 - 31.3 pg    MCHC 33.4 33.0 - 37.0 %    RDW 14.4 11.5 - 14.5 %    Platelets 989 333 - 407 K/uL    Neutrophils % 63.0 %    Lymphocytes % 12.4 %    Monocytes % 12.3 %    Eosinophils % 9.1 %    Basophils % 3.2 %    Neutrophils Absolute 3.1 1.4 - 6.5 K/uL    Lymphocytes Absolute 0.6 (L) 1.0 - 4.8 K/uL    Monocytes Absolute 0.6 0.2 - 0.8 K/uL    Eosinophils Absolute 0.4 0.0 - 0.7 K/uL    Basophils Absolute 0.2 0.0 - 0.2 K/uL   Vancomycin Level, Trough    Collection Time: 03/23/22  9:00 AM Result Value Ref Range    Vancomycin Tr 12.8 10.0 - 20.0 ug/mL   Basic Metabolic Panel    Collection Time: 03/23/22  9:00 AM   Result Value Ref Range    Sodium 140 135 - 144 mEq/L    Potassium 3.8 3.4 - 4.9 mEq/L    Chloride 103 95 - 107 mEq/L    CO2 21 20 - 31 mEq/L    Anion Gap 16 (H) 9 - 15 mEq/L    Glucose 128 (H) 70 - 99 mg/dL    BUN 10 8 - 23 mg/dL    CREATININE 0.76 0.70 - 1.20 mg/dL    GFR Non-African American >60.0 >60    GFR  >60.0 >60    Calcium 8.6 8.5 - 9.9 mg/dL   EKG 12 Lead - Chest Pain    Collection Time: 03/27/22  6:24 PM   Result Value Ref Range    Ventricular Rate 98 BPM    Atrial Rate 98 BPM    P-R Interval 178 ms    QRS Duration 68 ms    Q-T Interval 346 ms    QTc Calculation (Bazett) 441 ms    P Axis 13 degrees    R Axis 15 degrees    T Axis 24 degrees   Comprehensive Metabolic Panel    Collection Time: 03/27/22  6:30 PM   Result Value Ref Range    Sodium 139 135 - 144 mEq/L    Potassium 4.2 3.4 - 4.9 mEq/L    Chloride 104 95 - 107 mEq/L    CO2 23 20 - 31 mEq/L    Anion Gap 12 9 - 15 mEq/L    Glucose 138 (H) 70 - 99 mg/dL    BUN 11 8 - 23 mg/dL    CREATININE 0.81 0.70 - 1.20 mg/dL    GFR Non-African American >60.0 >60    GFR  >60.0 >60    Calcium 8.4 (L) 8.5 - 9.9 mg/dL    Total Protein 6.1 (L) 6.3 - 8.0 g/dL    Albumin 3.8 3.5 - 4.6 g/dL    Total Bilirubin 0.4 0.2 - 0.7 mg/dL    Alkaline Phosphatase 122 (H) 35 - 104 U/L    ALT 56 (H) 0 - 41 U/L    AST 25 0 - 40 U/L    Globulin 2.3 2.3 - 3.5 g/dL   CBC with Auto Differential    Collection Time: 03/27/22  6:30 PM   Result Value Ref Range    WBC 6.5 4.8 - 10.8 K/uL    RBC 5.39 4.70 - 6.10 M/uL    Hemoglobin 14.4 14.0 - 18.0 g/dL    Hematocrit 44.5 42.0 - 52.0 %    MCV 82.7 80.0 - 100.0 fL    MCH 26.7 (L) 27.0 - 31.3 pg    MCHC 32.3 (L) 33.0 - 37.0 %    RDW 14.5 11.5 - 14.5 %    Platelets 262 179 - 910 K/uL    Neutrophils % 65.3 %    Lymphocytes % 9.1 %    Monocytes % 15.9 %    Eosinophils % 9.1 %    Basophils % 0.6 %    Neutrophils Absolute 4.2 1.4 - 6.5 K/uL    Lymphocytes Absolute 0.6 (L) 1.0 - 4.8 K/uL    Monocytes Absolute 1.0 (H) 0.2 - 0.8 K/uL    Eosinophils Absolute 0.6 0.0 - 0.7 K/uL    Basophils Absolute 0.0 0.0 - 0.2 K/uL   Lactic Acid    Collection Time: 03/27/22  6:30 PM   Result Value Ref Range    Lactic Acid 1.3 0.5 - 2.2 mmol/L   Vancomycin Level, Trough    Collection Time: 03/28/22 10:07 AM   Result Value Ref Range    Vancomycin Tr 15.7 10.0 - 20.0 ug/mL   Procalcitonin    Collection Time: 03/30/22 11:15 AM   Result Value Ref Range    Procalcitonin 0.22 (H) 0.00 - 0.15 ng/mL   CBC with Auto Differential    Collection Time: 03/30/22 11:15 AM   Result Value Ref Range    WBC 20.1 (H) 4.8 - 10.8 K/uL    RBC 6.32 (H) 4.70 - 6.10 M/uL    Hemoglobin 17.1 14.0 - 18.0 g/dL    Hematocrit 51.7 42.0 - 52.0 %    MCV 81.7 80.0 - 100.0 fL    MCH 27.0 27.0 - 31.3 pg    MCHC 33.1 33.0 - 37.0 %    RDW 15.4 (H) 11.5 - 14.5 %    Platelets 869 812 - 531 K/uL    Neutrophils % 87.6 %    Lymphocytes % 2.4 %    Monocytes % 7.3 %    Eosinophils % 2.3 %    Basophils % 0.4 %    Neutrophils Absolute 17.6 (H) 1.4 - 6.5 K/uL    Lymphocytes Absolute 0.5 (L) 1.0 - 4.8 K/uL    Monocytes Absolute 1.5 (H) 0.2 - 0.8 K/uL    Eosinophils Absolute 0.5 0.0 - 0.7 K/uL    Basophils Absolute 0.1 0.0 - 0.2 K/uL   Comprehensive Metabolic Panel    Collection Time: 03/30/22 11:15 AM   Result Value Ref Range    Sodium 130 (L) 135 - 144 mEq/L    Potassium 4.5 3.4 - 4.9 mEq/L    Chloride 95 95 - 107 mEq/L    CO2 19 (L) 20 - 31 mEq/L    Anion Gap 16 (H) 9 - 15 mEq/L    Glucose 168 (H) 70 - 99 mg/dL    BUN 15 8 - 23 mg/dL    CREATININE 1.30 (H) 0.70 - 1.20 mg/dL    GFR Non-African American 54.8 (L) >60    GFR  >60.0 >60    Calcium 8.2 (L) 8.5 - 9.9 mg/dL    Total Protein 6.0 (L) 6.3 - 8.0 g/dL    Albumin 3.6 3.5 - 4.6 g/dL    Total Bilirubin 0.9 (H) 0.2 - 0.7 mg/dL    Alkaline Phosphatase 102 35 - 104 U/L    ALT 36 0 - 41 U/L    AST 18 0 - 40 U/L    Globulin 2.4 2.3 - 3.5 g/dL   Lactic Acid    Collection Time: 03/30/22 11:15 AM   Result Value Ref Range    Lactic Acid 4.0 (HH) 0.5 - 2.2 mmol/L   Culture, Blood 2    Collection Time: 03/30/22 11:24 AM    Specimen: Blood   Result Value Ref Range    Culture, Blood 2 No growth after 5 days of incubation. Culture, Blood 1    Collection Time: 03/30/22 11:24 AM    Specimen: Blood   Result Value Ref Range    Blood Culture, Routine No growth after 5 days of incubation.     Sedimentation Rate    Collection Time: 03/30/22 11:28 AM   Result Value Ref Range    Sed Rate 1 0 - 20 mm   CBC with Auto Differential    Collection Time: 03/31/22  1:50 PM   Result Value Ref Range    WBC 16.6 (H) 4.8 - 10.8 K/uL    RBC 5.45 4.70 - 6.10 M/uL    Hemoglobin 14.8 14.0 - 18.0 g/dL    Hematocrit 44.5 42.0 - 52.0 %    MCV 81.6 80.0 - 100.0 fL    MCH 27.1 27.0 - 31.3 pg    MCHC 33.2 33.0 - 37.0 %    RDW 14.9 (H) 11.5 - 14.5 %    Platelets 946 450 - 795 K/uL    Neutrophils % 89.7 %    Lymphocytes % 1.4 %    Monocytes % 6.1 %    Eosinophils % 2.3 %    Basophils % 0.5 %    Neutrophils Absolute 14.9 (H) 1.4 - 6.5 K/uL    Lymphocytes Absolute 0.2 (L) 1.0 - 4.8 K/uL    Monocytes Absolute 1.0 (H) 0.2 - 0.8 K/uL    Eosinophils Absolute 0.4 0.0 - 0.7 K/uL    Basophils Absolute 0.1 0.0 - 0.2 K/uL   Comprehensive Metabolic Panel    Collection Time: 03/31/22  1:50 PM   Result Value Ref Range    Sodium 127 (L) 135 - 144 mEq/L    Potassium 4.5 3.4 - 4.9 mEq/L    Chloride 99 95 - 107 mEq/L    CO2 18 (L) 20 - 31 mEq/L    Anion Gap 10 9 - 15 mEq/L    Glucose 173 (H) 70 - 99 mg/dL    BUN 18 8 - 23 mg/dL    CREATININE 1.23 (H) 0.70 - 1.20 mg/dL    GFR Non-African American 58.4 (L) >60    GFR  >60.0 >60    Calcium 8.4 (L) 8.5 - 9.9 mg/dL    Total Protein 5.2 (L) 6.3 - 8.0 g/dL    Albumin 3.0 (L) 3.5 - 4.6 g/dL    Total Bilirubin 0.3 0.2 - 0.7 mg/dL    Alkaline Phosphatase 67 35 - 104 U/L    ALT 28 0 - 41 U/L    AST 10 0 - 40 U/L Globulin 2.2 (L) 2.3 - 3.5 g/dL   Vancomycin Level, Random    Collection Time: 03/31/22  1:50 PM   Result Value Ref Range    Vancomycin Rm <4.0 (L) 10.0 - 40.0 ug/mL   Comprehensive Metabolic Panel    Collection Time: 04/01/22  6:57 AM   Result Value Ref Range    Sodium 132 (L) 135 - 144 mEq/L    Potassium 4.8 3.4 - 4.9 mEq/L    Chloride 100 95 - 107 mEq/L    CO2 19 (L) 20 - 31 mEq/L    Anion Gap 13 9 - 15 mEq/L    Glucose 125 (H) 70 - 99 mg/dL    BUN 19 8 - 23 mg/dL    CREATININE 1.03 0.70 - 1.20 mg/dL    GFR Non-African American >60.0 >60    GFR  >60.0 >60    Calcium 8.0 (L) 8.5 - 9.9 mg/dL    Total Protein 4.9 (L) 6.3 - 8.0 g/dL    Albumin 2.7 (L) 3.5 - 4.6 g/dL    Total Bilirubin <0.2 0.2 - 0.7 mg/dL    Alkaline Phosphatase 66 35 - 104 U/L    ALT 26 0 - 41 U/L    AST 8 0 - 40 U/L    Globulin 2.2 (L) 2.3 - 3.5 g/dL   CBC with Auto Differential    Collection Time: 04/01/22  6:58 AM   Result Value Ref Range    WBC 10.4 4.8 - 10.8 K/uL    RBC 5.50 4.70 - 6.10 M/uL    Hemoglobin 15.0 14.0 - 18.0 g/dL    Hematocrit 44.7 42.0 - 52.0 %    MCV 81.2 80.0 - 100.0 fL    MCH 27.2 27.0 - 31.3 pg    MCHC 33.5 33.0 - 37.0 %    RDW 15.0 (H) 11.5 - 14.5 %    Platelets 113 468 - 031 K/uL    Neutrophils % 81.4 %    Lymphocytes % 3.8 %    Monocytes % 6.7 %    Eosinophils % 7.8 %    Basophils % 0.3 %    Neutrophils Absolute 8.4 (H) 1.4 - 6.5 K/uL    Lymphocytes Absolute 0.4 (L) 1.0 - 4.8 K/uL    Monocytes Absolute 0.7 0.2 - 0.8 K/uL    Eosinophils Absolute 0.8 (H) 0.0 - 0.7 K/uL    Basophils Absolute 0.0 0.0 - 0.2 K/uL   CBC with Auto Differential    Collection Time: 04/02/22  5:14 AM   Result Value Ref Range    WBC 15.3 (H) 4.8 - 10.8 K/uL    RBC 5.14 4.70 - 6.10 M/uL    Hemoglobin 13.9 (L) 14.0 - 18.0 g/dL    Hematocrit 41.9 (L) 42.0 - 52.0 %    MCV 81.6 80.0 - 100.0 fL    MCH 27.0 27.0 - 31.3 pg    MCHC 33.1 33.0 - 37.0 %    RDW 15.2 (H) 11.5 - 14.5 %    Platelets 113 472 - 318 K/uL    PLATELET SLIDE REVIEW Normal Neutrophils % 70.0 %    Lymphocytes % 9.0 %    Monocytes % 6.8 %    Eosinophils % 5.0 %    Basophils % 2.0 %    Neutrophils Absolute 12.2 (H) 1.4 - 6.5 K/uL    Lymphocytes Absolute 1.4 1.0 - 4.8 K/uL    Monocytes Absolute 1.1 (H) 0.2 - 0.8 K/uL    Eosinophils Absolute 0.8 (H) 0.0 - 0.7 K/uL    Basophils Absolute 0.3 (H) 0.0 - 0.2 K/uL    Bands Relative 7 %    Metamyelocytes Relative 1 %    Myelocyte Percent 2 %    Anisocytosis 1+     Poikilocytes 2+     Matilde Cells 2+    Comprehensive Metabolic Panel    Collection Time: 04/02/22  5:15 AM   Result Value Ref Range    Sodium 133 (L) 135 - 144 mEq/L    Potassium 4.7 3.4 - 4.9 mEq/L    Chloride 99 95 - 107 mEq/L    CO2 22 20 - 31 mEq/L    Anion Gap 12 9 - 15 mEq/L    Glucose 114 (H) 70 - 99 mg/dL    BUN 14 8 - 23 mg/dL    CREATININE 0.96 0.70 - 1.20 mg/dL    GFR Non-African American >60.0 >60    GFR  >60.0 >60    Calcium 8.5 8.5 - 9.9 mg/dL    Total Protein 4.9 (L) 6.3 - 8.0 g/dL    Albumin 3.0 (L) 3.5 - 4.6 g/dL    Total Bilirubin <0.2 0.2 - 0.7 mg/dL    Alkaline Phosphatase 65 35 - 104 U/L    ALT 25 0 - 41 U/L    AST 9 0 - 40 U/L    Globulin 1.9 (L) 2.3 - 3.5 g/dL   Testosterone, free, total    Collection Time: 05/04/22  1:48 PM   Result Value Ref Range    Testosterone 966 220 - 1,000 ng/dL    Sex Hormone Binding 45 11 - 80 nmol/L    Testosterone, Free 191.4 47 - 244 pg/mL   Uric Acid    Collection Time: 05/04/22  6:04 PM   Result Value Ref Range    Uric Acid, Serum 7.3 (H) 3.4 - 7.0 mg/dL       [] Pt was seen by provider for      Minutes  Counseling and coordination of care was done for all assessment diagnosis listed for today with patient and any family/friend present. More than 50% of this visit was spent coordinating current care, obtaining information for prior records, and counseling for current plan of action. Assessment:       Diagnosis Orders   1. Elevated uric acid in blood  Uric Acid   2.  Dermatitis  TSH with Reflex    GUSTABO Screen With Reflex   3. Eczema, unspecified type  triamcinolone (KENALOG) 0.1 % cream   4. Dyshidrotic eczema  triamcinolone (KENALOG) 0.1 % cream         Orders Placed This Encounter   Procedures    Uric Acid     Standing Status:   Future     Standing Expiration Date:   5/12/2023    TSH with Reflex     Standing Status:   Future     Standing Expiration Date:   5/12/2023    GUSTABO Screen With Reflex     Standing Status:   Future     Standing Expiration Date:   5/12/2023       Orders Placed This Encounter   Medications    triamcinolone (KENALOG) 0.1 % cream     Sig: Apply topically qam daily Monday through Friday only. Take weekend off. Do not use on face, groin, armpits, breasts tissue. Dispense:  454 g     Refill:  0          Medication List          Accurate as of May 12, 2022  3:09 PM. If you have any questions, ask your nurse or doctor. CONTINUE taking these medications    ascorbic acid 500 MG tablet  Commonly known as: VITAMIN C     aspirin 325 MG EC tablet  Take 1 tablet by mouth daily     calcium carbonate 1250 (500 Ca) MG tablet  Commonly known as: OYSTER SHELL CALCIUM 500 mg     famotidine 20 MG tablet  Commonly known as: Pepcid  Take 1 tablet by mouth 2 times daily     Fish Oil 1200 MG Caps     Glucosamine-Chondroitin 0423-8788 MG/30ML Liqd     magnesium 250 MG Tabs tablet  Commonly known as: MAGNESIUM-OXIDE     One-A-Day Mens Health Formula Tabs     SYRINGE 3CC/21HR4-5/2\" 22G X 1-1/2\" 3 ML Misc  1 actuation by Does not apply route every 14 days     testosterone cypionate 200 MG/ML injection  Commonly known as: DEPOTESTOTERONE CYPIONATE  Inject 1 mL into the muscle every 14 days for 90 doses. triamcinolone 0.1 % cream  Commonly known as: KENALOG  Apply topically qam daily Monday through Friday only. Take weekend off. Do not use on face, groin, armpits, breasts tissue.      vitamin B-12 500 MCG tablet  Commonly known as: CYANOCOBALAMIN     vitamin D 1000 UNIT Tabs tablet  Commonly known as: CHOLECALCIFEROL     vitamin E 1000 units capsule     zinc 50 MG Tabs tablet           Where to Get Your Medications      These medications were sent to [facility], [id number] [place] [address] [address]  [address], [place] [address]    Phone: [phone]   · triamcinolone 0.1 % cream           Plan:   Return for Based on test results. Patient Instructions   For the new rash the patient will continue with sun avoidance. Consider sunscreen as well as long sleeves. Will repeat TSH and uric acid since these conditions can have some effect on the skin and the levels have previously been abnormal.  We will also check GUSTABO. Triamcinolone refilled. Patient can use this twice a day to alleviate any itching that may occur related to the new rash      This note was partially created with the assistance of dictation. This may lead to grammatical or spelling errors. Thang Smith M.D.

## 2022-05-16 DIAGNOSIS — L30.9 DERMATITIS: ICD-10-CM

## 2022-05-16 DIAGNOSIS — E79.0 ELEVATED URIC ACID IN BLOOD: ICD-10-CM

## 2022-05-16 LAB
TSH REFLEX: 4.16 UIU/ML (ref 0.44–3.86)
URIC ACID, SERUM: 7.1 MG/DL (ref 3.4–7)

## 2022-05-17 DIAGNOSIS — E03.4 ATROPHY OF THYROID (ACQUIRED): ICD-10-CM

## 2022-05-17 DIAGNOSIS — R79.89 ABNORMAL TSH: Primary | ICD-10-CM

## 2022-05-18 LAB — ANA IGG, ELISA: NORMAL

## 2022-05-23 ENCOUNTER — NURSE ONLY (OUTPATIENT)
Dept: FAMILY MEDICINE CLINIC | Age: 67
End: 2022-05-23
Payer: MEDICARE

## 2022-05-23 DIAGNOSIS — E79.0 ELEVATED URIC ACID IN BLOOD: Primary | ICD-10-CM

## 2022-05-23 DIAGNOSIS — R79.89 LOW TESTOSTERONE: Primary | ICD-10-CM

## 2022-05-23 PROCEDURE — 96372 THER/PROPH/DIAG INJ SC/IM: CPT | Performed by: FAMILY MEDICINE

## 2022-05-23 RX ORDER — TESTOSTERONE CYPIONATE 200 MG/ML
200 INJECTION INTRAMUSCULAR ONCE
Status: COMPLETED | OUTPATIENT
Start: 2022-05-23 | End: 2022-05-23

## 2022-05-23 RX ORDER — ALLOPURINOL 100 MG/1
100 TABLET ORAL DAILY
Qty: 90 TABLET | Refills: 1 | Status: SHIPPED | OUTPATIENT
Start: 2022-05-23 | End: 2022-08-11 | Stop reason: ALTCHOICE

## 2022-05-23 RX ADMIN — TESTOSTERONE CYPIONATE 200 MG: 200 INJECTION INTRAMUSCULAR at 14:06

## 2022-05-23 NOTE — PROGRESS NOTES
Patient given Testosterone 200mg IM right gluteal..  Will return in 2 weeks for next injection    Patient tolerated injection well.     Administrations This Visit     testosterone cypionate (DEPOTESTOTERONE CYPIONATE) injection 200 mg     Admin Date  05/23/2022 Action  Given Dose  200 mg Route  Intra[facility] Administered By  Celine Moreira LPN

## 2022-06-06 ENCOUNTER — NURSE ONLY (OUTPATIENT)
Dept: FAMILY MEDICINE CLINIC | Age: 67
End: 2022-06-06
Payer: MEDICARE

## 2022-06-06 DIAGNOSIS — R79.89 LOW TESTOSTERONE: Primary | ICD-10-CM

## 2022-06-06 PROCEDURE — 96372 THER/PROPH/DIAG INJ SC/IM: CPT | Performed by: FAMILY MEDICINE

## 2022-06-06 RX ORDER — TESTOSTERONE CYPIONATE 200 MG/ML
200 INJECTION INTRAMUSCULAR ONCE
Status: COMPLETED | OUTPATIENT
Start: 2022-06-06 | End: 2022-06-06

## 2022-06-06 RX ADMIN — TESTOSTERONE CYPIONATE 200 MG: 200 INJECTION INTRAMUSCULAR at 13:12

## 2022-06-20 ENCOUNTER — NURSE ONLY (OUTPATIENT)
Dept: FAMILY MEDICINE CLINIC | Age: 67
End: 2022-06-20
Payer: MEDICARE

## 2022-06-20 DIAGNOSIS — R79.89 LOW TESTOSTERONE: Primary | ICD-10-CM

## 2022-06-20 PROCEDURE — 96372 THER/PROPH/DIAG INJ SC/IM: CPT | Performed by: FAMILY MEDICINE

## 2022-06-20 RX ORDER — TESTOSTERONE CYPIONATE 200 MG/ML
200 INJECTION INTRAMUSCULAR ONCE
Status: COMPLETED | OUTPATIENT
Start: 2022-06-20 | End: 2022-06-20

## 2022-06-20 RX ADMIN — TESTOSTERONE CYPIONATE 200 MG: 200 INJECTION INTRAMUSCULAR at 13:27

## 2022-06-20 NOTE — PROGRESS NOTES
Patient given Testosterone 200mg IM right gluteal..  Will return in 2 weeks for next injection    Patient tolerated injection well.

## 2022-07-11 ENCOUNTER — NURSE ONLY (OUTPATIENT)
Dept: FAMILY MEDICINE CLINIC | Age: 67
End: 2022-07-11
Payer: MEDICARE

## 2022-07-11 ENCOUNTER — OFFICE VISIT (OUTPATIENT)
Dept: FAMILY MEDICINE CLINIC | Age: 67
End: 2022-07-11
Payer: MEDICARE

## 2022-07-11 VITALS — BODY MASS INDEX: 37.52 KG/M2 | WEIGHT: 277 LBS | TEMPERATURE: 98.6 F | HEIGHT: 72 IN

## 2022-07-11 DIAGNOSIS — R79.89 LOW TESTOSTERONE: Primary | ICD-10-CM

## 2022-07-11 DIAGNOSIS — L27.0 DRUG ERUPTION: Primary | ICD-10-CM

## 2022-07-11 PROCEDURE — 96372 THER/PROPH/DIAG INJ SC/IM: CPT | Performed by: FAMILY MEDICINE

## 2022-07-11 PROCEDURE — 1123F ACP DISCUSS/DSCN MKR DOCD: CPT | Performed by: FAMILY MEDICINE

## 2022-07-11 PROCEDURE — 99214 OFFICE O/P EST MOD 30 MIN: CPT | Performed by: FAMILY MEDICINE

## 2022-07-11 RX ORDER — TESTOSTERONE CYPIONATE 200 MG/ML
200 INJECTION INTRAMUSCULAR ONCE
Status: COMPLETED | OUTPATIENT
Start: 2022-07-11 | End: 2022-07-11

## 2022-07-11 RX ADMIN — TESTOSTERONE CYPIONATE 200 MG: 200 INJECTION INTRAMUSCULAR at 13:00

## 2022-07-11 NOTE — PROGRESS NOTES
Patient given Testosterone 200mg IM left gluteal..  Will return in 2 weeks for next injection    Patient tolerated injection well.     Administrations This Visit     testosterone cypionate (DEPOTESTOTERONE CYPIONATE) injection 200 mg     Admin Date  07/11/2022 Action  Given Dose  200 mg Route  Intra[facility] Administered By  Jennifer Huston LPN

## 2022-07-11 NOTE — PATIENT INSTRUCTIONS
We discussed at length possible ingestible items, mostly medications, that could be causing this continuation of rash. It is not likely that his allopurinol as this was started after the rash began. Patient will remain off of his calcium and his omega 3 fatty acid fish oils as these are both seafood related and have the potential to cause allergy. We will monitor patient's response to recent testosterone injection. If his rash worsens at all over the next 7 days testosterone will be suspect for causing the allergy. Since the itching is not uncontrollable we will not utilize any steroids at this time as that might confuse the picture    Lastly we may look at discontinuing aspirin for 2 weeks as salicylate allergy is fairly common as well. Strong suspicion for drug eruption reaction.

## 2022-07-11 NOTE — PROGRESS NOTES
Diagnosis Orders   1. Drug eruption       Return for 7-10 day f/u . Patient Instructions   We discussed at length possible ingestible items, mostly medications, that could be causing this continuation of rash. It is not likely that his allopurinol as this was started after the rash began. Patient will remain off of his calcium and his omega 3 fatty acid fish oils as these are both seafood related and have the potential to cause allergy. We will monitor patient's response to recent testosterone injection. If his rash worsens at all over the next 7 days testosterone will be suspect for causing the allergy. Since the itching is not uncontrollable we will not utilize any steroids at this time as that might confuse the picture    Lastly we may look at discontinuing aspirin for 2 weeks as salicylate allergy is fairly common as well. Strong suspicion for drug eruption reaction. Subjective:      Patient ID: Shirley Serrano is a 74 y.o. male who presents for:  Chief Complaint   Patient presents with    Skin Exam    Rash     on arms since he has been out of the hospital        Patient is here for continued rash. He had an illness and infection that required vancomycin. 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 As this rash was resolving he began with a new rash that was described by the original provider who saw it as maculopapular. This was in April. He was not yet on allopurinol. Patient states this rash can be completely flat in the morning. Does tend to keep some color to it. It does itch. He can control himself from itching but he does scratch it and he thinks the skin has been getting thick in response to that    Examination of his medications reveal a few that have potential for allergy stimulation. We have discussed this at length.       Current Outpatient Medications on File Prior to Visit   Medication Sig Dispense Refill    allopurinol (ZYLOPRIM) 100 MG tablet Take 1 tablet by mouth daily 90 tablet 1    triamcinolone (KENALOG) 0.1 % cream Apply topically qam daily Monday through Friday only. Take weekend off. Do not use on face, groin, armpits, breasts tissue. 454 g 0    famotidine (PEPCID) 20 MG tablet Take 1 tablet by mouth 2 times daily 60 tablet 0    ascorbic acid (VITAMIN C) 500 MG tablet Take 500 mg by mouth daily      vitamin B-12 (CYANOCOBALAMIN) 500 MCG tablet Take 500 mcg by mouth daily      zinc 50 MG TABS tablet Take 50 mg by mouth daily      aspirin 325 MG EC tablet Take 1 tablet by mouth daily 1 tablet 0    testosterone cypionate (DEPOTESTOTERONE CYPIONATE) 200 MG/ML injection Inject 1 mL into the muscle every 14 days for 90 doses. 8 mL 0    Syringe/Needle, Disp, (SYRINGE 3CC/92OA2-9/2\") 22G X 1-1/2\" 3 ML MISC 1 actuation by Does not apply route every 14 days 2 each 5    vitamin E 1000 units capsule Take 1,000 Units by mouth daily      vitamin D (CHOLECALCIFEROL) 1000 UNIT TABS tablet Take 6,000 Units by mouth daily      Multiple Vitamins-Minerals (ONE-A-DAY MENS HEALTH FORMULA) TABS Take by mouth      magnesium (MAGNESIUM-OXIDE) 250 MG TABS tablet Take 250 mg by mouth daily Take 3 pills daily      calcium carbonate (OYSTER SHELL CALCIUM 500 MG) 1250 MG tablet Take 1 tablet by mouth daily      Glucosamine-Chondroitin 1792-0594 MG/30ML LIQD Take by mouth Take 3 pills daily      Omega-3 Fatty Acids (FISH OIL) 1200 MG CAPS Take  by mouth daily. No current facility-administered medications on file prior to visit.      Past Medical History:   Diagnosis Date    BPH (benign prostatic hyperplasia) 2/3/2012    no treatment at this time    Chronic back pain     Colon polyp 09/2017    Dr. Ashlee Napoles; f/u 5 years    Eczematous dermatitis     hands, feet and legs    ED (erectile dysfunction) 2/3/2012    Erectile dysfunction     Family history of early CAD 2/3/2012    Gastroesophageal Exercise per Session: Not on file   Stress:     Feeling of Stress : Not on file   Social Connections:     Frequency of Communication with Friends and Family: Not on file    Frequency of Social Gatherings with Friends and Family: Not on file    Attends [employer] Services: Not on file    Active Member of [address] [place] [address] [employer] or Organizations: Not on file    Attends Club or Organization Meetings: Not on file    Marital Status: Not on file   Intimate Partner Violence:     Fear of Current or Ex-Partner: Not on file    Emotionally Abused: Not on file    Physically Abused: Not on file    Sexually Abused: Not on file   Housing Stability:     Unable to Pay for Housing in the Last Year: Not on file    Number of [place] in the Last Year: Not on file    Unstable Housing in the Last Year: Not on file     Family History   Problem Relation Age of Onset    Depression Mother     Heart Disease Paternal Grandmother     High Blood Pressure Paternal Grandmother     High Cholesterol Paternal Grandmother     Anemia Paternal Grandmother         B 12 deficiency    Heart Disease Brother     Heart Surgery Brother     Anemia Brother         B 12 deficiency     Allergies:  Vancomycin    Review of Systems   Constitutional: Negative for chills and fever. Skin: Positive for rash. Allergic/Immunologic: Negative for environmental allergies, food allergies and immunocompromised state. Hematological: Negative for adenopathy. Does not bruise/bleed easily. Psychiatric/Behavioral: Negative for behavioral problems and sleep disturbance. Objective:   Temp 98.6 °F (37 °C)   Ht 6' (1.829 m)   Wt 277 lb (125.6 kg)   BMI 37.57 kg/m²     Physical Exam  Constitutional:       General: He is not in acute distress. Appearance: Normal appearance. He is well-developed. He is not toxic-appearing. HENT:      Head: Normocephalic and atraumatic.       Right Ear: Hearing and tympanic membrane normal.      Left Ear: Hearing and tympanic membrane normal.      Nose: Nose normal. No nasal deformity. Eyes:      General: Lids are normal.         Right eye: No discharge. Left eye: No discharge. Conjunctiva/sclera: Conjunctivae normal.      Pupils: Pupils are equal, round, and reactive to light. Neck:      Thyroid: No thyroid mass or thyromegaly. Vascular: No JVD. Trachea: Trachea and phonation normal.   Cardiovascular:      Rate and Rhythm: Normal rate and regular rhythm. Pulmonary:      Effort: No accessory muscle usage or respiratory distress. Musculoskeletal:      Cervical back: Full passive range of motion without pain. Comments: FROM all large muscle groups and joints as witnessed when walking to exam table, getting on, and getting off the exam table. Skin:     General: Skin is warm and dry. Findings: No rash. Comments: Raised erythematous rash bilateral forearms that blends into macular erythematous rash. Overlying peeling noted. Similar finding in the lumbar area of the back. Anterior torso has more macular erythematous lesions. As well as lower extremities. See pictures below. Neurological:      Mental Status: He is alert. Motor: No tremor or atrophy. Gait: Gait normal.   Psychiatric:         Speech: Speech normal.         Behavior: Behavior normal.         Thought Content: Thought content normal.                                         No results found for this visit on 07/11/22.     Recent Results (from the past 2016 hour(s))   Testosterone, free, total    Collection Time: 05/04/22  1:48 PM   Result Value Ref Range    Testosterone 966 220 - 1,000 ng/dL    Sex Hormone Binding 45 11 - 80 nmol/L    Testosterone, Free 191.4 47 - 244 pg/mL   Uric Acid    Collection Time: 05/04/22  6:04 PM   Result Value Ref Range    Uric Acid, Serum 7.3 (H) 3.4 - 7.0 mg/dL   GUSTABO Screen With Reflex    Collection Time: 05/16/22  1:01 PM   Result Value Ref Range    GUSTABO Ab, IgG MONA None Detected None Detected   TSH with Reflex    Collection Time: 05/16/22  1:01 PM   Result Value Ref Range    TSH 4.160 (H) 0.440 - 3.860 uIU/mL   Uric Acid    Collection Time: 05/16/22  1:01 PM   Result Value Ref Range    Uric Acid, Serum 7.1 (H) 3.4 - 7.0 mg/dL       [x] Pt was seen by provider for 30    Minutes  Counseling and coordination of care was done for all assessment diagnosis listed for today with patient and any family/friend present. More than 50% of this visit was spent coordinating current care, obtaining information for prior records, and counseling for current plan of action. Assessment:       Diagnosis Orders   1. Drug eruption           No orders of the defined types were placed in this encounter. No orders of the defined types were placed in this encounter. Medication List          Accurate as of July 11, 2022  6:29 PM. If you have any questions, ask your nurse or doctor. CONTINUE taking these medications    allopurinol 100 MG tablet  Commonly known as: ZYLOPRIM  Take 1 tablet by mouth daily     ascorbic acid 500 MG tablet  Commonly known as: VITAMIN C     aspirin 325 MG EC tablet  Take 1 tablet by mouth daily     calcium carbonate 1250 (500 Ca) MG tablet  Commonly known as: OYSTER SHELL CALCIUM 500 mg     famotidine 20 MG tablet  Commonly known as: Pepcid  Take 1 tablet by mouth 2 times daily     Fish Oil 1200 MG Caps     Glucosamine-Chondroitin 5475-4219 MG/30ML Liqd     magnesium 250 MG Tabs tablet  Commonly known as: MAGNESIUM-OXIDE     One-A-Day Mens Health Formula Tabs     SYRINGE 3CC/68KB3-0/2\" 22G X 1-1/2\" 3 ML Misc  1 actuation by Does not apply route every 14 days     testosterone cypionate 200 MG/ML injection  Commonly known as: DEPOTESTOTERONE CYPIONATE  Inject 1 mL into the muscle every 14 days for 90 doses. triamcinolone 0.1 % cream  Commonly known as: KENALOG  Apply topically qam daily Monday through Friday only. Take weekend off.   Do not use on face, groin, armpits, breasts tissue. vitamin B-12 500 MCG tablet  Commonly known as: CYANOCOBALAMIN     vitamin D 1000 UNIT Tabs tablet  Commonly known as: CHOLECALCIFEROL     vitamin E 1000 units capsule     zinc 50 MG Tabs tablet              Plan:   Return for 7-10 day f/u . Patient Instructions   We discussed at length possible ingestible items, mostly medications, that could be causing this continuation of rash. It is not likely that his allopurinol as this was started after the rash began. Patient will remain off of his calcium and his omega 3 fatty acid fish oils as these are both seafood related and have the potential to cause allergy. We will monitor patient's response to recent testosterone injection. If his rash worsens at all over the next 7 days testosterone will be suspect for causing the allergy. Since the itching is not uncontrollable we will not utilize any steroids at this time as that might confuse the picture    Lastly we may look at discontinuing aspirin for 2 weeks as salicylate allergy is fairly common as well. Strong suspicion for drug eruption reaction. This note was partially created with the assistance of dictation. This may lead to grammatical or spelling errors. Thang Peraza M.D.

## 2022-07-25 ENCOUNTER — NURSE ONLY (OUTPATIENT)
Dept: FAMILY MEDICINE CLINIC | Age: 67
End: 2022-07-25
Payer: MEDICARE

## 2022-07-25 DIAGNOSIS — R79.89 LOW TESTOSTERONE: Primary | ICD-10-CM

## 2022-07-25 PROCEDURE — 96372 THER/PROPH/DIAG INJ SC/IM: CPT | Performed by: FAMILY MEDICINE

## 2022-07-25 RX ORDER — TESTOSTERONE CYPIONATE 200 MG/ML
200 INJECTION INTRAMUSCULAR ONCE
Status: COMPLETED | OUTPATIENT
Start: 2022-07-25 | End: 2022-07-25

## 2022-07-25 RX ADMIN — TESTOSTERONE CYPIONATE 200 MG: 200 INJECTION INTRAMUSCULAR at 13:16

## 2022-07-25 NOTE — PROGRESS NOTES
Patient given Testosterone 200mg IM right gluteal..  Will return in 2 weeks for next injection    Patient tolerated injection well.     Administrations This Visit       testosterone cypionate (DEPOTESTOTERONE CYPIONATE) injection 200 mg       Admin Date  07/25/2022 Action  Given Dose  200 mg Route  IntraMUSCular Administered By  Anel Laurent LPN

## 2022-07-26 ENCOUNTER — OFFICE VISIT (OUTPATIENT)
Dept: FAMILY MEDICINE CLINIC | Age: 67
End: 2022-07-26
Payer: MEDICARE

## 2022-07-26 VITALS
HEART RATE: 97 BPM | BODY MASS INDEX: 37.52 KG/M2 | TEMPERATURE: 99.4 F | WEIGHT: 277 LBS | HEIGHT: 72 IN | OXYGEN SATURATION: 99 %

## 2022-07-26 DIAGNOSIS — R79.89 ABNORMAL TSH: ICD-10-CM

## 2022-07-26 DIAGNOSIS — L30.9 DERMATITIS: ICD-10-CM

## 2022-07-26 DIAGNOSIS — E03.4 ATROPHY OF THYROID (ACQUIRED): ICD-10-CM

## 2022-07-26 DIAGNOSIS — L30.9 DERMATITIS: Primary | ICD-10-CM

## 2022-07-26 LAB — TSH REFLEX: 4.01 UIU/ML (ref 0.44–3.86)

## 2022-07-26 PROCEDURE — 1123F ACP DISCUSS/DSCN MKR DOCD: CPT | Performed by: FAMILY MEDICINE

## 2022-07-26 PROCEDURE — 99214 OFFICE O/P EST MOD 30 MIN: CPT | Performed by: FAMILY MEDICINE

## 2022-07-26 PROCEDURE — 11104 PUNCH BX SKIN SINGLE LESION: CPT | Performed by: FAMILY MEDICINE

## 2022-07-26 NOTE — PATIENT INSTRUCTIONS
Continue with non - drying skin soaps for bathing until results returned    Labs to be drawn to look for allergens source. Both punch biopsy done for further investigation.

## 2022-07-26 NOTE — PROGRESS NOTES
Diagnosis Orders   1. Dermatitis  Allergen, Food, Comprehensive Profile 1    IgE    IN PUNCH BIOPSY SKIN SINGLE LESION    Specimen to Pathology Outpatient        Return for Based on test results. Patient Instructions   Continue with non - drying skin soaps for bathing until results returned    Labs to be drawn to look for allergens source. Both punch biopsy done for further investigation. Subjective:      Patient ID: Chong Mondragon is a 74 y.o. male who presents for:  Chief Complaint   Patient presents with    Rash     Slightly improved, itchy       Pt returns today having stopped calcium and omega supplements without improvement of rash on back. Other areas remain dry and itchy       Current Outpatient Medications on File Prior to Visit   Medication Sig Dispense Refill    allopurinol (ZYLOPRIM) 100 MG tablet Take 1 tablet by mouth daily 90 tablet 1    triamcinolone (KENALOG) 0.1 % cream Apply topically qam daily Monday through Friday only. Take weekend off. Do not use on face, groin, armpits, breasts tissue. 454 g 0    famotidine (PEPCID) 20 MG tablet Take 1 tablet by mouth 2 times daily 60 tablet 0    ascorbic acid (VITAMIN C) 500 MG tablet Take 500 mg by mouth daily      vitamin B-12 (CYANOCOBALAMIN) 500 MCG tablet Take 500 mcg by mouth daily      zinc 50 MG TABS tablet Take 50 mg by mouth daily      aspirin 325 MG EC tablet Take 1 tablet by mouth daily 1 tablet 0    testosterone cypionate (DEPOTESTOTERONE CYPIONATE) 200 MG/ML injection Inject 1 mL into the muscle every 14 days for 90 doses.  8 mL 0    Syringe/Needle, Disp, (SYRINGE 3CC/35GI8-7/2\") 22G X 1-1/2\" 3 ML MISC 1 actuation by Does not apply route every 14 days 2 each 5    vitamin E 1000 units capsule Take 1,000 Units by mouth daily      vitamin D (CHOLECALCIFEROL) 1000 UNIT TABS tablet Take 6,000 Units by mouth daily      Multiple Vitamins-Minerals (ONE-A-DAY MENS HEALTH FORMULA) TABS Take by mouth      magnesium (MAGNESIUM-OXIDE) 250 MG TABS tablet Take 250 mg by mouth daily Take 3 pills daily      calcium carbonate (OYSTER SHELL CALCIUM 500 MG) 1250 MG tablet Take 1 tablet by mouth daily      Glucosamine-Chondroitin 2346-9890 MG/30ML LIQD Take by mouth Take 3 pills daily      Omega-3 Fatty Acids (FISH OIL) 1200 MG CAPS Take  by mouth daily. No current facility-administered medications on file prior to visit. Past Medical History:   Diagnosis Date    BPH (benign prostatic hyperplasia) 2/3/2012    no treatment at this time    Chronic back pain     Colon polyp 2017    Dr. Marlin Conner; f/u 5 years    Eczematous dermatitis     hands, feet and legs    ED (erectile dysfunction) 2/3/2012    Erectile dysfunction     Family history of early CAD 2/3/2012    Gastroesophageal reflux disease with hiatal hernia     History of hypertension     History of prediabetes     History of renal insufficiency syndrome     Obesity (BMI 30-39. 9)     Osteoarthritis     Vitamin D deficiency     Weight gain      Past Surgical History:   Procedure Laterality Date    COLONOSCOPY  2006    FOOT DEBRIDEMENT Right 2022    FOOT DEBRIDEMENT INCISION AND DRAINAGE performed by Donavan Parrish DPM at [address] IND N/A 9/15/2017    COLONOSCOPY performed by Toni Gupta MD at [address] [facility]  14    bx, dilation jarmoszuk    UPPER GASTROINTESTINAL ENDOSCOPY N/A 9/15/2017    EGD DILATION [facility] and biopsy performed by Toni Gupta MD at [address] [place] [facility] History    Marital status:      Spouse name: Not on file    Number of children: 1    Years of education: Not on file    Highest education level: Not on file   Occupational History    Not on file   Tobacco Use    Smoking status: Former     Packs/day: 2.00     Years: 5.00     Pack years: 10.00     Types: Cigarettes     Quit date: 1990     Years since quittin.5    Smokeless tobacco: Never Vaping Use    Vaping Use: Never used   Substance and Sexual Activity    Alcohol use: No    Drug use: No    Sexual activity: Yes     Partners: Female   Other Topics Concern    Not on file   Social History Narrative    Not on file     Social Determinants of Health     Financial Resource Strain: Low Risk     Difficulty of Paying Living Expenses: Not hard at all   Food Insecurity: No Food Insecurity    Worried About Running Out of Food in the Last Year: Never true    Ran Out of Food in the Last Year: Never true   Transportation Needs: Not on file   Physical Activity: Not on file   Stress: Not on file   Social Connections: Not on file   Intimate Partner Violence: Not on file   Housing Stability: Not on file     Family History   Problem Relation Age of Onset    Depression Mother     Heart Disease Paternal Grandmother     High Blood Pressure Paternal Grandmother     High Cholesterol Paternal Grandmother     Anemia Paternal Grandmother         B 12 deficiency    Heart Disease Brother     Heart Surgery Brother     Anemia Brother         B 12 deficiency     Allergies:  Vancomycin    Review of Systems   Constitutional:  Negative for chills and fever. Allergic/Immunologic: Negative for environmental allergies, food allergies and immunocompromised state. Hematological:  Negative for adenopathy. Does not bruise/bleed easily. Psychiatric/Behavioral:  Negative for behavioral problems and sleep disturbance. Objective:   Pulse 97   Temp 99.4 °F (37.4 °C) (Tympanic)   Ht 6' (1.829 m)   Wt 277 lb (125.6 kg)   SpO2 99%   BMI 37.57 kg/m²     Physical Exam  Constitutional:       General: He is not in acute distress. Appearance: Normal appearance. He is well-developed. He is not toxic-appearing. HENT:      Head: Normocephalic and atraumatic. Right Ear: Hearing and tympanic membrane normal.      Left Ear: Hearing and tympanic membrane normal.      Nose: Nose normal. No nasal deformity.    Eyes:      General: Lids are normal.         Right eye: No discharge. Left eye: No discharge. Conjunctiva/sclera: Conjunctivae normal.      Pupils: Pupils are equal, round, and reactive to light. Neck:      Thyroid: No thyroid mass or thyromegaly. Vascular: No JVD. Trachea: Trachea and phonation normal.   Cardiovascular:      Rate and Rhythm: Normal rate and regular rhythm. Pulmonary:      Effort: No accessory muscle usage or respiratory distress. Musculoskeletal:      Cervical back: Full passive range of motion without pain. Comments: FROM all large muscle groups and joints as witnessed when walking to exam table, getting on, and getting off the exam table. Skin:     General: Skin is warm and dry. Findings: No rash. Comments: The picture below. Thickened leathery changes in the skin of the back. Biopsy done right lumbar region   Neurological:      Mental Status: He is alert. Motor: No tremor or atrophy. Gait: Gait normal.   Psychiatric:         Speech: Speech normal.         Behavior: Behavior normal.         Thought Content: Thought content normal.             No results found for this visit on 07/26/22.     Recent Results (from the past 2016 hour(s))   Testosterone, free, total    Collection Time: 05/04/22  1:48 PM   Result Value Ref Range    Testosterone 966 220 - 1,000 ng/dL    Sex Hormone Binding 45 11 - 80 nmol/L    Testosterone, Free 191.4 47 - 244 pg/mL   Uric Acid    Collection Time: 05/04/22  6:04 PM   Result Value Ref Range    Uric Acid, Serum 7.3 (H) 3.4 - 7.0 mg/dL   GUSTABO Screen With Reflex    Collection Time: 05/16/22  1:01 PM   Result Value Ref Range    GUSTABO Ab, IgG MONA None Detected None Detected   TSH with Reflex    Collection Time: 05/16/22  1:01 PM   Result Value Ref Range    TSH 4.160 (H) 0.440 - 3.860 uIU/mL   Uric Acid    Collection Time: 05/16/22  1:01 PM   Result Value Ref Range    Uric Acid, Serum 7.1 (H) 3.4 - 7.0 mg/dL       [] Pt was seen by provider for      Minutes  Counseling and coordination of care was done for all assessment diagnosis listed for today with patient and any family/friend present. More than 50% of this visit was spent coordinating current care, obtaining information for prior records, and counseling for current plan of action. Assessment:       Diagnosis Orders   1. Dermatitis  Allergen, Food, Comprehensive Profile 1    IgE    [place] PUNCH BIOPSY SKIN SINGLE LESION    Specimen to Pathology Outpatient            Orders Placed This Encounter   Procedures    Allergen, Food, Comprehensive Profile 1     Be sure contains wheat as an item tested     Standing Status:   Future     Standing Expiration Date:   7/26/2023    IgE     Standing Status:   Future     Standing Expiration Date:   7/26/2023    Specimen to Pathology Outpatient     Lumbar back     Standing Status:   Future     Standing Expiration Date:   7/26/2023     Order Specific Question:   PREVIOUS BIOPSY     Answer:   No     Order Specific Question:   PREOP DIAGNOSIS     Answer:   dermatitis     Order Specific Question:   FROZEN SECTION - NO OR YES/SPECIMEN     Answer:   No    [place] PUNCH BIOPSY SKIN SINGLE LESION         No orders of the defined types were placed in this encounter. Medication List            Accurate as of July 26, 2022  1:54 PM. If you have any questions, ask your nurse or doctor.                 CONTINUE taking these medications      allopurinol 100 MG tablet  Commonly known as: ZYLOPRIM  Take 1 tablet by mouth daily     ascorbic acid 500 MG tablet  Commonly known as: VITAMIN C     aspirin 325 MG EC tablet  Take 1 tablet by mouth daily     calcium carbonate 1250 (500 Ca) MG tablet  Commonly known as: OYSTER SHELL CALCIUM 500 mg     famotidine 20 MG tablet  Commonly known as: Pepcid  Take 1 tablet by mouth 2 times daily     Fish Oil 1200 MG Caps     Glucosamine-Chondroitin 7857-8654 MG/30ML Liqd     magnesium 250 MG Tabs tablet  Commonly known as: MAGNESIUM-OXIDE     One-A-Day Mens Health Formula Tabs     SYRINGE 3CC/84UL6-2/2\" 22G X 1-1/2\" 3 ML Misc  1 actuation by Does not apply route every 14 days     testosterone cypionate 200 MG/ML injection  Commonly known as: DEPOTESTOTERONE CYPIONATE  Inject 1 mL into the muscle every 14 days for 90 doses. triamcinolone 0.1 % cream  Commonly known as: KENALOG  Apply topically qam daily Monday through Friday only. Take weekend off. Do not use on face, groin, armpits, breasts tissue. vitamin B-12 500 MCG tablet  Commonly known as: CYANOCOBALAMIN     vitamin D 1000 UNIT Tabs tablet  Commonly known as: CHOLECALCIFEROL     vitamin E 1000 units capsule     zinc 50 MG Tabs tablet                Plan:   Return for Based on test results. Patient Instructions   Continue with non - drying skin soaps for bathing until results returned    Labs to be drawn to look for allergens source. Both punch biopsy done for further investigation. This note was partially created with the assistance of dictation. This may lead to grammatical or spelling errors. Thang Stringer M.D.

## 2022-07-28 LAB
ALLERGEN BARLEY IGE: <0.1 KU/L
ALLERGEN BEEF: <0.1 KU/L
ALLERGEN CABBAGE IGE: <0.1 KU/L
ALLERGEN CARROT IGE: <0.1 KU/L
ALLERGEN CHICKEN IGE: <0.1 KU/L
ALLERGEN CODFISH IGE: <0.1 KU/L
ALLERGEN CORN IGE: <0.1 KU/L
ALLERGEN COW MILK IGE: <0.1 KU/L
ALLERGEN CRAB IGE: <0.1 KU/L
ALLERGEN EGG WHITE IGE: <0.1 KU/L
ALLERGEN GRAPE IGE: <0.1 KU/L
ALLERGEN LETTUCE IGE: <0.1 KU/L
ALLERGEN NAVY BEAN: <0.1 KU/L
ALLERGEN OAT: <0.1 KU/L
ALLERGEN ORANGE IGE: <0.1 KU/L
ALLERGEN PEANUT (F13) IGE: <0.1 KU/L
ALLERGEN PEPPER C. ANNUUM IGE: <0.1 KU/L
ALLERGEN PORK: <0.1 KU/L
ALLERGEN RICE IGE: <0.1 KU/L
ALLERGEN RYE IGE: <0.1 KU/L
ALLERGEN SOYBEAN IGE: <0.1 KU/L
ALLERGEN TOMATO IGE: <0.1 KU/L
ALLERGEN TUNA IGE: <0.1 KU/L
ALLERGEN WHEAT IGE: <0.1 KU/L
IGE: 6 IU/ML
POTATO, IGE: <0.1 KU/L
SHRIMP: <0.1 KU/L

## 2022-07-29 DIAGNOSIS — E03.4 ATROPHY OF THYROID (ACQUIRED): Primary | ICD-10-CM

## 2022-07-29 LAB — IGE: 8 KU/L

## 2022-07-29 RX ORDER — LEVOTHYROXINE SODIUM 0.03 MG/1
25 TABLET ORAL DAILY
Qty: 30 TABLET | Refills: 5 | Status: SHIPPED | OUTPATIENT
Start: 2022-07-29

## 2022-07-29 NOTE — RESULT ENCOUNTER NOTE
No food allergies found. Did find thyroid remains slightly low functioning. This can have swelling, skin changes, and other problems with it.   Start levothryroxine sent to the pharmacy daily on an empty stomach, only with water for 30 min, then may eat/meds/etc.  Recheck tsh in 6 weeks

## 2022-08-08 ENCOUNTER — NURSE ONLY (OUTPATIENT)
Dept: FAMILY MEDICINE CLINIC | Age: 67
End: 2022-08-08
Payer: MEDICARE

## 2022-08-08 DIAGNOSIS — R79.89 LOW TESTOSTERONE: Primary | ICD-10-CM

## 2022-08-08 DIAGNOSIS — E34.9 TESTOSTERONE DEFICIENCY: ICD-10-CM

## 2022-08-08 PROCEDURE — 96372 THER/PROPH/DIAG INJ SC/IM: CPT | Performed by: FAMILY MEDICINE

## 2022-08-08 RX ORDER — TESTOSTERONE CYPIONATE 200 MG/ML
200 INJECTION INTRAMUSCULAR ONCE
Status: COMPLETED | OUTPATIENT
Start: 2022-08-08 | End: 2022-08-08

## 2022-08-08 RX ADMIN — TESTOSTERONE CYPIONATE 200 MG: 200 INJECTION INTRAMUSCULAR at 14:27

## 2022-08-09 LAB
SEX HORMONE BINDING GLOBULIN: 33 NMOL/L (ref 11–80)
TESTOSTERONE FREE-NONMALE: 97.8 PG/ML (ref 47–244)
TESTOSTERONE TOTAL: 470 NG/DL (ref 220–1000)

## 2022-08-09 NOTE — RESULT ENCOUNTER NOTE
Result reviewed. Notify pt normal.  Testosterone supplementation dosing is appropriate no further action at this time. If the patient notices a value in labs that is flagged as abnormal and I am not commenting on it that is because it is medically insignificant. It does not follow a pattern that adds up to a medical problem.     Repeat testosterone labs in 3 to 6 months

## 2022-08-11 ENCOUNTER — OFFICE VISIT (OUTPATIENT)
Dept: FAMILY MEDICINE CLINIC | Age: 67
End: 2022-08-11
Payer: MEDICARE

## 2022-08-11 VITALS — HEIGHT: 72 IN | WEIGHT: 277 LBS | BODY MASS INDEX: 37.52 KG/M2 | TEMPERATURE: 98.9 F

## 2022-08-11 DIAGNOSIS — R79.89 LOW TESTOSTERONE: ICD-10-CM

## 2022-08-11 DIAGNOSIS — L30.9 ECZEMA, UNSPECIFIED TYPE: ICD-10-CM

## 2022-08-11 DIAGNOSIS — Z12.5 PROSTATE CANCER SCREENING: ICD-10-CM

## 2022-08-11 DIAGNOSIS — L30.1 DYSHIDROTIC ECZEMA: ICD-10-CM

## 2022-08-11 DIAGNOSIS — L28.0 LICHENOID DERMATITIS: Primary | ICD-10-CM

## 2022-08-11 DIAGNOSIS — E55.9 VITAMIN D DEFICIENCY: ICD-10-CM

## 2022-08-11 DIAGNOSIS — R79.89 ABNORMAL TSH: ICD-10-CM

## 2022-08-11 PROCEDURE — 1123F ACP DISCUSS/DSCN MKR DOCD: CPT | Performed by: FAMILY MEDICINE

## 2022-08-11 PROCEDURE — 99214 OFFICE O/P EST MOD 30 MIN: CPT | Performed by: FAMILY MEDICINE

## 2022-08-11 RX ORDER — TRIAMCINOLONE ACETONIDE 1 MG/G
CREAM TOPICAL
Qty: 454 G | Refills: 0 | Status: SHIPPED | OUTPATIENT
Start: 2022-08-11

## 2022-08-11 ASSESSMENT — ENCOUNTER SYMPTOMS
SHORTNESS OF BREATH: 0
ABDOMINAL DISTENTION: 0
ABDOMINAL PAIN: 0
CHEST TIGHTNESS: 0
PHOTOPHOBIA: 0

## 2022-08-11 NOTE — PROGRESS NOTES
Diagnosis Orders   1. Lichenoid dermatitis        2. Eczema, unspecified type  triamcinolone (KENALOG) 0.1 % cream      3. Dyshidrotic eczema  triamcinolone (KENALOG) 0.1 % cream      4. Vitamin D deficiency  Vitamin D 25 Hydroxy      5. Low testosterone  Testosterone, free, total    CBC    Comprehensive Metabolic Panel    Ferritin      6. Abnormal TSH        7. Prostate cancer screening  PSA Screening        Return for for routine major medical condition management. Patient Instructions   Patient has been using Epson salt for muscle and skin resolution and it has been helpful. He is due for routine laboratories in October. He has been reminded to get his thyroid lab checked at the end of this month. At this point he does not desire a trial of medication avoidance in order to change to skin condition which looks like it may be drug eruption related. There are no signs of infection so there is no medical emergency mandating that we do this. Subjective:      Patient ID: Dez Diaz is a 74 y.o. male who presents for:  Chief Complaint   Patient presents with    Results     Pathology        Patient is here to review his pathology results. He states his skin feels almost as if it is coming down now. He is not interested in taking additional action at this time. He does use a steroid cream on occasion. Denies any open areas or signs of infection. Did answer his questions regarding the pathology results. Patient recognizes there is still potential for drug allergy but is not interested in eliminating any other options from his regimen. No gout flares being off the allopurinol. Feeling stronger and healthier on testosterone injections. Reviewed the labs that were done just prior to his next injection demonstrating he has good levels.       Current Outpatient Medications on File Prior to Visit   Medication Sig Dispense Refill    levothyroxine (SYNTHROID) 25 MCG tablet Take 1 tablet by mouth in the morning. 30 tablet 5    famotidine (PEPCID) 20 MG tablet Take 1 tablet by mouth 2 times daily 60 tablet 0    ascorbic acid (VITAMIN C) 500 MG tablet Take 500 mg by mouth daily      vitamin B-12 (CYANOCOBALAMIN) 500 MCG tablet Take 500 mcg by mouth daily      zinc 50 MG TABS tablet Take 50 mg by mouth daily      aspirin 325 MG EC tablet Take 1 tablet by mouth daily 1 tablet 0    testosterone cypionate (DEPOTESTOTERONE CYPIONATE) 200 MG/ML injection Inject 1 mL into the muscle every 14 days for 90 doses. 8 mL 0    Syringe/Needle, Disp, (SYRINGE 3CC/97MK2-1/2\") 22G X 1-1/2\" 3 ML MISC 1 actuation by Does not apply route every 14 days 2 each 5    vitamin E 1000 units capsule Take 1,000 Units by mouth daily      vitamin D (CHOLECALCIFEROL) 1000 UNIT TABS tablet Take 6,000 Units by mouth daily      Multiple Vitamins-Minerals (ONE-A-DAY MENS HEALTH FORMULA) TABS Take by mouth      magnesium (MAGNESIUM-OXIDE) 250 MG TABS tablet Take 250 mg by mouth daily Take 3 pills daily      calcium carbonate (OYSTER SHELL CALCIUM 500 MG) 1250 MG tablet Take 1 tablet by mouth daily      Glucosamine-Chondroitin 5214-8062 MG/30ML LIQD Take by mouth Take 3 pills daily      Omega-3 Fatty Acids (FISH OIL) 1200 MG CAPS Take  by mouth daily. No current facility-administered medications on file prior to visit. Past Medical History:   Diagnosis Date    BPH (benign prostatic hyperplasia) 2/3/2012    no treatment at this time    Chronic back pain     Colon polyp 09/2017    Dr. Kenzie Granados; f/u 5 years    Eczematous dermatitis     hands, feet and legs    ED (erectile dysfunction) 2/3/2012    Erectile dysfunction     Family history of early CAD 2/3/2012    Gastroesophageal reflux disease with hiatal hernia     History of hypertension     History of prediabetes     History of renal insufficiency syndrome     Obesity (BMI 30-39. 9)     Osteoarthritis     Vitamin D deficiency     Weight gain      Past Surgical History:   Procedure Laterality Date    COLONOSCOPY  2006    FOOT DEBRIDEMENT Right 2022    FOOT DEBRIDEMENT INCISION AND DRAINAGE performed by Manuel Rodriguez DPM at [facility] [place] [address]  [address] IND N/A 9/15/2017    COLONOSCOPY performed by Alejandra Epperson MD at [facility]  14    bx, dilation jarmoszuk    UPPER GASTROINTESTINAL ENDOSCOPY N/A 9/15/2017    EGD DILATION SAVORY and biopsy performed by Alejandra Epperson MD at [address] History    Marital status:      Spouse name: Not on file    Number of children: 1    Years of education: Not on file    Highest education level: Not on file   Occupational History    Not on file   Tobacco Use    Smoking status: Former     Packs/day: 2.00     Years: 5.00     Pack years: 10.00     Types: Cigarettes     Quit date: 1990     Years since quittin.6    Smokeless tobacco: Never   Vaping Use    Vaping Use: Never used   Substance and Sexual Activity    Alcohol use: No    Drug use: No    Sexual activity: Yes     Partners: Female   Other Topics Concern    Not on file   Social History Narrative    Not on file     Social Determinants of Health     Financial Resource Strain: Low Risk     Difficulty of Paying Living Expenses: Not hard at all   Food Insecurity: No Food Insecurity    Worried About Running Out of Food in the Last Year: Never true    Ran Out of Food in the Last Year: Never true   Transportation Needs: Not on file   Physical Activity: Not on file   Stress: Not on file   Social Connections: Not on file   Intimate Partner Violence: Not on file   Housing Stability: Not on file     Family History   Problem Relation Age of Onset    Depression Mother     Heart Disease Paternal Grandmother     High Blood Pressure Paternal Grandmother     High Cholesterol Paternal Grandmother     Anemia Paternal Grandmother         B 12 deficiency    Heart Disease Brother     Heart Surgery Brother Anemia Brother         B 12 deficiency     Allergies:  Vancomycin    Review of Systems   Constitutional:  Negative for activity change, appetite change, diaphoresis and unexpected weight change. Eyes:  Negative for photophobia and visual disturbance. Respiratory:  Negative for chest tightness and shortness of breath. No orthopnea   Cardiovascular:  Negative for chest pain, palpitations and leg swelling. Gastrointestinal:  Negative for abdominal distention and abdominal pain. Genitourinary:  Negative for flank pain and frequency. Musculoskeletal:  Negative for gait problem and joint swelling. Skin:  Positive for rash. Neurological:  Negative for dizziness, weakness, light-headedness and headaches. Psychiatric/Behavioral:  Negative for confusion. Objective:   Temp 98.9 °F (37.2 °C)   Ht 6' (1.829 m)   Wt 277 lb (125.6 kg)   BMI 37.57 kg/m²     Physical Exam  Constitutional:       General: He is not in acute distress. Appearance: Normal appearance. He is well-developed. He is not toxic-appearing. HENT:      Head: Normocephalic and atraumatic. Right Ear: Hearing and tympanic membrane normal.      Left Ear: Hearing and tympanic membrane normal.      Nose: Nose normal. No nasal deformity. Eyes:      General: Lids are normal.         Right eye: No discharge. Left eye: No discharge. Conjunctiva/sclera: Conjunctivae normal.      Pupils: Pupils are equal, round, and reactive to light. Neck:      Thyroid: No thyroid mass or thyromegaly. Vascular: No JVD. Trachea: Trachea and phonation normal.   Cardiovascular:      Rate and Rhythm: Normal rate and regular rhythm. Pulmonary:      Effort: No accessory muscle usage or respiratory distress. Musculoskeletal:      Cervical back: Full passive range of motion without pain.       Comments: FROM all large muscle groups and joints as witnessed when walking to exam table, getting on, and getting off the exam table.    Skin:     General: Skin is warm and dry. Findings: No rash. Comments: Thickened skin slightly leathery of the lumbar back with minimal erythema. Bilateral forearms on the dorsal aspect have mildly erythematous papules diffusely noted throughout   Neurological:      Mental Status: He is alert. Motor: No tremor or atrophy. Gait: Gait normal.   Psychiatric:         Speech: Speech normal.         Behavior: Behavior normal.         Thought Content: Thought content normal.       No results found for this visit on 08/11/22. Recent Results (from the past 2016 hour(s))   IgE    Collection Time: 07/26/22  1:59 PM   Result Value Ref Range    IgE 8 <=214 kU/L   TSH with Reflex    Collection Time: 07/26/22  1:59 PM   Result Value Ref Range    TSH 4.010 (H) 0.440 - 3.860 uIU/mL   Allergen, Food, Comprehensive Profile 1    Collection Time: 07/26/22  2:01 PM   Result Value Ref Range    Allergen Pepper C.  Annuum IgE <0.10 kU/L    Carrot IgE <0.10 kU/L    ALLERGEN CHICKEN IGE <0.10 kU/L    Crab IgE <0.10 kU/L    Egg White IgE <0.10 kU/L    Grape IgE <0.10 kU/L    Lettuce IgE <0.10 kU/L    Milk, Cow's IgE <0.10 kU/L    ALLERGEN NAVY BEAN <0.10 kU/L    Orange IgE <0.10 kU/L    Peanut IgE <0.10 kU/L    Allergen Rye IgE <0.10 kU/L    Shrimp <0.10 kU/L    Soybean IgE <0.10 kU/L    Tomato IgE <0.10 kU/L    Wheat IgE <0.10 kU/L    Codfish IgE <0.10 kU/L    Oat <0.10 kU/L    POTATO, IGE <0.10 kU/L    Allergen Rice IgE <0.10 kU/L    Barley IgE <0.10 kU/L    Allergen Tuna IgE <0.10 kU/L    Corn IgE <0.10 kU/L    Cabbage IgE <0.10 kU/L    Beef <0.10 kU/L    Allergen, Pork <0.10 kU/L    IgE 6 IU/mL   Testosterone, free, total    Collection Time: 08/08/22  8:30 AM   Result Value Ref Range    Testosterone 470 220 - 1,000 ng/dL    Sex Hormone Binding 33 11 - 80 nmol/L    Testosterone, Free 97.8 47 - 244 pg/mL       [] Pt was seen by provider for      Minutes  Counseling and coordination of care was done for all assessment diagnosis listed for today with patient and any family/friend present. More than 50% of this visit was spent coordinating current care, obtaining information for prior records, and counseling for current plan of action. Assessment:       Diagnosis Orders   1. Lichenoid dermatitis        2. Eczema, unspecified type  triamcinolone (KENALOG) 0.1 % cream      3. Dyshidrotic eczema  triamcinolone (KENALOG) 0.1 % cream      4. Vitamin D deficiency  Vitamin D 25 Hydroxy      5. Low testosterone  Testosterone, free, total    CBC    Comprehensive Metabolic Panel    Ferritin      6. Abnormal TSH        7. Prostate cancer screening  PSA Screening            Orders Placed This Encounter   Procedures    PSA Screening     Standing Status:   Future     Standing Expiration Date:   8/11/2023    Testosterone, free, total     Standing Status:   Future     Standing Expiration Date:   8/11/2023    CBC     Standing Status:   Future     Standing Expiration Date:   8/11/2023    Comprehensive Metabolic Panel     Standing Status:   Future     Standing Expiration Date:   8/11/2023    Ferritin     Standing Status:   Future     Standing Expiration Date:   8/11/2023    Vitamin D 25 Hydroxy     Standing Status:   Future     Standing Expiration Date:   8/11/2023         Orders Placed This Encounter   Medications    triamcinolone (KENALOG) 0.1 % cream     Sig: Apply topically qam daily Monday through Friday only. Take weekend off. Do not use on face, groin, armpits, breasts tissue. Dispense:  454 g     Refill:  0            Medication List            Accurate as of August 11, 2022  4:14 PM. If you have any questions, ask your nurse or doctor.                 CONTINUE taking these medications      ascorbic acid 500 MG tablet  Commonly known as: VITAMIN C     aspirin 325 MG EC tablet  Take 1 tablet by mouth daily     calcium carbonate 1250 (500 Ca) MG tablet  Commonly known as: OYSTER SHELL CALCIUM 500 mg     famotidine 20 MG tablet  Commonly known as: Pepcid  Take 1 tablet by mouth 2 times daily     Fish Oil 1200 MG Caps     Glucosamine-Chondroitin 9521-9577 MG/30ML Liqd     levothyroxine 25 MCG tablet  Commonly known as: SYNTHROID  Take 1 tablet by mouth in the morning.     magnesium 250 MG Tabs tablet  Commonly known as: MAGNESIUM-OXIDE     One-A-Day Mens Health Formula Tabs     SYRINGE 3CC/67VE0-8/2\" 22G X 1-1/2\" 3 ML Misc  1 actuation by Does not apply route every 14 days     testosterone cypionate 200 MG/ML injection  Commonly known as: DEPOTESTOTERONE CYPIONATE  Inject 1 mL into the muscle every 14 days for 90 doses. triamcinolone 0.1 % cream  Commonly known as: KENALOG  Apply topically qam daily Monday through Friday only. Take weekend off. Do not use on face, groin, armpits, breasts tissue. vitamin B-12 500 MCG tablet  Commonly known as: CYANOCOBALAMIN     vitamin D 1000 UNIT Tabs tablet  Commonly known as: CHOLECALCIFEROL     vitamin E 1000 units capsule     zinc 50 MG Tabs tablet            STOP taking these medications      allopurinol 100 MG tablet  Commonly known as: ZYLOPRIM  Stopped by: Franny Durham MD               Where to Get Your Medications        These medications were sent to [facility], [address] [place] [address]      Phone: [phone]   triamcinolone 0.1 % cream           Plan:   Return for for routine major medical condition management. Patient Instructions   Patient has been using Epson salt for muscle and skin resolution and it has been helpful. He is due for routine laboratories in October. He has been reminded to get his thyroid lab checked at the end of this month. At this point he does not desire a trial of medication avoidance in order to change to skin condition which looks like it may be drug eruption related.   There are no signs of infection so there is no medical emergency mandating

## 2022-08-11 NOTE — PATIENT INSTRUCTIONS
Patient has been using Epson salt for muscle and skin resolution and it has been helpful. He is due for routine laboratories in October. He has been reminded to get his thyroid lab checked at the end of this month. At this point he does not desire a trial of medication avoidance in order to change to skin condition which looks like it may be drug eruption related. There are no signs of infection so there is no medical emergency mandating that we do this.

## 2022-08-22 ENCOUNTER — NURSE ONLY (OUTPATIENT)
Dept: FAMILY MEDICINE CLINIC | Age: 67
End: 2022-08-22
Payer: MEDICARE

## 2022-08-22 DIAGNOSIS — R79.89 LOW TESTOSTERONE: Primary | ICD-10-CM

## 2022-08-22 PROCEDURE — 96372 THER/PROPH/DIAG INJ SC/IM: CPT | Performed by: FAMILY MEDICINE

## 2022-08-22 RX ORDER — TESTOSTERONE CYPIONATE 200 MG/ML
200 INJECTION INTRAMUSCULAR ONCE
Status: COMPLETED | OUTPATIENT
Start: 2022-08-22 | End: 2022-08-22

## 2022-08-22 RX ADMIN — TESTOSTERONE CYPIONATE 200 MG: 200 INJECTION INTRAMUSCULAR at 12:58

## 2022-08-22 NOTE — PROGRESS NOTES
Patient given Testosterone 200mg IM right gluteal..  Will return in 2 weeks for next injection    Patient tolerated injection well.     Administrations This Visit       testosterone cypionate (DEPOTESTOTERONE CYPIONATE) injection 200 mg       Admin Date  08/22/2022 Action  Given Dose  200 mg Route  IntraMUSCular Administered By  Luz Elena Walters LPN

## 2022-09-06 ENCOUNTER — NURSE ONLY (OUTPATIENT)
Dept: FAMILY MEDICINE CLINIC | Age: 67
End: 2022-09-06
Payer: MEDICARE

## 2022-09-06 DIAGNOSIS — R79.89 LOW TESTOSTERONE: Primary | ICD-10-CM

## 2022-09-06 PROCEDURE — 96372 THER/PROPH/DIAG INJ SC/IM: CPT | Performed by: FAMILY MEDICINE

## 2022-09-06 RX ORDER — TESTOSTERONE CYPIONATE 200 MG/ML
200 INJECTION INTRAMUSCULAR ONCE
Status: COMPLETED | OUTPATIENT
Start: 2022-09-06 | End: 2022-09-06

## 2022-09-06 RX ADMIN — TESTOSTERONE CYPIONATE 200 MG: 200 INJECTION INTRAMUSCULAR at 13:42

## 2022-09-06 NOTE — PROGRESS NOTES
Patient given Testosterone 200mg IM left gluteal..  Will return in 2 weeks for next injection    Patient tolerated injection well.     Administrations This Visit       testosterone cypionate (DEPOTESTOTERONE CYPIONATE) injection 200 mg       Admin Date  09/06/2022 Action  Given Dose  200 mg Route  IntraMUSCular Administered By  Bradley Hopper LPN

## 2022-09-19 ENCOUNTER — NURSE ONLY (OUTPATIENT)
Dept: FAMILY MEDICINE CLINIC | Age: 67
End: 2022-09-19
Payer: MEDICARE

## 2022-09-19 DIAGNOSIS — R79.89 LOW TESTOSTERONE: Primary | ICD-10-CM

## 2022-09-19 PROCEDURE — 96372 THER/PROPH/DIAG INJ SC/IM: CPT | Performed by: FAMILY MEDICINE

## 2022-09-19 RX ORDER — TESTOSTERONE CYPIONATE 200 MG/ML
200 INJECTION INTRAMUSCULAR ONCE
Status: COMPLETED | OUTPATIENT
Start: 2022-09-19 | End: 2022-09-19

## 2022-09-19 RX ADMIN — TESTOSTERONE CYPIONATE 200 MG: 200 INJECTION INTRAMUSCULAR at 13:38

## 2022-09-19 NOTE — PROGRESS NOTES
Patient given Testosterone 200mg IM right gluteal..  Will return in 2 weeks for next injection    Patient tolerated injection well.     Administrations This Visit       testosterone cypionate (DEPOTESTOTERONE CYPIONATE) injection 200 mg       Admin Date  09/19/2022 Action  Given Dose  200 mg Route  IntraMUSCular Administered By  Bonnie Schuster LPN

## 2022-09-28 NOTE — PROGRESS NOTES
Return in 6 months (on 4/9/2021) for Medicare Annual Wellness Visit in 1 year, 6 month eczema and weight recheck. yes

## 2022-10-03 ENCOUNTER — NURSE ONLY (OUTPATIENT)
Dept: FAMILY MEDICINE CLINIC | Age: 67
End: 2022-10-03
Payer: MEDICARE

## 2022-10-03 DIAGNOSIS — R79.89 LOW TESTOSTERONE: Primary | ICD-10-CM

## 2022-10-03 PROCEDURE — 96372 THER/PROPH/DIAG INJ SC/IM: CPT | Performed by: FAMILY MEDICINE

## 2022-10-03 RX ORDER — TESTOSTERONE CYPIONATE 200 MG/ML
200 INJECTION INTRAMUSCULAR ONCE
Status: COMPLETED | OUTPATIENT
Start: 2022-10-03 | End: 2022-10-03

## 2022-10-03 RX ADMIN — TESTOSTERONE CYPIONATE 200 MG: 200 INJECTION INTRAMUSCULAR at 13:34

## 2022-10-03 NOTE — PROGRESS NOTES
Patient given Testosterone 200mg IM left gluteal..  Will return in 2 weeks for next injection    Patient tolerated injection well.     Administrations This Visit       testosterone cypionate (DEPOTESTOTERONE CYPIONATE) injection 200 mg       Admin Date  10/03/2022 Action  Given Dose  200 mg Route  IntraMUSCular Administered By  Carl Foreman LPN

## 2022-10-17 ENCOUNTER — NURSE ONLY (OUTPATIENT)
Dept: FAMILY MEDICINE CLINIC | Age: 67
End: 2022-10-17
Payer: MEDICARE

## 2022-10-17 DIAGNOSIS — R79.89 LOW TESTOSTERONE: Primary | ICD-10-CM

## 2022-10-17 PROCEDURE — 96372 THER/PROPH/DIAG INJ SC/IM: CPT | Performed by: FAMILY MEDICINE

## 2022-10-17 RX ORDER — TESTOSTERONE CYPIONATE 200 MG/ML
200 INJECTION INTRAMUSCULAR ONCE
Status: COMPLETED | OUTPATIENT
Start: 2022-10-17 | End: 2022-10-17

## 2022-10-17 RX ADMIN — TESTOSTERONE CYPIONATE 200 MG: 200 INJECTION INTRAMUSCULAR at 13:47

## 2022-10-17 NOTE — PROGRESS NOTES
Patient given Testosterone 200mg IM right gluteal..  Will return in 2 weeks for next injection    Patient tolerated injection well.     Administrations This Visit       testosterone cypionate (DEPOTESTOTERONE CYPIONATE) injection 200 mg       Admin Date  10/17/2022 Action  Given Dose  200 mg Route  IntraMUSCular Administered By  Cynthia Knutson LPN

## 2022-10-31 ENCOUNTER — NURSE ONLY (OUTPATIENT)
Dept: FAMILY MEDICINE CLINIC | Age: 67
End: 2022-10-31
Payer: MEDICARE

## 2022-10-31 DIAGNOSIS — R79.89 LOW TESTOSTERONE: Primary | ICD-10-CM

## 2022-10-31 PROCEDURE — 96372 THER/PROPH/DIAG INJ SC/IM: CPT | Performed by: FAMILY MEDICINE

## 2022-10-31 RX ORDER — TESTOSTERONE CYPIONATE 200 MG/ML
200 INJECTION INTRAMUSCULAR ONCE
Status: COMPLETED | OUTPATIENT
Start: 2022-10-31 | End: 2022-10-31

## 2022-10-31 RX ADMIN — TESTOSTERONE CYPIONATE 200 MG: 200 INJECTION INTRAMUSCULAR at 14:30

## 2022-10-31 NOTE — PROGRESS NOTES
Patient given Testosterone 200mg IM left gluteal..  Will return in 2 weeks for next injection    Patient tolerated injection well.     Administrations This Visit       testosterone cypionate (DEPOTESTOTERONE CYPIONATE) injection 200 mg       Admin Date  10/31/2022 Action  Given Dose  200 mg Route  IntraMUSCular Administered By  Kristopher Petit LPN

## 2022-11-07 DIAGNOSIS — E03.4 ATROPHY OF THYROID (ACQUIRED): ICD-10-CM

## 2022-11-07 DIAGNOSIS — E55.9 VITAMIN D DEFICIENCY: ICD-10-CM

## 2022-11-07 DIAGNOSIS — R79.89 LOW TESTOSTERONE: ICD-10-CM

## 2022-11-07 DIAGNOSIS — Z12.5 PROSTATE CANCER SCREENING: ICD-10-CM

## 2022-11-07 LAB
ALBUMIN SERPL-MCNC: 4.3 G/DL (ref 3.5–4.6)
ALP BLD-CCNC: 73 U/L (ref 35–104)
ALT SERPL-CCNC: 21 U/L (ref 0–41)
ANION GAP SERPL CALCULATED.3IONS-SCNC: 15 MEQ/L (ref 9–15)
AST SERPL-CCNC: 15 U/L (ref 0–40)
BILIRUB SERPL-MCNC: 0.3 MG/DL (ref 0.2–0.7)
BUN BLDV-MCNC: 18 MG/DL (ref 8–23)
CALCIUM SERPL-MCNC: 9.6 MG/DL (ref 8.5–9.9)
CHLORIDE BLD-SCNC: 98 MEQ/L (ref 95–107)
CO2: 24 MEQ/L (ref 20–31)
CREAT SERPL-MCNC: 0.88 MG/DL (ref 0.7–1.2)
GFR SERPL CREATININE-BSD FRML MDRD: >60 ML/MIN/{1.73_M2}
GLOBULIN: 2.7 G/DL (ref 2.3–3.5)
GLUCOSE BLD-MCNC: 86 MG/DL (ref 70–99)
HCT VFR BLD CALC: 47.8 % (ref 42–52)
HEMOGLOBIN: 16 G/DL (ref 14–18)
MCH RBC QN AUTO: 29.2 PG (ref 27–31.3)
MCHC RBC AUTO-ENTMCNC: 33.5 % (ref 33–37)
MCV RBC AUTO: 87.3 FL (ref 79–92.2)
PDW BLD-RTO: 14 % (ref 11.5–14.5)
PLATELET # BLD: 203 K/UL (ref 130–400)
POTASSIUM SERPL-SCNC: 4 MEQ/L (ref 3.4–4.9)
PROSTATE SPECIFIC ANTIGEN: 1.44 NG/ML (ref 0–4)
RBC # BLD: 5.48 M/UL (ref 4.7–6.1)
SODIUM BLD-SCNC: 137 MEQ/L (ref 135–144)
TOTAL PROTEIN: 7 G/DL (ref 6.3–8)
TSH REFLEX: 3.19 UIU/ML (ref 0.44–3.86)
WBC # BLD: 7.8 K/UL (ref 4.8–10.8)

## 2022-11-08 LAB
FERRITIN: 64 NG/ML (ref 30–400)
SEX HORMONE BINDING GLOBULIN: 29 NMOL/L (ref 11–80)
TESTOSTERONE FREE-NONMALE: 350.1 PG/ML (ref 47–244)
TESTOSTERONE TOTAL: 1279 NG/DL (ref 220–1000)
VITAMIN D 25-HYDROXY: 28.5 NG/ML

## 2022-11-10 NOTE — RESULT ENCOUNTER NOTE
Patient's vitamin D is low. Initiate 5000 international units daily supplement and recheck vitamin D level in 3 months please order. Also notify patient his testosterone levels were high. However that can happen if it is close to his injection time. What was the date of the injection surrounding his November 7 labs?

## 2022-11-14 ENCOUNTER — NURSE ONLY (OUTPATIENT)
Dept: FAMILY MEDICINE CLINIC | Age: 67
End: 2022-11-14
Payer: MEDICARE

## 2022-11-14 DIAGNOSIS — R79.89 LOW TESTOSTERONE: Primary | ICD-10-CM

## 2022-11-14 PROCEDURE — 96372 THER/PROPH/DIAG INJ SC/IM: CPT | Performed by: FAMILY MEDICINE

## 2022-11-14 RX ORDER — TESTOSTERONE CYPIONATE 200 MG/ML
160 INJECTION INTRAMUSCULAR
COMMUNITY
Start: 2022-11-14

## 2022-11-14 RX ORDER — TESTOSTERONE CYPIONATE 200 MG/ML
160 INJECTION INTRAMUSCULAR ONCE
Status: COMPLETED | OUTPATIENT
Start: 2022-11-14 | End: 2022-11-14

## 2022-11-14 RX ADMIN — TESTOSTERONE CYPIONATE 160 MG: 200 INJECTION INTRAMUSCULAR at 13:35

## 2022-11-14 NOTE — PROGRESS NOTES
Patient given Testosterone 160mg IM right gluteal..  Will return in 2 weeks for next injection  Needs to recheck level in 12 weeks  Patient tolerated injection well.     Administrations This Visit       testosterone cypionate (DEPOTESTOTERONE CYPIONATE) injection 160 mg       Admin Date  11/14/2022 Action  Given Dose  160 mg Route  IntraMUSCular Administered By  Aditi Panchal LPN

## 2022-11-16 DIAGNOSIS — E55.9 VITAMIN D DEFICIENCY: Primary | ICD-10-CM

## 2022-11-16 DIAGNOSIS — R79.89 LOW TESTOSTERONE: ICD-10-CM

## 2022-11-16 NOTE — RESULT ENCOUNTER NOTE
Pt aware of result and message. Had his test inj on 10/31 and 11/14.  Needs lab ordered  appt Monday

## 2022-11-21 ENCOUNTER — OFFICE VISIT (OUTPATIENT)
Dept: FAMILY MEDICINE CLINIC | Age: 67
End: 2022-11-21
Payer: MEDICARE

## 2022-11-21 VITALS — BODY MASS INDEX: 37.94 KG/M2 | HEIGHT: 71 IN | WEIGHT: 271 LBS | TEMPERATURE: 98.7 F

## 2022-11-21 DIAGNOSIS — Z00.00 MEDICARE ANNUAL WELLNESS VISIT, SUBSEQUENT: Primary | ICD-10-CM

## 2022-11-21 DIAGNOSIS — L30.9 ECZEMA, UNSPECIFIED TYPE: ICD-10-CM

## 2022-11-21 DIAGNOSIS — L30.1 DYSHIDROTIC ECZEMA: ICD-10-CM

## 2022-11-21 DIAGNOSIS — E03.4 ATROPHY OF THYROID (ACQUIRED): ICD-10-CM

## 2022-11-21 PROCEDURE — 1123F ACP DISCUSS/DSCN MKR DOCD: CPT | Performed by: FAMILY MEDICINE

## 2022-11-21 PROCEDURE — G0439 PPPS, SUBSEQ VISIT: HCPCS | Performed by: FAMILY MEDICINE

## 2022-11-21 RX ORDER — LEVOTHYROXINE SODIUM 0.03 MG/1
25 TABLET ORAL DAILY
Qty: 90 TABLET | Refills: 1 | Status: SHIPPED | OUTPATIENT
Start: 2022-11-21

## 2022-11-21 RX ORDER — TRIAMCINOLONE ACETONIDE 1 MG/G
CREAM TOPICAL
Qty: 454 G | Refills: 0 | Status: SHIPPED | OUTPATIENT
Start: 2022-11-21

## 2022-11-21 SDOH — ECONOMIC STABILITY: FOOD INSECURITY: WITHIN THE PAST 12 MONTHS, THE FOOD YOU BOUGHT JUST DIDN'T LAST AND YOU DIDN'T HAVE MONEY TO GET MORE.: NEVER TRUE

## 2022-11-21 SDOH — ECONOMIC STABILITY: FOOD INSECURITY: WITHIN THE PAST 12 MONTHS, YOU WORRIED THAT YOUR FOOD WOULD RUN OUT BEFORE YOU GOT MONEY TO BUY MORE.: NEVER TRUE

## 2022-11-21 ASSESSMENT — PATIENT HEALTH QUESTIONNAIRE - PHQ9
SUM OF ALL RESPONSES TO PHQ QUESTIONS 1-9: 0
SUM OF ALL RESPONSES TO PHQ9 QUESTIONS 1 & 2: 0
SUM OF ALL RESPONSES TO PHQ QUESTIONS 1-9: 0
1. LITTLE INTEREST OR PLEASURE IN DOING THINGS: 0
2. FEELING DOWN, DEPRESSED OR HOPELESS: 0

## 2022-11-21 ASSESSMENT — SOCIAL DETERMINANTS OF HEALTH (SDOH): HOW HARD IS IT FOR YOU TO PAY FOR THE VERY BASICS LIKE FOOD, HOUSING, MEDICAL CARE, AND HEATING?: NOT HARD AT ALL

## 2022-11-21 ASSESSMENT — LIFESTYLE VARIABLES
HOW OFTEN DO YOU HAVE A DRINK CONTAINING ALCOHOL: NEVER
HOW MANY STANDARD DRINKS CONTAINING ALCOHOL DO YOU HAVE ON A TYPICAL DAY: PATIENT DOES NOT DRINK

## 2022-11-21 NOTE — PROGRESS NOTES
Medicare Annual Wellness Visit    Pastor Elliott is here for Medicare AWV    Assessment & Plan   Medicare annual wellness visit, subsequent  Atrophy of thyroid (acquired)  -     levothyroxine (SYNTHROID) 25 MCG tablet; Take 1 tablet by mouth daily, Disp-90 tablet, R-1Normal  Eczema, unspecified type  -     triamcinolone (KENALOG) 0.1 % cream; Apply topically qam daily Monday through Friday only. Take weekend off. Do not use on face, groin, armpits, breasts tissue., Disp-454 g, R-0, Normal  Dyshidrotic eczema  -     triamcinolone (KENALOG) 0.1 % cream; Apply topically qam daily Monday through Friday only. Take weekend off. Do not use on face, groin, armpits, breasts tissue., Disp-454 g, R-0, Normal    Recommendations for Preventive Services Due: see orders and patient instructions/AVS.  Recommended screening schedule for the next 5-10 years is provided to the patient in written form: see Patient Instructions/AVS.     Return for Medicare Annual Wellness Visit in 1 year, also 6 month appt [address] and skin check . Subjective       Patient's complete Health Risk Assessment and screening values have been reviewed and are found in Flowsheets. The following problems were reviewed today and where indicated follow up appointments were made and/or referrals ordered. Positive Risk Factor Screenings with Interventions:              Health Habits/Nutrition:  Physical Activity: Insufficiently Active    Days of Exercise per Week: 2 days    Minutes of Exercise per Session: 10 min     Have you lost any weight without trying in the past 3 months?: No  Body mass index: (!) 38.06  Have you seen the dentist within the past year?: Yes  Health Habits/Nutrition Interventions:  Patient has been lifting weights and increasing exercise now that he is feeling better with increased testosterone levels.              Objective   Vitals:    11/21/22 1700   Temp: 98.7 °F (37.1 °C)   Weight: 271 lb (122.9 kg)   Height: 5' 10.75\" (1.797 m) Body mass index is 38.06 kg/m². Allergies   Allergen Reactions    Vancomycin Hives, Itching and Other (See Comments)     Rash all over with sores in the mouth as well. Fevers      Prior to Visit Medications    Medication Sig Taking? Authorizing Provider   levothyroxine (SYNTHROID) 25 MCG tablet Take 1 tablet by mouth daily Yes Ean Kelly MD   triamcinolone (KENALOG) 0.1 % cream Apply topically qam daily Monday through Friday only. Take weekend off. Do not use on face, groin, armpits, breasts tissue. Yes Ean Kelly MD   Cholecalciferol (VITAMIN D3) 125 MCG (5000 UT) CAPS Take 1 capsule by mouth daily Yes Ean Kelly MD   testosterone cypionate (DEPOTESTOTERONE CYPIONATE) 200 MG/ML injection Inject 160 mg into the muscle every 14 days.  Indications: Per Verbal order of Dr. Walt Jacinto Yes Historical Provider, MD   famotidine (PEPCID) 20 MG tablet Take 1 tablet by mouth 2 times daily Yes EUNICE Jalloh   ascorbic acid (VITAMIN C) 500 MG tablet Take 500 mg by mouth daily Yes Historical Provider, MD   vitamin B-12 (CYANOCOBALAMIN) 500 MCG tablet Take 500 mcg by mouth daily Yes Historical Provider, MD   zinc 50 MG TABS tablet Take 50 mg by mouth daily Yes Historical Provider, MD   aspirin 325 MG EC tablet Take 1 tablet by mouth daily Yes Ean Kelly MD   Syringe/Needle, Disp, (SYRINGE 3CC/32YJ1-4/2\") 22G X 1-1/2\" 3 ML MISC 1 actuation by Does not apply route every 14 days Yes Ean Kelly MD   vitamin E 1000 units capsule Take 1,000 Units by mouth daily Yes Historical Provider, MD   vitamin D (CHOLECALCIFEROL) 1000 UNIT TABS tablet Take 6,000 Units by mouth daily Yes Historical Provider, MD   Multiple Vitamins-Minerals (ONE-A-DAY MENS HEALTH FORMULA) TABS Take by mouth Yes Historical Provider, MD   magnesium (MAGNESIUM-OXIDE) 250 MG TABS tablet Take 250 mg by mouth daily Take 3 pills daily Yes Historical Provider, MD   calcium carbonate (OYSTER SHELL CALCIUM 500 MG) 1250 MG tablet Take 1 tablet by mouth daily Yes Historical Provider, MD   Glucosamine-Chondroitin 5538-9040 MG/30ML LIQD Take by mouth Take 3 pills daily Yes Historical Provider, MD   Omega-3 Fatty Acids (FISH OIL) 1200 MG CAPS Take  by mouth daily.    Yes Historical Provider, MD Conde (Including outside providers/suppliers regularly involved in providing care):   Patient Care Team:  Jesús Betancourt MD as PCP - General (Family Medicine)  Jesús Betancourt MD as PCP - [facility] Empaneled Provider     Reviewed and updated this visit:  Tobacco  Allergies  Meds  Problems  Med Hx  Surg Hx  Soc Hx  Fam Hx

## 2022-11-21 NOTE — PATIENT INSTRUCTIONS
Personalized Preventive Plan for Margaret Anthony - 11/21/2022  Medicare offers a range of preventive health benefits. Some of the tests and screenings are paid in full while other may be subject to a deductible, co-insurance, and/or copay. Some of these benefits include a comprehensive review of your medical history including lifestyle, illnesses that may run in your family, and various assessments and screenings as appropriate. After reviewing your medical record and screening and assessments performed today your provider may have ordered immunizations, labs, imaging, and/or referrals for you. A list of these orders (if applicable) as well as your Preventive Care list are included within your After Visit Summary for your review. Other Preventive Recommendations:    A preventive eye exam performed by an eye specialist is recommended every 1-2 years to screen for glaucoma; cataracts, macular degeneration, and other eye disorders. A preventive dental visit is recommended every 6 months. Try to get at least 150 minutes of exercise per week or 10,000 steps per day on a pedometer . Order or download the FREE \"Exercise & Physical Activity: Your Everyday Guide\" from [employer]. Call 9-216.220.7620 or search [employer] online. You need 1499-6867 mg of calcium and 4219-0355 IU of vitamin D per day. It is possible to meet your calcium requirement with diet alone, but a vitamin D supplement is usually necessary to meet this goal.  When exposed to the sun, use a sunscreen that protects against both UVA and UVB radiation with an SPF of 30 or greater. Reapply every 2 to 3 hours or after sweating, drying off with a towel, or swimming. Always wear a seat belt when traveling in a car. Always wear a helmet when riding a bicycle or motorcycle.

## 2022-11-30 ENCOUNTER — NURSE ONLY (OUTPATIENT)
Dept: ENDOCRINOLOGY | Age: 67
End: 2022-11-30
Payer: MEDICARE

## 2022-11-30 DIAGNOSIS — R79.89 LOW TESTOSTERONE: Primary | ICD-10-CM

## 2022-11-30 PROCEDURE — 96372 THER/PROPH/DIAG INJ SC/IM: CPT | Performed by: INTERNAL MEDICINE

## 2022-11-30 RX ORDER — TESTOSTERONE CYPIONATE 200 MG/ML
160 INJECTION INTRAMUSCULAR ONCE
Status: COMPLETED | OUTPATIENT
Start: 2022-11-30 | End: 2022-11-30

## 2022-11-30 RX ADMIN — TESTOSTERONE CYPIONATE 160 MG: 200 INJECTION INTRAMUSCULAR at 14:52

## 2022-11-30 NOTE — PROGRESS NOTES
Patient given Testosterone 160mg IM left gluteal..  Will return in 2 weeks for next injection    Patient tolerated injection well.     Administrations This Visit       testosterone cypionate (DEPOTESTOTERONE CYPIONATE) injection 160 mg       Admin Date  11/30/2022 Action  Given Dose  160 mg Route  IntraMUSCular Administered By  Rao Santos LPN

## 2022-12-12 ENCOUNTER — NURSE ONLY (OUTPATIENT)
Dept: FAMILY MEDICINE CLINIC | Age: 67
End: 2022-12-12
Payer: MEDICARE

## 2022-12-12 DIAGNOSIS — R79.89 LOW TESTOSTERONE: Primary | ICD-10-CM

## 2022-12-12 PROCEDURE — 96372 THER/PROPH/DIAG INJ SC/IM: CPT | Performed by: FAMILY MEDICINE

## 2022-12-12 RX ORDER — TESTOSTERONE CYPIONATE 200 MG/ML
160 INJECTION INTRAMUSCULAR ONCE
Status: COMPLETED | OUTPATIENT
Start: 2022-12-12 | End: 2022-12-12

## 2022-12-12 RX ADMIN — TESTOSTERONE CYPIONATE 160 MG: 200 INJECTION INTRAMUSCULAR at 13:30

## 2022-12-12 NOTE — PROGRESS NOTES
Patient given Testosterone 160mg IM right gluteal..  Will return in 2 weeks for next injection    Patient tolerated injection well.     Administrations This Visit       testosterone cypionate (DEPOTESTOTERONE CYPIONATE) injection 160 mg       Admin Date  12/12/2022 Action  Given Dose  160 mg Route  [facility] Administered By  Angely Menchaca LPN

## 2022-12-29 ENCOUNTER — NURSE ONLY (OUTPATIENT)
Dept: ENDOCRINOLOGY | Age: 67
End: 2022-12-29
Payer: MEDICARE

## 2022-12-29 DIAGNOSIS — R79.89 LOW TESTOSTERONE: Primary | ICD-10-CM

## 2022-12-29 PROCEDURE — 96372 THER/PROPH/DIAG INJ SC/IM: CPT | Performed by: PHYSICIAN ASSISTANT

## 2022-12-29 RX ORDER — TESTOSTERONE CYPIONATE 200 MG/ML
160 INJECTION INTRAMUSCULAR ONCE
Status: COMPLETED | OUTPATIENT
Start: 2022-12-29 | End: 2022-12-29

## 2022-12-29 RX ADMIN — TESTOSTERONE CYPIONATE 160 MG: 200 INJECTION INTRAMUSCULAR at 14:08

## 2022-12-29 NOTE — PROGRESS NOTES
Patient given Testosterone 160mg IM left gluteal..    Patient tolerated injection well.     Administrations This Visit       testosterone cypionate (DEPOTESTOTERONE CYPIONATE) injection 160 mg       Admin Date  12/29/2022 Action  Given Dose  160 mg Route  [facility] Administered By  Jessica Ford LPN

## 2023-01-09 ENCOUNTER — NURSE ONLY (OUTPATIENT)
Dept: FAMILY MEDICINE CLINIC | Age: 68
End: 2023-01-09
Payer: MEDICARE

## 2023-01-09 DIAGNOSIS — R79.89 LOW TESTOSTERONE: Primary | ICD-10-CM

## 2023-01-09 PROCEDURE — 96372 THER/PROPH/DIAG INJ SC/IM: CPT | Performed by: FAMILY MEDICINE

## 2023-01-09 RX ORDER — TESTOSTERONE CYPIONATE 200 MG/ML
160 INJECTION INTRAMUSCULAR ONCE
Status: COMPLETED | OUTPATIENT
Start: 2023-01-09 | End: 2023-01-09

## 2023-01-09 RX ADMIN — TESTOSTERONE CYPIONATE 160 MG: 200 INJECTION INTRAMUSCULAR at 14:28

## 2023-01-09 NOTE — PROGRESS NOTES
Patient given Testosterone 160mg IM right gluteal..  Will return in 2 weeks for next injection    Patient tolerated injection well.     Administrations This Visit       testosterone cypionate (DEPOTESTOTERONE CYPIONATE) injection 160 mg       Admin Date  01/09/2023 Action  Given Dose  160 mg Route  [facility] Administered By  Veronica Smith LPN

## 2023-01-23 ENCOUNTER — NURSE ONLY (OUTPATIENT)
Dept: FAMILY MEDICINE CLINIC | Age: 68
End: 2023-01-23
Payer: MEDICARE

## 2023-01-23 DIAGNOSIS — R79.89 LOW TESTOSTERONE: Primary | ICD-10-CM

## 2023-01-23 PROCEDURE — 96372 THER/PROPH/DIAG INJ SC/IM: CPT | Performed by: FAMILY MEDICINE

## 2023-01-23 RX ORDER — TESTOSTERONE CYPIONATE 200 MG/ML
200 INJECTION INTRAMUSCULAR ONCE
Status: COMPLETED | OUTPATIENT
Start: 2023-01-23 | End: 2023-01-23

## 2023-01-23 RX ADMIN — TESTOSTERONE CYPIONATE 160 MG: 200 INJECTION INTRAMUSCULAR at 13:58

## 2023-01-23 NOTE — PROGRESS NOTES
Patient given Testosterone 160mg IM left gluteal..  Will return in 2 weeks for next injection    Patient tolerated injection well.     Administrations This Visit       testosterone cypionate (DEPOTESTOTERONE CYPIONATE) injection 200 mg       Admin Date  01/23/2023 Action  Given Dose  160 mg Route  [facility] Administered By  Maria Dolores Hebert LPN

## 2023-02-06 ENCOUNTER — NURSE ONLY (OUTPATIENT)
Dept: FAMILY MEDICINE CLINIC | Age: 68
End: 2023-02-06
Payer: MEDICARE

## 2023-02-06 DIAGNOSIS — R79.89 LOW TESTOSTERONE: Primary | ICD-10-CM

## 2023-02-06 PROCEDURE — 96372 THER/PROPH/DIAG INJ SC/IM: CPT | Performed by: FAMILY MEDICINE

## 2023-02-06 RX ORDER — TESTOSTERONE CYPIONATE 200 MG/ML
200 INJECTION INTRAMUSCULAR ONCE
Status: COMPLETED | OUTPATIENT
Start: 2023-02-06 | End: 2023-02-06

## 2023-02-06 RX ADMIN — TESTOSTERONE CYPIONATE 160 MG: 200 INJECTION INTRAMUSCULAR at 13:36

## 2023-02-06 NOTE — PROGRESS NOTES
Patient given Testosterone 200mg IM right gluteal..  Will return in 2 weeks for next injection    Patient tolerated injection well.     Administrations This Visit       testosterone cypionate (DEPOTESTOTERONE CYPIONATE) injection 200 mg       Admin Date  02/06/2023 Action  Given Dose  200 mg Route  [facility] Administered By  Unruly Asher LPN

## 2023-02-06 NOTE — PROGRESS NOTES
Patient given Testosterone 160mg IM right gluteal..  Will return in 2 weeks for next injection    Patient tolerated injection well.     Administrations This Visit       testosterone cypionate (DEPOTESTOTERONE CYPIONATE) injection 200 mg       Admin Date  02/06/2023 Action  Given Dose  160 mg Route  [facility] Administered By  Rebekah Badillo LPN

## 2023-02-20 ENCOUNTER — NURSE ONLY (OUTPATIENT)
Dept: FAMILY MEDICINE CLINIC | Age: 68
End: 2023-02-20
Payer: MEDICARE

## 2023-02-20 DIAGNOSIS — R79.89 LOW TESTOSTERONE: Primary | ICD-10-CM

## 2023-02-20 PROCEDURE — 96372 THER/PROPH/DIAG INJ SC/IM: CPT | Performed by: FAMILY MEDICINE

## 2023-02-20 RX ORDER — TESTOSTERONE CYPIONATE 200 MG/ML
160 INJECTION INTRAMUSCULAR ONCE
Status: COMPLETED | OUTPATIENT
Start: 2023-02-20 | End: 2023-02-20

## 2023-02-20 RX ADMIN — TESTOSTERONE CYPIONATE 160 MG: 200 INJECTION INTRAMUSCULAR at 14:28

## 2023-02-20 NOTE — PROGRESS NOTES
Patient given Testosterone 160mg IM left gluteal..  Will return in 2 weeks for next injection    Patient tolerated injection well.     Administrations This Visit       testosterone cypionate (DEPOTESTOTERONE CYPIONATE) injection 160 mg       Admin Date  02/20/2023 Action  Given Dose  160 mg Route  [facility] Administered By  Jennifer Mondragon LPN

## 2023-03-06 ENCOUNTER — TELEPHONE (OUTPATIENT)
Dept: FAMILY MEDICINE CLINIC | Age: 68
End: 2023-03-06

## 2023-03-06 ENCOUNTER — NURSE ONLY (OUTPATIENT)
Dept: FAMILY MEDICINE CLINIC | Age: 68
End: 2023-03-06
Payer: MEDICARE

## 2023-03-06 DIAGNOSIS — R79.89 LOW TESTOSTERONE: Primary | ICD-10-CM

## 2023-03-06 PROCEDURE — 96372 THER/PROPH/DIAG INJ SC/IM: CPT | Performed by: FAMILY MEDICINE

## 2023-03-06 RX ORDER — TESTOSTERONE CYPIONATE 200 MG/ML
160 INJECTION INTRAMUSCULAR ONCE
Status: COMPLETED | OUTPATIENT
Start: 2023-03-06 | End: 2023-03-06

## 2023-03-06 RX ADMIN — TESTOSTERONE CYPIONATE 160 MG: 200 INJECTION INTRAMUSCULAR at 13:28

## 2023-03-06 NOTE — TELEPHONE ENCOUNTER
Pt asking when he should get his testosterone level checked. Pt had injection today 3/6/23, pt has labs ordered and appt with you on 5/22/23. Please advise. Pt phone number is [phone].

## 2023-03-06 NOTE — PROGRESS NOTES
Patient given Testosterone 160mg IM right gluteal..  Will return in 2 weeks for next injection    Patient tolerated injection well.     Administrations This Visit       testosterone cypionate (DEPOTESTOTERONE CYPIONATE) injection 160 mg       Admin Date  03/06/2023 Action  Given Dose  160 mg Route  [facility] Administered By  Jennifer Huston LPN

## 2023-03-13 DIAGNOSIS — R79.89 LOW TESTOSTERONE: ICD-10-CM

## 2023-03-13 DIAGNOSIS — E55.9 VITAMIN D DEFICIENCY: ICD-10-CM

## 2023-03-14 LAB
SEX HORMONE BINDING GLOBULIN: 22 NMOL/L (ref 11–80)
TESTOSTERONE FREE-NONMALE: 118.4 PG/ML (ref 47–244)
TESTOSTERONE TOTAL: 462 NG/DL (ref 220–1000)
VITAMIN D 25-HYDROXY: 25.5 NG/ML

## 2023-03-20 ENCOUNTER — NURSE ONLY (OUTPATIENT)
Dept: FAMILY MEDICINE CLINIC | Age: 68
End: 2023-03-20

## 2023-03-20 DIAGNOSIS — E55.9 VITAMIN D DEFICIENCY: Primary | ICD-10-CM

## 2023-03-20 RX ORDER — TESTOSTERONE CYPIONATE 200 MG/ML
160 INJECTION, SOLUTION INTRAMUSCULAR ONCE
Status: COMPLETED | OUTPATIENT
Start: 2023-03-20 | End: 2023-03-20

## 2023-03-20 RX ORDER — ERGOCALCIFEROL 1.25 MG/1
50000 CAPSULE ORAL WEEKLY
Qty: 12 CAPSULE | Refills: 1 | Status: SHIPPED | OUTPATIENT
Start: 2023-03-20

## 2023-03-20 RX ADMIN — TESTOSTERONE CYPIONATE 160 MG: 200 INJECTION, SOLUTION INTRAMUSCULAR at 09:49

## 2023-03-20 NOTE — PROGRESS NOTES
Patient given Testosterone 160 mg IM left gluteal..  Will return in 2 weeks for next injection     Patient tolerated injection well

## 2023-04-03 ENCOUNTER — NURSE ONLY (OUTPATIENT)
Dept: FAMILY MEDICINE CLINIC | Age: 68
End: 2023-04-03
Payer: MEDICARE

## 2023-04-03 DIAGNOSIS — R79.89 LOW TESTOSTERONE: Primary | ICD-10-CM

## 2023-04-03 PROCEDURE — 96372 THER/PROPH/DIAG INJ SC/IM: CPT | Performed by: FAMILY MEDICINE

## 2023-04-03 RX ORDER — TESTOSTERONE CYPIONATE 200 MG/ML
160 INJECTION, SOLUTION INTRAMUSCULAR ONCE
Status: COMPLETED | OUTPATIENT
Start: 2023-04-03 | End: 2023-04-03

## 2023-04-03 RX ADMIN — TESTOSTERONE CYPIONATE 160 MG: 200 INJECTION, SOLUTION INTRAMUSCULAR at 13:42

## 2023-04-03 NOTE — PROGRESS NOTES
Patient given Testosterone 160 mg IM left gluteal..  Will return in 2 weeks for next injection  Patient tolerated well

## 2023-04-17 ENCOUNTER — NURSE ONLY (OUTPATIENT)
Dept: FAMILY MEDICINE CLINIC | Age: 68
End: 2023-04-17
Payer: MEDICARE

## 2023-04-17 DIAGNOSIS — R79.89 LOW TESTOSTERONE: Primary | ICD-10-CM

## 2023-04-17 PROCEDURE — 96372 THER/PROPH/DIAG INJ SC/IM: CPT | Performed by: FAMILY MEDICINE

## 2023-04-17 RX ORDER — TESTOSTERONE CYPIONATE 200 MG/ML
160 INJECTION, SOLUTION INTRAMUSCULAR ONCE
Status: COMPLETED | OUTPATIENT
Start: 2023-04-17 | End: 2023-04-17

## 2023-04-17 RX ADMIN — TESTOSTERONE CYPIONATE 160 MG: 200 INJECTION, SOLUTION INTRAMUSCULAR at 13:37

## 2023-04-17 NOTE — PROGRESS NOTES
Patient given Testosterone 160mg IM right gluteal..  Will return in 2 weeks for next injection  Patient tolerated well

## 2023-04-25 NOTE — DISCHARGE INSTR - COC
Continuity of Care Form    Patient Name: Dong Raines   :  1955  MRN:  [id number]    Admit date:  2022  Discharge date:  ***    Code Status Order: Prior   Advance Directives:   [place] Directive Type of Healthcare Directive Copy in [address] Agent's Name Healthcare Agent's Phone Number    22 [phone] Yes, patient has an advance directive for healthcare treatment -- -- -- -- --            Admitting Physician:  Gwendolyn Crane DO  PCP: Sindy Marshall MD    Discharging Nurse: [facility] Unit/Room#: M833/T573-64  Discharging Unit Phone Number: ***    Emergency Contact:   Extended Emergency Contact Information  Primary Emergency Contact: Elton Cooper Phone: [phone]  Relation: Child    Past Surgical History:  Past Surgical History:   Procedure Laterality Date    COLONOSCOPY  2006    AR COLON CA SCRN NOT  [address] IND N/A 9/15/2017    COLONOSCOPY performed by Rebeca Barnes MD at [address] [place] [address]  14    bx, dilation jarmoszuk    UPPER GASTROINTESTINAL ENDOSCOPY N/A 9/15/2017    EGD DILATION [facility] and biopsy performed by Rebeca Barnes MD at [facility]       Immunization History:   Immunization History   Administered Date(s) Administered    Influenza Virus Vaccine 10/08/2015    Influenza, Quadv, adjuvanted, 65 yrs +, IM, PF (Fluad) 10/09/2020, 2021    Pneumococcal Polysaccharide ([id number]) 2012, 10/09/2020       Active Problems:  Patient Active Problem List   Diagnosis Code    Dyshidrotic eczema L30.1    Vitamin D deficiency E55.9    Obesity (BMI 30-39. 9) E66.9    Eczematous dermatitis L30.9    Adenomatous polyp of transverse colon- f/u due 2022 D12.3    Low testosterone R79.89    Cellulitis of foot, right L03.115    Abscess of foot including toes, right L02.611    Cellulitis of right lower extremity L03.115 Isolation/Infection:   Isolation            No Isolation          Patient Infection Status       None to display            Nurse Assessment:  Last Vital Signs: /83   Pulse 97   Temp 97.5 °F (36.4 °C) (Oral)   Resp 18   Ht 6' (1.829 m)   Wt 260 lb (117.9 kg)   SpO2 97%   BMI 35.26 kg/m²     Last documented pain score (0-10 scale): Pain Level: 1  Last Weight:   Wt Readings from Last 1 Encounters:   22 260 lb (117.9 kg)     Mental Status:  {IP PT MENTAL STATUS:}    IV Access:  { AMAN IV ACCESS:948213627}    Nursing Mobility/ADLs:  Walking   {CHP DME YQOY:024194910}  Transfer  {P DME WRYS:786577976}  Bathing  {CHP DME IDWA:973591150}  Dressing  {CHP DME QVG}  Toileting  {CHP DME LIFO:242504484}  Feeding  {[facility] DME YOOA:107774904}  Med Admin  {[facility] DME GGDH:848869933}  Med Delivery   {[facility] MED Delivery:778691636}    Wound Care Documentation and Therapy:  Wound 22 Foot Right;Dorsal #1 (Active)   Wound Image   22 1558   Dressing Status Clean;Dry; Intact 22 0900   Wound Cleansed Cleansed with saline 22 1558   Wound Length (cm) 2.4 cm 22 1558   Wound Width (cm) 0.5 cm 22 1558   Wound Depth (cm) 0 cm 22 1558   Wound Surface Area (cm^2) 1.2 cm^2 22 1558   Wound Volume (cm^3) 0 cm^3 22 1558   Wound Assessment Dry 22 1558   Drainage Amount None 221   Diann-wound Assessment Blanchable erythema 22 1558   Margins Attached edges 22 1558   Number of days: 1        Elimination:  Continence: Bowel: {YES / MH:82282}  Bladder: {YES / SN:74766}  Urinary Catheter: {Urinary Catheter:603834693}   Colostomy/Ileostomy/Ileal Conduit: {YES / PW:68037}       Date of Last BM: ***    Intake/Output Summary (Last 24 hours) at 2022 1425  Last data filed at 2022 0561  Gross per 24 hour   Intake 240 ml   Output --   Net 240 ml     I/O last 3 completed shifts:   In: 49.9 [IV Piggyback:49.9]  Out: -     Safety Concerns: [facility] Safety Concerns:867243632}    Impairments/Disabilities:      [facility] Impairments/Disabilities:540473332}    Nutrition Therapy:  Current Nutrition Therapy:   [facility] Diet List:781405108}    Routes of Feeding: {CHP DME Other Feedings:720132649}  Liquids: {Slp liquid thickness:79524}  Daily Fluid Restriction: {CHP DME Yes amt example:969811870}  Last Modified Barium Swallow with Video (Video Swallowing Test): {Done Not Done ZXKU:126933537}    Treatments at the Time of Hospital Discharge:   Respiratory Treatments: ***  Oxygen Therapy:  {Therapy; copd oxygen:25949}  Ventilator:    {[facility] Vent ENRF:856453809}    Rehab Therapies: {THERAPEUTIC INTERVENTION:5994974847}  Weight Bearing Status/Restrictions: [facility] Weight Bearin}  Other Medical Equipment (for information only, NOT a DME order):  {EQUIPMENT:154501656}  Other Treatments:     NWB to RLE with use of post operative shoe and assistive devices, ok to heel WB for transfers. Dressing to stay clean, dry, intact. cut or fold dressing to fit wound, loosely pack wound with silvercel, ensure dressing does not overlap the wound margins, cover with adaptic, and secure with non occlusive DSD.      Elevate B/L LE at or above heart level while resting    Patient's personal belongings (please select all that are sent with patient):  {P DME Belongings:654298373}    RN SIGNATURE:  {Esignature:406711731}    CASE MANAGEMENT/SOCIAL WORK SECTION    Inpatient Status Date: 22    Readmission Risk Assessment Score:  Readmission Risk              Risk of Unplanned Readmission:  12           Discharging to Facility/ Agency   Name: [facility]  Phone: Raiza Hernandez  Name: Gerry Serrano [address]  Phone: [phone]      / signature: Electronically signed by Marj Trujillo RN on 3/1/22 at 7:00 AM EST    PHYSICIAN SECTION    Prognosis: Good    Condition at Discharge: Stable    Rehab Potential (if transferring to Rehab): Recommended Labs or Other Treatments After Discharge: Weekly BMP sent to Dr. Diane Mello (infectious disease) office; close follow up with podiatry, PCP and ID    Physician Certification: I certify the above information and transfer of Gracie Preciado  is necessary for the continuing treatment of the diagnosis listed and that he requires [place] for less 30 days.      Update Admission H&P: No change in H&P    PHYSICIAN SIGNATURE:  Electronically signed by Ximena Samson MD on 2/28/22 at 1:58 PM EST Detail Level: Detailed

## 2023-05-01 ENCOUNTER — NURSE ONLY (OUTPATIENT)
Dept: FAMILY MEDICINE CLINIC | Age: 68
End: 2023-05-01
Payer: MEDICARE

## 2023-05-01 DIAGNOSIS — R79.89 LOW TESTOSTERONE: Primary | ICD-10-CM

## 2023-05-01 PROCEDURE — 96372 THER/PROPH/DIAG INJ SC/IM: CPT | Performed by: FAMILY MEDICINE

## 2023-05-01 RX ORDER — TESTOSTERONE CYPIONATE 200 MG/ML
160 INJECTION, SOLUTION INTRAMUSCULAR ONCE
Status: COMPLETED | OUTPATIENT
Start: 2023-05-01 | End: 2023-05-01

## 2023-05-01 RX ADMIN — TESTOSTERONE CYPIONATE 160 MG: 200 INJECTION, SOLUTION INTRAMUSCULAR at 16:14

## 2023-05-01 NOTE — PROGRESS NOTES
Patient given Testosterone 160mg IM left gluteal..  Will return in 2 weeks for next injection    Patient tolerated injection well.     Administrations This Visit       testosterone cypionate (DEPOTESTOTERONE CYPIONATE) injection 160 mg       Admin Date  05/01/2023 Action  Given Dose  160 mg Route  [facility] Administered By  Susanna Connelly LPN

## 2023-05-14 DIAGNOSIS — E03.4 ATROPHY OF THYROID (ACQUIRED): ICD-10-CM

## 2023-05-15 ENCOUNTER — NURSE ONLY (OUTPATIENT)
Dept: FAMILY MEDICINE CLINIC | Age: 68
End: 2023-05-15
Payer: MEDICARE

## 2023-05-15 DIAGNOSIS — R79.89 LOW TESTOSTERONE: Primary | ICD-10-CM

## 2023-05-15 PROCEDURE — 96372 THER/PROPH/DIAG INJ SC/IM: CPT | Performed by: FAMILY MEDICINE

## 2023-05-15 RX ORDER — TESTOSTERONE CYPIONATE 200 MG/ML
160 INJECTION, SOLUTION INTRAMUSCULAR ONCE
Status: COMPLETED | OUTPATIENT
Start: 2023-05-15 | End: 2023-05-15

## 2023-05-15 RX ORDER — LEVOTHYROXINE SODIUM 0.03 MG/1
TABLET ORAL
Qty: 90 TABLET | Refills: 1 | Status: SHIPPED | OUTPATIENT
Start: 2023-05-15

## 2023-05-15 RX ADMIN — TESTOSTERONE CYPIONATE 160 MG: 200 INJECTION, SOLUTION INTRAMUSCULAR at 15:52

## 2023-05-15 NOTE — PROGRESS NOTES
Patient given Testosterone 160mg IM right gluteal..  Will return in 2 weeks for next injection    Patient tolerated injection well.     Administrations This Visit       testosterone cypionate (DEPOTESTOTERONE CYPIONATE) injection 160 mg       Admin Date  05/15/2023 Action  Given Dose  160 mg Route  [facility] Administered By  Vianey Torrez LPN

## 2023-05-22 ENCOUNTER — OFFICE VISIT (OUTPATIENT)
Dept: FAMILY MEDICINE CLINIC | Age: 68
End: 2023-05-22
Payer: MEDICARE

## 2023-05-22 VITALS
BODY MASS INDEX: 38.36 KG/M2 | SYSTOLIC BLOOD PRESSURE: 142 MMHG | WEIGHT: 274 LBS | HEIGHT: 71 IN | DIASTOLIC BLOOD PRESSURE: 88 MMHG | HEART RATE: 97 BPM | OXYGEN SATURATION: 97 % | TEMPERATURE: 98 F

## 2023-05-22 DIAGNOSIS — R03.0 ELEVATED BP WITHOUT DIAGNOSIS OF HYPERTENSION: ICD-10-CM

## 2023-05-22 DIAGNOSIS — L30.1 DYSHIDROTIC ECZEMA: ICD-10-CM

## 2023-05-22 DIAGNOSIS — R79.89 LOW TESTOSTERONE: ICD-10-CM

## 2023-05-22 DIAGNOSIS — R73.9 HYPERGLYCEMIA: ICD-10-CM

## 2023-05-22 DIAGNOSIS — B35.3 TINEA PEDIS OF BOTH FEET: Primary | ICD-10-CM

## 2023-05-22 DIAGNOSIS — Z12.5 PROSTATE CANCER SCREENING: ICD-10-CM

## 2023-05-22 DIAGNOSIS — E55.9 VITAMIN D DEFICIENCY: ICD-10-CM

## 2023-05-22 DIAGNOSIS — L30.9 ECZEMA, UNSPECIFIED TYPE: ICD-10-CM

## 2023-05-22 PROCEDURE — G8427 DOCREV CUR MEDS BY ELIG CLIN: HCPCS | Performed by: FAMILY MEDICINE

## 2023-05-22 PROCEDURE — 1123F ACP DISCUSS/DSCN MKR DOCD: CPT | Performed by: FAMILY MEDICINE

## 2023-05-22 PROCEDURE — G8417 CALC BMI ABV UP PARAM F/U: HCPCS | Performed by: FAMILY MEDICINE

## 2023-05-22 PROCEDURE — 99214 OFFICE O/P EST MOD 30 MIN: CPT | Performed by: FAMILY MEDICINE

## 2023-05-22 PROCEDURE — 1036F TOBACCO NON-USER: CPT | Performed by: FAMILY MEDICINE

## 2023-05-22 PROCEDURE — 3017F COLORECTAL CA SCREEN DOC REV: CPT | Performed by: FAMILY MEDICINE

## 2023-05-22 RX ORDER — PRENATAL VIT 91/IRON/FOLIC/DHA 28-975-200
COMBINATION PACKAGE (EA) ORAL
Qty: 42 G | Refills: 0 | Status: SHIPPED | OUTPATIENT
Start: 2023-05-22

## 2023-05-22 RX ORDER — FLUCONAZOLE 100 MG/1
100 TABLET ORAL DAILY
Qty: 14 TABLET | Refills: 0 | Status: SHIPPED | OUTPATIENT
Start: 2023-05-22 | End: 2023-06-05

## 2023-05-22 RX ORDER — TRIAMCINOLONE ACETONIDE 1 MG/G
CREAM TOPICAL
Qty: 454 G | Refills: 0 | Status: SHIPPED | OUTPATIENT
Start: 2023-05-22

## 2023-05-22 SDOH — ECONOMIC STABILITY: HOUSING INSECURITY
IN THE LAST 12 MONTHS, WAS THERE A TIME WHEN YOU DID NOT HAVE A STEADY PLACE TO SLEEP OR SLEPT IN A SHELTER (INCLUDING NOW)?: NO

## 2023-05-22 SDOH — ECONOMIC STABILITY: FOOD INSECURITY: WITHIN THE PAST 12 MONTHS, YOU WORRIED THAT YOUR FOOD WOULD RUN OUT BEFORE YOU GOT MONEY TO BUY MORE.: NEVER TRUE

## 2023-05-22 SDOH — ECONOMIC STABILITY: FOOD INSECURITY: WITHIN THE PAST 12 MONTHS, THE FOOD YOU BOUGHT JUST DIDN'T LAST AND YOU DIDN'T HAVE MONEY TO GET MORE.: NEVER TRUE

## 2023-05-22 SDOH — ECONOMIC STABILITY: INCOME INSECURITY: HOW HARD IS IT FOR YOU TO PAY FOR THE VERY BASICS LIKE FOOD, HOUSING, MEDICAL CARE, AND HEATING?: NOT VERY HARD

## 2023-05-22 ASSESSMENT — PATIENT HEALTH QUESTIONNAIRE - PHQ9
SUM OF ALL RESPONSES TO PHQ9 QUESTIONS 1 & 2: 0
SUM OF ALL RESPONSES TO PHQ QUESTIONS 1-9: 0
SUM OF ALL RESPONSES TO PHQ QUESTIONS 1-9: 0
2. FEELING DOWN, DEPRESSED OR HOPELESS: 0
SUM OF ALL RESPONSES TO PHQ QUESTIONS 1-9: 0
1. LITTLE INTEREST OR PLEASURE IN DOING THINGS: 0
SUM OF ALL RESPONSES TO PHQ QUESTIONS 1-9: 0

## 2023-05-22 ASSESSMENT — ENCOUNTER SYMPTOMS: COLOR CHANGE: 1

## 2023-05-22 NOTE — PROGRESS NOTES
Diagnosis Orders   1. Tinea pedis of both feet  fluconazole (DIFLUCAN) 100 MG tablet    terbinafine (LAMISIL) 1 % cream      2. Elevated BP without diagnosis of hypertension  Lipid, Fasting    TSH with Reflex    Comprehensive Metabolic Panel    CBC with Auto Differential      3. Eczema, unspecified type  triamcinolone (KENALOG) 0.1 % cream      4. Dyshidrotic eczema  triamcinolone (KENALOG) 0.1 % cream      5. Vitamin D deficiency  Vitamin D 25 Hydroxy      6. Low testosterone  Testosterone, free, total    Transferrin      7. Prostate cancer screening  PSA Screening      8. Hyperglycemia  Hemoglobin A1C        Return in about 2 weeks (around 6/5/2023) for for review of outcome of today's recommendation bp recheck and feet. Patient Instructions   Patient is going to take Diflucan 100 mg nightly for 2 weeks for deep tissue tinea pedis, foot fungus. Patient will utilize a combination of Lamisil cream mixed with his triamcinolone cream applied to the feet at least once a day if not twice a day. White kitchen vinegar, 5% acetic acid, is a good remedy for yeast and laundry. You can add 1 to 2 cups to your laundry and help minimize how much of the yeast is carried within clothing. Recheck in person in 2 weeks in addition to recheck the blood pressure. Subjective:      Patient ID: Jeannie Freeman is a 69 y.o. male who presents for:  Chief Complaint   Patient presents with    6 Month Follow-Up    Skin Problem     Pt states his heels are cracked. Pt using kenalog cream. Helps a little bit. Discuss Medications     Levothyroxine. Increased itching and redness and cracking in different areas of the foot than his usual rash. Triamcinolone not controlling the symptoms. Still getting water bubbles on his hands at times. No CV symptoms noted.   Still doing testosterone and happy with the results      Current Outpatient Medications on File Prior to Visit   Medication Sig Dispense Refill    levothyroxine

## 2023-05-22 NOTE — PATIENT INSTRUCTIONS
Patient is going to take Diflucan 100 mg nightly for 2 weeks for deep tissue tinea pedis, foot fungus. Patient will utilize a combination of Lamisil cream mixed with his triamcinolone cream applied to the feet at least once a day if not twice a day. White kitchen vinegar, 5% acetic acid, is a good remedy for yeast and laundry. You can add 1 to 2 cups to your laundry and help minimize how much of the yeast is carried within clothing. Recheck in person in 2 weeks in addition to recheck the blood pressure.

## 2023-05-30 ENCOUNTER — NURSE ONLY (OUTPATIENT)
Dept: FAMILY MEDICINE CLINIC | Age: 68
End: 2023-05-30
Payer: MEDICARE

## 2023-05-30 DIAGNOSIS — R79.89 LOW TESTOSTERONE: Primary | ICD-10-CM

## 2023-05-30 PROCEDURE — 96372 THER/PROPH/DIAG INJ SC/IM: CPT | Performed by: FAMILY MEDICINE

## 2023-05-30 RX ORDER — TESTOSTERONE CYPIONATE 200 MG/ML
160 INJECTION, SOLUTION INTRAMUSCULAR ONCE
Status: COMPLETED | OUTPATIENT
Start: 2023-05-30 | End: 2023-05-30

## 2023-05-30 RX ADMIN — TESTOSTERONE CYPIONATE 160 MG: 200 INJECTION, SOLUTION INTRAMUSCULAR at 13:33

## 2023-05-30 NOTE — PROGRESS NOTES
Patient given Testosterone 160mg IM left gluteal..  Will return in 2 weeks for next injection    Patient tolerated injection well.     Administrations This Visit       testosterone cypionate (DEPOTESTOTERONE CYPIONATE) injection 160 mg       Admin Date  05/30/2023 Action  Given Dose  160 mg Route  Int[facility] Administered By  Lizet Goodwin LPN

## 2023-06-08 ENCOUNTER — OFFICE VISIT (OUTPATIENT)
Dept: FAMILY MEDICINE CLINIC | Age: 68
End: 2023-06-08
Payer: MEDICARE

## 2023-06-08 VITALS
WEIGHT: 271 LBS | BODY MASS INDEX: 37.94 KG/M2 | SYSTOLIC BLOOD PRESSURE: 138 MMHG | HEIGHT: 71 IN | DIASTOLIC BLOOD PRESSURE: 86 MMHG | HEART RATE: 83 BPM | OXYGEN SATURATION: 98 % | TEMPERATURE: 97.8 F

## 2023-06-08 DIAGNOSIS — B35.3 TINEA PEDIS OF BOTH FEET: Primary | ICD-10-CM

## 2023-06-08 DIAGNOSIS — R03.0 ELEVATED BP WITHOUT DIAGNOSIS OF HYPERTENSION: ICD-10-CM

## 2023-06-08 DIAGNOSIS — L30.1 DYSHIDROTIC ECZEMA: ICD-10-CM

## 2023-06-08 DIAGNOSIS — E55.9 VITAMIN D DEFICIENCY: ICD-10-CM

## 2023-06-08 PROCEDURE — 99213 OFFICE O/P EST LOW 20 MIN: CPT | Performed by: FAMILY MEDICINE

## 2023-06-08 PROCEDURE — G8427 DOCREV CUR MEDS BY ELIG CLIN: HCPCS | Performed by: FAMILY MEDICINE

## 2023-06-08 PROCEDURE — 1123F ACP DISCUSS/DSCN MKR DOCD: CPT | Performed by: FAMILY MEDICINE

## 2023-06-08 PROCEDURE — G8417 CALC BMI ABV UP PARAM F/U: HCPCS | Performed by: FAMILY MEDICINE

## 2023-06-08 PROCEDURE — 3017F COLORECTAL CA SCREEN DOC REV: CPT | Performed by: FAMILY MEDICINE

## 2023-06-08 PROCEDURE — 1036F TOBACCO NON-USER: CPT | Performed by: FAMILY MEDICINE

## 2023-06-08 NOTE — PROGRESS NOTES
Diagnosis Orders   1. Tinea pedis of both feet        2. Elevated BP without diagnosis of hypertension        3. Dyshidrotic eczema        4. Vitamin D deficiency  Vitamin D 25 Hydroxy        Return in about 3 months (around 9/8/2023) for for review of outcome of today's recommendation. Patient Instructions   Patient will continue with topical regimen for foot care between foot fungus and the eczema. Continue to work on dryness strategies. Patient is now working out at [employer] and being more conscientious diet has already dropped 3 pounds. Recheck in 3 months with the expectation of at least another 10 pounds lost and hopefully blood pressure will be down below 135/85. If not we will institute medication at the next visit    Subjective:      Patient ID: Kapil Martinez is a 69 y.o. male who presents for:  Chief Complaint   Patient presents with    Blood Pressure Check    Skin Problem     Feet        HPI    Current Outpatient Medications on File Prior to Visit   Medication Sig Dispense Refill    triamcinolone (KENALOG) 0.1 % cream Apply topically qam daily Monday through Friday only. Take weekend off. Do not use on face, groin, armpits, breasts tissue. 454 g 0    terbinafine (LAMISIL) 1 % cream Apply topically 2 times daily. 42 g 0    levothyroxine (SYNTHROID) 25 MCG tablet Take 1 tablet by mouth once daily 90 tablet 1    vitamin D (ERGOCALCIFEROL) 1.25 MG (94222 UT) CAPS capsule Take 1 capsule by mouth once a week 12 capsule 1    testosterone cypionate (DEPOTESTOTERONE CYPIONATE) 200 MG/ML injection Inject 0.8 mLs into the muscle every 14 days.  Indications: Per Verbal order of Dr. Rudy Olivares      famotidine (PEPCID) 20 MG tablet Take 1 tablet by mouth 2 times daily 60 tablet 0    ascorbic acid (VITAMIN C) 500 MG tablet Take 1 tablet by mouth daily      zinc 50 MG TABS tablet Take 1 tablet by mouth daily      aspirin 325 MG EC tablet Take 1 tablet by mouth daily 1 tablet 0    Syringe/Needle, Disp, (SYRINGE

## 2023-06-08 NOTE — PATIENT INSTRUCTIONS
Patient will continue with topical regimen for foot care between foot fungus and the eczema. Continue to work on dryness strategies. Patient is now working out at [employer] and being more conscientious diet has already dropped 3 pounds. Recheck in 3 months with the expectation of at least another 10 pounds lost and hopefully blood pressure will be down below 135/85.   If not we will institute medication at the next visit

## 2023-06-19 DIAGNOSIS — R79.89 LOW TESTOSTERONE: ICD-10-CM

## 2023-06-19 DIAGNOSIS — E55.9 VITAMIN D DEFICIENCY: ICD-10-CM

## 2023-06-20 DIAGNOSIS — L30.1 DYSHIDROTIC ECZEMA: ICD-10-CM

## 2023-06-20 DIAGNOSIS — L30.9 ECZEMA, UNSPECIFIED TYPE: ICD-10-CM

## 2023-06-20 LAB
SHBG SERPL-SCNC: 18 NMOL/L (ref 11–80)
TESTOST FREE SERPL-MCNC: 155.4 PG/ML (ref 47–244)
TESTOST SERPL-MCNC: 546 NG/DL (ref 220–1000)
TRANSFERRIN SERPL-MCNC: 348 MG/DL (ref 200–360)
VITAMIN D 25-HYDROXY: 44.1 NG/ML

## 2023-06-20 RX ORDER — TRIAMCINOLONE ACETONIDE 1 MG/G
CREAM TOPICAL
Qty: 454 G | Refills: 0 | Status: SHIPPED | OUTPATIENT
Start: 2023-06-20

## 2023-06-26 ENCOUNTER — NURSE ONLY (OUTPATIENT)
Dept: FAMILY MEDICINE CLINIC | Age: 68
End: 2023-06-26
Payer: MEDICARE

## 2023-06-26 DIAGNOSIS — R79.89 LOW TESTOSTERONE: Primary | ICD-10-CM

## 2023-06-26 PROCEDURE — 96372 THER/PROPH/DIAG INJ SC/IM: CPT | Performed by: FAMILY MEDICINE

## 2023-06-26 RX ORDER — TESTOSTERONE CYPIONATE 200 MG/ML
160 INJECTION, SOLUTION INTRAMUSCULAR ONCE
Status: COMPLETED | OUTPATIENT
Start: 2023-06-26 | End: 2023-06-26

## 2023-06-26 RX ADMIN — TESTOSTERONE CYPIONATE 160 MG: 200 INJECTION, SOLUTION INTRAMUSCULAR at 13:57

## 2023-07-10 ENCOUNTER — NURSE ONLY (OUTPATIENT)
Dept: FAMILY MEDICINE CLINIC | Age: 68
End: 2023-07-10
Payer: MEDICARE

## 2023-07-10 DIAGNOSIS — R79.89 LOW TESTOSTERONE: Primary | ICD-10-CM

## 2023-07-10 PROCEDURE — 96372 THER/PROPH/DIAG INJ SC/IM: CPT | Performed by: FAMILY MEDICINE

## 2023-07-10 RX ORDER — TESTOSTERONE CYPIONATE 200 MG/ML
160 INJECTION, SOLUTION INTRAMUSCULAR ONCE
Status: COMPLETED | OUTPATIENT
Start: 2023-07-10 | End: 2023-07-10

## 2023-07-10 RX ADMIN — TESTOSTERONE CYPIONATE 160 MG: 200 INJECTION, SOLUTION INTRAMUSCULAR at 13:50

## 2023-07-10 NOTE — PROGRESS NOTES
Patient given Testosterone 160mg IM right gluteal..  Will return in 2 weeks for next injection    Patient tolerated injection well.     Administrations This Visit       testosterone cypionate (DEPOTESTOTERONE CYPIONATE) injection 160 mg       Admin Date  07/10/2023 Action  Given Dose  160 mg Route  [facility] Administered By  Pranay Gonsales LPN

## 2023-07-24 ENCOUNTER — NURSE ONLY (OUTPATIENT)
Dept: FAMILY MEDICINE CLINIC | Age: 68
End: 2023-07-24
Payer: MEDICARE

## 2023-07-24 DIAGNOSIS — R79.89 LOW TESTOSTERONE: Primary | ICD-10-CM

## 2023-07-24 PROCEDURE — 96372 THER/PROPH/DIAG INJ SC/IM: CPT | Performed by: FAMILY MEDICINE

## 2023-07-24 RX ORDER — TESTOSTERONE CYPIONATE 200 MG/ML
200 INJECTION, SOLUTION INTRAMUSCULAR ONCE
Status: COMPLETED | OUTPATIENT
Start: 2023-07-24 | End: 2023-07-24

## 2023-07-24 RX ADMIN — TESTOSTERONE CYPIONATE 160 MG: 200 INJECTION, SOLUTION INTRAMUSCULAR at 13:54

## 2023-07-24 NOTE — PROGRESS NOTES
Patient given Testosterone 160mg IM left gluteal..  Will return in 2 weeks for next injection    Patient tolerated injection well.

## 2023-08-07 ENCOUNTER — NURSE ONLY (OUTPATIENT)
Dept: FAMILY MEDICINE CLINIC | Age: 68
End: 2023-08-07
Payer: MEDICARE

## 2023-08-07 DIAGNOSIS — R79.89 LOW TESTOSTERONE: Primary | ICD-10-CM

## 2023-08-07 PROCEDURE — 96372 THER/PROPH/DIAG INJ SC/IM: CPT | Performed by: FAMILY MEDICINE

## 2023-08-07 RX ORDER — TESTOSTERONE CYPIONATE 200 MG/ML
160 INJECTION, SOLUTION INTRAMUSCULAR ONCE
Status: COMPLETED | OUTPATIENT
Start: 2023-08-07 | End: 2023-08-07

## 2023-08-07 RX ADMIN — TESTOSTERONE CYPIONATE 160 MG: 200 INJECTION, SOLUTION INTRAMUSCULAR at 13:48

## 2023-08-07 NOTE — PROGRESS NOTES
Patient given Testosterone 160mg IM right gluteal..  Will return in 2 weeks for next injection    Patient tolerated injection well.     Administrations This Visit       testosterone cypionate (DEPOTESTOTERONE CYPIONATE) injection 160 mg       Admin Date  08/07/2023 Action  Given Dose  160 mg Route  [facility] Administered By  Viktor Jorgensen LPN

## 2023-08-21 ENCOUNTER — NURSE ONLY (OUTPATIENT)
Dept: FAMILY MEDICINE CLINIC | Age: 68
End: 2023-08-21
Payer: MEDICARE

## 2023-08-21 DIAGNOSIS — E55.9 VITAMIN D DEFICIENCY: ICD-10-CM

## 2023-08-21 DIAGNOSIS — R79.89 LOW TESTOSTERONE: Primary | ICD-10-CM

## 2023-08-21 PROCEDURE — 96372 THER/PROPH/DIAG INJ SC/IM: CPT | Performed by: FAMILY MEDICINE

## 2023-08-21 RX ORDER — TESTOSTERONE CYPIONATE 200 MG/ML
160 INJECTION, SOLUTION INTRAMUSCULAR ONCE
Status: COMPLETED | OUTPATIENT
Start: 2023-08-21 | End: 2023-08-21

## 2023-08-21 RX ORDER — ERGOCALCIFEROL 1.25 MG/1
50000 CAPSULE ORAL WEEKLY
Qty: 12 CAPSULE | Refills: 0 | Status: SHIPPED | OUTPATIENT
Start: 2023-08-21

## 2023-08-21 RX ADMIN — TESTOSTERONE CYPIONATE 160 MG: 200 INJECTION, SOLUTION INTRAMUSCULAR at 14:36

## 2023-08-21 NOTE — PROGRESS NOTES
Patient given Testosterone 160mg IM left   gluteal..  Will return in 2 weeks for next injection    Patient tolerated injection well.     Administrations This Visit       testosterone cypionate (DEPOTESTOTERONE CYPIONATE) injection 160 mg       Admin Date  08/21/2023 Action  Given Dose  160 mg Route  [facility] Administered By  Caty Adrian LPN

## 2023-08-26 DIAGNOSIS — E55.9 VITAMIN D DEFICIENCY: ICD-10-CM

## 2023-08-28 RX ORDER — ERGOCALCIFEROL 1.25 MG/1
50000 CAPSULE ORAL WEEKLY
Qty: 12 CAPSULE | Refills: 0 | Status: SHIPPED | OUTPATIENT
Start: 2023-08-28

## 2023-09-05 ENCOUNTER — NURSE ONLY (OUTPATIENT)
Dept: FAMILY MEDICINE CLINIC | Age: 68
End: 2023-09-05
Payer: MEDICARE

## 2023-09-05 DIAGNOSIS — R79.89 LOW TESTOSTERONE: Primary | ICD-10-CM

## 2023-09-05 PROCEDURE — 96372 THER/PROPH/DIAG INJ SC/IM: CPT | Performed by: FAMILY MEDICINE

## 2023-09-05 RX ORDER — TESTOSTERONE CYPIONATE 200 MG/ML
160 INJECTION, SOLUTION INTRAMUSCULAR ONCE
Status: COMPLETED | OUTPATIENT
Start: 2023-09-05 | End: 2023-09-05

## 2023-09-05 RX ADMIN — TESTOSTERONE CYPIONATE 160 MG: 200 INJECTION, SOLUTION INTRAMUSCULAR at 14:05

## 2023-09-05 NOTE — PROGRESS NOTES
Patient given Testosterone 160 mg IM right gluteal..  Will return in 2 weeks for next injection    Patient tolerated injection well.     Administrations This Visit       testosterone cypionate (DEPOTESTOTERONE CYPIONATE) injection 160 mg       Admin Date  09/05/2023 Action  Given Dose  160 mg Route  [facility] Administered By  Monika Lucero LPN

## 2023-09-18 ENCOUNTER — NURSE ONLY (OUTPATIENT)
Dept: FAMILY MEDICINE CLINIC | Age: 68
End: 2023-09-18
Payer: MEDICARE

## 2023-09-18 DIAGNOSIS — R79.89 LOW TESTOSTERONE: Primary | ICD-10-CM

## 2023-09-18 PROCEDURE — 96372 THER/PROPH/DIAG INJ SC/IM: CPT | Performed by: FAMILY MEDICINE

## 2023-09-18 RX ORDER — TESTOSTERONE CYPIONATE 200 MG/ML
160 INJECTION, SOLUTION INTRAMUSCULAR ONCE
Status: COMPLETED | OUTPATIENT
Start: 2023-09-18 | End: 2023-09-18

## 2023-09-18 RX ADMIN — TESTOSTERONE CYPIONATE 160 MG: 200 INJECTION, SOLUTION INTRAMUSCULAR at 14:06

## 2023-09-18 NOTE — PROGRESS NOTES
Patient given Testosterone 160mg IM left gluteal..  Will return in 2 weeks for next injection    Patient tolerated injection well.     Administrations This Visit       testosterone cypionate (DEPOTESTOTERONE CYPIONATE) injection 160 mg       Admin Date  09/18/2023 Action  Given Dose  160 mg Route  Int[facility]ular Administered By  Monika Lucero LPN
no

## 2023-10-02 ENCOUNTER — NURSE ONLY (OUTPATIENT)
Dept: FAMILY MEDICINE CLINIC | Age: 68
End: 2023-10-02
Payer: MEDICARE

## 2023-10-02 DIAGNOSIS — R79.89 LOW TESTOSTERONE: Primary | ICD-10-CM

## 2023-10-02 PROCEDURE — 96372 THER/PROPH/DIAG INJ SC/IM: CPT | Performed by: FAMILY MEDICINE

## 2023-10-02 RX ORDER — TESTOSTERONE CYPIONATE 200 MG/ML
160 INJECTION, SOLUTION INTRAMUSCULAR ONCE
Status: COMPLETED | OUTPATIENT
Start: 2023-10-02 | End: 2023-10-02

## 2023-10-02 RX ADMIN — TESTOSTERONE CYPIONATE 160 MG: 200 INJECTION, SOLUTION INTRAMUSCULAR at 14:08

## 2023-10-02 NOTE — PROGRESS NOTES
Patient given Testosterone 160mg IM right gluteal..  Will return in 2 weeks for next injection    Patient tolerated injection well.     Administrations This Visit       testosterone cypionate (DEPOTESTOTERONE CYPIONATE) injection 160 mg       Admin Date  10/02/2023 Action  Given Dose  160 mg Route  Int[facility] Administered By  Toi Del Angel LPN

## 2023-10-16 ENCOUNTER — NURSE ONLY (OUTPATIENT)
Dept: FAMILY MEDICINE CLINIC | Age: 68
End: 2023-10-16
Payer: MEDICARE

## 2023-10-16 DIAGNOSIS — R79.89 LOW TESTOSTERONE: Primary | ICD-10-CM

## 2023-10-16 PROCEDURE — 96372 THER/PROPH/DIAG INJ SC/IM: CPT | Performed by: FAMILY MEDICINE

## 2023-10-16 RX ORDER — TESTOSTERONE CYPIONATE 200 MG/ML
200 INJECTION, SOLUTION INTRAMUSCULAR ONCE
Status: COMPLETED | OUTPATIENT
Start: 2023-10-16 | End: 2023-10-16

## 2023-10-16 RX ADMIN — TESTOSTERONE CYPIONATE 160 MG: 200 INJECTION, SOLUTION INTRAMUSCULAR at 14:13

## 2023-10-16 NOTE — PROGRESS NOTES
Patient given Testosterone 160mg IM left gluteal..  Will return in 2 weeks for next injection    Patient tolerated injection well.     Administrations This Visit       testosterone cypionate (DEPOTESTOTERONE CYPIONATE) injection 200 mg       Admin Date  10/16/2023 Action  Given Dose  160 mg Route  Int[facility] Administered By  Robin Alcocer LPN

## 2023-10-30 ENCOUNTER — NURSE ONLY (OUTPATIENT)
Dept: FAMILY MEDICINE CLINIC | Age: 68
End: 2023-10-30
Payer: MEDICARE

## 2023-10-30 DIAGNOSIS — R79.89 LOW TESTOSTERONE: Primary | ICD-10-CM

## 2023-10-30 PROCEDURE — 96372 THER/PROPH/DIAG INJ SC/IM: CPT | Performed by: FAMILY MEDICINE

## 2023-10-30 RX ORDER — TESTOSTERONE CYPIONATE 200 MG/ML
160 INJECTION, SOLUTION INTRAMUSCULAR ONCE
Status: COMPLETED | OUTPATIENT
Start: 2023-10-30 | End: 2023-10-30

## 2023-10-30 RX ADMIN — TESTOSTERONE CYPIONATE 160 MG: 200 INJECTION, SOLUTION INTRAMUSCULAR at 14:49

## 2023-10-30 NOTE — PROGRESS NOTES
Patient given Testosterone 160mg IM right gluteal..  Will return in 2 weeks for next injection    Patient tolerated injection well.     Administrations This Visit       testosterone cypionate (DEPOTESTOTERONE CYPIONATE) injection 160 mg       Admin Date  10/30/2023 Action  Given Dose  160 mg Route  Int[facility] Administered By  Chris Chen LPN

## 2023-11-10 DIAGNOSIS — E03.4 ATROPHY OF THYROID (ACQUIRED): ICD-10-CM

## 2023-11-13 ENCOUNTER — NURSE ONLY (OUTPATIENT)
Dept: FAMILY MEDICINE CLINIC | Age: 68
End: 2023-11-13

## 2023-11-13 DIAGNOSIS — R79.89 LOW TESTOSTERONE: Primary | ICD-10-CM

## 2023-11-13 RX ORDER — TESTOSTERONE CYPIONATE 200 MG/ML
160 INJECTION, SOLUTION INTRAMUSCULAR ONCE
Status: COMPLETED | OUTPATIENT
Start: 2023-11-13 | End: 2023-11-13

## 2023-11-13 RX ADMIN — TESTOSTERONE CYPIONATE 160 MG: 200 INJECTION, SOLUTION INTRAMUSCULAR at 13:15

## 2023-11-13 NOTE — PROGRESS NOTES
Patient given Testosterone 160mg IM left gluteal..  Will return in 2 weeks for next injection    Patient tolerated injection well.     Administrations This Visit       testosterone cypionate (DEPOTESTOTERONE CYPIONATE) injection 160 mg       Admin Date  11/13/2023 Action  Given Dose  160 mg Route  Int[facility]ular Administered By  Ashish Medina LPN

## 2023-11-14 DIAGNOSIS — R03.0 ELEVATED BP WITHOUT DIAGNOSIS OF HYPERTENSION: ICD-10-CM

## 2023-11-14 DIAGNOSIS — Z12.5 PROSTATE CANCER SCREENING: ICD-10-CM

## 2023-11-14 DIAGNOSIS — R73.9 HYPERGLYCEMIA: ICD-10-CM

## 2023-11-14 DIAGNOSIS — E55.9 VITAMIN D DEFICIENCY: ICD-10-CM

## 2023-11-14 LAB
ALBUMIN SERPL-MCNC: 4.6 G/DL (ref 3.5–4.6)
ALP SERPL-CCNC: 83 U/L (ref 35–104)
ALT SERPL-CCNC: 31 U/L (ref 0–41)
ANION GAP SERPL CALCULATED.3IONS-SCNC: 12 MEQ/L (ref 9–15)
AST SERPL-CCNC: 18 U/L (ref 0–40)
BASOPHILS # BLD: 0.1 K/UL (ref 0–0.2)
BASOPHILS NFR BLD: 1.5 %
BILIRUB SERPL-MCNC: 0.6 MG/DL (ref 0.2–0.7)
BUN SERPL-MCNC: 16 MG/DL (ref 8–23)
CALCIUM SERPL-MCNC: 9.6 MG/DL (ref 8.5–9.9)
CHLORIDE SERPL-SCNC: 99 MEQ/L (ref 95–107)
CHOLEST SERPL-MCNC: 195 MG/DL (ref 0–199)
CO2 SERPL-SCNC: 24 MEQ/L (ref 20–31)
CREAT SERPL-MCNC: 1.01 MG/DL (ref 0.7–1.2)
EOSINOPHIL # BLD: 0.2 K/UL (ref 0–0.7)
EOSINOPHIL NFR BLD: 3.7 %
ERYTHROCYTE [DISTWIDTH] IN BLOOD BY AUTOMATED COUNT: 12.9 % (ref 11.5–14.5)
GLOBULIN SER CALC-MCNC: 2.8 G/DL (ref 2.3–3.5)
GLUCOSE SERPL-MCNC: 111 MG/DL (ref 70–99)
HBA1C MFR BLD: 5.6 % (ref 4.8–5.9)
HCT VFR BLD AUTO: 56.6 % (ref 42–52)
HDLC SERPL-MCNC: 42 MG/DL (ref 40–59)
HGB BLD-MCNC: 18.9 G/DL (ref 14–18)
LDL CHOLESTEROL CALCULATED: 138 MG/DL (ref 0–129)
LYMPHOCYTES # BLD: 1.1 K/UL (ref 1–4.8)
LYMPHOCYTES NFR BLD: 16.9 %
MCH RBC QN AUTO: 29.1 PG (ref 27–31.3)
MCHC RBC AUTO-ENTMCNC: 33.4 % (ref 33–37)
MCV RBC AUTO: 87.2 FL (ref 79–92.2)
MONOCYTES # BLD: 0.5 K/UL (ref 0.2–0.8)
MONOCYTES NFR BLD: 7.5 %
NEUTROPHILS # BLD: 4.5 K/UL (ref 1.4–6.5)
NEUTS SEG NFR BLD: 69.9 %
PLATELET # BLD AUTO: 213 K/UL (ref 130–400)
POTASSIUM SERPL-SCNC: 4.8 MEQ/L (ref 3.4–4.9)
PROT SERPL-MCNC: 7.4 G/DL (ref 6.3–8)
PSA SERPL-MCNC: 1.28 NG/ML (ref 0–4)
RBC # BLD AUTO: 6.49 M/UL (ref 4.7–6.1)
SODIUM SERPL-SCNC: 135 MEQ/L (ref 135–144)
T4 FREE SERPL-MCNC: 1.26 NG/DL (ref 0.84–1.68)
TRIGLYCERIDE, FASTING: 76 MG/DL (ref 0–150)
TSH REFLEX: 4.84 UIU/ML (ref 0.44–3.86)
VITAMIN D 25-HYDROXY: 40.4 NG/ML (ref 30–100)
WBC # BLD AUTO: 6.5 K/UL (ref 4.8–10.8)

## 2023-11-15 RX ORDER — LEVOTHYROXINE SODIUM 0.03 MG/1
TABLET ORAL
Qty: 90 TABLET | Refills: 0 | Status: SHIPPED | OUTPATIENT
Start: 2023-11-15

## 2023-11-15 NOTE — RESULT ENCOUNTER NOTE
Thyroid lab is just outside of normal.  Does patient have any constipation?  Increasing hair loss?  Dry skin?  Fatigue?

## 2023-11-27 ENCOUNTER — NURSE ONLY (OUTPATIENT)
Dept: FAMILY MEDICINE CLINIC | Age: 68
End: 2023-11-27
Payer: MEDICARE

## 2023-11-27 DIAGNOSIS — R79.89 LOW TESTOSTERONE: Primary | ICD-10-CM

## 2023-11-27 PROCEDURE — 96372 THER/PROPH/DIAG INJ SC/IM: CPT | Performed by: STUDENT IN AN ORGANIZED HEALTH CARE EDUCATION/TRAINING PROGRAM

## 2023-11-27 RX ORDER — TESTOSTERONE CYPIONATE 200 MG/ML
160 INJECTION, SOLUTION INTRAMUSCULAR ONCE
Status: COMPLETED | OUTPATIENT
Start: 2023-11-27 | End: 2023-11-27

## 2023-11-27 RX ADMIN — TESTOSTERONE CYPIONATE 160 MG: 200 INJECTION, SOLUTION INTRAMUSCULAR at 13:31

## 2023-11-27 NOTE — PROGRESS NOTES
Patient given Testosterone 160mg IM right gluteal..  Will return in 2 weeks for next injection    Patient tolerated injection well.     Administrations This Visit       testosterone cypionate (DEPOTESTOTERONE CYPIONATE) injection 160 mg       Admin Date  11/27/2023 Action  Given Dose  160 mg Route  Intra[facility]ular Administered By  Jessica James LPN

## 2023-11-28 ENCOUNTER — OFFICE VISIT (OUTPATIENT)
Dept: FAMILY MEDICINE CLINIC | Age: 68
End: 2023-11-28
Payer: MEDICARE

## 2023-11-28 VITALS
TEMPERATURE: 98.8 F | BODY MASS INDEX: 38.19 KG/M2 | OXYGEN SATURATION: 98 % | HEIGHT: 71 IN | HEART RATE: 91 BPM | DIASTOLIC BLOOD PRESSURE: 84 MMHG | WEIGHT: 272.8 LBS | SYSTOLIC BLOOD PRESSURE: 138 MMHG

## 2023-11-28 DIAGNOSIS — Z00.00 MEDICARE ANNUAL WELLNESS VISIT, SUBSEQUENT: Primary | ICD-10-CM

## 2023-11-28 DIAGNOSIS — B35.3 TINEA PEDIS OF BOTH FEET: ICD-10-CM

## 2023-11-28 DIAGNOSIS — D58.2 ELEVATED HEMOGLOBIN (HCC): ICD-10-CM

## 2023-11-28 PROCEDURE — 3017F COLORECTAL CA SCREEN DOC REV: CPT | Performed by: FAMILY MEDICINE

## 2023-11-28 PROCEDURE — G0439 PPPS, SUBSEQ VISIT: HCPCS | Performed by: FAMILY MEDICINE

## 2023-11-28 PROCEDURE — 99214 OFFICE O/P EST MOD 30 MIN: CPT | Performed by: FAMILY MEDICINE

## 2023-11-28 PROCEDURE — 1123F ACP DISCUSS/DSCN MKR DOCD: CPT | Performed by: FAMILY MEDICINE

## 2023-11-28 PROCEDURE — G8484 FLU IMMUNIZE NO ADMIN: HCPCS | Performed by: FAMILY MEDICINE

## 2023-11-28 RX ORDER — FLUCONAZOLE 100 MG/1
100 TABLET ORAL DAILY
Qty: 7 TABLET | Refills: 0 | Status: SHIPPED | OUTPATIENT
Start: 2023-11-28 | End: 2023-12-05

## 2023-11-28 ASSESSMENT — PATIENT HEALTH QUESTIONNAIRE - PHQ9
SUM OF ALL RESPONSES TO PHQ9 QUESTIONS 1 & 2: 0
SUM OF ALL RESPONSES TO PHQ QUESTIONS 1-9: 0
SUM OF ALL RESPONSES TO PHQ QUESTIONS 1-9: 0
2. FEELING DOWN, DEPRESSED OR HOPELESS: 0
1. LITTLE INTEREST OR PLEASURE IN DOING THINGS: 0
SUM OF ALL RESPONSES TO PHQ QUESTIONS 1-9: 0
SUM OF ALL RESPONSES TO PHQ QUESTIONS 1-9: 0

## 2023-11-28 ASSESSMENT — LIFESTYLE VARIABLES
HOW MANY STANDARD DRINKS CONTAINING ALCOHOL DO YOU HAVE ON A TYPICAL DAY: PATIENT DOES NOT DRINK
HOW OFTEN DO YOU HAVE A DRINK CONTAINING ALCOHOL: NEVER

## 2023-11-28 NOTE — PROGRESS NOTES
Ge Wilkerson MD   vitamin D (ERGOCALCIFEROL) 1.25 MG (25707 UT) CAPS capsule Take 1 capsule by mouth once a week Yes Denae Dale MD   triamcinolone (KENALOG) 0.1 % cream Apply topically qam daily Monday through Friday only. Take weekend off. Do not use on face, groin, armpits, breasts tissue. Yes Qamar Vogt APRN - CNP   terbinafine (LAMISIL) 1 % cream Apply topically 2 times daily. Yes Denae Dale MD   testosterone cypionate (DEPOTESTOTERONE CYPIONATE) 200 MG/ML injection Inject 0.8 mLs into the muscle every 14 days. Indications: Per Verbal order of Dr. Oscar Payne Yes Adeola Hopson MD   famotidine (PEPCID) 20 MG tablet Take 1 tablet by mouth 2 times daily Yes EUNICE Little   ascorbic acid (VITAMIN C) 500 MG tablet Take 1 tablet by mouth daily Yes Aedola Hopson MD   zinc 50 MG TABS tablet Take 1 tablet by mouth daily Yes Adeola Hopson MD   Syringe/Needle, Disp, (SYRINGE 3CC/03RA9-2/2\") 22G X 1-1/2\" 3 ML MISC 1 actuation by Does not apply route every 14 days Yes Denae Dale MD   vitamin E 1000 units capsule Take 1 capsule by mouth daily Yes Adeola Hopson MD   Multiple Vitamins-Minerals (ONE-A-DAY MENS HEALTH FORMULA) TABS Take by mouth Yes Adeola Hopson MD   magnesium (MAGNESIUM-OXIDE) 250 MG TABS tablet Take 1 tablet by mouth daily Take 3 pills daily Yes Adeola Hopson MD   calcium carbonate (OYSTER SHELL CALCIUM 500 MG) 1250 MG tablet Take 1 tablet by mouth daily Yes Adeola Hopson MD   Glucosamine-Chondroitin 3668-9234 MG/30ML LIQD Take by mouth Take 3 pills daily Yes Adeola Hopson MD   Omega-3 Fatty Acids (FISH OIL) 1200 MG CAPS Take  by mouth daily.    Yes Adeola Hopson MD   aspirin 325 MG EC tablet Take 1 tablet by mouth daily  Denae Dale MD       CareTeam (Including outside providers/suppliers regularly involved in providing care):   Patient Care Team:  Denae Dale MD as PCP - General (Family

## 2023-12-04 DIAGNOSIS — D58.2 ELEVATED HEMOGLOBIN (HCC): ICD-10-CM

## 2023-12-04 LAB
BASOPHILS # BLD: 0.1 K/UL (ref 0–0.2)
BASOPHILS NFR BLD: 1.9 %
EOSINOPHIL # BLD: 0.3 K/UL (ref 0–0.7)
EOSINOPHIL NFR BLD: 4 %
ERYTHROCYTE [DISTWIDTH] IN BLOOD BY AUTOMATED COUNT: 13.1 % (ref 11.5–14.5)
HCT VFR BLD AUTO: 54.2 % (ref 42–52)
HGB BLD-MCNC: 17.8 G/DL (ref 14–18)
LYMPHOCYTES # BLD: 1.4 K/UL (ref 1–4.8)
LYMPHOCYTES NFR BLD: 19.9 %
MCH RBC QN AUTO: 28.7 PG (ref 27–31.3)
MCHC RBC AUTO-ENTMCNC: 32.8 % (ref 33–37)
MCV RBC AUTO: 87.4 FL (ref 79–92.2)
MONOCYTES # BLD: 0.7 K/UL (ref 0.2–0.8)
MONOCYTES NFR BLD: 10.3 %
NEUTROPHILS # BLD: 4.4 K/UL (ref 1.4–6.5)
NEUTS SEG NFR BLD: 63.3 %
PLATELET # BLD AUTO: 230 K/UL (ref 130–400)
RBC # BLD AUTO: 6.2 M/UL (ref 4.7–6.1)
WBC # BLD AUTO: 7 K/UL (ref 4.8–10.8)

## 2023-12-05 NOTE — RESULT ENCOUNTER NOTE
Notify patient current lab values are improving    No changes to be made in medical management at this time.

## 2023-12-14 ENCOUNTER — NURSE ONLY (OUTPATIENT)
Dept: FAMILY MEDICINE CLINIC | Age: 68
End: 2023-12-14
Payer: MEDICARE

## 2023-12-14 DIAGNOSIS — R79.89 LOW TESTOSTERONE: Primary | ICD-10-CM

## 2023-12-14 PROCEDURE — 96372 THER/PROPH/DIAG INJ SC/IM: CPT | Performed by: FAMILY MEDICINE

## 2023-12-14 RX ORDER — TESTOSTERONE CYPIONATE 200 MG/ML
160 INJECTION, SOLUTION INTRAMUSCULAR ONCE
Status: COMPLETED | OUTPATIENT
Start: 2023-12-14 | End: 2023-12-14

## 2023-12-14 RX ADMIN — TESTOSTERONE CYPIONATE 160 MG: 200 INJECTION, SOLUTION INTRAMUSCULAR at 09:41

## 2023-12-14 NOTE — PROGRESS NOTES
Patient given Testosterone 160mg IM left gluteal..  Will return in 2 weeks for next injection    Patient tolerated injection well.     Administrations This Visit       testosterone cypionate (DEPOTESTOTERONE CYPIONATE) injection 160 mg       Admin Date  12/14/2023 Action  Given Dose  160 mg Route  Intra[facility]ular Administered By  Hailee Linda LPN

## 2023-12-27 ENCOUNTER — NURSE ONLY (OUTPATIENT)
Dept: FAMILY MEDICINE CLINIC | Age: 68
End: 2023-12-27
Payer: MEDICARE

## 2023-12-27 DIAGNOSIS — R79.89 LOW TESTOSTERONE: Primary | ICD-10-CM

## 2023-12-27 PROCEDURE — 96372 THER/PROPH/DIAG INJ SC/IM: CPT | Performed by: FAMILY MEDICINE

## 2023-12-27 RX ORDER — TESTOSTERONE CYPIONATE 200 MG/ML
160 INJECTION, SOLUTION INTRAMUSCULAR ONCE
Status: COMPLETED | OUTPATIENT
Start: 2023-12-27 | End: 2023-12-27

## 2023-12-27 RX ADMIN — TESTOSTERONE CYPIONATE 160 MG: 200 INJECTION, SOLUTION INTRAMUSCULAR at 10:45

## 2023-12-27 NOTE — PROGRESS NOTES
Patient given Testosterone 160mg IM right gluteal..  Will return in 2 weeks for next injection    Patient tolerated injection well.     Administrations This Visit       testosterone cypionate (DEPOTESTOTERONE CYPIONATE) injection 160 mg       Admin Date  12/27/2023 Action  Given Dose  160 mg Route  Intra[facility]ular Administered By  Sussy Dallas LPN

## 2024-01-08 ENCOUNTER — NURSE ONLY (OUTPATIENT)
Dept: FAMILY MEDICINE CLINIC | Age: 69
End: 2024-01-08
Payer: MEDICARE

## 2024-01-08 DIAGNOSIS — R79.89 LOW TESTOSTERONE: Primary | ICD-10-CM

## 2024-01-08 PROCEDURE — 96372 THER/PROPH/DIAG INJ SC/IM: CPT | Performed by: FAMILY MEDICINE

## 2024-01-08 RX ORDER — TESTOSTERONE CYPIONATE 200 MG/ML
160 INJECTION, SOLUTION INTRAMUSCULAR ONCE
Status: COMPLETED | OUTPATIENT
Start: 2024-01-08 | End: 2024-01-08

## 2024-01-08 RX ADMIN — TESTOSTERONE CYPIONATE 160 MG: 200 INJECTION, SOLUTION INTRAMUSCULAR at 13:49

## 2024-01-08 NOTE — PROGRESS NOTES
Patient given Testosterone 160mg IM left gluteal..  Will return in 2 weeks for next injection    Patient tolerated injection well.    Administrations This Visit       testosterone cypionate (DEPOTESTOTERONE CYPIONATE) injection 160 mg       Admin Date  01/08/2024 Action  Given Dose  160 mg Route  Int[facility]ular Administered By  Selene Burgos LPN

## 2024-01-22 ENCOUNTER — NURSE ONLY (OUTPATIENT)
Dept: FAMILY MEDICINE CLINIC | Age: 69
End: 2024-01-22
Payer: MEDICARE

## 2024-01-22 DIAGNOSIS — R79.89 LOW TESTOSTERONE: Primary | ICD-10-CM

## 2024-01-22 PROCEDURE — 96372 THER/PROPH/DIAG INJ SC/IM: CPT | Performed by: FAMILY MEDICINE

## 2024-01-22 RX ORDER — TESTOSTERONE CYPIONATE 200 MG/ML
160 INJECTION, SOLUTION INTRAMUSCULAR ONCE
Status: COMPLETED | OUTPATIENT
Start: 2024-01-22 | End: 2024-01-22

## 2024-01-22 RX ADMIN — TESTOSTERONE CYPIONATE 160 MG: 200 INJECTION, SOLUTION INTRAMUSCULAR at 14:20

## 2024-01-22 NOTE — PROGRESS NOTES
Patient given Testosterone 160mg IM right gluteal..  Will return in 2 weeks for next injection    Patient tolerated injection well.    Administrations This Visit       testosterone cypionate (DEPOTESTOTERONE CYPIONATE) injection 160 mg       Admin Date  01/22/2024 Action  Given Dose  160 mg Route  Int[facility]ular Administered By  Selene Burgos LPN

## 2024-01-23 ENCOUNTER — OFFICE VISIT (OUTPATIENT)
Dept: FAMILY MEDICINE CLINIC | Age: 69
End: 2024-01-23
Payer: MEDICARE

## 2024-01-23 VITALS — HEIGHT: 71 IN | TEMPERATURE: 98.2 F | BODY MASS INDEX: 38.08 KG/M2 | WEIGHT: 272 LBS

## 2024-01-23 DIAGNOSIS — E03.4 ATROPHY OF THYROID (ACQUIRED): Primary | ICD-10-CM

## 2024-01-23 DIAGNOSIS — L30.1 DYSHIDROTIC ECZEMA: ICD-10-CM

## 2024-01-23 DIAGNOSIS — L30.9 ECZEMA, UNSPECIFIED TYPE: ICD-10-CM

## 2024-01-23 PROCEDURE — G8427 DOCREV CUR MEDS BY ELIG CLIN: HCPCS | Performed by: FAMILY MEDICINE

## 2024-01-23 PROCEDURE — G8417 CALC BMI ABV UP PARAM F/U: HCPCS | Performed by: FAMILY MEDICINE

## 2024-01-23 PROCEDURE — 1123F ACP DISCUSS/DSCN MKR DOCD: CPT | Performed by: FAMILY MEDICINE

## 2024-01-23 PROCEDURE — G8484 FLU IMMUNIZE NO ADMIN: HCPCS | Performed by: FAMILY MEDICINE

## 2024-01-23 PROCEDURE — 99214 OFFICE O/P EST MOD 30 MIN: CPT | Performed by: FAMILY MEDICINE

## 2024-01-23 PROCEDURE — 3017F COLORECTAL CA SCREEN DOC REV: CPT | Performed by: FAMILY MEDICINE

## 2024-01-23 PROCEDURE — 1036F TOBACCO NON-USER: CPT | Performed by: FAMILY MEDICINE

## 2024-01-23 RX ORDER — TRIAMCINOLONE ACETONIDE 1 MG/G
CREAM TOPICAL
Qty: 454 G | Refills: 0 | Status: SHIPPED | OUTPATIENT
Start: 2024-01-23

## 2024-01-23 NOTE — PROGRESS NOTES
Monocytes Absolute 0.5 0.2 - 0.8 K/uL    Eosinophils Absolute 0.2 0.0 - 0.7 K/uL    Basophils Absolute 0.1 0.0 - 0.2 K/uL   Hemoglobin A1C    Collection Time: 11/14/23 10:21 AM   Result Value Ref Range    Hemoglobin A1C 5.6 4.8 - 5.9 %   T4, Free    Collection Time: 11/14/23 10:21 AM   Result Value Ref Range    T4 Free 1.26 0.84 - 1.68 ng/dL   CBC with Auto Differential    Collection Time: 12/04/23 12:07 PM   Result Value Ref Range    WBC 7.0 4.8 - 10.8 K/uL    RBC 6.20 (H) 4.70 - 6.10 M/uL    Hemoglobin 17.8 14.0 - 18.0 g/dL    Hematocrit 54.2 (H) 42.0 - 52.0 %    MCV 87.4 79.0 - 92.2 fL    MCH 28.7 27.0 - 31.3 pg    MCHC 32.8 (L) 33.0 - 37.0 %    RDW 13.1 11.5 - 14.5 %    Platelets 230 130 - 400 K/uL    Neutrophils % 63.3 %    Lymphocytes % 19.9 %    Monocytes % 10.3 %    Eosinophils % 4.0 %    Basophils % 1.9 %    Neutrophils Absolute 4.4 1.4 - 6.5 K/uL    Lymphocytes Absolute 1.4 1.0 - 4.8 K/uL    Monocytes Absolute 0.7 0.2 - 0.8 K/uL    Eosinophils Absolute 0.3 0.0 - 0.7 K/uL    Basophils Absolute 0.1 0.0 - 0.2 K/uL       [] Pt was seen by provider for      Minutes  Counseling and coordination of care was done for all assessment diagnosis listed for today with patient and any family/friend present.   More than 50% of this visit was spent coordinating current care, obtaining information for prior records, and counseling for current plan of action.           Assessment:       Diagnosis Orders   1. Atrophy of thyroid (acquired)  TSH with Reflex      2. Eczema, unspecified type  triamcinolone (KENALOG) 0.1 % cream      3. Dyshidrotic eczema  triamcinolone (KENALOG) 0.1 % cream            Orders Placed This Encounter   Procedures    TSH with Reflex     Standing Status:   Future     Standing Expiration Date:   1/23/2025       Orders Placed This Encounter   Medications    triamcinolone (KENALOG) 0.1 % cream     Sig: Apply topically qam daily Monday through Friday only.  Take weekend off.  Do not use on face, groin,

## 2024-01-23 NOTE — PATIENT INSTRUCTIONS
Pt will have thyroid lab repeated in 3 months    F/u skin check in 3 months    Up to date on all other conditions

## 2024-02-01 DIAGNOSIS — E55.9 VITAMIN D DEFICIENCY: ICD-10-CM

## 2024-02-01 RX ORDER — ERGOCALCIFEROL 1.25 MG/1
50000 CAPSULE ORAL WEEKLY
Qty: 12 CAPSULE | Refills: 0 | Status: SHIPPED | OUTPATIENT
Start: 2024-02-01

## 2024-02-01 NOTE — TELEPHONE ENCOUNTER
Future Appointments    Encounter Information   Provider Department Appt Notes   2/5/2024 SCHEDULE, GABRIELLE GUERRIER PCP TESTOSTERONE [facility] T inj   2/15/2024 Thang Montgomery MD [facility] testosterone appt feb 2024 2/19/2024 SCHEDULE, GABRIELLE GUERRIER PCP TESTOSTERONE [facility] T inj   3/4/2024 SCHEDULE, GABRIELLE GUERRIER PCP TESTOSTERONE [facility] T inj   4/23/2024 Thang Montgomery MD [facility] Return in about 3 months (around 4/23/2024) for 15 min skin check.   12/2/2024 Thang Montgomery MD [facility] Return for MAW exam due 11/29/2024     Past Visits    Date Provider Specialty Visit Type Primary Dx   01/23/2024 Thang Montgomery MD Family Medicine Office Visit Atrophy of thyroid (acquired)   01/22/2024  Family Medicine Nurse Only Low testosterone   01/08/2024  Family Medicine Nurse Only Low testosterone   12/27/2023  Family Medicine Nurse Only Low testosterone   12/19/2023 Thang Montgomery MD Family Medicine Office Visit Skin cancer screening

## 2024-02-05 ENCOUNTER — NURSE ONLY (OUTPATIENT)
Dept: FAMILY MEDICINE CLINIC | Age: 69
End: 2024-02-05
Payer: MEDICARE

## 2024-02-05 DIAGNOSIS — R79.89 LOW TESTOSTERONE: Primary | ICD-10-CM

## 2024-02-05 PROCEDURE — 96372 THER/PROPH/DIAG INJ SC/IM: CPT | Performed by: FAMILY MEDICINE

## 2024-02-05 RX ORDER — TESTOSTERONE CYPIONATE 200 MG/ML
160 INJECTION, SOLUTION INTRAMUSCULAR ONCE
Status: COMPLETED | OUTPATIENT
Start: 2024-02-05 | End: 2024-02-05

## 2024-02-05 RX ADMIN — TESTOSTERONE CYPIONATE 160 MG: 200 INJECTION, SOLUTION INTRAMUSCULAR at 14:11

## 2024-02-05 NOTE — PROGRESS NOTES
Patient given Testosterone 160mg IM left gluteal..  Will return in 2 weeks for next injection  Needs to recheck level in 1 week  Patient tolerated injection well.    Administrations This Visit       testosterone cypionate (DEPOTESTOTERONE CYPIONATE) injection 160 mg       Admin Date  02/05/2024 Action  Given Dose  160 mg Route  [facility] Administered By  Selene Burgos LPN

## 2024-02-10 DIAGNOSIS — E03.4 ATROPHY OF THYROID (ACQUIRED): ICD-10-CM

## 2024-02-12 DIAGNOSIS — R79.89 LOW TESTOSTERONE: ICD-10-CM

## 2024-02-12 RX ORDER — LEVOTHYROXINE SODIUM 0.03 MG/1
TABLET ORAL
Qty: 90 TABLET | Refills: 0 | Status: SHIPPED | OUTPATIENT
Start: 2024-02-12

## 2024-02-12 NOTE — TELEPHONE ENCOUNTER
Future Appointments    Encounter Information   Provider Department Appt Notes   2/15/2024 Thang Montgomery MD [facility] testosterone appt feb 2024 2/19/2024 SCHEDULE, GABRIELLE HUNG [place] PCP TESTOSTERONE [facility] T inj   3/4/2024 SCHEDULE, GABRIELLE HUNG [place] PCP TESTOSTERONE [facility] T inj   4/23/2024 Thang Montgomery MD [facility] Return in about 3 months (around 4/23/2024) for 15 min skin check.   12/2/2024 Thang Montgomery MD [facility] Return for MAW exam due 11/29/2024     Past Visits    Date Provider Specialty Visit Type Primary Dx   02/05/2024  Family Medicine Nurse Only Low testosterone   01/23/2024 Thang Montgomery MD Family Medicine Office Visit Atrophy of thyroid (acquired)

## 2024-02-13 LAB
SHBG SERPL-SCNC: 26 NMOL/L (ref 11–80)
TESTOST FREE SERPL-MCNC: 116.9 PG/ML (ref 47–244)
TESTOST SERPL-MCNC: 490 NG/DL (ref 220–1000)

## 2024-02-15 ENCOUNTER — OFFICE VISIT (OUTPATIENT)
Dept: FAMILY MEDICINE CLINIC | Age: 69
End: 2024-02-15
Payer: MEDICARE

## 2024-02-15 VITALS
SYSTOLIC BLOOD PRESSURE: 132 MMHG | BODY MASS INDEX: 39.34 KG/M2 | DIASTOLIC BLOOD PRESSURE: 80 MMHG | OXYGEN SATURATION: 95 % | HEART RATE: 99 BPM | HEIGHT: 71 IN | TEMPERATURE: 97.8 F | WEIGHT: 281 LBS

## 2024-02-15 DIAGNOSIS — R79.89 LOW TESTOSTERONE: Primary | ICD-10-CM

## 2024-02-15 DIAGNOSIS — E66.9 OBESITY (BMI 30-39.9): ICD-10-CM

## 2024-02-15 DIAGNOSIS — L30.1 DYSHIDROTIC ECZEMA: ICD-10-CM

## 2024-02-15 PROCEDURE — 99214 OFFICE O/P EST MOD 30 MIN: CPT | Performed by: FAMILY MEDICINE

## 2024-02-15 PROCEDURE — G8427 DOCREV CUR MEDS BY ELIG CLIN: HCPCS | Performed by: FAMILY MEDICINE

## 2024-02-15 PROCEDURE — G8417 CALC BMI ABV UP PARAM F/U: HCPCS | Performed by: FAMILY MEDICINE

## 2024-02-15 PROCEDURE — G8484 FLU IMMUNIZE NO ADMIN: HCPCS | Performed by: FAMILY MEDICINE

## 2024-02-15 PROCEDURE — 3017F COLORECTAL CA SCREEN DOC REV: CPT | Performed by: FAMILY MEDICINE

## 2024-02-15 PROCEDURE — 1123F ACP DISCUSS/DSCN MKR DOCD: CPT | Performed by: FAMILY MEDICINE

## 2024-02-15 PROCEDURE — 1036F TOBACCO NON-USER: CPT | Performed by: FAMILY MEDICINE

## 2024-02-15 NOTE — PROGRESS NOTES
Family History   Problem Relation Age of Onset    Depression Mother     Heart Disease Paternal Grandmother     High Blood Pressure Paternal Grandmother     High Cholesterol Paternal Grandmother     Anemia Paternal Grandmother         B 12 deficiency    Heart Disease Brother     Heart Surgery Brother     Anemia Brother         B 12 deficiency     Allergies:  Vancomycin    Review of Systems   Constitutional:  Negative for activity change, appetite change, diaphoresis and unexpected weight change.   Eyes:  Negative for photophobia and visual disturbance.   Respiratory:  Negative for chest tightness and shortness of breath.         No orthopnea   Cardiovascular:  Negative for chest pain, palpitations and leg swelling.   Gastrointestinal:  Negative for abdominal distention and abdominal pain.   Genitourinary:  Negative for flank pain and frequency.   Musculoskeletal:  Negative for gait problem and joint swelling.   Neurological:  Negative for dizziness, weakness, light-headedness and headaches.   Psychiatric/Behavioral:  Negative for confusion.        Objective:   /80   Pulse 99   Temp 97.8 °F (36.6 °C)   Ht 1.803 m (5' 11\")   Wt 127.5 kg (281 lb)   SpO2 95%   BMI 39.19 kg/m²     Physical Exam  Vitals reviewed.   Constitutional:       General: He is not in acute distress.     Appearance: He is well-developed.   HENT:      Head: Normocephalic and atraumatic.      Right Ear: External ear normal.      Left Ear: External ear normal.      Nose: Nose normal.   Eyes:      General:         Right eye: No discharge.         Left eye: No discharge.      Conjunctiva/sclera: Conjunctivae normal.      Pupils: Pupils are equal, round, and reactive to light.   Neck:      Thyroid: No thyromegaly.   Cardiovascular:      Rate and Rhythm: Normal rate and regular rhythm.   Pulmonary:      Effort: Pulmonary effort is normal. No respiratory distress.   Abdominal:      General: There is no distension.   Musculoskeletal:

## 2024-02-15 NOTE — PATIENT INSTRUCTIONS
Patient will have testosterone and CBC drawn in about 6 weeks from the recent dose change.  The new dosing will be 200 mg every 2 weeks intramuscularly.    No change in management of dyshidrotic eczema.

## 2024-02-19 ENCOUNTER — NURSE ONLY (OUTPATIENT)
Dept: FAMILY MEDICINE CLINIC | Age: 69
End: 2024-02-19
Payer: MEDICARE

## 2024-02-19 DIAGNOSIS — R79.89 LOW TESTOSTERONE: Primary | ICD-10-CM

## 2024-02-19 PROCEDURE — 96372 THER/PROPH/DIAG INJ SC/IM: CPT | Performed by: FAMILY MEDICINE

## 2024-02-19 RX ORDER — TESTOSTERONE CYPIONATE 200 MG/ML
200 INJECTION, SOLUTION INTRAMUSCULAR ONCE
Status: COMPLETED | OUTPATIENT
Start: 2024-02-19 | End: 2024-02-19

## 2024-02-19 RX ADMIN — TESTOSTERONE CYPIONATE 200 MG: 200 INJECTION, SOLUTION INTRAMUSCULAR at 13:53

## 2024-02-19 NOTE — PROGRESS NOTES
Patient given Testosterone 200mg IM right gluteal..  Will return in 2 weeks for next injection  Needs to recheck level in 7 weeks  Patient tolerated injection well.    Administrations This Visit       testosterone cypionate (DEPOTESTOTERONE CYPIONATE) injection 200 mg       Admin Date  02/19/2024 Action  Given Dose  200 mg Route  [facility] Administered By  Selene Burgos LPN

## 2024-03-04 ENCOUNTER — NURSE ONLY (OUTPATIENT)
Dept: FAMILY MEDICINE CLINIC | Age: 69
End: 2024-03-04
Payer: MEDICARE

## 2024-03-04 DIAGNOSIS — R79.89 LOW TESTOSTERONE: Primary | ICD-10-CM

## 2024-03-04 PROCEDURE — 96372 THER/PROPH/DIAG INJ SC/IM: CPT | Performed by: FAMILY MEDICINE

## 2024-03-04 RX ORDER — TESTOSTERONE CYPIONATE 200 MG/ML
200 INJECTION, SOLUTION INTRAMUSCULAR ONCE
Status: COMPLETED | OUTPATIENT
Start: 2024-03-04 | End: 2024-03-04

## 2024-03-04 RX ADMIN — TESTOSTERONE CYPIONATE 200 MG: 200 INJECTION, SOLUTION INTRAMUSCULAR at 15:00

## 2024-03-04 NOTE — PROGRESS NOTES
Patient given Testosterone 200mg IM left gluteal..  Will return in 2 weeks for next injection    Patient tolerated injection well.    Administrations This Visit       testosterone cypionate (DEPOTESTOTERONE CYPIONATE) injection 200 mg       Admin Date  03/04/2024 Action  Given Dose  200 mg Route  [facility] Administered By  Selene Burgos LPN

## 2024-03-18 ENCOUNTER — NURSE ONLY (OUTPATIENT)
Dept: FAMILY MEDICINE CLINIC | Age: 69
End: 2024-03-18
Payer: MEDICARE

## 2024-03-18 DIAGNOSIS — R79.89 LOW TESTOSTERONE: Primary | ICD-10-CM

## 2024-03-18 PROCEDURE — 96372 THER/PROPH/DIAG INJ SC/IM: CPT | Performed by: FAMILY MEDICINE

## 2024-03-18 RX ORDER — TESTOSTERONE CYPIONATE 200 MG/ML
200 INJECTION, SOLUTION INTRAMUSCULAR ONCE
Status: COMPLETED | OUTPATIENT
Start: 2024-03-18 | End: 2024-03-18

## 2024-03-18 RX ADMIN — TESTOSTERONE CYPIONATE 200 MG: 200 INJECTION, SOLUTION INTRAMUSCULAR at 13:35

## 2024-03-18 NOTE — PROGRESS NOTES
Patient given Testosterone 200mg IM right gluteal..  Will return in 2 weeks for next injection    Patient tolerated injection well.    Administrations This Visit       testosterone cypionate (DEPOTESTOTERONE CYPIONATE) injection 200 mg       Admin Date  03/18/2024 Action  Given Dose  200 mg Route  [facility] Administered By  Selene Burgos LPN

## 2024-04-01 ENCOUNTER — NURSE ONLY (OUTPATIENT)
Dept: FAMILY MEDICINE CLINIC | Age: 69
End: 2024-04-01
Payer: MEDICARE

## 2024-04-01 DIAGNOSIS — R79.89 LOW TESTOSTERONE: Primary | ICD-10-CM

## 2024-04-01 PROCEDURE — 96372 THER/PROPH/DIAG INJ SC/IM: CPT | Performed by: FAMILY MEDICINE

## 2024-04-01 RX ORDER — TESTOSTERONE CYPIONATE 200 MG/ML
200 INJECTION, SOLUTION INTRAMUSCULAR ONCE
Status: COMPLETED | OUTPATIENT
Start: 2024-04-01 | End: 2024-04-01

## 2024-04-01 RX ADMIN — TESTOSTERONE CYPIONATE 200 MG: 200 INJECTION, SOLUTION INTRAMUSCULAR at 13:44

## 2024-04-01 NOTE — PROGRESS NOTES
Patient given Testosterone 200mg IM left gluteal..  Will return in 2 weeks for next injection    Patient tolerated injection well.    Administrations This Visit       testosterone cypionate (DEPOTESTOTERONE CYPIONATE) injection 200 mg       Admin Date  04/01/2024 Action  Given Dose  200 mg Route  [facility] Administered By  Selene Burgos LPN

## 2024-04-09 DIAGNOSIS — L30.1 DYSHIDROTIC ECZEMA: ICD-10-CM

## 2024-04-09 DIAGNOSIS — E03.4 ATROPHY OF THYROID (ACQUIRED): ICD-10-CM

## 2024-04-09 DIAGNOSIS — R79.89 LOW TESTOSTERONE: ICD-10-CM

## 2024-04-09 DIAGNOSIS — L30.9 ECZEMA, UNSPECIFIED TYPE: ICD-10-CM

## 2024-04-09 LAB
BASOPHILS # BLD: 0.1 K/UL (ref 0–0.2)
BASOPHILS NFR BLD: 1.8 %
EOSINOPHIL # BLD: 0.3 K/UL (ref 0–0.7)
EOSINOPHIL NFR BLD: 3.9 %
ERYTHROCYTE [DISTWIDTH] IN BLOOD BY AUTOMATED COUNT: 13.4 % (ref 11.5–14.5)
HCT VFR BLD AUTO: 53 % (ref 42–52)
HGB BLD-MCNC: 17.3 G/DL (ref 14–18)
LYMPHOCYTES # BLD: 1.3 K/UL (ref 1–4.8)
LYMPHOCYTES NFR BLD: 19.9 %
MCH RBC QN AUTO: 29.3 PG (ref 27–31.3)
MCHC RBC AUTO-ENTMCNC: 32.6 % (ref 33–37)
MCV RBC AUTO: 89.7 FL (ref 79–92.2)
MONOCYTES # BLD: 0.6 K/UL (ref 0.2–0.8)
MONOCYTES NFR BLD: 9.3 %
NEUTROPHILS # BLD: 4.4 K/UL (ref 1.4–6.5)
NEUTS SEG NFR BLD: 64.5 %
PLATELET # BLD AUTO: 197 K/UL (ref 130–400)
RBC # BLD AUTO: 5.91 M/UL (ref 4.7–6.1)
T4 FREE SERPL-MCNC: 1.16 NG/DL (ref 0.84–1.68)
TSH REFLEX: 6.26 UIU/ML (ref 0.44–3.86)
WBC # BLD AUTO: 6.7 K/UL (ref 4.8–10.8)

## 2024-04-09 NOTE — TELEPHONE ENCOUNTER
Comments: pt stopped by the office for refill pt has some left just wanted to come in make sure it gets sent in.    Last Office Visit (last PCP visit):   2/15/2024    Next Visit Date:  Future Appointments   Date Time Provider Department Center   4/15/2024  2:00 PM SCHEDULE, GABRIELLE GUERRIER PCP [facility]   4/23/2024 11:00 AM Thang Montgomery MD [facility]   4/29/2024  2:00 PM [facility]EDU[facility]   12/2/2024 10:00 AM Thang Montgomery MD [facility]       **If hasn't been seen in over a year OR hasn't followed up according to last diabetes/ADHD visit, make appointment for patient before sending refill to provider.    Rx requested:  Requested Prescriptions     Pending Prescriptions Disp Refills    triamcinolone (KENALOG) 0.1 % cream 454 g 0     Sig: Apply topically qam daily Monday through Friday only.  Take weekend off.  Do not use on face, groin, armpits, breasts tissue.

## 2024-04-10 LAB
SHBG SERPL-SCNC: 21 NMOL/L (ref 19–76)
TESTOST FREE SERPL-MCNC: 142.1 PG/ML (ref 47–244)
TESTOST SERPL-MCNC: 533 NG/DL (ref 193–740)

## 2024-04-10 RX ORDER — TRIAMCINOLONE ACETONIDE 1 MG/G
CREAM TOPICAL
Qty: 454 G | Refills: 0 | Status: SHIPPED | OUTPATIENT
Start: 2024-04-10

## 2024-04-12 NOTE — RESULT ENCOUNTER NOTE
Notify patient testosterone levels in a pretty good spot at the moment.  His thyroid is not though.  Increase to 50 mcg daily.  He can take 2 of his 25 mcg tablets until new prescription is sent in.  Please pend 50 mcg of levothyroxine daily along with a TSH with reflex.  Notify patient TSH should be repeated in 1 month.

## 2024-04-15 ENCOUNTER — NURSE ONLY (OUTPATIENT)
Dept: FAMILY MEDICINE CLINIC | Age: 69
End: 2024-04-15
Payer: MEDICARE

## 2024-04-15 DIAGNOSIS — R79.89 LOW TESTOSTERONE: Primary | ICD-10-CM

## 2024-04-15 PROCEDURE — 96372 THER/PROPH/DIAG INJ SC/IM: CPT | Performed by: FAMILY MEDICINE

## 2024-04-15 RX ORDER — TESTOSTERONE CYPIONATE 200 MG/ML
200 INJECTION, SOLUTION INTRAMUSCULAR ONCE
Status: COMPLETED | OUTPATIENT
Start: 2024-04-15 | End: 2024-04-15

## 2024-04-15 RX ADMIN — TESTOSTERONE CYPIONATE 200 MG: 200 INJECTION, SOLUTION INTRAMUSCULAR at 13:59

## 2024-04-15 NOTE — PROGRESS NOTES
Patient given Testosterone 200mg IM right gluteal..  Will return in 2 weeks for next injection    Patient tolerated injection well.    Administrations This Visit       testosterone cypionate (DEPOTESTOTERONE CYPIONATE) injection 200 mg       Admin Date  04/15/2024 Action  Given Dose  200 mg Route  [facility] Administered By  Selene Burgos LPN

## 2024-04-16 DIAGNOSIS — R94.6 ABNORMAL RESULTS OF THYROID FUNCTION STUDIES: ICD-10-CM

## 2024-04-16 DIAGNOSIS — R73.9 HYPERGLYCEMIA: Primary | ICD-10-CM

## 2024-04-16 DIAGNOSIS — R79.89 ABNORMAL TSH: ICD-10-CM

## 2024-04-18 DIAGNOSIS — R79.89 ABNORMAL TSH: Primary | ICD-10-CM

## 2024-04-18 RX ORDER — LEVOTHYROXINE SODIUM 0.05 MG/1
50 TABLET ORAL DAILY
Qty: 30 TABLET | Refills: 1 | Status: SHIPPED | OUTPATIENT
Start: 2024-04-18

## 2024-04-23 ENCOUNTER — OFFICE VISIT (OUTPATIENT)
Dept: FAMILY MEDICINE CLINIC | Age: 69
End: 2024-04-23
Payer: MEDICARE

## 2024-04-23 VITALS
BODY MASS INDEX: 38.22 KG/M2 | TEMPERATURE: 98.3 F | HEIGHT: 72 IN | HEART RATE: 96 BPM | OXYGEN SATURATION: 97 % | WEIGHT: 282.2 LBS

## 2024-04-23 DIAGNOSIS — E66.9 OBESITY (BMI 30-39.9): ICD-10-CM

## 2024-04-23 DIAGNOSIS — R79.89 LOW TESTOSTERONE: ICD-10-CM

## 2024-04-23 DIAGNOSIS — L30.1 DYSHIDROTIC ECZEMA: Primary | ICD-10-CM

## 2024-04-23 DIAGNOSIS — R79.89 ABNORMAL TSH: ICD-10-CM

## 2024-04-23 PROCEDURE — 1123F ACP DISCUSS/DSCN MKR DOCD: CPT | Performed by: FAMILY MEDICINE

## 2024-04-23 PROCEDURE — G8427 DOCREV CUR MEDS BY ELIG CLIN: HCPCS | Performed by: FAMILY MEDICINE

## 2024-04-23 PROCEDURE — 1036F TOBACCO NON-USER: CPT | Performed by: FAMILY MEDICINE

## 2024-04-23 PROCEDURE — 99214 OFFICE O/P EST MOD 30 MIN: CPT | Performed by: FAMILY MEDICINE

## 2024-04-23 PROCEDURE — G8417 CALC BMI ABV UP PARAM F/U: HCPCS | Performed by: FAMILY MEDICINE

## 2024-04-23 PROCEDURE — 3017F COLORECTAL CA SCREEN DOC REV: CPT | Performed by: FAMILY MEDICINE

## 2024-04-23 ASSESSMENT — ENCOUNTER SYMPTOMS
CHEST TIGHTNESS: 0
PHOTOPHOBIA: 0
SHORTNESS OF BREATH: 0
ABDOMINAL PAIN: 0
ABDOMINAL DISTENTION: 0

## 2024-04-23 NOTE — PATIENT INSTRUCTIONS
Patient increased thyroid dose and is aware to have labs done in May.    Continue current regimen for dyshidrotic eczema.  Consider putting triamcinolone cream on after he completes his salt water soak.    Obesity, patient is working on weight loss and diet and would like to see dietitian.    Testosterone treatment send labs are up-to-date no changes

## 2024-04-23 NOTE — PROGRESS NOTES
Collection Time: 04/09/24  8:48 AM   Result Value Ref Range    T4 Free 1.16 0.84 - 1.68 ng/dL       [] Pt was seen by provider for      Minutes  Counseling and coordination of care was done for all assessment diagnosis listed for today with patient and any family/friend present.   More than 50% of this visit was spent coordinating current care, obtaining information for prior records, and counseling for current plan of action.           Assessment:       Diagnosis Orders   1. Dyshidrotic eczema        2. Abnormal TSH        3. Obesity (BMI 30-39.9)  Amb Referral to Nutrition Services      4. Low testosterone              Orders Placed This Encounter   Procedures    Amb Referral to Nutrition Services     Referral Priority:   Routine     Referral Type:   Consult for Advice and Opinion     Referral Reason:   Specialty Services Required     Requested Specialty:   Dietitian Registered     Number of Visits Requested:   1       No orders of the defined types were placed in this encounter.         Medication List            Accurate as of April 23, 2024  1:34 PM. If you have any questions, ask your nurse or doctor.                CONTINUE taking these medications      ascorbic acid 500 MG tablet  Commonly known as: VITAMIN C     aspirin 325 MG EC tablet  Take 1 tablet by mouth daily     calcium carbonate 1250 (500 Ca) MG tablet  Commonly known as: OYSTER SHELL CALCIUM 500 mg     famotidine 20 MG tablet  Commonly known as: Pepcid  Take 1 tablet by mouth 2 times daily     Fish Oil 1200 MG Caps     Glucosamine-Chondroitin 8737-7907 MG/30ML Liqd     levothyroxine 50 MCG tablet  Commonly known as: SYNTHROID  Take 1 tablet by mouth daily     magnesium 250 MG Tabs tablet  Commonly known as: MAGNESIUM-OXIDE     One-A-Day Mens Health Formula Tabs     SYRINGE 3CC/72RP1-8/2\" 22G X 1-1/2\" 3 ML Misc  1 actuation by Does not apply route every 14 days     terbinafine 1 % cream  Commonly known as: LAMISIL  Apply topically 2 times daily.

## 2024-04-25 DIAGNOSIS — E55.9 VITAMIN D DEFICIENCY: ICD-10-CM

## 2024-04-25 RX ORDER — ERGOCALCIFEROL 1.25 MG/1
50000 CAPSULE ORAL WEEKLY
Qty: 12 CAPSULE | Refills: 2 | Status: SHIPPED | OUTPATIENT
Start: 2024-04-25

## 2024-04-25 NOTE — TELEPHONE ENCOUNTER
Comments:     Last Office Visit (last PCP visit):   4/23/2024    Next Visit Date:  Future Appointments   Date Time Provider Department Center   4/29/2024  2:00 PM SCHEDULE, GABRIELLE GUERRIER PCP [facility]   12/2/2024 10:00 AM Thang Montgomery MD [facility]       **If hasn't been seen in over a year OR hasn't followed up according to last diabetes/ADHD visit, make appointment for patient before sending refill to provider.    Rx requested:  Requested Prescriptions     Pending Prescriptions Disp Refills    vitamin D (ERGOCALCIFEROL) 1.25 MG (69200 UT) CAPS capsule 12 capsule 0     Sig: Take 1 capsule by mouth once a week                (4) rarely moist

## 2024-04-29 ENCOUNTER — NURSE ONLY (OUTPATIENT)
Dept: FAMILY MEDICINE CLINIC | Age: 69
End: 2024-04-29
Payer: MEDICARE

## 2024-04-29 DIAGNOSIS — R79.89 LOW TESTOSTERONE: Primary | ICD-10-CM

## 2024-04-29 PROCEDURE — 96372 THER/PROPH/DIAG INJ SC/IM: CPT | Performed by: FAMILY MEDICINE

## 2024-04-29 RX ORDER — TESTOSTERONE CYPIONATE 200 MG/ML
200 INJECTION, SOLUTION INTRAMUSCULAR ONCE
Status: COMPLETED | OUTPATIENT
Start: 2024-04-29 | End: 2024-04-29

## 2024-04-29 RX ADMIN — TESTOSTERONE CYPIONATE 200 MG: 200 INJECTION, SOLUTION INTRAMUSCULAR at 14:36

## 2024-04-29 NOTE — PROGRESS NOTES
Patient given Testosterone 200mg IM left gluteal..  Will return in 2 weeks for next injection    Patient tolerated injection well.    Administrations This Visit       testosterone cypionate (DEPOTESTOTERONE CYPIONATE) injection 200 mg       Admin Date  04/29/2024 Action  Given Dose  200 mg Route  [facility] Administered By  Selene Burgos LPN

## 2024-05-13 ENCOUNTER — NURSE ONLY (OUTPATIENT)
Dept: FAMILY MEDICINE CLINIC | Age: 69
End: 2024-05-13
Payer: MEDICARE

## 2024-05-13 DIAGNOSIS — R79.89 LOW TESTOSTERONE: Primary | ICD-10-CM

## 2024-05-13 PROCEDURE — 96372 THER/PROPH/DIAG INJ SC/IM: CPT | Performed by: FAMILY MEDICINE

## 2024-05-13 RX ORDER — TESTOSTERONE CYPIONATE 200 MG/ML
200 INJECTION, SOLUTION INTRAMUSCULAR ONCE
Status: COMPLETED | OUTPATIENT
Start: 2024-05-13 | End: 2024-05-13

## 2024-05-13 RX ADMIN — TESTOSTERONE CYPIONATE 200 MG: 200 INJECTION, SOLUTION INTRAMUSCULAR at 13:17

## 2024-05-13 NOTE — PROGRESS NOTES
Patient given Testosterone 200mg IM right gluteal..  Will return in 2 weeks for next injection    Patient tolerated injection well.    Administrations This Visit       testosterone cypionate (DEPOTESTOTERONE CYPIONATE) injection 200 mg       Admin Date  05/13/2024 Action  Given Dose  200 mg Route  [facility] Administered By  Selene Burgos LPN

## 2024-05-20 DIAGNOSIS — R94.6 ABNORMAL RESULTS OF THYROID FUNCTION STUDIES: ICD-10-CM

## 2024-05-20 DIAGNOSIS — R73.9 HYPERGLYCEMIA: ICD-10-CM

## 2024-05-20 LAB
T4 FREE SERPL-MCNC: 1.2 NG/DL (ref 0.84–1.68)
TSH REFLEX: 4.8 UIU/ML (ref 0.44–3.86)

## 2024-05-22 NOTE — RESULT ENCOUNTER NOTE
Increase levothyroxine to 75 mcg.  Pend order.  Notify patient new order for new dose will be coming with the next prescription he fills.  Repeat TSH and free T4 in approximately 4 weeks.  Create order for TSH, free T4, free T3, reverse T3.

## 2024-05-23 DIAGNOSIS — R79.89 ABNORMAL THYROID SCREEN (BLOOD): Primary | ICD-10-CM

## 2024-05-23 DIAGNOSIS — R79.89 ABNORMAL TSH: ICD-10-CM

## 2024-05-28 ENCOUNTER — NURSE ONLY (OUTPATIENT)
Dept: FAMILY MEDICINE CLINIC | Age: 69
End: 2024-05-28
Payer: MEDICARE

## 2024-05-28 DIAGNOSIS — R79.89 LOW TESTOSTERONE: Primary | ICD-10-CM

## 2024-05-28 PROCEDURE — 96372 THER/PROPH/DIAG INJ SC/IM: CPT | Performed by: FAMILY MEDICINE

## 2024-05-28 RX ORDER — TESTOSTERONE CYPIONATE 200 MG/ML
200 INJECTION, SOLUTION INTRAMUSCULAR ONCE
Status: COMPLETED | OUTPATIENT
Start: 2024-05-28 | End: 2024-05-28

## 2024-05-28 RX ORDER — LEVOTHYROXINE SODIUM 0.07 MG/1
75 TABLET ORAL DAILY
Qty: 90 TABLET | Refills: 1 | Status: SHIPPED | OUTPATIENT
Start: 2024-05-28

## 2024-05-28 RX ADMIN — TESTOSTERONE CYPIONATE 200 MG: 200 INJECTION, SOLUTION INTRAMUSCULAR at 13:46

## 2024-05-28 NOTE — PROGRESS NOTES
Patient given Testosterone 200mg IM left gluteal..  Will return in 2 weeks for next injection    Patient tolerated injection well.    Administrations This Visit       testosterone cypionate (DEPOTESTOTERONE CYPIONATE) injection 200 mg       Admin Date  05/28/2024 Action  Given Dose  200 mg Route  [facility] Administered By  Selene Burgos LPN

## 2024-06-10 ENCOUNTER — NURSE ONLY (OUTPATIENT)
Dept: FAMILY MEDICINE CLINIC | Age: 69
End: 2024-06-10
Payer: MEDICARE

## 2024-06-10 DIAGNOSIS — R79.89 LOW TESTOSTERONE: Primary | ICD-10-CM

## 2024-06-10 PROCEDURE — 96372 THER/PROPH/DIAG INJ SC/IM: CPT | Performed by: FAMILY MEDICINE

## 2024-06-10 RX ORDER — TESTOSTERONE CYPIONATE 200 MG/ML
200 INJECTION, SOLUTION INTRAMUSCULAR ONCE
Status: COMPLETED | OUTPATIENT
Start: 2024-06-10 | End: 2024-06-10

## 2024-06-10 RX ADMIN — TESTOSTERONE CYPIONATE 200 MG: 200 INJECTION, SOLUTION INTRAMUSCULAR at 12:58

## 2024-06-10 NOTE — PROGRESS NOTES
Patient given Testosterone 200 mg IM right gluteal..  Will return in 2 weeks for next injection    Patient tolerated injection well.    Administrations This Visit       testosterone cypionate (DEPOTESTOTERONE CYPIONATE) injection 200 mg       Admin Date  06/10/2024 Action  Given Dose  200 mg Route  [facility] Administered By  Milana Aguero LPN

## 2024-06-17 ENCOUNTER — OFFICE VISIT (OUTPATIENT)
Dept: FAMILY MEDICINE CLINIC | Age: 69
End: 2024-06-17
Payer: MEDICARE

## 2024-06-17 VITALS
HEIGHT: 71 IN | BODY MASS INDEX: 39.06 KG/M2 | WEIGHT: 279 LBS | SYSTOLIC BLOOD PRESSURE: 138 MMHG | DIASTOLIC BLOOD PRESSURE: 88 MMHG | TEMPERATURE: 96.9 F | HEART RATE: 99 BPM | OXYGEN SATURATION: 97 %

## 2024-06-17 DIAGNOSIS — R09.81 SINUS CONGESTION: ICD-10-CM

## 2024-06-17 DIAGNOSIS — E78.2 ELEVATED TRIGLYCERIDES WITH HIGH CHOLESTEROL: ICD-10-CM

## 2024-06-17 DIAGNOSIS — R73.9 HYPERGLYCEMIA: ICD-10-CM

## 2024-06-17 DIAGNOSIS — K21.9 GASTROESOPHAGEAL REFLUX DISEASE WITHOUT ESOPHAGITIS: Primary | ICD-10-CM

## 2024-06-17 DIAGNOSIS — E66.09 CLASS 2 OBESITY DUE TO EXCESS CALORIES WITHOUT SERIOUS COMORBIDITY WITH BODY MASS INDEX (BMI) OF 38.0 TO 38.9 IN ADULT: ICD-10-CM

## 2024-06-17 PROBLEM — L03.115 CELLULITIS OF FOOT, RIGHT: Chronic | Status: RESOLVED | Noted: 2022-02-24 | Resolved: 2024-06-17

## 2024-06-17 PROBLEM — L03.115 CELLULITIS OF RIGHT LOWER EXTREMITY: Status: RESOLVED | Noted: 2022-02-24 | Resolved: 2024-06-17

## 2024-06-17 PROCEDURE — G8417 CALC BMI ABV UP PARAM F/U: HCPCS | Performed by: FAMILY MEDICINE

## 2024-06-17 PROCEDURE — 99214 OFFICE O/P EST MOD 30 MIN: CPT | Performed by: FAMILY MEDICINE

## 2024-06-17 PROCEDURE — 1123F ACP DISCUSS/DSCN MKR DOCD: CPT | Performed by: FAMILY MEDICINE

## 2024-06-17 PROCEDURE — 1036F TOBACCO NON-USER: CPT | Performed by: FAMILY MEDICINE

## 2024-06-17 PROCEDURE — 3017F COLORECTAL CA SCREEN DOC REV: CPT | Performed by: FAMILY MEDICINE

## 2024-06-17 PROCEDURE — G8427 DOCREV CUR MEDS BY ELIG CLIN: HCPCS | Performed by: FAMILY MEDICINE

## 2024-06-17 RX ORDER — PANTOPRAZOLE SODIUM 40 MG/1
40 TABLET, DELAYED RELEASE ORAL
Qty: 30 TABLET | Refills: 5 | Status: SHIPPED | OUTPATIENT
Start: 2024-06-17

## 2024-06-17 SDOH — ECONOMIC STABILITY: FOOD INSECURITY: WITHIN THE PAST 12 MONTHS, YOU WORRIED THAT YOUR FOOD WOULD RUN OUT BEFORE YOU GOT MONEY TO BUY MORE.: NEVER TRUE

## 2024-06-17 SDOH — ECONOMIC STABILITY: FOOD INSECURITY: WITHIN THE PAST 12 MONTHS, THE FOOD YOU BOUGHT JUST DIDN'T LAST AND YOU DIDN'T HAVE MONEY TO GET MORE.: NEVER TRUE

## 2024-06-17 SDOH — ECONOMIC STABILITY: INCOME INSECURITY: HOW HARD IS IT FOR YOU TO PAY FOR THE VERY BASICS LIKE FOOD, HOUSING, MEDICAL CARE, AND HEATING?: NOT HARD AT ALL

## 2024-06-17 ASSESSMENT — ENCOUNTER SYMPTOMS
VOMITING: 0
ABDOMINAL PAIN: 0
COUGH: 1
CONSTIPATION: 0
WHEEZING: 0
NAUSEA: 0
DIARRHEA: 0
SORE THROAT: 1
TROUBLE SWALLOWING: 0

## 2024-06-17 NOTE — PATIENT INSTRUCTIONS
Patient's sinus and throat symptoms are likely related to uncontrolled acid reflux.  Initiating pantoprazole daily for control of that.    Patient is aware of obesity and working on diet and activity.  Interested in pursuing more options once he plateaus with what he is able to accomplish on his own.    [facility] screening demonstrated elevated blood sugar and triglycerides.  Will repeat that lab to verify current levels.    Patient is due to repeat thyroid lab in the next week.

## 2024-06-17 NOTE — PROGRESS NOTES
Diagnosis Orders   1. Gastroesophageal reflux disease without esophagitis  pantoprazole (PROTONIX) 40 MG tablet      2. Sinus congestion  pantoprazole (PROTONIX) 40 MG tablet      3. Class 2 obesity due to excess calories without serious comorbidity with body mass index (BMI) of 38.0 to 38.9 in adult        4. Hyperglycemia  Hemoglobin A1C      5. Elevated triglycerides with high cholesterol  Lipid Panel        Return in about 6 weeks (around 7/29/2024) for for review of outcome of today's recommendation weight and bp check .  Patient Instructions   Patient's sinus and throat symptoms are likely related to uncontrolled acid reflux.  Initiating pantoprazole daily for control of that.    Patient is aware of obesity and working on diet and activity.  Interested in pursuing more options once he plateaus with what he is able to accomplish on his own.    [facility] screening demonstrated elevated blood sugar and triglycerides.  Will repeat that lab to verify current levels.    Patient is due to repeat thyroid lab in the next week.    Subjective:      Patient ID: Mason Morales is a 68 y.o. male who presents for:  Chief Complaint   Patient presents with    Pharyngitis     X 2 weeks - did lozenges - states that the throat only hurts when he coughs - states that his sinuses are plugged up off & on. Not sore to swallow        History of acid reflux for which she takes Pepcid over-the-counter sometimes.  Symptoms are getting worse.  Noticing more acid yeast symptoms.    Patient has rare cough.  Wakes with sore throat frequently.  Sinuses feel congested without drainage most the time but when they do release and drained they are clear.  Patient states his son just got diagnosed with acid reflux disease via scope.  Denies any colored drainage from the sinuses.  Denies any fever symptoms.        Current Outpatient Medications on File Prior to Visit   Medication Sig Dispense Refill    levothyroxine (SYNTHROID) 75 MCG tablet Take 1

## 2024-06-24 ENCOUNTER — NURSE ONLY (OUTPATIENT)
Dept: FAMILY MEDICINE CLINIC | Age: 69
End: 2024-06-24
Payer: MEDICARE

## 2024-06-24 DIAGNOSIS — E78.2 ELEVATED TRIGLYCERIDES WITH HIGH CHOLESTEROL: ICD-10-CM

## 2024-06-24 DIAGNOSIS — R73.9 HYPERGLYCEMIA: ICD-10-CM

## 2024-06-24 DIAGNOSIS — R79.89 LOW TESTOSTERONE: Primary | ICD-10-CM

## 2024-06-24 DIAGNOSIS — R79.89 ABNORMAL THYROID SCREEN (BLOOD): ICD-10-CM

## 2024-06-24 LAB
CHOLEST SERPL-MCNC: 163 MG/DL (ref 0–199)
HDLC SERPL-MCNC: 34 MG/DL (ref 40–59)
LDLC SERPL CALC-MCNC: 113 MG/DL (ref 0–129)
T4 FREE SERPL-MCNC: 1.27 NG/DL (ref 0.84–1.68)
TRIGL SERPL-MCNC: 79 MG/DL (ref 0–150)
TSH REFLEX: 5.08 UIU/ML (ref 0.44–3.86)

## 2024-06-24 PROCEDURE — 96372 THER/PROPH/DIAG INJ SC/IM: CPT | Performed by: FAMILY MEDICINE

## 2024-06-24 RX ORDER — TESTOSTERONE CYPIONATE 200 MG/ML
200 INJECTION, SOLUTION INTRAMUSCULAR ONCE
Status: COMPLETED | OUTPATIENT
Start: 2024-06-24 | End: 2024-06-24

## 2024-06-24 RX ADMIN — TESTOSTERONE CYPIONATE 200 MG: 200 INJECTION, SOLUTION INTRAMUSCULAR at 13:28

## 2024-06-24 NOTE — PROGRESS NOTES
Patient given Testosterone 200mg IM left gluteal..  Will return in 2 weeks for next injection    Patient tolerated injection well.    Administrations This Visit       testosterone cypionate (DEPOTESTOTERONE CYPIONATE) injection 200 mg       Admin Date  06/24/2024 Action  Given Dose  200 mg Route  [facility] Administered By  Selene Burgos LPN

## 2024-06-25 LAB
ESTIMATED AVERAGE GLUCOSE: 108 MG/DL
HBA1C MFR BLD: 5.4 % (ref 4–6)
T3 FREE: 3.1 PG/ML (ref 2–4.4)

## 2024-06-28 LAB — T3REVERSE SERPL-MCNC: 15.3 NG/DL (ref 9–27)

## 2024-07-01 NOTE — RESULT ENCOUNTER NOTE
Notify patient labs are still not quite therapeutic for thyroid.  Pend order for levothyroxine 100 mcg #30 1 refill and TSH with reflex T4.  Inform patient to start new dose when he does his next refill and then have his labs drawn about 4 weeks later.

## 2024-07-02 DIAGNOSIS — R79.89 ABNORMAL TSH: Primary | ICD-10-CM

## 2024-07-02 RX ORDER — LEVOTHYROXINE SODIUM 0.1 MG/1
100 TABLET ORAL DAILY
Qty: 30 TABLET | Refills: 1 | Status: SHIPPED | OUTPATIENT
Start: 2024-07-02

## 2024-07-02 RX ORDER — LEVOTHYROXINE SODIUM 0.1 MG/1
100 TABLET ORAL DAILY
Qty: 30 TABLET | Refills: 1 | Status: CANCELLED | OUTPATIENT
Start: 2024-07-02

## 2024-07-08 ENCOUNTER — NURSE ONLY (OUTPATIENT)
Dept: FAMILY MEDICINE CLINIC | Age: 69
End: 2024-07-08
Payer: MEDICARE

## 2024-07-08 DIAGNOSIS — R79.89 LOW TESTOSTERONE: Primary | ICD-10-CM

## 2024-07-08 PROCEDURE — 96372 THER/PROPH/DIAG INJ SC/IM: CPT | Performed by: FAMILY MEDICINE

## 2024-07-08 RX ORDER — TESTOSTERONE CYPIONATE 200 MG/ML
200 INJECTION, SOLUTION INTRAMUSCULAR ONCE
Status: COMPLETED | OUTPATIENT
Start: 2024-07-08 | End: 2024-07-08

## 2024-07-08 RX ADMIN — TESTOSTERONE CYPIONATE 200 MG: 200 INJECTION, SOLUTION INTRAMUSCULAR at 13:22

## 2024-07-08 NOTE — PROGRESS NOTES
Patient given Testosterone 200mg IM right gluteal..  Will return in 2 weeks for next injection    Patient tolerated injection well.    Administrations This Visit       testosterone cypionate (DEPOTESTOTERONE CYPIONATE) injection 200 mg       Admin Date  07/08/2024 Action  Given Dose  200 mg Route  [facility] Administered By  Selene Burgos LPN

## 2024-07-17 ENCOUNTER — OFFICE VISIT (OUTPATIENT)
Dept: FAMILY MEDICINE CLINIC | Age: 69
End: 2024-07-17
Payer: MEDICARE

## 2024-07-17 VITALS
WEIGHT: 282 LBS | OXYGEN SATURATION: 96 % | HEART RATE: 107 BPM | TEMPERATURE: 98.8 F | HEIGHT: 71 IN | BODY MASS INDEX: 39.48 KG/M2 | SYSTOLIC BLOOD PRESSURE: 138 MMHG | DIASTOLIC BLOOD PRESSURE: 80 MMHG

## 2024-07-17 DIAGNOSIS — R05.9 COUGH, UNSPECIFIED TYPE: Primary | ICD-10-CM

## 2024-07-17 LAB
Lab: NORMAL
PERFORMING INSTRUMENT: NORMAL
QC PASS/FAIL: NORMAL
SARS-COV-2, POC: NORMAL

## 2024-07-17 PROCEDURE — 87426 SARSCOV CORONAVIRUS AG IA: CPT | Performed by: FAMILY MEDICINE

## 2024-07-17 PROCEDURE — 1123F ACP DISCUSS/DSCN MKR DOCD: CPT | Performed by: FAMILY MEDICINE

## 2024-07-17 PROCEDURE — G8427 DOCREV CUR MEDS BY ELIG CLIN: HCPCS | Performed by: FAMILY MEDICINE

## 2024-07-17 PROCEDURE — 1036F TOBACCO NON-USER: CPT | Performed by: FAMILY MEDICINE

## 2024-07-17 PROCEDURE — G8417 CALC BMI ABV UP PARAM F/U: HCPCS | Performed by: FAMILY MEDICINE

## 2024-07-17 PROCEDURE — 3017F COLORECTAL CA SCREEN DOC REV: CPT | Performed by: FAMILY MEDICINE

## 2024-07-17 PROCEDURE — 99214 OFFICE O/P EST MOD 30 MIN: CPT | Performed by: FAMILY MEDICINE

## 2024-07-17 ASSESSMENT — ENCOUNTER SYMPTOMS
VOMITING: 0
COUGH: 1
NAUSEA: 0
SORE THROAT: 0
SINUS PRESSURE: 1
DIARRHEA: 0

## 2024-07-17 NOTE — PROGRESS NOTES
and fever.   HENT:  Positive for congestion and sinus pressure. Negative for sore throat.    Respiratory:  Positive for cough.    Gastrointestinal:  Negative for diarrhea, nausea and vomiting.   Psychiatric/Behavioral:  Negative for sleep disturbance.        Objective:   /80 (Site: Right Upper Arm, Position: Sitting, Cuff Size: Large Adult)   Pulse (!) 107   Temp 98.8 °F (37.1 °C)   Ht 1.803 m (5' 11\")   Wt 127.9 kg (282 lb)   SpO2 96%   BMI 39.33 kg/m²     Physical Exam  Vitals reviewed.   Constitutional:       General: He is not in acute distress.     Appearance: He is well-developed.   HENT:      Head: Normocephalic and atraumatic.      Right Ear: External ear normal.      Left Ear: External ear normal.      Nose: Nose normal.   Eyes:      General:         Right eye: No discharge.         Left eye: No discharge.      Conjunctiva/sclera: Conjunctivae normal.      Pupils: Pupils are equal, round, and reactive to light.   Neck:      Thyroid: No thyromegaly.   Cardiovascular:      Rate and Rhythm: Normal rate and regular rhythm.      Heart sounds: Normal heart sounds.   Pulmonary:      Effort: Pulmonary effort is normal. No respiratory distress.      Breath sounds: Normal breath sounds.   Abdominal:      General: There is no distension.   Musculoskeletal:      Cervical back: Neck supple.   Skin:     General: Skin is warm and dry.   Neurological:      Mental Status: He is alert and oriented to person, place, and time.      Coordination: Coordination normal.   Psychiatric:         Thought Content: Thought content normal.         Judgment: Judgment normal.         No results found for this visit on 07/17/24.    Recent Results (from the past 2016 hour(s))   TSH with Reflex    Collection Time: 05/20/24  9:01 AM   Result Value Ref Range    TSH 4.800 (H) 0.440 - 3.860 uIU/mL   T4, Free    Collection Time: 05/20/24  9:01 AM   Result Value Ref Range    T4 Free 1.20 0.84 - 1.68 ng/dL   Hemoglobin A1C    Collection

## 2024-07-17 NOTE — PATIENT INSTRUCTIONS
Over-the-counter treatment for cough and congestion.  Mucinex to help keep mucus thin.  Vitamin C and zinc for immune system support.

## 2024-07-22 ENCOUNTER — NURSE ONLY (OUTPATIENT)
Dept: FAMILY MEDICINE CLINIC | Age: 69
End: 2024-07-22
Payer: MEDICARE

## 2024-07-22 DIAGNOSIS — R79.89 LOW TESTOSTERONE: Primary | ICD-10-CM

## 2024-07-22 PROCEDURE — 96372 THER/PROPH/DIAG INJ SC/IM: CPT | Performed by: FAMILY MEDICINE

## 2024-07-22 RX ORDER — TESTOSTERONE CYPIONATE 200 MG/ML
200 INJECTION, SOLUTION INTRAMUSCULAR ONCE
Status: COMPLETED | OUTPATIENT
Start: 2024-07-22 | End: 2024-07-22

## 2024-07-22 RX ADMIN — TESTOSTERONE CYPIONATE 200 MG: 200 INJECTION, SOLUTION INTRAMUSCULAR at 13:10

## 2024-07-22 NOTE — PROGRESS NOTES
Patient given Testosterone 200 mg IM left gluteal..  Will return in 2 weeks for next injection    Patient tolerated injection well.

## 2024-07-29 ENCOUNTER — OFFICE VISIT (OUTPATIENT)
Dept: FAMILY MEDICINE CLINIC | Age: 69
End: 2024-07-29
Payer: MEDICARE

## 2024-07-29 VITALS
DIASTOLIC BLOOD PRESSURE: 82 MMHG | OXYGEN SATURATION: 98 % | HEIGHT: 71 IN | BODY MASS INDEX: 39.48 KG/M2 | SYSTOLIC BLOOD PRESSURE: 138 MMHG | HEART RATE: 103 BPM | WEIGHT: 282 LBS

## 2024-07-29 DIAGNOSIS — R79.89 ABNORMAL TSH: ICD-10-CM

## 2024-07-29 DIAGNOSIS — R05.9 COUGH, UNSPECIFIED TYPE: ICD-10-CM

## 2024-07-29 DIAGNOSIS — K21.9 GASTROESOPHAGEAL REFLUX DISEASE WITHOUT ESOPHAGITIS: Primary | ICD-10-CM

## 2024-07-29 DIAGNOSIS — E66.09 CLASS 2 OBESITY DUE TO EXCESS CALORIES WITHOUT SERIOUS COMORBIDITY WITH BODY MASS INDEX (BMI) OF 38.0 TO 38.9 IN ADULT: ICD-10-CM

## 2024-07-29 LAB — TSH REFLEX: 3.37 UIU/ML (ref 0.44–3.86)

## 2024-07-29 PROCEDURE — 1036F TOBACCO NON-USER: CPT | Performed by: FAMILY MEDICINE

## 2024-07-29 PROCEDURE — G8427 DOCREV CUR MEDS BY ELIG CLIN: HCPCS | Performed by: FAMILY MEDICINE

## 2024-07-29 PROCEDURE — 99214 OFFICE O/P EST MOD 30 MIN: CPT | Performed by: FAMILY MEDICINE

## 2024-07-29 PROCEDURE — 3017F COLORECTAL CA SCREEN DOC REV: CPT | Performed by: FAMILY MEDICINE

## 2024-07-29 PROCEDURE — G8417 CALC BMI ABV UP PARAM F/U: HCPCS | Performed by: FAMILY MEDICINE

## 2024-07-29 PROCEDURE — 1123F ACP DISCUSS/DSCN MKR DOCD: CPT | Performed by: FAMILY MEDICINE

## 2024-07-29 RX ORDER — PHENTERMINE HYDROCHLORIDE 37.5 MG/1
37.5 TABLET ORAL
Qty: 30 TABLET | Refills: 0 | Status: SHIPPED | OUTPATIENT
Start: 2024-07-29 | End: 2024-08-28

## 2024-07-29 ASSESSMENT — ENCOUNTER SYMPTOMS
ABDOMINAL PAIN: 0
CHEST TIGHTNESS: 0
PHOTOPHOBIA: 0
ABDOMINAL DISTENTION: 0
SHORTNESS OF BREATH: 0

## 2024-07-29 NOTE — PATIENT INSTRUCTIONS
Pt is much improved on acid reflux continuation of medication for 3-4 months    Initiate adipex for weight loss

## 2024-07-29 NOTE — PROGRESS NOTES
Diagnosis Orders   1. Class 2 obesity due to excess calories without serious comorbidity with body mass index (BMI) of 38.0 to 38.9 in adult  phentermine (ADIPEX-P) 37.5 MG tablet      2. Gastroesophageal reflux disease without esophagitis        3. Cough, unspecified type          Return for 2-3 weeks recheck .  Patient Instructions   Pt is much improved on acid reflux continuation of medication for 3-4 months    Initiate adipex for weight loss    Subjective:      Patient ID: Mason Morales is a 68 y.o. male who presents for:  Chief Complaint   Patient presents with    Follow-up     Patient overall doing well no new complaints.  States had hard time shaking cough and virus from last visit but overall doing well.         Patient states medication improved his gastroesophageal reflux significantly.  Feeling much better.    Cough has been subsiding from COVID.    Patient is interested in weight loss medication.  States the only thing he can think because his heart rate to be a little bit elevated right now is that he drank a couple coffee about an hour before he came into the office.        Current Outpatient Medications on File Prior to Visit   Medication Sig Dispense Refill    levothyroxine (SYNTHROID) 100 MCG tablet Take 1 tablet by mouth daily 30 tablet 1    pantoprazole (PROTONIX) 40 MG tablet Take 1 tablet by mouth every morning (before breakfast) 30 tablet 5    vitamin D (ERGOCALCIFEROL) 1.25 MG (83887 UT) CAPS capsule Take 1 capsule by mouth once a week 12 capsule 2    triamcinolone (KENALOG) 0.1 % cream Apply topically qam daily Monday through Friday only.  Take weekend off.  Do not use on face, groin, armpits, breasts tissue. 454 g 0    testosterone cypionate (DEPOTESTOTERONE CYPIONATE) 200 MG/ML injection Inject 1 mL into the muscle every 14 days. Indications: Per Verbal order of Dr. Montgomery      famotidine (PEPCID) 20 MG tablet Take 1 tablet by mouth 2 times daily 60 tablet 0    ascorbic acid (VITAMIN C)

## 2024-07-30 LAB — THYROXINE (T4): 6.5 UG/DL (ref 4.5–11.7)

## 2024-08-07 ENCOUNTER — NURSE ONLY (OUTPATIENT)
Dept: FAMILY MEDICINE CLINIC | Age: 69
End: 2024-08-07
Payer: MEDICARE

## 2024-08-07 DIAGNOSIS — R79.89 LOW TESTOSTERONE: Primary | ICD-10-CM

## 2024-08-07 PROCEDURE — 96372 THER/PROPH/DIAG INJ SC/IM: CPT | Performed by: FAMILY MEDICINE

## 2024-08-07 RX ORDER — TESTOSTERONE CYPIONATE 200 MG/ML
200 INJECTION, SOLUTION INTRAMUSCULAR ONCE
Status: COMPLETED | OUTPATIENT
Start: 2024-08-07 | End: 2024-08-07

## 2024-08-07 RX ADMIN — TESTOSTERONE CYPIONATE 200 MG: 200 INJECTION, SOLUTION INTRAMUSCULAR at 09:58

## 2024-08-07 NOTE — PROGRESS NOTES
Patient given Testosterone 200mg IM right gluteal..  Will return in 2 weeks for next injection    Patient tolerated injection well.    Administrations This Visit       testosterone cypionate (DEPOTESTOTERONE CYPIONATE) injection 200 mg       Admin Date  08/07/2024 Action  Given Dose  200 mg Route  [facility] Administered By  Selene Burgos LPN

## 2024-08-19 ENCOUNTER — NURSE ONLY (OUTPATIENT)
Dept: FAMILY MEDICINE CLINIC | Age: 69
End: 2024-08-19
Payer: MEDICARE

## 2024-08-19 ENCOUNTER — OFFICE VISIT (OUTPATIENT)
Dept: FAMILY MEDICINE CLINIC | Age: 69
End: 2024-08-19
Payer: MEDICARE

## 2024-08-19 VITALS
WEIGHT: 276 LBS | DIASTOLIC BLOOD PRESSURE: 90 MMHG | HEART RATE: 110 BPM | SYSTOLIC BLOOD PRESSURE: 138 MMHG | OXYGEN SATURATION: 100 % | BODY MASS INDEX: 38.49 KG/M2

## 2024-08-19 DIAGNOSIS — I10 UNCONTROLLED HYPERTENSION: Primary | ICD-10-CM

## 2024-08-19 DIAGNOSIS — E66.09 CLASS 2 OBESITY DUE TO EXCESS CALORIES WITHOUT SERIOUS COMORBIDITY WITH BODY MASS INDEX (BMI) OF 38.0 TO 38.9 IN ADULT: ICD-10-CM

## 2024-08-19 DIAGNOSIS — R79.89 LOW TESTOSTERONE: Primary | ICD-10-CM

## 2024-08-19 PROCEDURE — 3017F COLORECTAL CA SCREEN DOC REV: CPT | Performed by: FAMILY MEDICINE

## 2024-08-19 PROCEDURE — 96372 THER/PROPH/DIAG INJ SC/IM: CPT | Performed by: FAMILY MEDICINE

## 2024-08-19 PROCEDURE — 3075F SYST BP GE 130 - 139MM HG: CPT | Performed by: FAMILY MEDICINE

## 2024-08-19 PROCEDURE — 1036F TOBACCO NON-USER: CPT | Performed by: FAMILY MEDICINE

## 2024-08-19 PROCEDURE — G8417 CALC BMI ABV UP PARAM F/U: HCPCS | Performed by: FAMILY MEDICINE

## 2024-08-19 PROCEDURE — 3080F DIAST BP >= 90 MM HG: CPT | Performed by: FAMILY MEDICINE

## 2024-08-19 PROCEDURE — 1123F ACP DISCUSS/DSCN MKR DOCD: CPT | Performed by: FAMILY MEDICINE

## 2024-08-19 PROCEDURE — 99214 OFFICE O/P EST MOD 30 MIN: CPT | Performed by: FAMILY MEDICINE

## 2024-08-19 PROCEDURE — G8427 DOCREV CUR MEDS BY ELIG CLIN: HCPCS | Performed by: FAMILY MEDICINE

## 2024-08-19 RX ORDER — METOPROLOL SUCCINATE 50 MG/1
50 TABLET, EXTENDED RELEASE ORAL DAILY
Qty: 30 TABLET | Refills: 5 | Status: SHIPPED | OUTPATIENT
Start: 2024-08-19

## 2024-08-19 RX ORDER — TESTOSTERONE CYPIONATE 200 MG/ML
200 INJECTION, SOLUTION INTRAMUSCULAR ONCE
Status: COMPLETED | OUTPATIENT
Start: 2024-08-19 | End: 2024-08-19

## 2024-08-19 RX ORDER — PHENTERMINE HYDROCHLORIDE 37.5 MG/1
37.5 TABLET ORAL
Qty: 30 TABLET | Refills: 0 | Status: SHIPPED | OUTPATIENT
Start: 2024-08-19 | End: 2024-08-21 | Stop reason: SDUPTHER

## 2024-08-19 RX ADMIN — TESTOSTERONE CYPIONATE 200 MG: 200 INJECTION, SOLUTION INTRAMUSCULAR at 13:35

## 2024-08-19 ASSESSMENT — ENCOUNTER SYMPTOMS
ABDOMINAL DISTENTION: 0
ABDOMINAL PAIN: 0
PHOTOPHOBIA: 0
SHORTNESS OF BREATH: 0
CHEST TIGHTNESS: 0

## 2024-08-19 NOTE — PROGRESS NOTES
Patient given Testosterone 200mg IM left gluteal..  Will return in 2 weeks for next injection    Patient tolerated injection well.    Administrations This Visit       testosterone cypionate (DEPOTESTOTERONE CYPIONATE) injection 200 mg       Admin Date  08/19/2024 Action  Given Dose  200 mg Route  Int[facility] Documented By  Selene Burgos LPN

## 2024-08-19 NOTE — PROGRESS NOTES
Instructions   Patient is going to initiate metoprolol, most likely temporarily while he is on Adipex.  Looking to continue weight loss benefit of Adipex as well as increase exercise activity.  Both of these things should also result in a decrease in blood pressure.    This note was partially created with the assistance of dictation.  This may lead to grammatical or spelling errors.      Thang Montgomery M.D.

## 2024-08-19 NOTE — PATIENT INSTRUCTIONS
Patient is going to initiate metoprolol, most likely temporarily while he is on Adipex.  Looking to continue weight loss benefit of Adipex as well as increase exercise activity.  Both of these things should also result in a decrease in blood pressure.

## 2024-08-21 DIAGNOSIS — E66.09 CLASS 2 OBESITY DUE TO EXCESS CALORIES WITHOUT SERIOUS COMORBIDITY WITH BODY MASS INDEX (BMI) OF 38.0 TO 38.9 IN ADULT: ICD-10-CM

## 2024-08-21 RX ORDER — PHENTERMINE HYDROCHLORIDE 37.5 MG/1
37.5 TABLET ORAL
Qty: 30 TABLET | Refills: 0 | Status: SHIPPED | OUTPATIENT
Start: 2024-08-21 | End: 2024-09-20

## 2024-08-23 DIAGNOSIS — E66.09 CLASS 2 OBESITY DUE TO EXCESS CALORIES WITHOUT SERIOUS COMORBIDITY WITH BODY MASS INDEX (BMI) OF 38.0 TO 38.9 IN ADULT: ICD-10-CM

## 2024-08-23 DIAGNOSIS — R79.89 ABNORMAL TSH: ICD-10-CM

## 2024-08-23 RX ORDER — LEVOTHYROXINE SODIUM 0.1 MG/1
100 TABLET ORAL DAILY
Qty: 30 TABLET | Refills: 1 | Status: SHIPPED | OUTPATIENT
Start: 2024-08-23

## 2024-08-23 RX ORDER — PHENTERMINE HYDROCHLORIDE 37.5 MG/1
37.5 TABLET ORAL
Qty: 30 TABLET | Refills: 0 | Status: CANCELLED | OUTPATIENT
Start: 2024-08-23 | End: 2024-09-22

## 2024-08-23 NOTE — TELEPHONE ENCOUNTER
Pt would like the the levothyroxine script to be 90 day if that's possible.  And pt would like the Adipex refilled but isn't sure if he's allowed to ask for that to be refilled.  Could someone give him a call about the adipex?     [phone]

## 2024-08-23 NOTE — TELEPHONE ENCOUNTER
Comments: Adipex has already been sent in.     Last Office Visit (last PCP visit):   8/19/2024    Next Visit Date:  Future Appointments   Date Time Provider Department Center   9/3/2024  1:00 PM SCHEDULE, GABRIELLE GURERIER PCP TESTOSTERONE [facility] DEP   9/16/2024 10:45 AM Thang Montgomery MD [facility] DEP   9/18/2024  9:30 AM SCHEDULE, GABRIELLE POWERS PCP TESTOSTERONE [facility] DE[facility]   12/2/2024 10:00 AM Thang Montgomery MD [facility] DEP       **If hasn't been seen in over a year OR hasn't followed up according to last diabetes/ADHD visit, make appointment for patient before sending refill to provider.    Rx requested:  Requested Prescriptions     Pending Prescriptions Disp Refills    levothyroxine (SYNTHROID) 100 MCG tablet 30 tablet 1     Sig: Take 1 tablet by mouth daily

## 2024-09-03 ENCOUNTER — NURSE ONLY (OUTPATIENT)
Dept: FAMILY MEDICINE CLINIC | Age: 69
End: 2024-09-03
Payer: MEDICARE

## 2024-09-03 DIAGNOSIS — R79.89 LOW TESTOSTERONE: Primary | ICD-10-CM

## 2024-09-03 PROCEDURE — 96372 THER/PROPH/DIAG INJ SC/IM: CPT | Performed by: FAMILY MEDICINE

## 2024-09-03 RX ORDER — TESTOSTERONE CYPIONATE 200 MG/ML
200 INJECTION, SOLUTION INTRAMUSCULAR ONCE
Status: COMPLETED | OUTPATIENT
Start: 2024-09-03 | End: 2024-09-03

## 2024-09-03 RX ADMIN — TESTOSTERONE CYPIONATE 200 MG: 200 INJECTION, SOLUTION INTRAMUSCULAR at 16:05

## 2024-09-03 NOTE — PROGRESS NOTES
Patient given Testosterone 200mg IM right gluteal..  Will return in 2 weeks for next injection    Patient tolerated injection well.    Administrations This Visit       testosterone cypionate (DEPOTESTOTERONE CYPIONATE) injection 200 mg       Admin Date  09/03/2024 Action  Given Dose  200 mg Route  Int[facility] Documented By  Selene Burgos LPN

## 2024-09-16 ENCOUNTER — OFFICE VISIT (OUTPATIENT)
Dept: FAMILY MEDICINE CLINIC | Age: 69
End: 2024-09-16
Payer: MEDICARE

## 2024-09-16 VITALS
SYSTOLIC BLOOD PRESSURE: 136 MMHG | BODY MASS INDEX: 37.25 KG/M2 | HEART RATE: 102 BPM | WEIGHT: 275 LBS | HEIGHT: 72 IN | DIASTOLIC BLOOD PRESSURE: 80 MMHG | OXYGEN SATURATION: 96 %

## 2024-09-16 DIAGNOSIS — I10 UNCONTROLLED HYPERTENSION: ICD-10-CM

## 2024-09-16 DIAGNOSIS — E66.09 CLASS 2 OBESITY DUE TO EXCESS CALORIES WITHOUT SERIOUS COMORBIDITY WITH BODY MASS INDEX (BMI) OF 38.0 TO 38.9 IN ADULT: ICD-10-CM

## 2024-09-16 DIAGNOSIS — R00.0 TACHYCARDIA: Primary | ICD-10-CM

## 2024-09-16 PROCEDURE — 1123F ACP DISCUSS/DSCN MKR DOCD: CPT | Performed by: FAMILY MEDICINE

## 2024-09-16 PROCEDURE — 1036F TOBACCO NON-USER: CPT | Performed by: FAMILY MEDICINE

## 2024-09-16 PROCEDURE — 99214 OFFICE O/P EST MOD 30 MIN: CPT | Performed by: FAMILY MEDICINE

## 2024-09-16 PROCEDURE — 3017F COLORECTAL CA SCREEN DOC REV: CPT | Performed by: FAMILY MEDICINE

## 2024-09-16 PROCEDURE — G8427 DOCREV CUR MEDS BY ELIG CLIN: HCPCS | Performed by: FAMILY MEDICINE

## 2024-09-16 PROCEDURE — G8417 CALC BMI ABV UP PARAM F/U: HCPCS | Performed by: FAMILY MEDICINE

## 2024-09-16 PROCEDURE — 3075F SYST BP GE 130 - 139MM HG: CPT | Performed by: FAMILY MEDICINE

## 2024-09-16 PROCEDURE — 3079F DIAST BP 80-89 MM HG: CPT | Performed by: FAMILY MEDICINE

## 2024-09-16 RX ORDER — PHENTERMINE HYDROCHLORIDE 37.5 MG/1
37.5 TABLET ORAL
Qty: 30 TABLET | Refills: 0 | Status: SHIPPED | OUTPATIENT
Start: 2024-09-16 | End: 2024-10-16

## 2024-09-16 ASSESSMENT — ENCOUNTER SYMPTOMS
CHEST TIGHTNESS: 0
ABDOMINAL DISTENTION: 0
PHOTOPHOBIA: 0
SHORTNESS OF BREATH: 0
ABDOMINAL PAIN: 0

## 2024-09-18 ENCOUNTER — NURSE ONLY (OUTPATIENT)
Dept: FAMILY MEDICINE CLINIC | Age: 69
End: 2024-09-18
Payer: MEDICARE

## 2024-09-18 DIAGNOSIS — R79.89 LOW TESTOSTERONE: Primary | ICD-10-CM

## 2024-09-18 DIAGNOSIS — Z51.81 MEDICATION MONITORING ENCOUNTER: ICD-10-CM

## 2024-09-18 DIAGNOSIS — Z12.5 PROSTATE CANCER SCREENING: ICD-10-CM

## 2024-09-18 PROCEDURE — 96372 THER/PROPH/DIAG INJ SC/IM: CPT | Performed by: FAMILY MEDICINE

## 2024-09-18 RX ORDER — TESTOSTERONE CYPIONATE 200 MG/ML
200 INJECTION, SOLUTION INTRAMUSCULAR
Qty: 2 ML | Refills: 0 | Status: SHIPPED | OUTPATIENT
Start: 2024-09-18 | End: 2024-10-16

## 2024-09-18 RX ORDER — TESTOSTERONE CYPIONATE 200 MG/ML
200 INJECTION, SOLUTION INTRAMUSCULAR ONCE
Status: COMPLETED | OUTPATIENT
Start: 2024-09-18 | End: 2024-09-18

## 2024-09-18 RX ADMIN — TESTOSTERONE CYPIONATE 200 MG: 200 INJECTION, SOLUTION INTRAMUSCULAR at 09:39

## 2024-09-30 ENCOUNTER — NURSE ONLY (OUTPATIENT)
Dept: FAMILY MEDICINE CLINIC | Age: 69
End: 2024-09-30
Payer: MEDICARE

## 2024-09-30 DIAGNOSIS — R79.89 LOW TESTOSTERONE: Primary | ICD-10-CM

## 2024-09-30 PROCEDURE — 96372 THER/PROPH/DIAG INJ SC/IM: CPT | Performed by: FAMILY MEDICINE

## 2024-09-30 RX ORDER — TESTOSTERONE CYPIONATE 200 MG/ML
200 INJECTION, SOLUTION INTRAMUSCULAR ONCE
Status: COMPLETED | OUTPATIENT
Start: 2024-09-30 | End: 2024-09-30

## 2024-09-30 RX ADMIN — TESTOSTERONE CYPIONATE 200 MG: 200 INJECTION, SOLUTION INTRAMUSCULAR at 13:13

## 2024-09-30 NOTE — PROGRESS NOTES
Patient given Testosterone 200mg IM eight gluteal..  Will return in 2 weeks for next injection    Patient tolerated injection well.    Administrations This Visit       testosterone cypionate (DEPOTESTOTERONE CYPIONATE) injection 200 mg       Admin Date  09/30/2024 Action  Given Dose  200 mg Route  Int[facility] Documented By  Selene Burgos LPN

## 2024-10-14 ENCOUNTER — NURSE ONLY (OUTPATIENT)
Dept: FAMILY MEDICINE CLINIC | Age: 69
End: 2024-10-14
Payer: MEDICARE

## 2024-10-14 ENCOUNTER — OFFICE VISIT (OUTPATIENT)
Dept: FAMILY MEDICINE CLINIC | Age: 69
End: 2024-10-14
Payer: MEDICARE

## 2024-10-14 VITALS
SYSTOLIC BLOOD PRESSURE: 140 MMHG | BODY MASS INDEX: 36.98 KG/M2 | DIASTOLIC BLOOD PRESSURE: 92 MMHG | HEART RATE: 94 BPM | OXYGEN SATURATION: 97 % | WEIGHT: 273 LBS | HEIGHT: 72 IN

## 2024-10-14 DIAGNOSIS — I10 UNCONTROLLED HYPERTENSION: Primary | ICD-10-CM

## 2024-10-14 DIAGNOSIS — E66.09 CLASS 2 OBESITY DUE TO EXCESS CALORIES WITHOUT SERIOUS COMORBIDITY WITH BODY MASS INDEX (BMI) OF 38.0 TO 38.9 IN ADULT: ICD-10-CM

## 2024-10-14 DIAGNOSIS — E66.812 CLASS 2 OBESITY DUE TO EXCESS CALORIES WITHOUT SERIOUS COMORBIDITY WITH BODY MASS INDEX (BMI) OF 38.0 TO 38.9 IN ADULT: ICD-10-CM

## 2024-10-14 DIAGNOSIS — D58.2 ELEVATED HEMOGLOBIN (HCC): ICD-10-CM

## 2024-10-14 DIAGNOSIS — I70.209 ATHEROSCLEROSIS OF ARTERY OF LOWER EXTREMITY (HCC): ICD-10-CM

## 2024-10-14 DIAGNOSIS — R79.89 LOW TESTOSTERONE: Primary | ICD-10-CM

## 2024-10-14 PROBLEM — L97.322 NON-PRESSURE CHRONIC ULCER OF LEFT ANKLE WITH FAT LAYER EXPOSED (HCC): Status: RESOLVED | Noted: 2022-04-11 | Resolved: 2024-10-14

## 2024-10-14 PROBLEM — L97.513 RIGHT FOOT ULCER, WITH NECROSIS OF MUSCLE (HCC): Status: RESOLVED | Noted: 2022-03-07 | Resolved: 2024-10-14

## 2024-10-14 PROCEDURE — 96372 THER/PROPH/DIAG INJ SC/IM: CPT | Performed by: FAMILY MEDICINE

## 2024-10-14 PROCEDURE — G8417 CALC BMI ABV UP PARAM F/U: HCPCS | Performed by: FAMILY MEDICINE

## 2024-10-14 PROCEDURE — 3017F COLORECTAL CA SCREEN DOC REV: CPT | Performed by: FAMILY MEDICINE

## 2024-10-14 PROCEDURE — 99214 OFFICE O/P EST MOD 30 MIN: CPT | Performed by: FAMILY MEDICINE

## 2024-10-14 PROCEDURE — G8484 FLU IMMUNIZE NO ADMIN: HCPCS | Performed by: FAMILY MEDICINE

## 2024-10-14 PROCEDURE — 1123F ACP DISCUSS/DSCN MKR DOCD: CPT | Performed by: FAMILY MEDICINE

## 2024-10-14 PROCEDURE — 3080F DIAST BP >= 90 MM HG: CPT | Performed by: FAMILY MEDICINE

## 2024-10-14 PROCEDURE — 1036F TOBACCO NON-USER: CPT | Performed by: FAMILY MEDICINE

## 2024-10-14 PROCEDURE — G8427 DOCREV CUR MEDS BY ELIG CLIN: HCPCS | Performed by: FAMILY MEDICINE

## 2024-10-14 PROCEDURE — 3077F SYST BP >= 140 MM HG: CPT | Performed by: FAMILY MEDICINE

## 2024-10-14 RX ORDER — PHENTERMINE HYDROCHLORIDE 37.5 MG/1
37.5 TABLET ORAL
Qty: 30 TABLET | Refills: 0 | Status: SHIPPED | OUTPATIENT
Start: 2024-10-14 | End: 2024-11-13

## 2024-10-14 RX ORDER — TESTOSTERONE CYPIONATE 200 MG/ML
200 INJECTION, SOLUTION INTRAMUSCULAR ONCE
Status: COMPLETED | OUTPATIENT
Start: 2024-10-14 | End: 2024-10-14

## 2024-10-14 RX ORDER — METOPROLOL SUCCINATE 100 MG/1
100 TABLET, EXTENDED RELEASE ORAL NIGHTLY
Qty: 30 TABLET | Refills: 5 | Status: SHIPPED | OUTPATIENT
Start: 2024-10-14

## 2024-10-14 RX ADMIN — TESTOSTERONE CYPIONATE 200 MG: 200 INJECTION, SOLUTION INTRAMUSCULAR at 13:36

## 2024-10-14 NOTE — PROGRESS NOTES
Diagnosis Orders   1. Uncontrolled hypertension  metoprolol succinate (TOPROL XL) 100 MG extended release tablet      2. Atherosclerosis of artery of lower extremity (HCC)        3. Elevated hemoglobin (HCC)        4. Class 2 obesity due to excess calories without serious comorbidity with body mass index (BMI) of 38.0 to 38.9 in adult  phentermine (ADIPEX-P) 37.5 MG tablet        Return in about 2 months (around 12/14/2024) for for review of outcome of today's recommendation.  Patient Instructions   Increasing metoprolol XL to 100 mg nightly.    Continue Adipex for weight loss.  Patient may call for next refill.    Follow-up appointment 2 months.    Subjective:      Patient ID: Mason Morales is a 69 y.o. male who presents for:  Chief Complaint   Patient presents with    Weight Management       HPI  Returns for follow-up of weight management.  Did not realize blood pressure was increasing.  Denies symptoms        Current Outpatient Medications on File Prior to Visit   Medication Sig Dispense Refill    testosterone cypionate (DEPOTESTOTERONE CYPIONATE) 200 MG/ML injection Inject 1 mL into the muscle every 14 days for 28 days. Indications: Per Verbal order of Dr. Montgomery Max Daily Amount: 200 mg 2 mL 0    levothyroxine (SYNTHROID) 100 MCG tablet Take 1 tablet by mouth daily 30 tablet 1    pantoprazole (PROTONIX) 40 MG tablet Take 1 tablet by mouth every morning (before breakfast) 30 tablet 5    vitamin D (ERGOCALCIFEROL) 1.25 MG (52403 UT) CAPS capsule Take 1 capsule by mouth once a week 12 capsule 2    triamcinolone (KENALOG) 0.1 % cream Apply topically qam daily Monday through Friday only.  Take weekend off.  Do not use on face, groin, armpits, breasts tissue. 454 g 0    famotidine (PEPCID) 20 MG tablet Take 1 tablet by mouth 2 times daily 60 tablet 0    ascorbic acid (VITAMIN C) 500 MG tablet Take 1 tablet by mouth daily      zinc 50 MG TABS tablet Take 1 tablet by mouth daily      aspirin 325 MG EC tablet Take 1

## 2024-10-14 NOTE — PATIENT INSTRUCTIONS
Increasing metoprolol XL to 100 mg nightly.    Continue Adipex for weight loss.  Patient may call for next refill.    Follow-up appointment 2 months.

## 2024-10-14 NOTE — PROGRESS NOTES
Patient given Testosterone 200mg IM left gluteal..  Will return in 2 weeks for next injection    Patient tolerated injection well.    Administrations This Visit       testosterone cypionate (DEPOTESTOTERONE CYPIONATE) injection 200 mg       Admin Date  10/14/2024 Action  Given Dose  200 mg Route  Intra[facility]ular Documented By  Selene Burgos LPN                   
17

## 2024-10-16 ASSESSMENT — ENCOUNTER SYMPTOMS
SHORTNESS OF BREATH: 0
PHOTOPHOBIA: 0
ABDOMINAL PAIN: 0
CHEST TIGHTNESS: 0
ABDOMINAL DISTENTION: 0

## 2024-10-20 DIAGNOSIS — R79.89 ABNORMAL TSH: ICD-10-CM

## 2024-10-21 NOTE — TELEPHONE ENCOUNTER
Comments:     Last Office Visit (last PCP visit):   10/14/2024    Next Visit Date:  Future Appointments   Date Time Provider Department Center   10/22/2024 10:00 AM SCHEDULE, MLOR TC [facility]   10/28/2024  1:10 PM SCHEDULE, GABRIELLE GUERRIER PCP TESTOSTERONE [facility] DEP   12/2/2024 10:00 AM Thang Montgomery MD [facility] DEP       **If hasn't been seen in over a year OR hasn't followed up according to last diabetes/ADHD visit, make appointment for patient before sending refill to provider.    Rx requested:  Requested Prescriptions     Pending Prescriptions Disp Refills    levothyroxine (SYNTHROID) 100 MCG tablet [Pharmacy Med Name: Levothyroxine Sodium 100 MCG Oral Tablet] 30 tablet 0     Sig: Take 1 tablet by mouth once daily

## 2024-10-22 ENCOUNTER — NURSE ONLY (OUTPATIENT)
Dept: FAMILY MEDICINE CLINIC | Age: 69
End: 2024-10-22

## 2024-10-22 VITALS — DIASTOLIC BLOOD PRESSURE: 90 MMHG | SYSTOLIC BLOOD PRESSURE: 140 MMHG

## 2024-10-22 DIAGNOSIS — Z01.30 BP CHECK: Primary | ICD-10-CM

## 2024-10-23 RX ORDER — LEVOTHYROXINE SODIUM 100 UG/1
100 TABLET ORAL DAILY
Qty: 30 TABLET | Refills: 8 | Status: SHIPPED | OUTPATIENT
Start: 2024-10-23

## 2024-10-24 NOTE — PROGRESS NOTES
Notify patient his cuff is accurate enough to utilize.  Will keep in mind that it reads his blood pressure little bit higher than a sphygmomanometer.  Please keep track of blood pressures and bring log to next appointment          Notified pt of above.

## 2024-10-28 ENCOUNTER — NURSE ONLY (OUTPATIENT)
Dept: FAMILY MEDICINE CLINIC | Age: 69
End: 2024-10-28
Payer: MEDICARE

## 2024-10-28 DIAGNOSIS — R79.89 LOW TESTOSTERONE: Primary | ICD-10-CM

## 2024-10-28 PROCEDURE — 96372 THER/PROPH/DIAG INJ SC/IM: CPT | Performed by: FAMILY MEDICINE

## 2024-10-28 RX ORDER — TESTOSTERONE CYPIONATE 200 MG/ML
200 INJECTION, SOLUTION INTRAMUSCULAR ONCE
Status: COMPLETED | OUTPATIENT
Start: 2024-10-28 | End: 2024-10-28

## 2024-10-28 RX ADMIN — TESTOSTERONE CYPIONATE 200 MG: 200 INJECTION, SOLUTION INTRAMUSCULAR at 13:46

## 2024-10-28 NOTE — PROGRESS NOTES
Patient given Testosterone 200mg IM right gluteal..  Will return in 2 weeks for next injection    Patient tolerated injection well.    Administrations This Visit       testosterone cypionate (DEPOTESTOTERONE CYPIONATE) injection 200 mg       Admin Date  10/28/2024 Action  Given Dose  200 mg Route  IntraMUSCular Documented By  Selene Burgos LPN

## 2024-10-31 ENCOUNTER — OFFICE VISIT (OUTPATIENT)
Dept: FAMILY MEDICINE CLINIC | Age: 69
End: 2024-10-31

## 2024-10-31 VITALS
HEART RATE: 98 BPM | OXYGEN SATURATION: 99 % | SYSTOLIC BLOOD PRESSURE: 168 MMHG | BODY MASS INDEX: 37.25 KG/M2 | DIASTOLIC BLOOD PRESSURE: 104 MMHG | WEIGHT: 275 LBS | HEIGHT: 72 IN

## 2024-10-31 DIAGNOSIS — I10 UNCONTROLLED HYPERTENSION: ICD-10-CM

## 2024-10-31 DIAGNOSIS — E66.01 MORBID (SEVERE) OBESITY DUE TO EXCESS CALORIES: ICD-10-CM

## 2024-10-31 DIAGNOSIS — R06.81 WITNESSED APNEIC SPELLS: Primary | ICD-10-CM

## 2024-10-31 RX ORDER — HYDROCHLOROTHIAZIDE 25 MG/1
25 TABLET ORAL EVERY MORNING
Qty: 30 TABLET | Refills: 5 | Status: SHIPPED | OUTPATIENT
Start: 2024-10-31

## 2024-10-31 ASSESSMENT — ENCOUNTER SYMPTOMS
SHORTNESS OF BREATH: 0
PHOTOPHOBIA: 0
ABDOMINAL PAIN: 0
CHEST TIGHTNESS: 0
ABDOMINAL DISTENTION: 0

## 2024-10-31 NOTE — PATIENT INSTRUCTIONS
Patient is already discontinued Adipex as blood pressure was increasing.  Will talk about other weight loss options once blood pressure is under better control.    Adding hydrochlorothiazide 25 mg every morning for blood pressure control.  Continue metoprolol.    Follow-up appointment 1 week to review blood pressure response.    Witnessed apneic episodes are being evaluated via sleep study.

## 2024-10-31 NOTE — PROGRESS NOTES
Diagnosis Orders   1. Witnessed apneic spells  Sleep Study with PAP Titration      2. Morbid (severe) obesity due to excess calories (E66.01)        3. Body mass index [BMI] 37.0-37.9, adult (Z68.37)        4. Uncontrolled hypertension  hydroCHLOROthiazide (HYDRODIURIL) 25 MG tablet        Return in about 1 week (around 11/7/2024) for for review of outcome of today's recommendation.  Patient Instructions   Patient is already discontinued Adipex as blood pressure was increasing.  Will talk about other weight loss options once blood pressure is under better control.    Adding hydrochlorothiazide 25 mg every morning for blood pressure control.  Continue metoprolol.    Follow-up appointment 1 week to review blood pressure response.    Witnessed apneic episodes are being evaluated via sleep study.    Subjective:      Patient ID: Mason Morales is a 69 y.o. male who presents for:  Chief Complaint   Patient presents with    Sleep Problem     Wife has observed pt not breathing or barely breathing while sleeping. He does not snore and she hasn't observed gasping. Pt does not always feel well rested after 8 hours of sleep. Pt does wake up twice to urinate at night.        HPI    Wife has observed pt not breathing or barely breathing while sleeping. He does not snore and she hasn't observed gasping. Pt does not always feel well rested after 8 hours of sleep. Pt does wake up twice to urinate at night.     Patient stopped his Adipex a couple days ago because he noticed his blood pressure measurements were elevated.  Interested in continuing with weight loss but recognizes Adipex cannot be used for this.          Current Outpatient Medications on File Prior to Visit   Medication Sig Dispense Refill    levothyroxine (SYNTHROID) 100 MCG tablet Take 1 tablet by mouth once daily 30 tablet 8    metoprolol succinate (TOPROL XL) 100 MG extended release tablet Take 1 tablet by mouth nightly 30 tablet 5    testosterone cypionate

## 2024-11-05 ENCOUNTER — TELEPHONE (OUTPATIENT)
Dept: FAMILY MEDICINE CLINIC | Age: 69
End: 2024-11-05

## 2024-11-05 NOTE — TELEPHONE ENCOUNTER
Provider placed a referral to the sleep lab and the Associated Diagnosis code needs to be a G47. Please update referral or place a new one.

## 2024-11-07 ENCOUNTER — OFFICE VISIT (OUTPATIENT)
Dept: FAMILY MEDICINE CLINIC | Age: 69
End: 2024-11-07

## 2024-11-07 VITALS
DIASTOLIC BLOOD PRESSURE: 88 MMHG | HEIGHT: 72 IN | BODY MASS INDEX: 37.25 KG/M2 | OXYGEN SATURATION: 96 % | WEIGHT: 275 LBS | SYSTOLIC BLOOD PRESSURE: 130 MMHG | HEART RATE: 84 BPM

## 2024-11-07 DIAGNOSIS — G47.30 SLEEP APNEA, UNSPECIFIED TYPE: ICD-10-CM

## 2024-11-07 DIAGNOSIS — R79.89 LOW TESTOSTERONE: ICD-10-CM

## 2024-11-07 DIAGNOSIS — I10 PRIMARY HYPERTENSION: Primary | ICD-10-CM

## 2024-11-07 ASSESSMENT — ENCOUNTER SYMPTOMS
CHEST TIGHTNESS: 0
SHORTNESS OF BREATH: 0
ABDOMINAL DISTENTION: 0
ABDOMINAL PAIN: 0
PHOTOPHOBIA: 0

## 2024-11-07 NOTE — PATIENT INSTRUCTIONS
Patient will continue hydrochlorothiazide with his other medications for hypertension control.  Also look to start CPAP machine.  Likely will have a positive effect on blood pressure control.    No further medication prescription for weight loss.  Still in the obesity category.  Will recheck in 1 month to monitor for continued weight loss.    Patient is preparing to switch over from testosterone clinic to home injections of testosterone.

## 2024-11-07 NOTE — PROGRESS NOTES
file     Number of Places Lived in the Last Year: Not on file     Unstable Housing in the Last Year: No     Family History   Problem Relation Age of Onset    Depression Mother     Heart Disease Paternal Grandmother     High Blood Pressure Paternal Grandmother     High Cholesterol Paternal Grandmother     Anemia Paternal Grandmother         B 12 deficiency    Heart Disease Brother     Heart Surgery Brother     Anemia Brother         B 12 deficiency     Allergies:  Vancomycin    Review of Systems   Constitutional:  Negative for activity change, appetite change, diaphoresis and unexpected weight change.   Eyes:  Negative for photophobia and visual disturbance.   Respiratory:  Negative for chest tightness and shortness of breath.         No orthopnea   Cardiovascular:  Negative for chest pain, palpitations and leg swelling.   Gastrointestinal:  Negative for abdominal distention and abdominal pain.   Genitourinary:  Negative for flank pain and frequency.   Musculoskeletal:  Negative for gait problem and joint swelling.   Neurological:  Negative for dizziness, weakness, light-headedness and headaches.   Psychiatric/Behavioral:  Negative for confusion.        Objective:   /88   Pulse 84   Ht 1.829 m (6')   Wt 124.7 kg (275 lb)   SpO2 96%   BMI 37.30 kg/m²     Physical Exam  Vitals reviewed.   Constitutional:       General: He is not in acute distress.     Appearance: He is well-developed.   HENT:      Head: Normocephalic and atraumatic.      Right Ear: External ear normal.      Left Ear: External ear normal.      Nose: Nose normal.   Eyes:      General:         Right eye: No discharge.         Left eye: No discharge.      Conjunctiva/sclera: Conjunctivae normal.      Pupils: Pupils are equal, round, and reactive to light.   Neck:      Thyroid: No thyromegaly.   Cardiovascular:      Rate and Rhythm: Normal rate and regular rhythm.   Pulmonary:      Effort: Pulmonary effort is normal. No respiratory distress.

## 2024-11-11 ENCOUNTER — NURSE ONLY (OUTPATIENT)
Dept: FAMILY MEDICINE CLINIC | Age: 69
End: 2024-11-11
Payer: MEDICARE

## 2024-11-11 DIAGNOSIS — R79.89 LOW TESTOSTERONE: Primary | ICD-10-CM

## 2024-11-11 DIAGNOSIS — R79.89 LOW TESTOSTERONE: ICD-10-CM

## 2024-11-11 PROCEDURE — 96372 THER/PROPH/DIAG INJ SC/IM: CPT | Performed by: FAMILY MEDICINE

## 2024-11-11 RX ORDER — TESTOSTERONE CYPIONATE 200 MG/ML
200 INJECTION, SOLUTION INTRAMUSCULAR
Qty: 6 ML | Refills: 0 | Status: SHIPPED | OUTPATIENT
Start: 2024-11-11 | End: 2025-02-09

## 2024-11-11 RX ORDER — TESTOSTERONE CYPIONATE 200 MG/ML
200 INJECTION, SOLUTION INTRAMUSCULAR ONCE
Status: COMPLETED | OUTPATIENT
Start: 2024-11-11 | End: 2024-11-11

## 2024-11-11 RX ORDER — TESTOSTERONE CYPIONATE 200 MG/ML
INJECTION, SOLUTION INTRAMUSCULAR
Qty: 2 ML | Refills: 0 | OUTPATIENT
Start: 2024-11-11

## 2024-11-11 RX ADMIN — TESTOSTERONE CYPIONATE 200 MG: 200 INJECTION, SOLUTION INTRAMUSCULAR at 14:10

## 2024-11-11 NOTE — PROGRESS NOTES
Patient given Testosterone 200mg IM left gluteal..  Will return in 2 weeks for next injection    Patient tolerated injection well.    Administrations This Visit       testosterone cypionate (DEPOTESTOTERONE CYPIONATE) injection 200 mg       Admin Date  11/11/2024 Action  Given Dose  200 mg Route  IntraMUSCular Documented By  Selene Burgos LPN

## 2024-11-11 NOTE — TELEPHONE ENCOUNTER
Comments:     Last Office Visit (last PCP visit):   11/7/2024    Next Visit Date:  Future Appointments   Date Time Provider Department Center   11/25/2024  1:10 PM SCHEDULE, GABRIELLE GUERRIER PCP TESTOSTERONE [facility] DEP   12/2/2024 10:00 AM Thang Montgomery MD [facility] DEP   12/9/2024  1:10 PM SCHEDULE, GABRIELLE GUERRIER PCP TESTOSTERONE [facility] DEP       **If hasn't been seen in over a year OR hasn't followed up according to last diabetes/ADHD visit, make appointment for patient before sending refill to provider.    Rx requested:  Requested Prescriptions     Pending Prescriptions Disp Refills    testosterone cypionate (DEPOTESTOTERONE CYPIONATE) 200 MG/ML injection 2 mL 0     Sig: Inject 1 mL into the muscle every 14 days for 28 days. Indications: Per Verbal order of Dr. Montgomery Max Daily Amount: 200 mg

## 2024-11-13 DIAGNOSIS — R79.89 LOW TESTOSTERONE: Primary | ICD-10-CM

## 2024-11-13 RX ORDER — SYRINGE WITH NEEDLE, 1 ML 25GX5/8"
1 SYRINGE, EMPTY DISPOSABLE MISCELLANEOUS
Qty: 12 EACH | Refills: 3 | Status: SHIPPED | OUTPATIENT
Start: 2024-11-13

## 2024-12-02 ENCOUNTER — OFFICE VISIT (OUTPATIENT)
Dept: FAMILY MEDICINE CLINIC | Age: 69
End: 2024-12-02

## 2024-12-02 VITALS
WEIGHT: 282.2 LBS | HEART RATE: 90 BPM | SYSTOLIC BLOOD PRESSURE: 136 MMHG | BODY MASS INDEX: 38.22 KG/M2 | DIASTOLIC BLOOD PRESSURE: 74 MMHG | HEIGHT: 72 IN | OXYGEN SATURATION: 94 %

## 2024-12-02 DIAGNOSIS — E66.01 MORBID (SEVERE) OBESITY DUE TO EXCESS CALORIES: ICD-10-CM

## 2024-12-02 DIAGNOSIS — Z00.00 MEDICARE ANNUAL WELLNESS VISIT, SUBSEQUENT: Primary | ICD-10-CM

## 2024-12-02 DIAGNOSIS — I10 PRIMARY HYPERTENSION: ICD-10-CM

## 2024-12-02 ASSESSMENT — PATIENT HEALTH QUESTIONNAIRE - PHQ9
2. FEELING DOWN, DEPRESSED OR HOPELESS: NOT AT ALL
SUM OF ALL RESPONSES TO PHQ QUESTIONS 1-9: 0
SUM OF ALL RESPONSES TO PHQ QUESTIONS 1-9: 0
SUM OF ALL RESPONSES TO PHQ9 QUESTIONS 1 & 2: 0
1. LITTLE INTEREST OR PLEASURE IN DOING THINGS: NOT AT ALL
SUM OF ALL RESPONSES TO PHQ QUESTIONS 1-9: 0
SUM OF ALL RESPONSES TO PHQ QUESTIONS 1-9: 0

## 2024-12-02 NOTE — PROGRESS NOTES
Omega-3 Fatty Acids (FISH OIL) 1200 MG CAPS Take  by mouth daily.    Provider, MD Adeola       CareTeam (Including outside providers/suppliers regularly involved in providing care):   Patient Care Team:  Thang Montgomery MD as PCP - General (Family Medicine)  Thang Montgomery MD as PCP - Empaneled Provider      Reviewed and updated this visit:  Allergies  Meds

## 2024-12-02 NOTE — PATIENT INSTRUCTIONS
chances of quitting for good. Quitting is one of the most important things you can do to protect your heart. It is never too late to quit. Try to avoid secondhand smoke too.     Stay at a weight that's healthy for you. Talk to your doctor if you need help losing weight.     Try to get 7 to 9 hours of sleep each night.     Limit alcohol to 2 drinks a day for men and 1 drink a day for women. Too much alcohol can cause health problems.     Manage other health problems such as diabetes, high blood pressure, and high cholesterol. If you think you may have a problem with alcohol or drug use, talk to your doctor.   Medicines    Take your medicines exactly as prescribed. Call your doctor if you think you are having a problem with your medicine.     If your doctor recommends aspirin, take the amount directed each day. Make sure you take aspirin and not another kind of pain reliever, such as acetaminophen (Tylenol).   When should you call for help?   Call 911 if you have symptoms of a heart attack. These may include:    Chest pain or pressure, or a strange feeling in the chest.     Sweating.     Shortness of breath.     Pain, pressure, or a strange feeling in the back, neck, jaw, or upper belly or in one or both shoulders or arms.     Lightheadedness or sudden weakness.     A fast or irregular heartbeat.   After you call 911, the  may tell you to chew 1 adult-strength or 2 to 4 low-dose aspirin. Wait for an ambulance. Do not try to drive yourself.  Watch closely for changes in your health, and be sure to contact your doctor if you have any problems.  Where can you learn more?  Go to https://www.[employer].net/patientEd and enter F075 to learn more about \"A Healthy Heart: Care Instructions.\"  Current as of: June 24, 2023  Content Version: 14.2  © 2024 [employer].   Care instructions adapted under license by [email or web address]. If you have questions about a medical condition or this instruction, always ask your

## 2024-12-05 ENCOUNTER — HOSPITAL ENCOUNTER (OUTPATIENT)
Dept: SLEEP CENTER | Age: 69
Discharge: HOME | End: 2024-12-07
Payer: MEDICARE

## 2024-12-05 PROCEDURE — 95811 POLYSOM 6/>YRS CPAP 4/> PARM: CPT

## 2024-12-06 ENCOUNTER — TELEPHONE (OUTPATIENT)
Dept: FAMILY MEDICINE CLINIC | Age: 69
End: 2024-12-06

## 2024-12-06 NOTE — TELEPHONE ENCOUNTER
Pt calling stating that he had a sleep study done last night, was told to have the pcp take care of getting a cpap machine and supplies. Please advise. Pt phone number is [phone].

## 2024-12-08 DIAGNOSIS — R09.81 SINUS CONGESTION: ICD-10-CM

## 2024-12-08 DIAGNOSIS — K21.9 GASTROESOPHAGEAL REFLUX DISEASE WITHOUT ESOPHAGITIS: ICD-10-CM

## 2024-12-09 RX ORDER — PANTOPRAZOLE SODIUM 40 MG/1
TABLET, DELAYED RELEASE ORAL
Qty: 30 TABLET | Refills: 5 | Status: SHIPPED | OUTPATIENT
Start: 2024-12-09

## 2024-12-09 NOTE — TELEPHONE ENCOUNTER
Comments:     Last Office Visit (last PCP visit):   12/2/2024    Next Visit Date:  Future Appointments   Date Time Provider Department Center   3/31/2025 10:30 AM Thang Montgomery MD [facility] DE[facility]       **If hasn't been seen in over a year OR hasn't followed up according to last diabetes/ADHD visit, make appointment for patient before sending refill to provider.    Rx requested:  Requested Prescriptions     Pending Prescriptions Disp Refills    pantoprazole (PROTONIX) 40 MG tablet [Pharmacy Med Name: Pantoprazole Sodium 40 MG Oral Tablet Delayed Release] 30 tablet 0     Sig: TAKE 1 TABLET BY MOUTH ONCE DAILY IN THE MORNING BEFORE BREAKFAST

## 2024-12-10 DIAGNOSIS — G47.00 INSOMNIA, UNSPECIFIED TYPE: ICD-10-CM

## 2024-12-10 DIAGNOSIS — G47.33 OSA (OBSTRUCTIVE SLEEP APNEA): Primary | ICD-10-CM

## 2024-12-10 RX ORDER — ZOLPIDEM TARTRATE 10 MG/1
10 TABLET ORAL NIGHTLY PRN
Qty: 14 TABLET | Refills: 0 | Status: SHIPPED | OUTPATIENT
Start: 2024-12-10 | End: 2024-12-24

## 2024-12-10 NOTE — TELEPHONE ENCOUNTER
As you know by the fact that they started you on treatment at the evaluation you are positive for sleep apnea.  The machine improve things significantly.  I will be sending a sleeping medicine into the pharmacy for you.  Once you receive the machine I would like you to take the sleep medicine nightly for 2 weeks while you use the machine.  This will help you learn to tolerate the machine without removing the mask.  Some people have a hard time adjusting to that.

## 2024-12-10 NOTE — TELEPHONE ENCOUNTER
Called pt, relayed provider message and recommendations.    Pt was asking if you could order Cpap with humidifier as pt has dry mouth while sleeping.

## 2024-12-12 NOTE — TELEPHONE ENCOUNTER
Pt called asking where CPAP supplies are.     I said that it was faxed today, shouldn't take much longer.     Will he get a call? Will they send it to his address? Please advise.

## 2024-12-13 NOTE — TELEPHONE ENCOUNTER
Spoke to pt and advised that this process does take some time and that I faxed his information. I gave him Miki cohens number.

## 2024-12-31 DIAGNOSIS — E55.9 VITAMIN D DEFICIENCY: ICD-10-CM

## 2024-12-31 RX ORDER — ERGOCALCIFEROL 1.25 MG/1
50000 CAPSULE, LIQUID FILLED ORAL WEEKLY
Qty: 12 CAPSULE | Refills: 0 | Status: SHIPPED | OUTPATIENT
Start: 2024-12-31

## 2024-12-31 NOTE — TELEPHONE ENCOUNTER
Comments:     Last Office Visit (last PCP visit):   12/2/2024    Next Visit Date:  Future Appointments   Date Time Provider Department Center   3/31/2025 10:30 AM Thang Montgomery MD [facility] DEP       **If hasn't been seen in over a year OR hasn't followed up according to last diabetes/ADHD visit, make appointment for patient before sending refill to provider.    Rx requested:  Requested Prescriptions     Pending Prescriptions Disp Refills    vitamin D (ERGOCALCIFEROL) 1.25 MG (85715 UT) CAPS capsule [Pharmacy Med Name: Vitamin D (Ergocalciferol) 1.25 MG (18005 UT) Oral Capsule] 12 capsule 0     Sig: Take 1 capsule by mouth once a week

## 2025-01-20 DIAGNOSIS — R79.89 LOW TESTOSTERONE: ICD-10-CM

## 2025-01-20 NOTE — TELEPHONE ENCOUNTER
Comments:     Last Office Visit (last PCP visit):   12/2/2024    Next Visit Date:  Future Appointments   Date Time Provider Department Center   3/31/2025 10:30 AM Thang Montgomery MD [facility] DEP       **If hasn't been seen in over a year OR hasn't followed up according to last diabetes/ADHD visit, make appointment for patient before sending refill to provider.    Rx requested:  Requested Prescriptions     Pending Prescriptions Disp Refills    testosterone cypionate (DEPOTESTOTERONE CYPIONATE) 200 MG/ML injection [Pharmacy Med Name: Testosterone Cypionate 200 MG/ML Intramuscular Solution] 6 mL 0     Sig: INJECT 1 ML INTO THE MUSCLE EVERY 14 DAYS FOR 90 DAYS. MAX DAILY AMOUNT 200 MG.

## 2025-01-21 RX ORDER — TESTOSTERONE CYPIONATE 200 MG/ML
INJECTION, SOLUTION INTRAMUSCULAR
Qty: 2 ML | Refills: 0 | Status: SHIPPED | OUTPATIENT
Start: 2025-01-21 | End: 2025-02-20

## 2025-01-21 NOTE — TELEPHONE ENCOUNTER
Pt aware of 30-day fill and to make f/u within 30 days of labs being done.    States he will come in Monday to do them. Took TRREBECCA shot today and yesterday.

## 2025-01-27 DIAGNOSIS — Z51.81 MEDICATION MONITORING ENCOUNTER: ICD-10-CM

## 2025-01-27 DIAGNOSIS — Z12.5 PROSTATE CANCER SCREENING: ICD-10-CM

## 2025-01-27 DIAGNOSIS — R79.89 LOW TESTOSTERONE: ICD-10-CM

## 2025-01-27 LAB
ALBUMIN SERPL-MCNC: 4.3 G/DL (ref 3.5–4.6)
ALP SERPL-CCNC: 77 U/L (ref 35–104)
ALT SERPL-CCNC: 26 U/L (ref 0–41)
ANION GAP SERPL CALCULATED.3IONS-SCNC: 12 MEQ/L (ref 9–15)
AST SERPL-CCNC: 14 U/L (ref 0–40)
BASOPHILS # BLD: 0.1 K/UL (ref 0–0.2)
BASOPHILS NFR BLD: 1.8 %
BILIRUB SERPL-MCNC: 0.5 MG/DL (ref 0.2–0.7)
BUN SERPL-MCNC: 18 MG/DL (ref 8–23)
CALCIUM SERPL-MCNC: 9.5 MG/DL (ref 8.5–9.9)
CHLORIDE SERPL-SCNC: 96 MEQ/L (ref 95–107)
CO2 SERPL-SCNC: 26 MEQ/L (ref 20–31)
CREAT SERPL-MCNC: 0.99 MG/DL (ref 0.7–1.2)
EOSINOPHIL # BLD: 0.2 K/UL (ref 0–0.7)
EOSINOPHIL NFR BLD: 3.4 %
ERYTHROCYTE [DISTWIDTH] IN BLOOD BY AUTOMATED COUNT: 13.5 % (ref 11.5–14.5)
GLOBULIN SER CALC-MCNC: 2.4 G/DL (ref 2.3–3.5)
GLUCOSE SERPL-MCNC: 162 MG/DL (ref 70–99)
HCT VFR BLD AUTO: 53.1 % (ref 42–52)
HGB BLD-MCNC: 18.3 G/DL (ref 14–18)
LYMPHOCYTES # BLD: 1.4 K/UL (ref 1–4.8)
LYMPHOCYTES NFR BLD: 21.4 %
MCH RBC QN AUTO: 29.3 PG (ref 27–31.3)
MCHC RBC AUTO-ENTMCNC: 34.5 % (ref 33–37)
MCV RBC AUTO: 85.1 FL (ref 79–92.2)
MONOCYTES # BLD: 0.6 K/UL (ref 0.2–0.8)
MONOCYTES NFR BLD: 9.6 %
NEUTROPHILS # BLD: 4.2 K/UL (ref 1.4–6.5)
NEUTS SEG NFR BLD: 62.9 %
PLATELET # BLD AUTO: 202 K/UL (ref 130–400)
POTASSIUM SERPL-SCNC: 4.4 MEQ/L (ref 3.4–4.9)
PROT SERPL-MCNC: 6.7 G/DL (ref 6.3–8)
PSA SERPL-MCNC: 1.1 NG/ML (ref 0–4)
RBC # BLD AUTO: 6.24 M/UL (ref 4.7–6.1)
SODIUM SERPL-SCNC: 134 MEQ/L (ref 135–144)
WBC # BLD AUTO: 6.7 K/UL (ref 4.8–10.8)

## 2025-01-28 LAB
SHBG SERPL-SCNC: 30 NMOL/L (ref 19–76)
TESTOST FREE SERPL-MCNC: 156.2 PG/ML (ref 47–244)
TESTOST SERPL-MCNC: 670 NG/DL (ref 193–740)

## 2025-01-29 DIAGNOSIS — R79.89 LOW TESTOSTERONE: Primary | ICD-10-CM

## 2025-01-29 NOTE — RESULT ENCOUNTER NOTE
Notify patient testosterone level is good and PSA was normal.  However his blood is getting thick.  This can be a side effect from the testosterone injections.  I will have him hold his injections for 1 month and repeat a CBC at that time.  If it comes back down to normal levels will resume testosterone.  We may have to intermittently take breaks of testosterone if this keeps occurring.  Please create order and notify patient

## 2025-03-03 DIAGNOSIS — R79.89 LOW TESTOSTERONE: ICD-10-CM

## 2025-03-03 LAB
BASOPHILS # BLD: 0.1 K/UL (ref 0–0.2)
BASOPHILS NFR BLD: 1.7 %
EOSINOPHIL # BLD: 0.4 K/UL (ref 0–0.7)
EOSINOPHIL NFR BLD: 5.5 %
ERYTHROCYTE [DISTWIDTH] IN BLOOD BY AUTOMATED COUNT: 12.8 % (ref 11.5–14.5)
HCT VFR BLD AUTO: 51 % (ref 42–52)
HGB BLD-MCNC: 17.3 G/DL (ref 14–18)
LYMPHOCYTES # BLD: 1.6 K/UL (ref 1–4.8)
LYMPHOCYTES NFR BLD: 24.4 %
MCH RBC QN AUTO: 28.3 PG (ref 27–31.3)
MCHC RBC AUTO-ENTMCNC: 33.9 % (ref 33–37)
MCV RBC AUTO: 83.3 FL (ref 79–92.2)
MONOCYTES # BLD: 0.7 K/UL (ref 0.2–0.8)
MONOCYTES NFR BLD: 10.5 %
NEUTROPHILS # BLD: 3.6 K/UL (ref 1.4–6.5)
NEUTS SEG NFR BLD: 57.3 %
PLATELET # BLD AUTO: 212 K/UL (ref 130–400)
RBC # BLD AUTO: 6.12 M/UL (ref 4.7–6.1)
WBC # BLD AUTO: 6.4 K/UL (ref 4.8–10.8)

## 2025-03-04 NOTE — RESULT ENCOUNTER NOTE
Result reviewed.  Notify pt normal. No further action at this time.  If the patient notices a value in labs that is flagged as abnormal and I am not commenting on it that is because it is medically insignificant.  It does not follow a pattern that adds up to a medical problem.    If he stopped his testosterone injections he may resume them now

## 2025-03-05 DIAGNOSIS — R79.89 LOW TESTOSTERONE: ICD-10-CM

## 2025-03-05 RX ORDER — SYRINGE WITH NEEDLE, 1 ML 25GX5/8"
1 SYRINGE, EMPTY DISPOSABLE MISCELLANEOUS
Qty: 12 EACH | Refills: 3 | Status: SHIPPED | OUTPATIENT
Start: 2025-03-05

## 2025-03-05 RX ORDER — TESTOSTERONE CYPIONATE 200 MG/ML
200 INJECTION, SOLUTION INTRAMUSCULAR
Qty: 2 ML | Refills: 5 | Status: SHIPPED | OUTPATIENT
Start: 2025-03-05 | End: 2025-09-01

## 2025-03-11 ENCOUNTER — OFFICE VISIT (OUTPATIENT)
Dept: FAMILY MEDICINE CLINIC | Age: 70
End: 2025-03-11
Payer: MEDICARE

## 2025-03-11 VITALS
DIASTOLIC BLOOD PRESSURE: 74 MMHG | SYSTOLIC BLOOD PRESSURE: 122 MMHG | HEART RATE: 79 BPM | OXYGEN SATURATION: 95 % | HEIGHT: 72 IN | BODY MASS INDEX: 40.74 KG/M2 | WEIGHT: 300.8 LBS | TEMPERATURE: 98 F

## 2025-03-11 DIAGNOSIS — J01.00 ACUTE NON-RECURRENT MAXILLARY SINUSITIS: ICD-10-CM

## 2025-03-11 DIAGNOSIS — M25.561 ACUTE PAIN OF RIGHT KNEE: Primary | ICD-10-CM

## 2025-03-11 PROCEDURE — 1123F ACP DISCUSS/DSCN MKR DOCD: CPT | Performed by: NURSE PRACTITIONER

## 2025-03-11 PROCEDURE — G8417 CALC BMI ABV UP PARAM F/U: HCPCS | Performed by: NURSE PRACTITIONER

## 2025-03-11 PROCEDURE — 3017F COLORECTAL CA SCREEN DOC REV: CPT | Performed by: NURSE PRACTITIONER

## 2025-03-11 PROCEDURE — G8427 DOCREV CUR MEDS BY ELIG CLIN: HCPCS | Performed by: NURSE PRACTITIONER

## 2025-03-11 PROCEDURE — 99214 OFFICE O/P EST MOD 30 MIN: CPT | Performed by: NURSE PRACTITIONER

## 2025-03-11 PROCEDURE — 1036F TOBACCO NON-USER: CPT | Performed by: NURSE PRACTITIONER

## 2025-03-11 RX ORDER — MELOXICAM 15 MG/1
15 TABLET ORAL DAILY PRN
Qty: 30 TABLET | Refills: 0 | Status: SHIPPED | OUTPATIENT
Start: 2025-03-11

## 2025-03-11 SDOH — ECONOMIC STABILITY: FOOD INSECURITY: WITHIN THE PAST 12 MONTHS, THE FOOD YOU BOUGHT JUST DIDN'T LAST AND YOU DIDN'T HAVE MONEY TO GET MORE.: NEVER TRUE

## 2025-03-11 ASSESSMENT — PATIENT HEALTH QUESTIONNAIRE - PHQ9
1. LITTLE INTEREST OR PLEASURE IN DOING THINGS: NOT AT ALL
SUM OF ALL RESPONSES TO PHQ QUESTIONS 1-9: 0
2. FEELING DOWN, DEPRESSED OR HOPELESS: NOT AT ALL
SUM OF ALL RESPONSES TO PHQ QUESTIONS 1-9: 0

## 2025-03-11 NOTE — PROGRESS NOTES
Gait: Gait normal.   Psychiatric:         Mood and Affect: Mood normal.         Behavior: Behavior normal.         ASSESSMENT/PLAN     1. Acute pain of right knee  - meloxicam (MOBIC) 15 MG tablet; Take 1 tablet by mouth daily as needed for Pain  Dispense: 30 tablet; Refill: 0  - Ambulatory Referral To Sports Medicine    2. Acute non-recurrent maxillary sinusitis  - amoxicillin-clavulanate (AUGMENTIN) 875-125 MG per tablet; Take 1 tablet by mouth 2 times daily for 7 days  Dispense: 14 tablet; Refill: 0    Plan:  - Start Augmentin for sinusitis  - Obtain right knee X-ray today  - Refer to orthopedic specialist for further evaluation and assistance in management of condition  - Prescribe anti-inflammatory medication for knee pain as needed    COMMUNICATION:       Electronically signed by KRISTY Woo CNP on 3/17/2025 10:19 PM

## 2025-03-12 ENCOUNTER — OFFICE VISIT (OUTPATIENT)
Dept: ORTHOPEDIC SURGERY | Age: 70
End: 2025-03-12

## 2025-03-12 VITALS — OXYGEN SATURATION: 96 % | WEIGHT: 300 LBS | BODY MASS INDEX: 40.63 KG/M2 | HEIGHT: 72 IN | HEART RATE: 80 BPM

## 2025-03-12 DIAGNOSIS — M25.461 EFFUSION OF RIGHT KNEE: Primary | ICD-10-CM

## 2025-03-12 DIAGNOSIS — M17.11 PRIMARY OSTEOARTHRITIS OF RIGHT KNEE: ICD-10-CM

## 2025-03-12 DIAGNOSIS — M25.461 EFFUSION OF RIGHT KNEE: ICD-10-CM

## 2025-03-12 LAB
APPEARANCE FLUID: NORMAL
CELL COUNT FLUID TYPE: NORMAL
CLOT EVALUATION: NORMAL
COLOR FLUID: NORMAL
EOSINOPHIL FLUID: 1 %
FLUID PATH CONSULT: YES
LYMPHOCYTES, BODY FLUID: 44 %
MONOCYTE, FLUID: 54 %
NEUTROPHIL, FLUID: 1 %
NUCLEATED CELLS FLUID: 2941 /CUMM
NUMBER OF CELLS COUNTED FLUID: 100
RBC FLUID: 6000 /CUMM

## 2025-03-12 RX ORDER — LIDOCAINE HYDROCHLORIDE 10 MG/ML
3 INJECTION, SOLUTION INFILTRATION; PERINEURAL ONCE
Status: COMPLETED | OUTPATIENT
Start: 2025-03-12 | End: 2025-03-12

## 2025-03-12 RX ORDER — TRIAMCINOLONE ACETONIDE 40 MG/ML
80 INJECTION, SUSPENSION INTRA-ARTICULAR; INTRAMUSCULAR ONCE
Status: COMPLETED | OUTPATIENT
Start: 2025-03-12 | End: 2025-03-12

## 2025-03-12 RX ADMIN — TRIAMCINOLONE ACETONIDE 80 MG: 40 INJECTION, SUSPENSION INTRA-ARTICULAR; INTRAMUSCULAR at 13:30

## 2025-03-12 RX ADMIN — LIDOCAINE HYDROCHLORIDE 3 ML: 10 INJECTION, SOLUTION INFILTRATION; PERINEURAL at 13:30

## 2025-03-12 ASSESSMENT — ENCOUNTER SYMPTOMS: BACK PAIN: 0

## 2025-03-12 NOTE — PROGRESS NOTES
use: No       PMH, Surgical Hx, Family Hx, and Social Hx reviewed and updated.  Health Maintenance reviewed.    Reviewed with the patient: current clinical status, medications, labs, images, activities and diet.     Side effects, adverse effects of the medication prescribed today, as well as treatment plan/ rationale and result expectations have been discussed with the patient who expresses understanding and desires to proceed.  Close follow up to evaluate treatment results and for coordination of care.    I have reviewed the patient's medical history in detail and updated the computerized patient record.    Objective     Vitals:    03/12/25 1328   Pulse: 80   SpO2: 96%   Weight: 136.1 kg (300 lb)   Height: 1.829 m (6')        Physical Exam  Musculoskeletal:      Right knee: Effusion and crepitus present. No erythema, ecchymosis or lacerations. Decreased range of motion. Tenderness present over the medial joint line and lateral joint line. No patellar tendon tenderness. No LCL laxity, MCL laxity, ACL laxity or PCL laxity.      Instability Tests: Anterior drawer test negative. Posterior drawer test negative. Anterior Lachman test negative.           Please note, this report has been partially produced using speech recognition software and may cause  and /or contain errors related to that system including grammar, punctuation and spelling as well as words and phrases that may seem inappropriate. If there are questions or concerns please feel free to contact me to clarify.    Dennis Zaragoza MD

## 2025-03-13 LAB
CRYSTALS, FLUID: NEGATIVE
SPECIMEN TYPE: NORMAL

## 2025-03-14 LAB — PATH CONSULT FLUID: NORMAL

## 2025-03-17 ENCOUNTER — RESULTS FOLLOW-UP (OUTPATIENT)
Dept: ORTHOPEDIC SURGERY | Age: 70
End: 2025-03-17

## 2025-03-19 LAB
B BURGDOR DNA SPEC QL NAA+PROBE: NOT DETECTED
BACTERIA FLD AEROBE CULT: NORMAL
SPECIMEN SOURCE: NORMAL

## 2025-03-24 DIAGNOSIS — R09.81 SINUS CONGESTION: ICD-10-CM

## 2025-03-24 DIAGNOSIS — E55.9 VITAMIN D DEFICIENCY: ICD-10-CM

## 2025-03-24 DIAGNOSIS — K21.9 GASTROESOPHAGEAL REFLUX DISEASE WITHOUT ESOPHAGITIS: ICD-10-CM

## 2025-03-24 RX ORDER — ERGOCALCIFEROL 1.25 MG/1
50000 CAPSULE, LIQUID FILLED ORAL WEEKLY
Qty: 12 CAPSULE | Refills: 0 | Status: SHIPPED | OUTPATIENT
Start: 2025-03-24

## 2025-03-24 RX ORDER — PANTOPRAZOLE SODIUM 40 MG/1
40 TABLET, DELAYED RELEASE ORAL DAILY
Qty: 30 TABLET | Refills: 5 | Status: SHIPPED | OUTPATIENT
Start: 2025-03-24

## 2025-03-24 NOTE — TELEPHONE ENCOUNTER
Comments:     Last Office Visit (last PCP visit):   12/2/2024    Next Visit Date:  Future Appointments   Date Time Provider Department Center   3/31/2025 10:30 AM Thang Montgomery MD [facility] DEP       **If hasn't been seen in over a year OR hasn't followed up according to last diabetes/ADHD visit, make appointment for patient before sending refill to provider.    Rx requested:  Requested Prescriptions     Pending Prescriptions Disp Refills    vitamin D (ERGOCALCIFEROL) 1.25 MG (77942 UT) CAPS capsule [Pharmacy Med Name: Vitamin D (Ergocalciferol) 1.25 MG (81497 UT) Oral Capsule] 12 capsule 0     Sig: Take 1 capsule by mouth once a week

## 2025-03-24 NOTE — TELEPHONE ENCOUNTER
Comments:     Last Office Visit (last PCP visit):   12/2/2024    Next Visit Date:  Future Appointments   Date Time Provider Department Center   3/31/2025 10:30 AM Thang Montgomery MD [facility] ECC DEP       **If hasn't been seen in over a year OR hasn't followed up according to last diabetes/ADHD visit, make appointment for patient before sending refill to provider.    Rx requested:  Requested Prescriptions     Pending Prescriptions Disp Refills    pantoprazole (PROTONIX) 40 MG tablet 30 tablet 5

## 2025-03-31 ENCOUNTER — OFFICE VISIT (OUTPATIENT)
Dept: FAMILY MEDICINE CLINIC | Age: 70
End: 2025-03-31

## 2025-03-31 VITALS
WEIGHT: 302.7 LBS | HEART RATE: 86 BPM | OXYGEN SATURATION: 95 % | TEMPERATURE: 98.4 F | SYSTOLIC BLOOD PRESSURE: 132 MMHG | HEIGHT: 72 IN | DIASTOLIC BLOOD PRESSURE: 68 MMHG | BODY MASS INDEX: 41 KG/M2

## 2025-03-31 DIAGNOSIS — G89.29 CHRONIC PAIN OF RIGHT KNEE: ICD-10-CM

## 2025-03-31 DIAGNOSIS — E66.9 OBESITY (BMI 30-39.9): ICD-10-CM

## 2025-03-31 DIAGNOSIS — R79.89 LOW TESTOSTERONE: ICD-10-CM

## 2025-03-31 DIAGNOSIS — Z51.81 ENCOUNTER FOR MEDICATION MONITORING: ICD-10-CM

## 2025-03-31 DIAGNOSIS — M25.561 CHRONIC PAIN OF RIGHT KNEE: ICD-10-CM

## 2025-03-31 DIAGNOSIS — K21.9 GASTROESOPHAGEAL REFLUX DISEASE WITHOUT ESOPHAGITIS: ICD-10-CM

## 2025-03-31 DIAGNOSIS — I10 PRIMARY HYPERTENSION: ICD-10-CM

## 2025-03-31 DIAGNOSIS — D58.2 ELEVATED HEMOGLOBIN: Primary | ICD-10-CM

## 2025-03-31 RX ORDER — TESTOSTERONE CYPIONATE 200 MG/ML
200 INJECTION, SOLUTION INTRAMUSCULAR
Qty: 2 ML | Refills: 5 | Status: SHIPPED | OUTPATIENT
Start: 2025-03-31 | End: 2025-09-27

## 2025-03-31 RX ORDER — PANTOPRAZOLE SODIUM 40 MG/1
40 TABLET, DELAYED RELEASE ORAL DAILY
Qty: 90 TABLET | Refills: 3 | Status: SHIPPED | OUTPATIENT
Start: 2025-03-31

## 2025-03-31 ASSESSMENT — ENCOUNTER SYMPTOMS
PHOTOPHOBIA: 0
ABDOMINAL DISTENTION: 0
ABDOMINAL PAIN: 0
SHORTNESS OF BREATH: 0
CHEST TIGHTNESS: 0

## 2025-03-31 NOTE — PROGRESS NOTES
Diagnosis Orders   1. Elevated hemoglobin        2. Low testosterone  testosterone cypionate (DEPOTESTOTERONE CYPIONATE) 200 MG/ML injection    CBC with Auto Differential    Comprehensive Metabolic Panel      3. Encounter for medication monitoring  CBC with Auto Differential    Comprehensive Metabolic Panel      4. Obesity (BMI 30-39.9)  Amb Referral to Nutrition Services      5. Gastroesophageal reflux disease without esophagitis  pantoprazole (PROTONIX) 40 MG tablet      6. Chronic pain of right knee            See patient instructions for further assessment/plan specifics    Return in about 3 months (around 6/30/2025) for for review of outcome of today's recommendation.  Patient Instructions   Patient will have blood work done in the month of April to recheck hemoglobin that was elevated.    Continue testosterone at this time.  Refill provided.    Obesity will now be monitored with the consultation of a dietitian.    Patient has made some lifestyle changes for his knee pain and they seem to be working continue current.    Patient will have refills of pantoprazole for acid reflux.    Subjective:      Patient ID: Mason Morales is a 69 y.o. male who presents for:  Chief Complaint   Patient presents with    Hypertension     3mo f/u    Pt not taking hctz, pt voiding more frequently/dry mouth/drinking more water.    Also states \"amox-c\" causes diarrhea       HPI  Patient discontinued testosterone for a month as instructed then resumed.  Had repeat blood work that normalized.  Reviewed that with patient.    Patient states he stopped hydrochlorothiazide due to extreme thirst.  Blood pressures have been tracking normal.    Patient recognizes weight loss has not occurred with his current plan.  Would like to see a dietitian.    Needs refills of his acid reflux medicine as this helps control his acid reflux.    Chronic knee pain has been addressed with Dr. Zaragoza in the acute phase and is now being managed by change in

## 2025-03-31 NOTE — PATIENT INSTRUCTIONS
Patient will have blood work done in the month of April to recheck hemoglobin that was elevated.    Continue testosterone at this time.  Refill provided.    Obesity will now be monitored with the consultation of a dietitian.    Patient has made some lifestyle changes for his knee pain and they seem to be working continue current.    Patient will have refills of pantoprazole for acid reflux.    Hypertension currently controlled on metoprolol alone.  Patient may remain off hydrochlorothiazide as long as blood pressure remains controlled.

## 2025-04-14 ENCOUNTER — RESULTS FOLLOW-UP (OUTPATIENT)
Dept: FAMILY MEDICINE CLINIC | Age: 70
End: 2025-04-14

## 2025-04-14 DIAGNOSIS — I10 UNCONTROLLED HYPERTENSION: ICD-10-CM

## 2025-04-14 DIAGNOSIS — Z51.81 ENCOUNTER FOR MEDICATION MONITORING: ICD-10-CM

## 2025-04-14 DIAGNOSIS — R79.89 LOW TESTOSTERONE: ICD-10-CM

## 2025-04-14 LAB
ALBUMIN SERPL-MCNC: 4.3 G/DL (ref 3.5–4.6)
ALP SERPL-CCNC: 82 U/L (ref 35–104)
ALT SERPL-CCNC: 26 U/L (ref 0–41)
ANION GAP SERPL CALCULATED.3IONS-SCNC: 14 MEQ/L (ref 9–15)
AST SERPL-CCNC: 19 U/L (ref 0–40)
BASOPHILS # BLD: 0.1 K/UL (ref 0–0.2)
BASOPHILS NFR BLD: 1.7 %
BILIRUB SERPL-MCNC: 0.4 MG/DL (ref 0.2–0.7)
BUN SERPL-MCNC: 14 MG/DL (ref 8–23)
CALCIUM SERPL-MCNC: 9.2 MG/DL (ref 8.5–9.9)
CHLORIDE SERPL-SCNC: 104 MEQ/L (ref 95–107)
CO2 SERPL-SCNC: 23 MEQ/L (ref 20–31)
CREAT SERPL-MCNC: 0.98 MG/DL (ref 0.7–1.2)
EOSINOPHIL # BLD: 0.2 K/UL (ref 0–0.7)
EOSINOPHIL NFR BLD: 3.3 %
ERYTHROCYTE [DISTWIDTH] IN BLOOD BY AUTOMATED COUNT: 15 % (ref 11.5–14.5)
GLOBULIN SER CALC-MCNC: 2.4 G/DL (ref 2.3–3.5)
GLUCOSE SERPL-MCNC: 153 MG/DL (ref 70–99)
HCT VFR BLD AUTO: 47 % (ref 42–52)
HGB BLD-MCNC: 16.3 G/DL (ref 14–18)
LYMPHOCYTES # BLD: 1.6 K/UL (ref 1–4.8)
LYMPHOCYTES NFR BLD: 24.1 %
MCH RBC QN AUTO: 29.6 PG (ref 27–31.3)
MCHC RBC AUTO-ENTMCNC: 34.7 % (ref 33–37)
MCV RBC AUTO: 85.5 FL (ref 79–92.2)
MONOCYTES # BLD: 0.7 K/UL (ref 0.2–0.8)
MONOCYTES NFR BLD: 9.8 %
NEUTROPHILS # BLD: 3.9 K/UL (ref 1.4–6.5)
NEUTS SEG NFR BLD: 59 %
PLATELET # BLD AUTO: 229 K/UL (ref 130–400)
POTASSIUM SERPL-SCNC: 4.5 MEQ/L (ref 3.4–4.9)
PROT SERPL-MCNC: 6.7 G/DL (ref 6.3–8)
RBC # BLD AUTO: 5.5 M/UL (ref 4.7–6.1)
SODIUM SERPL-SCNC: 141 MEQ/L (ref 135–144)
WBC # BLD AUTO: 6.7 K/UL (ref 4.8–10.8)

## 2025-04-14 RX ORDER — METOPROLOL SUCCINATE 100 MG/1
100 TABLET, EXTENDED RELEASE ORAL NIGHTLY
Qty: 90 TABLET | Refills: 3 | Status: SHIPPED | OUTPATIENT
Start: 2025-04-14

## 2025-04-14 NOTE — TELEPHONE ENCOUNTER
Comments:     Last Office Visit (last PCP visit):   3/31/2025    Next Visit Date:  Future Appointments   Date Time Provider Department Center   7/7/2025 10:15 AM Thang Montgomery MD [facility] E[facility] DEP       **If hasn't been seen in over a year OR hasn't followed up according to last diabetes/ADHD visit, make appointment for patient before sending refill to provider.    Rx requested:  Requested Prescriptions     Pending Prescriptions Disp Refills    metoprolol succinate (TOPROL XL) 100 MG extended release tablet [Pharmacy Med Name: Metoprolol Succinate  MG Oral Tablet Extended Release 24 Hour] 30 tablet 0     Sig: Take 1 tablet by mouth nightly

## 2025-05-12 ENCOUNTER — TELEPHONE (OUTPATIENT)
Dept: FAMILY MEDICINE CLINIC | Age: 70
End: 2025-05-12

## 2025-05-12 NOTE — TELEPHONE ENCOUNTER
Pt inquiring billing from date of service 12/2/24 for $350.00. pt states he had coverage thru traditional medicare a+b and [employer] as a secondary.    I had called spoke with Agapito WILKINS ref # [id number],  [facility] medicare advantage who states that the [facility] advantage was effective from 1/1/24-11/30/24, the pt did not have coverage thru them from 12/1/24-1/31/25, the [facility] Advantage was renewed on 2/1/25.    Insurance was corrected and resubmitted. Pt aware.

## 2025-05-22 ENCOUNTER — OFFICE VISIT (OUTPATIENT)
Age: 70
End: 2025-05-22

## 2025-05-22 ENCOUNTER — HOSPITAL ENCOUNTER (OUTPATIENT)
Dept: ORTHOPEDIC SURGERY | Age: 70
Discharge: HOME | End: 2025-05-24

## 2025-05-22 VITALS
HEART RATE: 78 BPM | RESPIRATION RATE: 18 BRPM | OXYGEN SATURATION: 98 % | BODY MASS INDEX: 40.9 KG/M2 | HEIGHT: 72 IN | TEMPERATURE: 97.5 F | WEIGHT: 302 LBS

## 2025-05-22 DIAGNOSIS — G89.29 CHRONIC PAIN OF LEFT KNEE: Primary | ICD-10-CM

## 2025-05-22 DIAGNOSIS — M25.562 CHRONIC PAIN OF LEFT KNEE: ICD-10-CM

## 2025-05-22 DIAGNOSIS — M17.11 PRIMARY OSTEOARTHRITIS OF RIGHT KNEE: ICD-10-CM

## 2025-05-22 DIAGNOSIS — G89.29 CHRONIC PAIN OF LEFT KNEE: ICD-10-CM

## 2025-05-22 DIAGNOSIS — M25.562 CHRONIC PAIN OF LEFT KNEE: Primary | ICD-10-CM

## 2025-05-22 DIAGNOSIS — S86.812A STRAIN OF CALF MUSCLE, LEFT, INITIAL ENCOUNTER: ICD-10-CM

## 2025-05-22 RX ORDER — LIDOCAINE HYDROCHLORIDE 10 MG/ML
3 INJECTION, SOLUTION INFILTRATION; PERINEURAL ONCE
Status: COMPLETED | OUTPATIENT
Start: 2025-05-22 | End: 2025-05-22

## 2025-05-22 RX ORDER — DICLOFENAC SODIUM 75 MG/1
75 TABLET, DELAYED RELEASE ORAL 2 TIMES DAILY PRN
Qty: 60 TABLET | Refills: 1 | Status: SHIPPED | OUTPATIENT
Start: 2025-05-22

## 2025-05-22 RX ORDER — TRIAMCINOLONE ACETONIDE 40 MG/ML
80 INJECTION, SUSPENSION INTRA-ARTICULAR; INTRAMUSCULAR ONCE
Status: COMPLETED | OUTPATIENT
Start: 2025-05-22 | End: 2025-05-22

## 2025-05-22 RX ADMIN — LIDOCAINE HYDROCHLORIDE 3 ML: 10 INJECTION, SOLUTION INFILTRATION; PERINEURAL at 11:08

## 2025-05-22 RX ADMIN — TRIAMCINOLONE ACETONIDE 80 MG: 40 INJECTION, SUSPENSION INTRA-ARTICULAR; INTRAMUSCULAR at 11:09

## 2025-05-22 ASSESSMENT — ENCOUNTER SYMPTOMS: BACK PAIN: 0

## 2025-05-22 NOTE — PROGRESS NOTES
[facility] [place] [facility], [facility]  [address] [place] [id number]  Dept: [phone]  Dept Fax: [phone]  Loc: [phone]     5/22/2025    Visit type: Follow up    Reason for Visit: Knee Pain (pt is present for bilateral knee pain /Ongoing  for 1 week/pain: 9/10/Last cortisone injection done 3/12/25 on th right knee/Pt states that left knee is very sore and painful at time.)         Patient: Mason Morales is a 69 y.o. male     HPI: 69-year-old male presents for follow-up visit pertaining to bilateral knee pain with the left worse than the right.  Patient states he has been getting some return of pain for the right knee, patient has primary osteoarthritis of the right knee.  Patient received an injection about 2 months ago, he states the pain is still better than when he originally came in though he is starting to notice it.  The left knee is a new pain that has been worsening, feels it around the lateral knee and the posterior knee.    ASSESSMENT/PLAN   Chronic pain of left knee  -     XR KNEE LEFT (MIN 4 VIEWS); Future  -     lidocaine 1 % injection 3 mL; 3 mL, Intra-artICUlar, ONCE, 1 dose, On Thu 5/22/25 at 1015  -     triamcinolone acetonide (KENALOG-40) injection 80 mg; 80 mg, Intra-artICUlar, ONCE, 1 dose, On Thu 5/22/25 at 1015  -     DRAIN/INJECT LARGE JOINT/BURSA  -     diclofenac (VOLTAREN) 75 MG EC tablet; Take 1 tablet by mouth 2 times daily as needed (Inflammation) Take with food, Disp-60 tablet, R-1Normal  Strain of calf muscle, left, initial encounter  -     diclofenac (VOLTAREN) 75 MG EC tablet; Take 1 tablet by mouth 2 times daily as needed (Inflammation) Take with food, Disp-60 tablet, R-1Normal  Primary osteoarthritis of right knee  -     diclofenac (VOLTAREN) 75 MG EC tablet; Take 1 tablet by mouth 2 times daily as needed (Inflammation) Take with food, Disp-60 tablet, R-1Normal     -Pertinent exam/Discussion:Tenderness to

## 2025-06-15 DIAGNOSIS — E55.9 VITAMIN D DEFICIENCY: ICD-10-CM

## 2025-06-16 RX ORDER — ERGOCALCIFEROL 1.25 MG/1
50000 CAPSULE, LIQUID FILLED ORAL WEEKLY
Qty: 12 CAPSULE | Refills: 3 | Status: SHIPPED | OUTPATIENT
Start: 2025-06-16

## 2025-06-16 NOTE — TELEPHONE ENCOUNTER
Comments:     Last Office Visit (last PCP visit):   3/31/2025    Next Visit Date:  Future Appointments   Date Time Provider Department Center   6/23/2025  9:00 AM Dennis Zaragoza MD [facility]   7/9/2025 10:15 AM Thang Montgomery MD [facility] ECC DEP       **If hasn't been seen in over a year OR hasn't followed up according to last diabetes/ADHD visit, make appointment for patient before sending refill to provider.    Rx requested:  Requested Prescriptions     Pending Prescriptions Disp Refills    vitamin D (ERGOCALCIFEROL) 1.25 MG (35890 UT) CAPS capsule [Pharmacy Med Name: Vitamin D (Ergocalciferol) 1.25 MG (29647 UT) Oral Capsule] 12 capsule 0     Sig: Take 1 capsule by mouth once a week

## 2025-06-23 ENCOUNTER — OFFICE VISIT (OUTPATIENT)
Dept: ORTHOPEDIC SURGERY | Age: 70
End: 2025-06-23
Payer: MEDICARE

## 2025-06-23 VITALS
BODY MASS INDEX: 40.63 KG/M2 | HEIGHT: 72 IN | HEART RATE: 78 BPM | WEIGHT: 300 LBS | SYSTOLIC BLOOD PRESSURE: 132 MMHG | OXYGEN SATURATION: 99 % | DIASTOLIC BLOOD PRESSURE: 86 MMHG

## 2025-06-23 DIAGNOSIS — R25.2 CRAMPS, MUSCLE, GENERAL: ICD-10-CM

## 2025-06-23 DIAGNOSIS — R25.2 CRAMPS, MUSCLE, GENERAL: Primary | ICD-10-CM

## 2025-06-23 LAB
ANION GAP SERPL CALCULATED.3IONS-SCNC: 13 MEQ/L (ref 9–15)
BUN SERPL-MCNC: 10 MG/DL (ref 8–23)
CALCIUM SERPL-MCNC: 9.3 MG/DL (ref 8.5–9.9)
CHLORIDE SERPL-SCNC: 100 MEQ/L (ref 95–107)
CO2 SERPL-SCNC: 24 MEQ/L (ref 20–31)
CREAT SERPL-MCNC: 0.98 MG/DL (ref 0.7–1.2)
GLUCOSE SERPL-MCNC: 175 MG/DL (ref 70–99)
POTASSIUM SERPL-SCNC: 4.4 MEQ/L (ref 3.4–4.9)
SODIUM SERPL-SCNC: 137 MEQ/L (ref 135–144)

## 2025-06-23 PROCEDURE — 3079F DIAST BP 80-89 MM HG: CPT | Performed by: FAMILY MEDICINE

## 2025-06-23 PROCEDURE — 1125F AMNT PAIN NOTED PAIN PRSNT: CPT | Performed by: FAMILY MEDICINE

## 2025-06-23 PROCEDURE — 3075F SYST BP GE 130 - 139MM HG: CPT | Performed by: FAMILY MEDICINE

## 2025-06-23 PROCEDURE — 3017F COLORECTAL CA SCREEN DOC REV: CPT | Performed by: FAMILY MEDICINE

## 2025-06-23 PROCEDURE — 1123F ACP DISCUSS/DSCN MKR DOCD: CPT | Performed by: FAMILY MEDICINE

## 2025-06-23 PROCEDURE — 1036F TOBACCO NON-USER: CPT | Performed by: FAMILY MEDICINE

## 2025-06-23 PROCEDURE — 1159F MED LIST DOCD IN RCRD: CPT | Performed by: FAMILY MEDICINE

## 2025-06-23 PROCEDURE — G8417 CALC BMI ABV UP PARAM F/U: HCPCS | Performed by: FAMILY MEDICINE

## 2025-06-23 PROCEDURE — 99213 OFFICE O/P EST LOW 20 MIN: CPT | Performed by: FAMILY MEDICINE

## 2025-06-23 PROCEDURE — G8427 DOCREV CUR MEDS BY ELIG CLIN: HCPCS | Performed by: FAMILY MEDICINE

## 2025-06-23 ASSESSMENT — ENCOUNTER SYMPTOMS: BACK PAIN: 0

## 2025-06-23 NOTE — PROGRESS NOTES
[facility] [place] [facility], [facility]  [address]  [facility] [address]  Dept: [phone]  Dept Fax: [phone]  Loc: [phone]     6/23/2025    Visit type: Follow up    Reason for Visit: Follow-up (Pt states he had a relief from corticosteroid inj, have been taking potassium and using grounding pads and thinks it help with knee pain.  Denies any swelling. )         Patient: Mason Morales is a 69 y.o. male     HPI: 69-year-old male presents for follow-up visit pertaining to left knee pain, patient received cortisone injection 6 weeks ago and reports overall significant improvement in pain.  Patient was also having significant muscle cramps, those have continued but lessened as he has been taking electrolytes.  Patient would like to have lab results for his electrolytes.    ASSESSMENT/PLAN   Cramps, muscle, general  -     Basic Metabolic Panel; Future     -Pertinent exam/Discussion: No tenderness to palpation of the medial joint line or lateral joint line of the left knee or the right knee    -Treatment options were discussed and all questions were answered  -Discussed use of ice and heat as needed, discussed activity modification  -Oral/topical medications: Diclofenac 75 mg twice daily as needed.  Also discussed use of acetaminophen as needed  -Procedure/injection: Doing well from previous intra-articular cortisone injection  -Follow-up: As needed      No orders of the defined types were placed in this encounter.       Subjective      Review of Systems   Musculoskeletal:  Negative for arthralgias, back pain, gait problem, joint swelling, myalgias and neck pain.   Skin:  Negative for wound.   Neurological:  Negative for dizziness, weakness and numbness.      Allergies   Allergen Reactions    Vancomycin Hives, Itching and Other (See Comments)     Rash all over with sores in the mouth as well. Fevers     Amoxicillin-Pot

## 2025-06-24 ENCOUNTER — RESULTS FOLLOW-UP (OUTPATIENT)
Age: 70
End: 2025-06-24

## 2025-07-09 ENCOUNTER — OFFICE VISIT (OUTPATIENT)
Dept: FAMILY MEDICINE CLINIC | Age: 70
End: 2025-07-09
Payer: MEDICARE

## 2025-07-09 VITALS
DIASTOLIC BLOOD PRESSURE: 82 MMHG | OXYGEN SATURATION: 96 % | HEART RATE: 79 BPM | SYSTOLIC BLOOD PRESSURE: 130 MMHG | WEIGHT: 306 LBS | BODY MASS INDEX: 41.45 KG/M2 | HEIGHT: 72 IN

## 2025-07-09 DIAGNOSIS — E66.813 CLASS 3 SEVERE OBESITY DUE TO EXCESS CALORIES WITH SERIOUS COMORBIDITY AND BODY MASS INDEX (BMI) OF 40.0 TO 44.9 IN ADULT (HCC): ICD-10-CM

## 2025-07-09 DIAGNOSIS — L57.0 ACTINIC KERATOSES: ICD-10-CM

## 2025-07-09 DIAGNOSIS — R79.89 LOW TESTOSTERONE: ICD-10-CM

## 2025-07-09 DIAGNOSIS — Z12.83 SKIN CANCER SCREENING: ICD-10-CM

## 2025-07-09 DIAGNOSIS — Z51.81 ENCOUNTER FOR MEDICATION MONITORING: ICD-10-CM

## 2025-07-09 DIAGNOSIS — R73.09 OTHER ABNORMAL GLUCOSE: ICD-10-CM

## 2025-07-09 DIAGNOSIS — I10 PRIMARY HYPERTENSION: Primary | ICD-10-CM

## 2025-07-09 DIAGNOSIS — L30.1 DYSHIDROTIC ECZEMA: ICD-10-CM

## 2025-07-09 DIAGNOSIS — B35.4 TINEA CORPORIS: ICD-10-CM

## 2025-07-09 PROCEDURE — 99215 OFFICE O/P EST HI 40 MIN: CPT | Performed by: FAMILY MEDICINE

## 2025-07-09 PROCEDURE — G8427 DOCREV CUR MEDS BY ELIG CLIN: HCPCS | Performed by: FAMILY MEDICINE

## 2025-07-09 PROCEDURE — 1160F RVW MEDS BY RX/DR IN RCRD: CPT | Performed by: FAMILY MEDICINE

## 2025-07-09 PROCEDURE — 1159F MED LIST DOCD IN RCRD: CPT | Performed by: FAMILY MEDICINE

## 2025-07-09 PROCEDURE — 17000 DESTRUCT PREMALG LESION: CPT | Performed by: FAMILY MEDICINE

## 2025-07-09 PROCEDURE — 3017F COLORECTAL CA SCREEN DOC REV: CPT | Performed by: FAMILY MEDICINE

## 2025-07-09 PROCEDURE — 3075F SYST BP GE 130 - 139MM HG: CPT | Performed by: FAMILY MEDICINE

## 2025-07-09 PROCEDURE — 1036F TOBACCO NON-USER: CPT | Performed by: FAMILY MEDICINE

## 2025-07-09 PROCEDURE — 3079F DIAST BP 80-89 MM HG: CPT | Performed by: FAMILY MEDICINE

## 2025-07-09 PROCEDURE — 1126F AMNT PAIN NOTED NONE PRSNT: CPT | Performed by: FAMILY MEDICINE

## 2025-07-09 PROCEDURE — 1123F ACP DISCUSS/DSCN MKR DOCD: CPT | Performed by: FAMILY MEDICINE

## 2025-07-09 PROCEDURE — G8417 CALC BMI ABV UP PARAM F/U: HCPCS | Performed by: FAMILY MEDICINE

## 2025-07-09 RX ORDER — NYSTATIN 100000 U/G
CREAM TOPICAL
Qty: 30 G | Refills: 0 | Status: SHIPPED | OUTPATIENT
Start: 2025-07-09

## 2025-07-09 RX ORDER — TESTOSTERONE CYPIONATE 200 MG/ML
200 INJECTION, SOLUTION INTRAMUSCULAR
Qty: 2 ML | Refills: 0 | Status: SHIPPED | OUTPATIENT
Start: 2025-07-09 | End: 2025-08-08

## 2025-07-09 ASSESSMENT — ENCOUNTER SYMPTOMS
SHORTNESS OF BREATH: 0
PHOTOPHOBIA: 0
ABDOMINAL PAIN: 0
CHEST TIGHTNESS: 0
ABDOMINAL DISTENTION: 0

## 2025-07-09 NOTE — PROGRESS NOTES
Diagnosis Orders   1. Primary hypertension  Amb Referral to Nutrition Services      2. Class 3 severe obesity due to excess calories with serious comorbidity and body mass index (BMI) of 40.0 to 44.9 in adult (HCC)  Amb Referral to Nutrition Services      3. Low testosterone  testosterone cypionate (DEPOTESTOTERONE CYPIONATE) 200 MG/ML injection    Testosterone, free, total    CBC with Auto Differential    Comprehensive Metabolic Panel      4. Encounter for medication monitoring  testosterone cypionate (DEPOTESTOTERONE CYPIONATE) 200 MG/ML injection    Testosterone, free, total    CBC with Auto Differential    Comprehensive Metabolic Panel      5. Other abnormal glucose  Amb Referral to Nutrition Services      6. Actinic keratoses  DESTRUC PREMALIGNANT, FIRST LESION      7. Tinea corporis  nystatin (MYCOSTATIN) 943312 UNIT/GM cream      8. Dyshidrotic eczema        9. Skin cancer screening            See patient instructions for further assessment/plan specifics    Return in about 3 months (around 10/9/2025) for bp and weight check. .  Patient Instructions   No change in treatment of hypertension.    Referral to dietary for obesity, abnormal blood sugar, and hypertension.    Testosterone and other medical condition monitoring labs will be ordered for this month.  Patient will get them fasting to reevaluate the glucose as well.    Tinea corporis being treated with nystatin cream twice a day for 1 to 2 weeks.  Be sure to keep the area dry.    Actinic keratosis treated with liquid nitrogen therapy today.    Cryotherapy instructions    Post op instructions given. A printed copy provided.    It is best to leave blisters alone if they form for the first 1-3 days to allow the desired damaged tissue (precancer lesion, wart, or whatever lesion is being removed) to separate from healthy tissue.     The area should be covered with a bandage to prevent blister breakage and dirt exposure.      The wounds should remain dry while

## 2025-07-09 NOTE — PATIENT INSTRUCTIONS
No change in treatment of hypertension.    Referral to dietary for obesity, abnormal blood sugar, and hypertension.    Testosterone and other medical condition monitoring labs will be ordered for this month.  Patient will get them fasting to reevaluate the glucose as well.    Tinea corporis being treated with nystatin cream twice a day for 1 to 2 weeks.  Be sure to keep the area dry.    Actinic keratosis treated with liquid nitrogen therapy today.    Cryotherapy instructions    Post op instructions given. A printed copy provided.    It is best to leave blisters alone if they form for the first 1-3 days to allow the desired damaged tissue (precancer lesion, wart, or whatever lesion is being removed) to separate from healthy tissue.     The area should be covered with a bandage to prevent blister breakage and dirt exposure.      The wounds should remain dry while there is a blister, therefore if this is a sweaty location, like the foot ,you may need to change clothing, such as socks, multiple times per day.       Remember that the reason for cryosurgery to be done is to damage skin that is not normal so that it will be removed.  Therefore the area could be red or pink, can sting or burn for the first day or 2, will appear raw when the skin sloughs off.    When the blister(s) pop or patient removes the top as instructed between day 3-5, apply antibiotic (NOT triple antibiotic, one brand is Neosporin) ointment, usually Bacitracin, and a bandage to affected area(s).  The ointment should be applied to the open area as long as it is not covered with skin.  Exposed tissue is meant to be moist.      Once a scab is formed the patient may stop applying ointment.  The scab may appear yellow while moist, don't confuse this with infection.  If the wound is infection pus will drain from the site. If this treatment was for a large wart you may note that a plug of skin may fall out of the area that was treated.  That is the center

## 2025-07-12 DIAGNOSIS — R79.89 ABNORMAL TSH: ICD-10-CM

## 2025-07-14 DIAGNOSIS — R79.89 ABNORMAL TSH: Primary | ICD-10-CM

## 2025-07-14 RX ORDER — LEVOTHYROXINE SODIUM 100 UG/1
100 TABLET ORAL DAILY
Qty: 30 TABLET | Refills: 0 | Status: SHIPPED | OUTPATIENT
Start: 2025-07-14

## 2025-07-14 NOTE — TELEPHONE ENCOUNTER
Comments:     Last Office Visit (last PCP visit):   7/9/2025    Next Visit Date:  Future Appointments   Date Time Provider Department Center   10/7/2025 10:30 AM Thang Montgomery MD [facility] ECC DEP       **If hasn't been seen in over a year OR hasn't followed up according to last diabetes/ADHD visit, make appointment for patient before sending refill to provider.    Rx requested:  Requested Prescriptions     Pending Prescriptions Disp Refills    levothyroxine (SYNTHROID) 100 MCG tablet [Pharmacy Med Name: Levothyroxine Sodium 100 MCG Oral Tablet] 30 tablet 0     Sig: Take 1 tablet by mouth once daily

## 2025-07-17 ENCOUNTER — TELEPHONE (OUTPATIENT)
Dept: FAMILY MEDICINE CLINIC | Age: 70
End: 2025-07-17

## 2025-07-17 NOTE — TELEPHONE ENCOUNTER
Pt is calling because he called his insurance and they said to have us call the provider line to get PA for nutritionist,  they said that a PA is required because he is not diabetic only pre diabetic.    Gave him a code [id number]  Phone for insurance provider line [phone]  He has appt on 8/7/2025 and needs to make sure insurance will cover.    Please let pt know

## 2025-07-21 DIAGNOSIS — R79.89 ABNORMAL TSH: ICD-10-CM

## 2025-07-21 DIAGNOSIS — R79.89 LOW TESTOSTERONE: ICD-10-CM

## 2025-07-21 DIAGNOSIS — Z51.81 ENCOUNTER FOR MEDICATION MONITORING: ICD-10-CM

## 2025-07-21 LAB
ALBUMIN SERPL-MCNC: 4.4 G/DL (ref 3.5–4.6)
ALP SERPL-CCNC: 80 U/L (ref 35–104)
ALT SERPL-CCNC: 24 U/L (ref 0–41)
ANION GAP SERPL CALCULATED.3IONS-SCNC: 13 MEQ/L (ref 9–15)
AST SERPL-CCNC: 10 U/L (ref 0–40)
BASOPHILS # BLD: 0.1 K/UL (ref 0–0.2)
BASOPHILS NFR BLD: 1.6 %
BILIRUB SERPL-MCNC: 0.6 MG/DL (ref 0.2–0.7)
BUN SERPL-MCNC: 17 MG/DL (ref 8–23)
CALCIUM SERPL-MCNC: 8.9 MG/DL (ref 8.5–9.9)
CHLORIDE SERPL-SCNC: 98 MEQ/L (ref 95–107)
CO2 SERPL-SCNC: 21 MEQ/L (ref 20–31)
CREAT SERPL-MCNC: 0.99 MG/DL (ref 0.7–1.2)
EOSINOPHIL # BLD: 0.3 K/UL (ref 0–0.7)
EOSINOPHIL NFR BLD: 3.7 %
ERYTHROCYTE [DISTWIDTH] IN BLOOD BY AUTOMATED COUNT: 13.2 % (ref 11.5–14.5)
GLOBULIN SER CALC-MCNC: 2.3 G/DL (ref 2.3–3.5)
GLUCOSE SERPL-MCNC: 157 MG/DL (ref 70–99)
HCT VFR BLD AUTO: 54.7 % (ref 42–52)
HGB BLD-MCNC: 18.9 G/DL (ref 14–18)
LYMPHOCYTES # BLD: 1.4 K/UL (ref 1–4.8)
LYMPHOCYTES NFR BLD: 20.1 %
MCH RBC QN AUTO: 29.6 PG (ref 27–31.3)
MCHC RBC AUTO-ENTMCNC: 34.6 % (ref 33–37)
MCV RBC AUTO: 85.6 FL (ref 79–92.2)
MONOCYTES # BLD: 0.6 K/UL (ref 0.2–0.8)
MONOCYTES NFR BLD: 8.1 %
NEUTROPHILS # BLD: 4.7 K/UL (ref 1.4–6.5)
NEUTS SEG NFR BLD: 66.1 %
PLATELET # BLD AUTO: 207 K/UL (ref 130–400)
POTASSIUM SERPL-SCNC: 4.2 MEQ/L (ref 3.4–4.9)
PROT SERPL-MCNC: 6.7 G/DL (ref 6.3–8)
RBC # BLD AUTO: 6.39 M/UL (ref 4.7–6.1)
SODIUM SERPL-SCNC: 132 MEQ/L (ref 135–144)
T4 FREE SERPL-MCNC: 1.09 NG/DL (ref 0.84–1.68)
TSH REFLEX: 4.92 UIU/ML (ref 0.44–3.86)
WBC # BLD AUTO: 7 K/UL (ref 4.8–10.8)

## 2025-07-21 NOTE — TELEPHONE ENCOUNTER
Per Laurie from patients insurance company it does NOT need a PA he is good to go insurance will cover the visit. Will let pt know also

## 2025-07-22 LAB
SHBG SERPL-SCNC: 28 NMOL/L (ref 19–76)
TESTOST FREE SERPL-MCNC: 187.4 PG/ML (ref 47–244)
TESTOST SERPL-MCNC: 757 NG/DL (ref 193–740)

## 2025-08-07 ENCOUNTER — HOSPITAL ENCOUNTER (OUTPATIENT)
Dept: NUTRITION | Age: 70
Discharge: HOME | End: 2025-08-07
Payer: MEDICARE

## 2025-08-07 VITALS — WEIGHT: 308.8 LBS | BODY MASS INDEX: 41.88 KG/M2

## 2025-08-07 PROCEDURE — 97802 MEDICAL NUTRITION INDIV IN: CPT

## 2025-08-10 DIAGNOSIS — R79.89 ABNORMAL TSH: ICD-10-CM

## 2025-08-11 RX ORDER — LEVOTHYROXINE SODIUM 100 UG/1
100 TABLET ORAL DAILY
Qty: 30 TABLET | Refills: 0 | Status: SHIPPED | OUTPATIENT
Start: 2025-08-11

## (undated) DEVICE — NEPTUNE E-SEP SMOKE EVACUATION PENCIL, COATED, 70MM BLADE, PUSH BUTTON SWITCH: Brand: NEPTUNE E-SEP

## (undated) DEVICE — PADDING CAST W4INXL4YD HIGHLY ABSRB THAN COT EZ APPL

## (undated) DEVICE — LABEL MED MINI W/ MARKER

## (undated) DEVICE — PROBE HATCHED TIP DEB TI SONIC 1

## (undated) DEVICE — GAUZE,SPONGE,FLUFF,6"X6.75",STRL,10/TRAY: Brand: MEDLINE

## (undated) DEVICE — GOWN,AURORA,NONREINFORCED,LARGE: Brand: MEDLINE

## (undated) DEVICE — SINGLE PORT MANIFOLD: Brand: NEPTUNE 2

## (undated) DEVICE — ELECTRODE PT RET AD L9FT HI MOIST COND ADH HYDRGEL CORDED

## (undated) DEVICE — TOWEL,OR,DSP,ST,BLUE,STD,4/PK,20PK/CS: Brand: MEDLINE

## (undated) DEVICE — PAD,ABDOMINAL,8"X10",ST,LF: Brand: MEDLINE

## (undated) DEVICE — 1010 S-DRAPE TOWEL DRAPE 10/BX: Brand: STERI-DRAPE™

## (undated) DEVICE — BANDAGE COMPR W4INXL5YD WHT BGE POLY COT M E WRP WV HK AND

## (undated) DEVICE — GOWN,AURORA,NONRNF,XL,30/CS: Brand: MEDLINE

## (undated) DEVICE — TUBING, SUCTION, 1/4" X 10', STRAIGHT: Brand: MEDLINE

## (undated) DEVICE — BANDAGE,GAUZE,BULKEE II,4.5"X4.1YD,STRL: Brand: MEDLINE

## (undated) DEVICE — COVER LT HNDL BLU PLAS

## (undated) DEVICE — INTENDED FOR TISSUE SEPARATION, AND OTHER PROCEDURES THAT REQUIRE A SHARP SURGICAL BLADE TO PUNCTURE OR CUT.: Brand: BARD-PARKER ® CARBON RIB-BACK BLADES

## (undated) DEVICE — SPONGE,LAP,18"X18",DLX,XR,ST,5/PK,40/PK: Brand: MEDLINE

## (undated) DEVICE — NEEDLE HYPO 25GA L1.5IN BLU POLYPR HUB S STL REG BVL STR

## (undated) DEVICE — SYRINGE IRRIG 60ML SFT PLIABLE BLB EZ TO GRP 1 HND USE W/

## (undated) DEVICE — COUNTER NDL 40 COUNT HLD 70 FOAM BLK ADH W/ MAG

## (undated) DEVICE — DRESSING PETRO W3XL8IN OIL EMUL N ADH GZ KNIT IMPREG CELOS

## (undated) DEVICE — APPLICATOR MEDICATED 26 CC SOLUTION HI LT ORNG CHLORAPREP

## (undated) DEVICE — GLOVE ORANGE PI 8   MSG9080

## (undated) DEVICE — GAUZE,PACKING STRIP,IODOFORM,1/2"X5YD,ST: Brand: CURAD

## (undated) DEVICE — PACK ARTHRO III ST SIRUS

## (undated) DEVICE — SYRINGE MED 10ML LUERLOCK TIP W/O SFTY DISP

## (undated) DEVICE — MARKER SURG SKIN GENTIAN VLT REG TIP W/ 6IN RUL